# Patient Record
Sex: FEMALE | Race: BLACK OR AFRICAN AMERICAN | NOT HISPANIC OR LATINO | Employment: OTHER | URBAN - METROPOLITAN AREA
[De-identification: names, ages, dates, MRNs, and addresses within clinical notes are randomized per-mention and may not be internally consistent; named-entity substitution may affect disease eponyms.]

---

## 2017-05-10 ENCOUNTER — OFFICE VISIT (OUTPATIENT)
Dept: FAMILY MEDICINE | Facility: CLINIC | Age: 82
End: 2017-05-10
Payer: MEDICARE

## 2017-05-10 ENCOUNTER — HOSPITAL ENCOUNTER (OUTPATIENT)
Dept: RADIOLOGY | Facility: HOSPITAL | Age: 82
Discharge: HOME | End: 2017-05-10
Attending: FAMILY MEDICINE
Payer: MEDICARE

## 2017-05-10 VITALS
WEIGHT: 154.13 LBS | HEIGHT: 59 IN | HEART RATE: 69 BPM | OXYGEN SATURATION: 98 % | TEMPERATURE: 98 F | SYSTOLIC BLOOD PRESSURE: 154 MMHG | DIASTOLIC BLOOD PRESSURE: 106 MMHG | BODY MASS INDEX: 31.07 KG/M2

## 2017-05-10 DIAGNOSIS — M25.552 LEFT HIP PAIN: ICD-10-CM

## 2017-05-10 DIAGNOSIS — M25.562 ACUTE PAIN OF LEFT KNEE: ICD-10-CM

## 2017-05-10 DIAGNOSIS — R53.82 CHRONIC FATIGUE: ICD-10-CM

## 2017-05-10 DIAGNOSIS — R79.9 ABNORMAL FINDING OF BLOOD CHEMISTRY: ICD-10-CM

## 2017-05-10 DIAGNOSIS — Z01.818 PRE-OP EVALUATION: Primary | ICD-10-CM

## 2017-05-10 DIAGNOSIS — Z01.818 PRE-OP EVALUATION: ICD-10-CM

## 2017-05-10 DIAGNOSIS — R03.0 ELEVATED BLOOD PRESSURE READING: ICD-10-CM

## 2017-05-10 PROCEDURE — 73521 X-RAY EXAM HIPS BI 2 VIEWS: CPT | Mod: TC,PO

## 2017-05-10 PROCEDURE — 99205 OFFICE O/P NEW HI 60 MIN: CPT | Mod: S$PBB,,, | Performed by: FAMILY MEDICINE

## 2017-05-10 PROCEDURE — 73521 X-RAY EXAM HIPS BI 2 VIEWS: CPT | Mod: 26,,, | Performed by: RADIOLOGY

## 2017-05-10 PROCEDURE — 71020 XR CHEST PA AND LATERAL: CPT | Mod: TC,PO

## 2017-05-10 PROCEDURE — 99999 PR PBB SHADOW E&M-NEW PATIENT-LVL III: CPT | Mod: PBBFAC,,, | Performed by: FAMILY MEDICINE

## 2017-05-10 PROCEDURE — 73560 X-RAY EXAM OF KNEE 1 OR 2: CPT | Mod: TC,50,PO

## 2017-05-10 PROCEDURE — 71020 XR CHEST PA AND LATERAL: CPT | Mod: 26,,, | Performed by: RADIOLOGY

## 2017-05-10 PROCEDURE — 73560 X-RAY EXAM OF KNEE 1 OR 2: CPT | Mod: 26,50,, | Performed by: RADIOLOGY

## 2017-05-10 NOTE — PROGRESS NOTES
[facility]  Progress Note    SUBJECTIVE:     Chief Complaint   Patient presents with    Pre-op Exam    Knee Pain    Hip Pain       HPI   Mikaela Ghosh  is a 84 y.o. female here for pre-op evaluation for cataract surgery. She is having them done next month, 1 eye first, and another 2 weeks after. She also has c/o left knee/hip pain for the past couple months. It is on/off. Certain positions make it worst. Still able to ambulate without problems. Rates it as mild/moderate. No trauma/falls.    Review of patient's allergies indicates:  No Known Allergies    History reviewed. No pertinent past medical history.  Past Surgical History:   Procedure Laterality Date    HYSTERECTOMY       History reviewed. No pertinent family history.  Social History   Substance Use Topics    Smoking status: Never Smoker    Smokeless tobacco: None    Alcohol use No        Review of Systems   Constitutional: Negative for chills, diaphoresis, fever and malaise/fatigue.   HENT: Negative.  Negative for congestion, ear pain and sore throat.    Eyes: Negative for photophobia and discharge.   Respiratory: Negative.  Negative for cough, shortness of breath and wheezing.    Cardiovascular: Negative.  Negative for chest pain and palpitations.   Gastrointestinal: Negative.  Negative for abdominal pain, constipation, diarrhea, nausea and vomiting.   Genitourinary: Negative.  Negative for dysuria and hematuria.   Musculoskeletal: Positive for joint pain (left hip, knee). Negative for back pain and myalgias.   Skin: Negative for itching and rash.   Neurological: Negative for dizziness, sensory change, focal weakness, weakness and headaches.   All other systems reviewed and are negative.    OBJECTIVE:     Vitals:    05/10/17 0841   BP: (!) 154/106   Pulse: 69   Temp: 97.6 °F (36.4 °C)     Body mass index is 31.12 kg/(m^2).    Physical Exam   Constitutional: She is oriented to person, place, and time and well-developed, well-nourished, and  in no distress. No distress.   HENT:   Head: Normocephalic and atraumatic.   Right Ear: Tympanic membrane is not perforated, not erythematous and not bulging. No hemotympanum.   Left Ear: Tympanic membrane is not perforated, not erythematous and not bulging. No hemotympanum.   Mouth/Throat: Oropharynx is clear and moist. No oropharyngeal exudate.   Eyes: Conjunctivae and EOM are normal. Pupils are equal, round, and reactive to light.   Neck: No thyromegaly present.   Cardiovascular: Normal rate, regular rhythm and normal heart sounds.  Exam reveals no gallop and no friction rub.    No murmur heard.  Pulmonary/Chest: Effort normal and breath sounds normal. No respiratory distress. She has no wheezes. She has no rales.   Abdominal: Soft. Bowel sounds are normal. She exhibits no distension. There is no tenderness. There is no rebound and no guarding.   Musculoskeletal: Normal range of motion. She exhibits no edema or tenderness (no point tenderness of left hip).   + mild crepitus of left knee, good ROM. No obvious deformities.    Lymphadenopathy:     She has no cervical adenopathy.   Neurological: She is alert and oriented to person, place, and time.   Skin: Skin is warm. No rash noted. She is not diaphoretic. No erythema.       Old records were reviewed. Labs and/or images were independently reviewed.    ASSESSMENT     1. Pre-op evaluation    2. Abnormal finding of blood chemistry     3. Chronic fatigue     4. Left hip pain    5. Acute pain of left knee    6. Elevated blood pressure reading        PLAN:     Pre-op evaluation  -     CBC auto differential; Future  -     Comprehensive metabolic panel; Future  -     Hemoglobin A1c; Future  -     Lipid panel; Future  -     TSH; Future  -     Urinalysis; Future  -     Cancel: X-Ray Chest 1 View; Future; Expected date: 5/10/17  -     X-Ray Chest PA And Lateral; Future; Expected date: 5/10/17  -     Patient low risk for low risk cataract surgery. Please proceed with caution.  Advised to not take advil/aleve or NSAIDs 10 days prior to surgery.    Left hip pain  -     X-Ray Hips Bilateral 2 View Incl AP Pelvis; Future; Expected date: 5/10/17    Acute pain of left knee  -     X-ray AP Standing Knees with Left Lateral; Future; Expected date: 5/10/17  -     Consider knee injection. Will check labs first to see what next best step in management is.    Elevated blood pressure   -     Educated patient to take medications as prescribed, and to record bp logs daily to bring along to next visit. Instructed patient to decrease salt intake. Also told patient to call if any new signs or symptoms develop.    RTC PRN 2 weeks for lab/bp follow-up.    Patient low risk for low risk cataract surgery. Please proceed with caution.    Felipe Mustafa MD  05/10/2017 10:35 AM

## 2017-05-24 ENCOUNTER — OFFICE VISIT (OUTPATIENT)
Dept: FAMILY MEDICINE | Facility: CLINIC | Age: 82
End: 2017-05-24
Payer: MEDICARE

## 2017-05-24 VITALS
HEART RATE: 80 BPM | DIASTOLIC BLOOD PRESSURE: 102 MMHG | SYSTOLIC BLOOD PRESSURE: 146 MMHG | OXYGEN SATURATION: 98 % | WEIGHT: 148.94 LBS | TEMPERATURE: 98 F | BODY MASS INDEX: 30.03 KG/M2 | HEIGHT: 59 IN

## 2017-05-24 DIAGNOSIS — I10 ESSENTIAL HYPERTENSION, BENIGN: Primary | ICD-10-CM

## 2017-05-24 PROCEDURE — 99214 OFFICE O/P EST MOD 30 MIN: CPT | Mod: S$PBB,,, | Performed by: FAMILY MEDICINE

## 2017-05-24 PROCEDURE — 99999 PR PBB SHADOW E&M-EST. PATIENT-LVL III: CPT | Mod: PBBFAC,,, | Performed by: FAMILY MEDICINE

## 2017-05-24 PROCEDURE — 99213 OFFICE O/P EST LOW 20 MIN: CPT | Mod: PBBFAC,PO | Performed by: FAMILY MEDICINE

## 2017-05-24 RX ORDER — AMLODIPINE BESYLATE 10 MG/1
10 TABLET ORAL DAILY
Qty: 30 TABLET | Refills: 3 | Status: SHIPPED | OUTPATIENT
Start: 2017-05-24 | End: 2018-04-18 | Stop reason: SDUPTHER

## 2017-05-24 NOTE — PROGRESS NOTES
[facility]  Progress Note    SUBJECTIVE:     Chief Complaint   Patient presents with    Hypertension       HPI   Mikaela Ghosh  is a 84 y.o. female here for follow-up of her hypertension. Her blood pressure numbers have been elevated. She has scheduled cataract surgery next month. No other problems/complaints.     Review of patient's allergies indicates:  No Known Allergies    History reviewed. No pertinent past medical history.  Past Surgical History:   Procedure Laterality Date    HYSTERECTOMY       History reviewed. No pertinent family history.  Social History   Substance Use Topics    Smoking status: Never Smoker    Smokeless tobacco: Not on file    Alcohol use No        Review of Systems   Constitutional: Negative for chills, fever and malaise/fatigue.   HENT: Negative.    Respiratory: Negative.  Negative for cough and shortness of breath.    Cardiovascular: Negative.  Negative for chest pain.   Gastrointestinal: Negative.  Negative for abdominal pain, nausea and vomiting.   Genitourinary: Negative.    Neurological: Negative for weakness and headaches.   All other systems reviewed and are negative.    OBJECTIVE:     Vitals:    05/24/17 1524   BP: (!) 146/102   Pulse: 80   Temp: 98.3 °F (36.8 °C)     Body mass index is 30.08 kg/m².    Physical Exam   Constitutional: She is oriented to person, place, and time and well-developed, well-nourished, and in no distress. No distress.   HENT:   Head: Normocephalic and atraumatic.   Eyes: Conjunctivae and EOM are normal.   Cardiovascular: Normal rate, regular rhythm and normal heart sounds.  Exam reveals no gallop and no friction rub.    No murmur heard.  Pulmonary/Chest: Effort normal and breath sounds normal. No respiratory distress. She has no wheezes. She has no rales.   Abdominal: Soft. Bowel sounds are normal. She exhibits no distension. There is no tenderness.   Neurological: She is alert and oriented to person, place, and time.   Skin: Skin is  warm. She is not diaphoretic.       Old records were reviewed. Labs and/or images were independently reviewed.    ASSESSMENT     1. Essential hypertension, benign        PLAN:     Essential hypertension, benign  -     Start amlodipine (NORVASC) 10 MG tablet; Take 1 tablet (10 mg total) by mouth once daily.  Dispense: 30 tablet; Refill: 3  -     Educated patient to take medications as prescribed, and to record bp logs daily to bring along to next visit. Instructed patient to decrease salt intake. Also told patient to call if any new signs or symptoms develop.    RTC PRN. After cataract surgery.    More than 50% of the encounter was spent counseling patient about HTN, in an outpatient setting. Total time spent counseling patient: 25.    Felipe Mustafa MD  05/24/2017 3:52 PM

## 2018-04-18 ENCOUNTER — HOSPITAL ENCOUNTER (OUTPATIENT)
Dept: RADIOLOGY | Facility: HOSPITAL | Age: 83
Discharge: HOME | DRG: 025 | End: 2018-04-18
Attending: FAMILY MEDICINE
Payer: MEDICARE

## 2018-04-18 ENCOUNTER — OFFICE VISIT (OUTPATIENT)
Dept: FAMILY MEDICINE | Facility: CLINIC | Age: 83
DRG: 025 | End: 2018-04-18
Payer: MEDICARE

## 2018-04-18 ENCOUNTER — HOSPITAL ENCOUNTER (INPATIENT)
Facility: HOSPITAL | Age: 83
LOS: 8 days | Discharge: NURSING, REHAB OR LONG-TERM CARE | DRG: 025 | End: 2018-04-26
Attending: EMERGENCY MEDICINE | Admitting: EMERGENCY MEDICINE
Payer: MEDICARE

## 2018-04-18 VITALS
HEIGHT: 59 IN | WEIGHT: 145.75 LBS | HEART RATE: 72 BPM | BODY MASS INDEX: 29.38 KG/M2 | SYSTOLIC BLOOD PRESSURE: 154 MMHG | OXYGEN SATURATION: 99 % | TEMPERATURE: 98 F | DIASTOLIC BLOOD PRESSURE: 102 MMHG

## 2018-04-18 DIAGNOSIS — I62.9 INTRACRANIAL BLEED: ICD-10-CM

## 2018-04-18 DIAGNOSIS — I51.7 CARDIOMEGALY: ICD-10-CM

## 2018-04-18 DIAGNOSIS — R29.818 FOCAL NEUROLOGICAL DEFICIT: Primary | ICD-10-CM

## 2018-04-18 DIAGNOSIS — R29.818 FOCAL NEUROLOGICAL DEFICIT: ICD-10-CM

## 2018-04-18 DIAGNOSIS — I62.03 ACUTE ON CHRONIC INTRACRANIAL SUBDURAL HEMATOMA: ICD-10-CM

## 2018-04-18 DIAGNOSIS — I63.9 STROKE: ICD-10-CM

## 2018-04-18 DIAGNOSIS — S06.5XAA SDH (SUBDURAL HEMATOMA): ICD-10-CM

## 2018-04-18 DIAGNOSIS — Z29.89 SEIZURE PROPHYLAXIS: ICD-10-CM

## 2018-04-18 DIAGNOSIS — I62.01 ACUTE ON CHRONIC INTRACRANIAL SUBDURAL HEMATOMA: ICD-10-CM

## 2018-04-18 DIAGNOSIS — S06.5XAA SUBDURAL HEMATOMA: Primary | ICD-10-CM

## 2018-04-18 DIAGNOSIS — I10 ESSENTIAL HYPERTENSION, BENIGN: ICD-10-CM

## 2018-04-18 LAB
ALBUMIN SERPL BCP-MCNC: 3.3 G/DL
ALBUMIN SERPL BCP-MCNC: 3.7 G/DL
ALP SERPL-CCNC: 65 U/L
ALP SERPL-CCNC: 68 U/L
ALT SERPL W/O P-5'-P-CCNC: 13 U/L
ALT SERPL W/O P-5'-P-CCNC: 15 U/L
ANION GAP SERPL CALC-SCNC: 10 MMOL/L
ANION GAP SERPL CALC-SCNC: 7 MMOL/L
APTT BLDCRRT: 28.1 SEC
AST SERPL-CCNC: 17 U/L
AST SERPL-CCNC: 18 U/L
BASOPHILS # BLD AUTO: 0.03 K/UL
BASOPHILS # BLD AUTO: 0.04 K/UL
BASOPHILS NFR BLD: 0.5 %
BASOPHILS NFR BLD: 0.8 %
BILIRUB SERPL-MCNC: 0.7 MG/DL
BILIRUB SERPL-MCNC: 0.7 MG/DL
BUN SERPL-MCNC: 16 MG/DL
BUN SERPL-MCNC: 17 MG/DL
CALCIUM SERPL-MCNC: 11.4 MG/DL
CALCIUM SERPL-MCNC: 12 MG/DL
CHLORIDE SERPL-SCNC: 109 MMOL/L
CHLORIDE SERPL-SCNC: 113 MMOL/L
CHOLEST SERPL-MCNC: 168 MG/DL
CHOLEST/HDLC SERPL: 3.1 {RATIO}
CK MB SERPL-MCNC: 2.2 NG/ML
CK MB SERPL-RTO: 2.8 %
CK SERPL-CCNC: 78 U/L
CO2 SERPL-SCNC: 19 MMOL/L
CO2 SERPL-SCNC: 24 MMOL/L
CREAT SERPL-MCNC: 0.7 MG/DL
CREAT SERPL-MCNC: 0.9 MG/DL
DIFFERENTIAL METHOD: ABNORMAL
DIFFERENTIAL METHOD: ABNORMAL
EOSINOPHIL # BLD AUTO: 0.1 K/UL
EOSINOPHIL # BLD AUTO: 0.1 K/UL
EOSINOPHIL NFR BLD: 1.6 %
EOSINOPHIL NFR BLD: 1.8 %
ERYTHROCYTE [DISTWIDTH] IN BLOOD BY AUTOMATED COUNT: 15 %
ERYTHROCYTE [DISTWIDTH] IN BLOOD BY AUTOMATED COUNT: 15.4 %
EST. GFR  (AFRICAN AMERICAN): >60 ML/MIN/1.73 M^2
EST. GFR  (AFRICAN AMERICAN): >60 ML/MIN/1.73 M^2
EST. GFR  (NON AFRICAN AMERICAN): 59 ML/MIN/1.73 M^2
EST. GFR  (NON AFRICAN AMERICAN): >60 ML/MIN/1.73 M^2
ESTIMATED AVG GLUCOSE: 100 MG/DL
GLUCOSE SERPL-MCNC: 74 MG/DL
GLUCOSE SERPL-MCNC: 75 MG/DL
HBA1C MFR BLD HPLC: 5.1 %
HCT VFR BLD AUTO: 40.4 %
HCT VFR BLD AUTO: 40.9 %
HDLC SERPL-MCNC: 55 MG/DL
HDLC SERPL: 32.7 %
HGB BLD-MCNC: 13.1 G/DL
HGB BLD-MCNC: 13.2 G/DL
IMM GRANULOCYTES # BLD AUTO: 0.02 K/UL
IMM GRANULOCYTES NFR BLD AUTO: 0.4 %
INR PPP: 1
INR PPP: 1.1
INR PPP: 1.1
LDLC SERPL CALC-MCNC: 100 MG/DL
LYMPHOCYTES # BLD AUTO: 1.4 K/UL
LYMPHOCYTES # BLD AUTO: 1.7 K/UL
LYMPHOCYTES NFR BLD: 28 %
LYMPHOCYTES NFR BLD: 31.1 %
MAGNESIUM SERPL-MCNC: 2.1 MG/DL
MCH RBC QN AUTO: 31 PG
MCH RBC QN AUTO: 31.1 PG
MCHC RBC AUTO-ENTMCNC: 32 G/DL
MCHC RBC AUTO-ENTMCNC: 32.7 G/DL
MCV RBC AUTO: 95 FL
MCV RBC AUTO: 97 FL
MONOCYTES # BLD AUTO: 0.5 K/UL
MONOCYTES # BLD AUTO: 0.6 K/UL
MONOCYTES NFR BLD: 11.3 %
MONOCYTES NFR BLD: 9.9 %
NEUTROPHILS # BLD AUTO: 3 K/UL
NEUTROPHILS # BLD AUTO: 3.1 K/UL
NEUTROPHILS NFR BLD: 56.5 %
NEUTROPHILS NFR BLD: 57.9 %
NONHDLC SERPL-MCNC: 113 MG/DL
NRBC BLD-RTO: 0 /100 WBC
PHOSPHATE SERPL-MCNC: 1.6 MG/DL
PLATELET # BLD AUTO: 162 K/UL
PLATELET # BLD AUTO: 164 K/UL
PMV BLD AUTO: 11.2 FL
PMV BLD AUTO: 11.5 FL
POCT GLUCOSE: 80 MG/DL (ref 70–110)
POTASSIUM SERPL-SCNC: 3.6 MMOL/L
POTASSIUM SERPL-SCNC: 3.8 MMOL/L
PROT SERPL-MCNC: 6.5 G/DL
PROT SERPL-MCNC: 7.1 G/DL
PROTHROMBIN TIME: 10.4 SEC
PROTHROMBIN TIME: 11 SEC
PROTHROMBIN TIME: 11.2 SEC
RBC # BLD AUTO: 4.22 M/UL
RBC # BLD AUTO: 4.25 M/UL
SODIUM SERPL-SCNC: 140 MMOL/L
SODIUM SERPL-SCNC: 142 MMOL/L
TRIGL SERPL-MCNC: 65 MG/DL
TROPONIN I SERPL DL<=0.01 NG/ML-MCNC: 0.01 NG/ML
TSH SERPL DL<=0.005 MIU/L-ACNC: 1.23 UIU/ML
WBC # BLD AUTO: 5.14 K/UL
WBC # BLD AUTO: 5.46 K/UL

## 2018-04-18 PROCEDURE — 96375 TX/PRO/DX INJ NEW DRUG ADDON: CPT

## 2018-04-18 PROCEDURE — 85025 COMPLETE CBC W/AUTO DIFF WBC: CPT

## 2018-04-18 PROCEDURE — 82553 CREATINE MB FRACTION: CPT

## 2018-04-18 PROCEDURE — 83735 ASSAY OF MAGNESIUM: CPT

## 2018-04-18 PROCEDURE — 70450 CT HEAD/BRAIN W/O DYE: CPT | Mod: TC

## 2018-04-18 PROCEDURE — 70450 CT HEAD/BRAIN W/O DYE: CPT | Mod: 26,,, | Performed by: RADIOLOGY

## 2018-04-18 PROCEDURE — 93005 ELECTROCARDIOGRAM TRACING: CPT

## 2018-04-18 PROCEDURE — 25000003 PHARM REV CODE 250: Performed by: NURSE PRACTITIONER

## 2018-04-18 PROCEDURE — 93010 ELECTROCARDIOGRAM REPORT: CPT | Mod: ,,, | Performed by: INTERNAL MEDICINE

## 2018-04-18 PROCEDURE — 85610 PROTHROMBIN TIME: CPT | Mod: 91

## 2018-04-18 PROCEDURE — 99999 PR PBB SHADOW E&M-EST. PATIENT-LVL III: CPT | Mod: PBBFAC,,, | Performed by: FAMILY MEDICINE

## 2018-04-18 PROCEDURE — 80053 COMPREHEN METABOLIC PANEL: CPT | Mod: 91

## 2018-04-18 PROCEDURE — 84100 ASSAY OF PHOSPHORUS: CPT

## 2018-04-18 PROCEDURE — 99285 EMERGENCY DEPT VISIT HI MDM: CPT | Mod: 25,27

## 2018-04-18 PROCEDURE — 82550 ASSAY OF CK (CPK): CPT

## 2018-04-18 PROCEDURE — 80061 LIPID PANEL: CPT

## 2018-04-18 PROCEDURE — 99222 1ST HOSP IP/OBS MODERATE 55: CPT | Mod: GC,,, | Performed by: NEUROLOGICAL SURGERY

## 2018-04-18 PROCEDURE — 80053 COMPREHEN METABOLIC PANEL: CPT

## 2018-04-18 PROCEDURE — 83036 HEMOGLOBIN GLYCOSYLATED A1C: CPT

## 2018-04-18 PROCEDURE — 84443 ASSAY THYROID STIM HORMONE: CPT

## 2018-04-18 PROCEDURE — 99291 CRITICAL CARE FIRST HOUR: CPT | Mod: ,,, | Performed by: NURSE PRACTITIONER

## 2018-04-18 PROCEDURE — 85610 PROTHROMBIN TIME: CPT

## 2018-04-18 PROCEDURE — 84484 ASSAY OF TROPONIN QUANT: CPT

## 2018-04-18 PROCEDURE — 85025 COMPLETE CBC W/AUTO DIFF WBC: CPT | Mod: 91

## 2018-04-18 PROCEDURE — 12000002 HC ACUTE/MED SURGE SEMI-PRIVATE ROOM

## 2018-04-18 PROCEDURE — 63600175 PHARM REV CODE 636 W HCPCS: Performed by: EMERGENCY MEDICINE

## 2018-04-18 PROCEDURE — 96374 THER/PROPH/DIAG INJ IV PUSH: CPT

## 2018-04-18 PROCEDURE — 82962 GLUCOSE BLOOD TEST: CPT

## 2018-04-18 PROCEDURE — 99213 OFFICE O/P EST LOW 20 MIN: CPT | Mod: PBBFAC,PO | Performed by: FAMILY MEDICINE

## 2018-04-18 PROCEDURE — 99215 OFFICE O/P EST HI 40 MIN: CPT | Mod: S$PBB,,, | Performed by: FAMILY MEDICINE

## 2018-04-18 PROCEDURE — 85730 THROMBOPLASTIN TIME PARTIAL: CPT

## 2018-04-18 RX ORDER — ACETAMINOPHEN 325 MG/1
650 TABLET ORAL EVERY 6 HOURS PRN
Status: DISCONTINUED | OUTPATIENT
Start: 2018-04-18 | End: 2018-04-26 | Stop reason: HOSPADM

## 2018-04-18 RX ORDER — SODIUM CHLORIDE 9 MG/ML
INJECTION, SOLUTION INTRAVENOUS CONTINUOUS
Status: DISCONTINUED | OUTPATIENT
Start: 2018-04-18 | End: 2018-04-23

## 2018-04-18 RX ORDER — LABETALOL HYDROCHLORIDE 5 MG/ML
10 INJECTION, SOLUTION INTRAVENOUS EVERY 4 HOURS PRN
Status: DISCONTINUED | OUTPATIENT
Start: 2018-04-18 | End: 2018-04-19

## 2018-04-18 RX ORDER — AMLODIPINE BESYLATE 10 MG/1
10 TABLET ORAL DAILY
Qty: 90 TABLET | Refills: 3 | Status: SHIPPED | OUTPATIENT
Start: 2018-04-18 | End: 2018-05-21 | Stop reason: SDUPTHER

## 2018-04-18 RX ORDER — HYDRALAZINE HYDROCHLORIDE 20 MG/ML
10 INJECTION INTRAMUSCULAR; INTRAVENOUS
Status: COMPLETED | OUTPATIENT
Start: 2018-04-18 | End: 2018-04-18

## 2018-04-18 RX ADMIN — HYDRALAZINE HYDROCHLORIDE 10 MG: 20 INJECTION INTRAMUSCULAR; INTRAVENOUS at 05:04

## 2018-04-18 RX ADMIN — POTASSIUM PHOSPHATE, MONOBASIC AND POTASSIUM PHOSPHATE, DIBASIC 30 MMOL: 224; 236 INJECTION, SOLUTION, CONCENTRATE INTRAVENOUS at 09:04

## 2018-04-18 RX ADMIN — SODIUM CHLORIDE: 0.9 INJECTION, SOLUTION INTRAVENOUS at 08:04

## 2018-04-18 NOTE — ASSESSMENT & PLAN NOTE
84 F with subacute left convexity SDH  -Admit to neuro ICU  -SBP less than 160  -Keppra  -NPO for now  -q 1 hr neuro checks.  -Repeat CT head tomorrow am.

## 2018-04-18 NOTE — SUBJECTIVE & OBJECTIVE
(Not in a hospital admission)    Review of patient's allergies indicates:  No Known Allergies    Past Medical History:   Diagnosis Date    Hypertension      Past Surgical History:   Procedure Laterality Date    EYE SURGERY      cataract    HYSTERECTOMY       Family History     None        Social History Main Topics    Smoking status: Never Smoker    Smokeless tobacco: Never Used    Alcohol use No    Drug use: No    Sexual activity: No     Review of Systems  Objective:     Weight: 65.8 kg (145 lb)  Body mass index is 26.52 kg/m².  Vital Signs (Most Recent):  Temp: 98.7 °F (37.1 °C) (04/18/18 1810)  Pulse: 100 (04/18/18 1810)  Resp: (!) 22 (04/18/18 1810)  BP: (!) 131/92 (04/18/18 1810)  SpO2: 99 % (04/18/18 1810) Vital Signs (24h Range):  Temp:  [97.7 °F (36.5 °C)-98.7 °F (37.1 °C)] 98.7 °F (37.1 °C)  Pulse:  [] 100  Resp:  [18-22] 22  SpO2:  [97 %-99 %] 99 %  BP: (131-202)/() 131/92                           Neurosurgery Physical Exam   AOX3, speech fluent  PERRL, EOMI, face symm, tongue midline  5/5 in BUE  4/5 in right HF otherwise 5/5 in BLE  SILT  No drift    Significant Labs:    Recent Labs  Lab 04/18/18  1600   GLU 75      K 3.8      CO2 24   BUN 17   CREATININE 0.9   CALCIUM 12.0*       Recent Labs  Lab 04/18/18  1600   WBC 5.14   HGB 13.2   HCT 40.4          Recent Labs  Lab 04/18/18  1600   INR 1.0   APTT 28.1     Microbiology Results (last 7 days)     ** No results found for the last 168 hours. **            Significant Diagnostics:  CT head: reviewed

## 2018-04-18 NOTE — HOSPITAL COURSE
4/18: Admit to [facility]. HCT shows a large left SDH.   4/19: NAEON. Repeat HCT shows stable large left subdural hematoma. Stable mass effect and midline shift.   4/20: OR for crani for SDH evacuation. SD drain left intra op.   4/21: No acute events overnight. Exam stable. Post op HCT shows decrease in size, shift, and mass effect of SDH.   4/22: No acute events overnight. Exam stable. SD drain removed without complication. Patient tolerated removal well. HCT s/p removal of drain is stable.   4/24: No acute events overnight. Exam stable.  Stable for TTF per NGSY. HCT stable - slight decrease in size of collection.   4/25: No acute events overnight. Exam stable. PT/OT recommending SNF. CM to give choices today.  4/26: No acute events overnight. Exam stable. DC to [facility]. Discharge instructions given verbally to patient and family. All of their questions were answered. They were encouraged to call the clinic with any questions or concerns prior to follow up appt.

## 2018-04-18 NOTE — ED TRIAGE NOTES
Patient transferred from [facility] today for he presented to their ED from dizziness, leg weakness, and leg weakness.  Patient was transferred to us for Subdural hematoma with midline shift.

## 2018-04-18 NOTE — ED TRIAGE NOTES
Pt to the ED via personal vehicle with c/o Subdural hematoma. Pt reports symptoms began Sunday (+dizziness, R arm and leg weakness, HTN). Pt was seen by MD and they ordered a CT. MD then called pt and told to come to ED due to brain bleed. No acute distress noted. R sided weakness noted.

## 2018-04-18 NOTE — PROGRESS NOTES
"[facility]  Progress Note    SUBJECTIVE:     Chief Complaint   Patient presents with    Leg Pain    Dizziness       HPI   Mikaela Ghosh  is a 84 y.o. female brought in by daughter who is primary caretaker and historian. Patient has been feeling dizziness for the past week. Patient had called her daughter and said that she was having some right leg pain and weakness. When she walks, she "feels her right leg catch" and feels off balanced, dragging her right foot most of the time. She feels like her right hand  has significantly weakened, dropping a lot of things when she tries to hold it, like cups. Patient thinks this all may have started about 3 weeks ago.     Review of patient's allergies indicates:  No Known Allergies    History reviewed. No pertinent past medical history.  Past Surgical History:   Procedure Laterality Date    HYSTERECTOMY       History reviewed. No pertinent family history.  Social History   Substance Use Topics    Smoking status: Never Smoker    Smokeless tobacco: Never Used    Alcohol use No        Review of Systems   Constitutional: Negative for chills, fever and malaise/fatigue.   HENT: Negative.    Respiratory: Negative.  Negative for cough and shortness of breath.    Cardiovascular: Negative.  Negative for chest pain.   Gastrointestinal: Negative.  Negative for abdominal pain, nausea and vomiting.   Genitourinary: Negative.    Neurological: Positive for dizziness, focal weakness and weakness (of right upper/lower extremity). Negative for tingling, sensory change, speech change, loss of consciousness and headaches.   All other systems reviewed and are negative.    OBJECTIVE:     Vitals:    04/18/18 1307   BP: (!) 154/102   Pulse: 72   Temp: 97.7 °F (36.5 °C)     Body mass index is 29.43 kg/m².    Physical Exam   Constitutional: She is oriented to person, place, and time and well-developed, well-nourished, and in no distress. No distress.   HENT:   Head: Normocephalic and " atraumatic.   Nose: Nose normal.   Eyes: Conjunctivae and EOM are normal.   Cardiovascular: Normal rate, regular rhythm and normal heart sounds.  Exam reveals no gallop and no friction rub.    No murmur heard.  Pulmonary/Chest: Effort normal and breath sounds normal. No respiratory distress. She has no wheezes. She has no rales. She exhibits no tenderness.   Abdominal: Soft. Bowel sounds are normal. She exhibits no distension. There is no tenderness. There is no rebound.   Neurological: She is alert and oriented to person, place, and time. No cranial nerve deficit (CN 2-12 grossly intact. ).   + noticeably weaker RUE/RLE when compared to LUE/LLE. Weaker RUE . Abnormal gait, with RLE dragging. Finger to nose ok but slow.    Skin: Skin is warm. She is not diaphoretic.       Old records were reviewed. Labs and/or images were independently reviewed.    ASSESSMENT     1. Focal neurological deficit    2. Essential hypertension, benign        PLAN:     Focal neurological deficit  -     CT Head Without Contrast; Future; Expected date: 04/18/2018  -     With new focal neurological deficit, and not compliant with blood pressure medications, will obtain CT head STAT to rule out stroke.     Essential hypertension, benign  -     amLODIPine (NORVASC) 10 MG tablet; Take 1 tablet (10 mg total) by mouth once daily.  Dispense: 90 tablet; Refill: 3  -     Educated patient to take medications as prescribed, and to record bp logs daily to bring along to next visit. Instructed patient to decrease salt intake. Also told patient to call if any new signs or symptoms develop. Will need RTC within 2-3 weeks for bp management.       Dispo: radiology     Felipe Mustafa MD  04/18/2018 1:28 PM

## 2018-04-18 NOTE — CONSULTS
[facility]  Neurosurgery  Consult Note    Consults  Subjective:     Chief Complaint/Reason for Admission: SDH    History of Present Illness: 84 F presents for eval of SDH.  Per pt she had dizziness 2 days ago and was instructed to get CT head which showed subacute left convexity SDH.  Pt denies any recent falls, traumas, or HA.  She took baby asa 2 days ago but doesn't routinely take asa.      (Not in a hospital admission)    Review of patient's allergies indicates:  No Known Allergies    Past Medical History:   Diagnosis Date    Hypertension      Past Surgical History:   Procedure Laterality Date    EYE SURGERY      cataract    HYSTERECTOMY       Family History     None        Social History Main Topics    Smoking status: Never Smoker    Smokeless tobacco: Never Used    Alcohol use No    Drug use: No    Sexual activity: No     Review of Systems  Objective:     Weight: 65.8 kg (145 lb)  Body mass index is 26.52 kg/m².  Vital Signs (Most Recent):  Temp: 98.7 °F (37.1 °C) (04/18/18 1810)  Pulse: 100 (04/18/18 1810)  Resp: (!) 22 (04/18/18 1810)  BP: (!) 131/92 (04/18/18 1810)  SpO2: 99 % (04/18/18 1810) Vital Signs (24h Range):  Temp:  [97.7 °F (36.5 °C)-98.7 °F (37.1 °C)] 98.7 °F (37.1 °C)  Pulse:  [] 100  Resp:  [18-22] 22  SpO2:  [97 %-99 %] 99 %  BP: (131-202)/() 131/92                           Neurosurgery Physical Exam   AOX3, speech fluent  PERRL, EOMI, face symm, tongue midline  5/5 in BUE  4/5 in right HF otherwise 5/5 in BLE  SILT  No drift    Significant Labs:    Recent Labs  Lab 04/18/18  1600   GLU 75      K 3.8      CO2 24   BUN 17   CREATININE 0.9   CALCIUM 12.0*       Recent Labs  Lab 04/18/18  1600   WBC 5.14   HGB 13.2   HCT 40.4          Recent Labs  Lab 04/18/18  1600   INR 1.0   APTT 28.1     Microbiology Results (last 7 days)     ** No results found for the last 168 hours. **            Significant Diagnostics:  CT head:  reviewed    Assessment/Plan:     * SDH (subdural hematoma)    84 F with subacute left convexity SDH  -Admit to neuro ICU  -SBP less than 160  -Keppra  -NPO for now  -q 1 hr neuro checks.  -Repeat CT head stealth tomorrow am  -Please provide medical clearance for possible intervention                Ramakrishna Garcia,   Neurosurgery  [facility]

## 2018-04-18 NOTE — HPI
84 F presents for eval of SDH.  Per pt she had dizziness 2 days ago and was instructed to get CT head which showed subacute left convexity SDH.  Pt denies any recent falls, traumas, or HA.  She took baby asa 2 days ago but doesn't routinely take asa.

## 2018-04-18 NOTE — ED PROVIDER NOTES
Encounter Date: 4/18/2018    SCRIBE #1 NOTE: I, Krishan Lopez, am scribing for, and in the presence of,  Giorgio Perkins MD. I have scribed the following portions of the note - Other sections scribed: HPI, ROS and PE.       History     Chief Complaint   Patient presents with    Subdural Hematoma     Dr. Mustafa called stating pt had out-pt CT this am.  He recv'd results, called pt, told her to come here.  Pt arrived A&O x 3 in no acute distress.     CC: Subdural Hematoma    HPI: This 84 y.o F with no pertinent PMHx presents to the ED for emergent evaluation of a subdural hematoma. The pt reports an out-pt CT performed this AM revealed a subdural hematoma. The pt was told to come to the ED by her PCP. The pt c/o dizziness, trouble walking, right upper extremity weakness and right lower extremity weakness since 4/15/18 while at work. The pt's daughter reports the pt was unable to  her pencil this AM. The pt is not Rx blood thinners, but did take an Aspirin 4/16/18. The pt denies trauma or fall. The pt denies fever, chills, diaphoresis, visual disturbances, speech difficulty, chest pain, SOB and N/V/D. The pt does not smoke and denies ETOH consumption. No prior tx.      The history is provided by the patient. No  was used.     Review of patient's allergies indicates:  No Known Allergies  Past Medical History:   Diagnosis Date    Hypertension      Past Surgical History:   Procedure Laterality Date    EYE SURGERY      cataract    HYSTERECTOMY       History reviewed. No pertinent family history.  Social History   Substance Use Topics    Smoking status: Never Smoker    Smokeless tobacco: Never Used    Alcohol use No     Review of Systems   Constitutional: Negative for chills, diaphoresis and fever.   HENT: Negative for rhinorrhea and sore throat.    Eyes: Negative for redness and visual disturbance.   Respiratory: Negative for cough and shortness of breath.    Cardiovascular: Negative for  chest pain.   Gastrointestinal: Negative for abdominal pain, diarrhea, nausea and vomiting.   Genitourinary: Negative for dysuria, frequency and urgency.   Musculoskeletal: Positive for gait problem. Negative for back pain and neck pain.   Skin: Negative for rash.   Neurological: Positive for dizziness and weakness.        (+) subdural hematoma   Psychiatric/Behavioral: The patient is not nervous/anxious.        Physical Exam     Initial Vitals   BP Pulse Resp Temp SpO2   -- -- -- -- --      MAP       --         Physical Exam    Nursing note and vitals reviewed.  Constitutional: She appears well-developed and well-nourished.   HENT:   Right Ear: External ear normal.   Left Ear: External ear normal.   Nose: Nose normal.   Mouth/Throat: Oropharynx is clear and moist.   Eyes: Conjunctivae and EOM are normal. Pupils are equal, round, and reactive to light.   Bilateral post surgical changes IOLI.   Neck: Normal range of motion. Neck supple.   Cardiovascular: Normal rate, regular rhythm and normal heart sounds.   Pulmonary/Chest: Breath sounds normal.   Abdominal: Soft. Bowel sounds are normal.   Musculoskeletal: Normal range of motion.   Neurological: She is alert and oriented to person, place, and time. She has normal strength.   Weakness of dorsal flexion of left foot. Otherwise non-focal.   Skin: Skin is warm and dry.   Psychiatric: She has a normal mood and affect. Her behavior is normal. Judgment and thought content normal.         ED Course   Procedures  Labs Reviewed   CBC W/ AUTO DIFFERENTIAL   COMPREHENSIVE METABOLIC PANEL   APTT   PROTIME-INR   POCT GLUCOSE             Medical Decision Making:   Clinical Tests:   Radiological Study: Reviewed  ED Management:  EXAMINATION:  CT HEAD WITHOUT CONTRAST    CLINICAL HISTORY:  Focal neuro deficit, new, fixed or worsening, >6 hours;rule out stroke; Other symptoms and signs involving the nervous system    TECHNIQUE:  Low dose axial images were obtained through the head.   Coronal and sagittal reformations were also performed. Contrast was not administered.    COMPARISON:  None.    FINDINGS:  There is a mixed density lentiform shaped extra-axial subdural hematoma overlying the left frontal and the parietal convexities with maximum thickness of the hematoma approximately 2.3 cm.  There is compression of the high convexities of the left frontal and the parietal lobes with compression of the left lateral ventricle.  There is a approximately 6.4 mm shift of the midline septum pellucidum to the contralateral side.  No significant transtentorial herniation noted.  There is a questionable finding of uncal herniation.    No acute intraventricular hemorrhage noted.  In the right cerebral hemisphere there are scattered hypoattenuation changes from chronic microvascular ischemia.  Within the parenchyma no hemorrhage is noted.  Focal hyperattenuation in the external capsules from chronic microvascular ischemia noted.  Basal ganglia otherwise are unremarkable.    In the posterior fossa there is no cerebellar hemorrhages or Chiari malformations or midline shift or infarctions.    There is a normal cranial vault.  Paranasal air sinuses are clear.    In the left posterior parietal occipital convexity there is a subcutaneus fatty appearing lesion that measures approximately 3.3 by 1 cm.  The lesion is well capsulated and represents most likely a lipoma.  Impression       1. Mixed attenuation subdural hematoma (subacute to chronic) overlying the left frontal and parietal convexities with compression of the left cerebral hemisphere and midline shift of approximately 6.5 mm as above.  2. White matter changes in the centrum from chronic microvascular ischemia.  3. Lipoma over the left parieto-occipital scalp.  This report was flagged in Epic as abnormal.    Results of this test were discussed with Felipe Gaspar at 3.10 p.m. on 04/18/2018 at the time of interpretation of the test.      Electronically  signed by: Rigoberto Gordon MD  Date: 04/18/2018  Time: 15:14    1616h patient is a gCS=15 and other than left foot weakness on dorsiflexion she is nonfocal.   1616h Dr. Sandoval ( neurosurgeon) accepted the patient in transfer ER to ER.            Scribe Attestation:   Scribe #1: I performed the above scribed service and the documentation accurately describes the services I performed. I attest to the accuracy of the note.    Attending Attestation:           Physician Attestation for Scribe:  Physician Attestation Statement for Scribe #1: I, Giorgio Perkins MD, reviewed documentation, as scribed by Krishan Lopez in my presence, and it is both accurate and complete.                    Clinical Impression:   The primary encounter diagnosis was Subdural hematoma. A diagnosis of Intracranial bleed was also pertinent to this visit.

## 2018-04-18 NOTE — ED PROVIDER NOTES
"Encounter Date: 4/18/2018       History     Chief Complaint   Patient presents with    Transfer     SDH 6 mm shift AAO4 GCS 15 R leg weakness      Patient is an 84-year-old female past medical history significant for hypertension, transferred from [facility] for a subdural hematoma with 6 mm midline shift found on outpatient CT imaging done today 4/18/18.  Reportedly patient had a head CT done after complaining of "wobbly walking and right leg weakness." Patient reports that she was experiencing right lower extremity weakness," like my leg was going to give out."  At this time patient does not complain of any pain, weakness, headache, visual disturbances.  Patient denies any falls, loss of consciousness, denies taking blood thinning medications.  Patient states that she did take 2 ibuprofen yesterday.    Patient denies any chest pain, nausea, vomiting or discomfort at this time.  Patient denies any pain in any other region of her body.  Patient denies any sick contacts or recently being ill.          Review of patient's allergies indicates:  No Known Allergies  Past Medical History:   Diagnosis Date    Hypertension      Past Surgical History:   Procedure Laterality Date    EYE SURGERY      cataract    HYSTERECTOMY       History reviewed. No pertinent family history.  Social History   Substance Use Topics    Smoking status: Never Smoker    Smokeless tobacco: Never Used    Alcohol use No     Review of Systems   Constitutional: Negative for fever.   HENT: Negative for sore throat.    Respiratory: Negative for shortness of breath.    Cardiovascular: Negative for chest pain.   Gastrointestinal: Negative for nausea.   Genitourinary: Negative for dysuria.   Musculoskeletal: Negative for back pain, joint swelling and neck stiffness.   Skin: Negative for rash.   Neurological: Positive for weakness (right lower extremity weakness "giving away", right hand grasp decreased ). Negative for dizziness, seizures, " syncope, facial asymmetry, speech difficulty, light-headedness, numbness and headaches.   Hematological: Does not bruise/bleed easily.   Psychiatric/Behavioral: Positive for decreased concentration. Negative for hallucinations, self-injury and suicidal ideas. The patient is not nervous/anxious.        Physical Exam     Initial Vitals   BP Pulse Resp Temp SpO2   04/18/18 1716 04/18/18 1646 04/18/18 1716 04/18/18 1810 04/18/18 1646   (!) 186/89 72 18 98.7 °F (37.1 °C) 98 %      MAP       04/18/18 1716       121.33         Physical Exam    Vitals reviewed.  Constitutional: She appears well-developed and well-nourished. She is not diaphoretic. No distress.   HENT:   Head: Normocephalic and atraumatic.   Mouth/Throat: No oropharyngeal exudate.   Eyes: EOM are normal. Pupils are equal, round, and reactive to light. No scleral icterus.   Neck: Normal range of motion. Neck supple. No JVD present.   Cardiovascular: Normal rate, regular rhythm, normal heart sounds and intact distal pulses.   No murmur heard.  Pulmonary/Chest: Breath sounds normal. No respiratory distress. She has no wheezes.   Abdominal: Soft. Bowel sounds are normal. She exhibits no distension. There is no tenderness.   Musculoskeletal: Normal range of motion. She exhibits no edema.   Neurological: She is alert and oriented to person, place, and time.   Decreased  strength on Right greater than left.  Decreased sensation to touch on right cheek versus left.  Patient unable to do heel-to-shin on right side.    Patient presents with mild confusion   Skin: Skin is warm and dry. Capillary refill takes less than 2 seconds. No erythema.   Psychiatric: She has a normal mood and affect.         ED Course   Procedures  Labs Reviewed   CBC W/ AUTO DIFFERENTIAL - Abnormal; Notable for the following:        Result Value    MCH 31.1 (*)     RDW 15.4 (*)     All other components within normal limits   COMPREHENSIVE METABOLIC PANEL - Abnormal; Notable for the  following:     Calcium 12.0 (*)     Anion Gap 7 (*)     eGFR if non  59 (*)     All other components within normal limits   APTT   PROTIME-INR   POCT GLUCOSE             Medical Decision Making:   Initial Assessment:   Upon initial presentation patient does not appear to be in any acute distress with no gross focal neurological deficits.  Patient with known subdural hematoma found an outside facility with 6 mm midline shift.  EMS reports patient had systolic of over 200 and was given 10 of hydralazine prior to transfer.  Since patient has been nausea main campus, her systolic has been below 160.  Neuro checks every hour with no new neurologic deficits or changes from baseline.  Differential Diagnosis:   Subdural hematoma  Independently Interpreted Test(s):   I have ordered and independently interpreted X-rays - see prior notes.  I have ordered and independently interpreted EKG Reading(s) - see prior notes  Clinical Tests:   Lab Tests: Ordered and Reviewed  Radiological Study: Ordered and Reviewed  Medical Tests: Ordered and Reviewed  ED Management:  After consulting with neurosurgery, cold systolic blood pressure of less than 160.  Patient has not required an intervention at this time.  Will continue to monitor blood pressure q 10 minutes and neuro checks every hour.  Neuro critical care, neurosurgery and stroke all notified the patient is within the facility.  Neuro critical care accepted patient for admission.  Outpatient CT result:    FINDINGS:  There is a mixed density lentiform shaped extra-axial subdural hematoma overlying the left frontal and the parietal convexities with maximum thickness of the hematoma approximately 2.3 cm.  There is compression of the high convexities of the left frontal and the parietal lobes with compression of the left lateral ventricle.  There is a approximately 6.4 mm shift of the midline septum pellucidum to the contralateral side.  No significant transtentorial  herniation noted.  There is a questionable finding of uncal herniation.     No acute intraventricular hemorrhage noted.  In the right cerebral hemisphere there are scattered hypoattenuation changes from chronic microvascular ischemia.  Within the parenchyma no hemorrhage is noted.  Focal hyperattenuation in the external capsules from chronic microvascular ischemia noted.  Basal ganglia otherwise are unremarkable.     In the posterior fossa there is no cerebellar hemorrhages or Chiari malformations or midline shift or infarctions.     There is a normal cranial vault.  Paranasal air sinuses are clear.     In the left posterior parietal occipital convexity there is a subcutaneus fatty appearing lesion that measures approximately 3.3 by 1 cm.  The lesion is well capsulated and represents most likely a lipoma.  Impression         1. Mixed attenuation subdural hematoma (subacute to chronic) overlying the left frontal and parietal convexities with compression of the left cerebral hemisphere and midline shift of approximately 6.5 mm as above.  2. White matter changes in the centrum from chronic microvascular ischemia.  3. Lipoma over the left parieto-occipital scalp.  This report was flagged in Epic as abnormal.                      Clinical Impression:   The primary encounter diagnosis was Subdural hematoma. Diagnoses of Intracranial bleed and SDH (subdural hematoma) were also pertinent to this visit.            patient admitted to neuro critical care                   Chaparrita De Jesus MD  04/19/18 0148

## 2018-04-18 NOTE — PROGRESS NOTES
Received phone call from radiologist, who states that Ms devlin's CT head shows a subdural hematoma with midline shift. I attempted to call patient's phone and emergency contact which was unable to reach. I left a voicemail stating please take Ms. devlin to the nearest emergency room for further evaluation and treatment.

## 2018-04-19 ENCOUNTER — ANESTHESIA EVENT (OUTPATIENT)
Dept: SURGERY | Facility: HOSPITAL | Age: 83
DRG: 025 | End: 2018-04-19
Payer: MEDICARE

## 2018-04-19 PROBLEM — I51.7 LVH (LEFT VENTRICULAR HYPERTROPHY): Status: ACTIVE | Noted: 2018-04-19

## 2018-04-19 PROBLEM — I62.03 ACUTE ON CHRONIC INTRACRANIAL SUBDURAL HEMATOMA: Status: ACTIVE | Noted: 2018-04-19

## 2018-04-19 PROBLEM — I62.01 ACUTE ON CHRONIC INTRACRANIAL SUBDURAL HEMATOMA: Status: ACTIVE | Noted: 2018-04-19

## 2018-04-19 LAB
ALBUMIN SERPL BCP-MCNC: 3.3 G/DL
ALP SERPL-CCNC: 63 U/L
ALT SERPL W/O P-5'-P-CCNC: 14 U/L
ANION GAP SERPL CALC-SCNC: 8 MMOL/L
AST SERPL-CCNC: 26 U/L
BASOPHILS # BLD AUTO: 0.02 K/UL
BASOPHILS NFR BLD: 0.4 %
BILIRUB SERPL-MCNC: 0.8 MG/DL
BUN SERPL-MCNC: 14 MG/DL
CALCIUM SERPL-MCNC: 11 MG/DL
CHLORIDE SERPL-SCNC: 110 MMOL/L
CO2 SERPL-SCNC: 19 MMOL/L
CREAT SERPL-MCNC: 0.7 MG/DL
DIFFERENTIAL METHOD: ABNORMAL
EOSINOPHIL # BLD AUTO: 0 K/UL
EOSINOPHIL NFR BLD: 0.4 %
ERYTHROCYTE [DISTWIDTH] IN BLOOD BY AUTOMATED COUNT: 15.3 %
EST. GFR  (AFRICAN AMERICAN): >60 ML/MIN/1.73 M^2
EST. GFR  (NON AFRICAN AMERICAN): >60 ML/MIN/1.73 M^2
ESTIMATED PA SYSTOLIC PRESSURE: 63.53
GLUCOSE SERPL-MCNC: 96 MG/DL
HCT VFR BLD AUTO: 39.6 %
HGB BLD-MCNC: 12.7 G/DL
IMM GRANULOCYTES # BLD AUTO: 0.01 K/UL
IMM GRANULOCYTES NFR BLD AUTO: 0.2 %
INR PPP: 1.1
LYMPHOCYTES # BLD AUTO: 0.9 K/UL
LYMPHOCYTES NFR BLD: 16.6 %
MAGNESIUM SERPL-MCNC: 2.7 MG/DL
MCH RBC QN AUTO: 31.1 PG
MCHC RBC AUTO-ENTMCNC: 32.1 G/DL
MCV RBC AUTO: 97 FL
MITRAL VALVE MOBILITY: ABNORMAL
MITRAL VALVE REGURGITATION: ABNORMAL
MONOCYTES # BLD AUTO: 0.5 K/UL
MONOCYTES NFR BLD: 9.6 %
NEUTROPHILS # BLD AUTO: 3.8 K/UL
NEUTROPHILS NFR BLD: 72.8 %
NRBC BLD-RTO: 0 /100 WBC
PHOSPHATE SERPL-MCNC: 2.5 MG/DL
PLATELET # BLD AUTO: 160 K/UL
PMV BLD AUTO: 11.5 FL
POCT GLUCOSE: 84 MG/DL (ref 70–110)
POTASSIUM SERPL-SCNC: 4.8 MMOL/L
PROT SERPL-MCNC: 6.8 G/DL
PROTHROMBIN TIME: 11 SEC
RBC # BLD AUTO: 4.08 M/UL
RETIRED EF AND QEF - SEE NOTES: 68 (ref 55–65)
SODIUM SERPL-SCNC: 137 MMOL/L
TRICUSPID VALVE REGURGITATION: ABNORMAL
WBC # BLD AUTO: 5.19 K/UL

## 2018-04-19 PROCEDURE — 85025 COMPLETE CBC W/AUTO DIFF WBC: CPT

## 2018-04-19 PROCEDURE — 94761 N-INVAS EAR/PLS OXIMETRY MLT: CPT

## 2018-04-19 PROCEDURE — 25000003 PHARM REV CODE 250: Performed by: NURSE PRACTITIONER

## 2018-04-19 PROCEDURE — 63600175 PHARM REV CODE 636 W HCPCS: Performed by: NURSE PRACTITIONER

## 2018-04-19 PROCEDURE — 97162 PT EVAL MOD COMPLEX 30 MIN: CPT

## 2018-04-19 PROCEDURE — 97165 OT EVAL LOW COMPLEX 30 MIN: CPT

## 2018-04-19 PROCEDURE — 99233 SBSQ HOSP IP/OBS HIGH 50: CPT | Mod: 57,GC,, | Performed by: NEUROLOGICAL SURGERY

## 2018-04-19 PROCEDURE — 85610 PROTHROMBIN TIME: CPT

## 2018-04-19 PROCEDURE — 80053 COMPREHEN METABOLIC PANEL: CPT

## 2018-04-19 PROCEDURE — 20000000 HC ICU ROOM

## 2018-04-19 PROCEDURE — 83735 ASSAY OF MAGNESIUM: CPT

## 2018-04-19 PROCEDURE — 63600175 PHARM REV CODE 636 W HCPCS: Performed by: STUDENT IN AN ORGANIZED HEALTH CARE EDUCATION/TRAINING PROGRAM

## 2018-04-19 PROCEDURE — 93306 TTE W/DOPPLER COMPLETE: CPT | Mod: 26,,, | Performed by: INTERNAL MEDICINE

## 2018-04-19 PROCEDURE — 97116 GAIT TRAINING THERAPY: CPT

## 2018-04-19 PROCEDURE — 97535 SELF CARE MNGMENT TRAINING: CPT

## 2018-04-19 PROCEDURE — 93306 TTE W/DOPPLER COMPLETE: CPT

## 2018-04-19 PROCEDURE — 97530 THERAPEUTIC ACTIVITIES: CPT

## 2018-04-19 PROCEDURE — 84100 ASSAY OF PHOSPHORUS: CPT

## 2018-04-19 PROCEDURE — 99233 SBSQ HOSP IP/OBS HIGH 50: CPT | Mod: ,,, | Performed by: PSYCHIATRY & NEUROLOGY

## 2018-04-19 RX ORDER — POTASSIUM CHLORIDE 20 MEQ/15ML
40 SOLUTION ORAL
Status: DISCONTINUED | OUTPATIENT
Start: 2018-04-19 | End: 2018-04-26 | Stop reason: HOSPADM

## 2018-04-19 RX ORDER — SODIUM,POTASSIUM PHOSPHATES 280-250MG
2 POWDER IN PACKET (EA) ORAL
Status: DISCONTINUED | OUTPATIENT
Start: 2018-04-19 | End: 2018-04-26 | Stop reason: HOSPADM

## 2018-04-19 RX ORDER — AMLODIPINE BESYLATE 10 MG/1
10 TABLET ORAL DAILY
Status: DISCONTINUED | OUTPATIENT
Start: 2018-04-19 | End: 2018-04-26 | Stop reason: HOSPADM

## 2018-04-19 RX ORDER — HYDRALAZINE HYDROCHLORIDE 20 MG/ML
INJECTION INTRAMUSCULAR; INTRAVENOUS
Status: DISPENSED
Start: 2018-04-19 | End: 2018-04-19

## 2018-04-19 RX ORDER — LABETALOL HYDROCHLORIDE 5 MG/ML
10 INJECTION, SOLUTION INTRAVENOUS EVERY 4 HOURS PRN
Status: DISCONTINUED | OUTPATIENT
Start: 2018-04-19 | End: 2018-04-26 | Stop reason: HOSPADM

## 2018-04-19 RX ORDER — POTASSIUM CHLORIDE 20 MEQ/15ML
60 SOLUTION ORAL
Status: DISCONTINUED | OUTPATIENT
Start: 2018-04-19 | End: 2018-04-26 | Stop reason: HOSPADM

## 2018-04-19 RX ORDER — LEVETIRACETAM 5 MG/ML
500 INJECTION INTRAVASCULAR EVERY 12 HOURS
Status: DISCONTINUED | OUTPATIENT
Start: 2018-04-19 | End: 2018-04-23

## 2018-04-19 RX ORDER — HYDRALAZINE HYDROCHLORIDE 20 MG/ML
10 INJECTION INTRAMUSCULAR; INTRAVENOUS EVERY 4 HOURS PRN
Status: DISCONTINUED | OUTPATIENT
Start: 2018-04-19 | End: 2018-04-26 | Stop reason: HOSPADM

## 2018-04-19 RX ORDER — LANOLIN ALCOHOL/MO/W.PET/CERES
800 CREAM (GRAM) TOPICAL
Status: DISCONTINUED | OUTPATIENT
Start: 2018-04-19 | End: 2018-04-26 | Stop reason: HOSPADM

## 2018-04-19 RX ORDER — AMOXICILLIN 250 MG
1 CAPSULE ORAL DAILY
Status: DISCONTINUED | OUTPATIENT
Start: 2018-04-19 | End: 2018-04-23

## 2018-04-19 RX ADMIN — HYDRALAZINE HYDROCHLORIDE 10 MG: 20 INJECTION INTRAMUSCULAR; INTRAVENOUS at 01:04

## 2018-04-19 RX ADMIN — LABETALOL HYDROCHLORIDE 10 MG: 5 INJECTION, SOLUTION INTRAVENOUS at 05:04

## 2018-04-19 RX ADMIN — AMLODIPINE BESYLATE 10 MG: 10 TABLET ORAL at 10:04

## 2018-04-19 RX ADMIN — HYDRALAZINE HYDROCHLORIDE 10 MG: 20 INJECTION INTRAMUSCULAR; INTRAVENOUS at 06:04

## 2018-04-19 RX ADMIN — LEVETIRACETAM 500 MG: 5 INJECTION INTRAVENOUS at 08:04

## 2018-04-19 RX ADMIN — LEVETIRACETAM 500 MG: 5 INJECTION INTRAVENOUS at 10:04

## 2018-04-19 NOTE — HOSPITAL COURSE
04/18: Admit to NCC.   04/19: NAEON. F/U CTH Stable large subdural hematoma over the left cerebral convexity with local mass effect approximately 6-7 mm of rightward midline shift.  4/20: s/p SDH evacuation. SD drain in place.   4/21: no acute events overnight  4/22: post op day 2. No acute events overnight  4/24 POD #4, EVD drain removed yesterday. Stable for TTF per NGSY

## 2018-04-19 NOTE — ANESTHESIA PREPROCEDURE EVALUATION
04/19/2018  [facility]  Anesthesia Pre-Operative Evaluation         Patient Name: Mikaela Ghosh  YOB: 1933  MRN: [id number]    SUBJECTIVE:     Pre-operative evaluation for Procedure(s) (LRB):  CRANIOTOMY OPEN FOR SUB-DURAL HEMATOMA possible rosanna hole left, with stealth. (Left)     04/19/2018    Mikaela Ghosh is a 84 y.o. female w/ a significant PMHx of HTN, acute on chronic subdural of unknown etiology who presents for the above procedure.  Difficulty using her R arm, consent signed by her daughter in law.    LDA:   Right forearm 20g PIV  Right forearm 20g PIV  Left hand 20g PIV    Prev airway: None documented.    Drips:   NS 50 ml/hr    Patient Active Problem List   Diagnosis    Essential hypertension, benign    Acute on chronic intracranial subdural hematoma    Subdural hematoma       Review of patient's allergies indicates:  No Known Allergies    Current Inpatient Medications:   amLODIPine  10 mg Oral Daily    hydrALAZINE        levetiracetam IVPB  500 mg Intravenous Q12H    senna-docusate 8.6-50 mg  1 tablet Oral Daily       No current facility-administered medications on file prior to encounter.      Current Outpatient Prescriptions on File Prior to Encounter   Medication Sig Dispense Refill    amLODIPine (NORVASC) 10 MG tablet Take 1 tablet (10 mg total) by mouth once daily. 90 tablet 3       Past Surgical History:   Procedure Laterality Date    EYE SURGERY      cataract    HYSTERECTOMY         Social History     Social History    Marital status: Single     Spouse name: N/A    Number of children: N/A    Years of education: N/A     Occupational History    Not on file.     Social History Main Topics    Smoking status: Never Smoker    Smokeless tobacco: Never Used    Alcohol use No    Drug use: No    Sexual activity: No     Other Topics Concern    Not on  file     Social History Narrative    No narrative on file       OBJECTIVE:     Vital Signs Range (Last 24H):  Temp:  [36.5 °C (97.7 °F)-37.1 °C (98.7 °F)]   Pulse:  []   Resp:  [16-37]   BP: (105-202)/()   SpO2:  [96 %-100 %]       CBC:   Recent Labs      04/18/18 1857 04/19/18   0246   WBC  5.46  5.19   RBC  4.22  4.08   HGB  13.1  12.7   HCT  40.9  39.6   PLT  162  160   MCV  97  97   MCH  31.0  31.1*   MCHC  32.0  32.1       CMP:   Recent Labs      04/18/18 1857 04/19/18 0246 04/19/18   1048   NA  142  137   --    K  3.6  4.8   --    CL  113*  110   --    CO2  19*  19*   --    BUN  16  14   --    CREATININE  0.7  0.7   --    GLU  74  96   --    MG  2.1  2.7*   --    PHOS  1.6*   --   2.5*   CALCIUM  11.4*  11.0*   --    ALBUMIN  3.3*  3.3*   --    PROT  6.5  6.8   --    ALKPHOS  65  63   --    ALT  13  14   --    AST  17  26   --    BILITOT  0.7  0.8   --        INR:  Recent Labs      04/18/18   1600  04/18/18 1857 04/18/18 2050 04/19/18   0246   INR  1.0  1.1  1.1  1.1   APTT  28.1   --    --    --        Diagnostic Studies: No relevant studies.    EKG:   Normal sinus rhythm  Left bundle branch block  Abnormal ECG  When compared with ECG of 18-APR-2018 16:00,  No significant change was found  Confirmed by Hilary BARILLAS, Veronica ([id number]) on 4/19/2018 9:17:09 AM    2D ECHO:  Results for orders placed or performed during the hospital encounter of 04/18/18   Echo doppler color flow   Result Value Ref Range    EF 68 55 - 65    Mitral Valve Regurgitation MILD     Est. PA Systolic Pressure 63.53 (A)     Mitral Valve Mobility MASHA     Tricuspid Valve Regurgitation MILD          ASSESSMENT/PLAN:       Anesthesia Evaluation    I have reviewed the Patient Summary Reports.     I have reviewed the Medications.     Review of Systems  Anesthesia Hx:  Denies Family Hx of Anesthesia complications.   Denies Personal Hx of Anesthesia complications.   Social:  Non-Smoker    Hematology/Oncology:  Hematology Normal    Oncology Normal    -- Denies Anemia:    EENT/Dental:EENT/Dental Normal   Cardiovascular:   Hypertension ECG has been reviewed.    Pulmonary:  Pulmonary Normal    Renal/:  Renal/ Normal     Hepatic/GI:  Hepatic/GI Normal    Neurological:   Subdural hematoma   Endocrine:  Endocrine Normal    Psych:  Psychiatric Normal           Physical Exam  General:  Malnutrition    Airway/Jaw/Neck:  Airway Findings: Mouth Opening: < 3 cm Tongue: Normal  General Airway Assessment: Adult, Possible difficult intubation  Mallampati: IV  TM Distance: Normal, at least 6 cm  Jaw/Neck Findings:  Neck ROM: Normal ROM  Neck Findings: Normal    Eyes/Ears/Nose:  EYES/EARS/NOSE FINDINGS: Normal   Dental:  Dental Findings: Periodontal disease, Severe    Chest/Lungs:  Chest/Lungs Findings: Clear to auscultation, Normal Respiratory Rate     Heart/Vascular:  Heart Findings: Rate: Normal  Rhythm: Regular Rhythm  Sounds: Normal  Heart murmur: negative       Mental Status:  Mental Status Findings:  Cooperative, Alert and Oriented         Anesthesia Plan  Type of Anesthesia, risks & benefits discussed:  Anesthesia Type:  general  Patient's Preference:   Intra-op Monitoring Plan: standard ASA monitors and arterial line  Intra-op Monitoring Plan Comments:   Post Op Pain Control Plan: multimodal analgesia  Post Op Pain Control Plan Comments:   Induction:   IV  Beta Blocker:  Patient is not currently on a Beta-Blocker (No further documentation required).       Informed Consent: Patient representative understands risks and agrees with Anesthesia plan.  Questions answered. Anesthesia consent signed with patient representative.  ASA Score: 3     Day of Surgery Review of History & Physical: I have interviewed and examined the patient. I have reviewed the patient's H&P dated:  There are no significant changes.  H&P update referred to the surgeon.         Ready For Surgery From Anesthesia Perspective.

## 2018-04-19 NOTE — ASSESSMENT & PLAN NOTE
ECHO: The left ventricle is normal in size, with an end-diastolic diameter of 3.5 cm, and an end-systolic diameter of 2.4 cm. Wall thickness is increased, with the septum measuring 1.6 cm and the posterior wall measuring 1.4 cm across. Relative wall thickness was increased at 0.80, and the LV mass index was increased at 134.6 g/m2 consistent with mild concentric left ventricular hypertrophy      1 - Normal left ventricular systolic function (EF 65-70%).     2 - No wall motion abnormalities.     3 - Concentric hypertrophy.     4 - Mild left atrial enlargement.     5 - Normal right ventricular systolic function .     6 - MASHA and LVOT gradient as per text.     7 - Mild mitral regurgitation.     8 - Mild tricuspid regurgitation.     9 - Pulmonary hypertension. The estimated PA systolic pressure is 64 mmHg.

## 2018-04-19 NOTE — ASSESSMENT & PLAN NOTE
-- Continue Neuro checks q 1hr  -- Neurosurgery consulted  -- 04/18 OSH CT Head revealed mixed attenuation subdural hematoma (subacute to chronic) overlying the left frontal and parietal convexities with compression of the left cerebral hemisphere and midline shift of approximately 6.5 mm as above.  -- 04/19: CTH ordered for 1 am  -- SBP goal < 160  -- PT/OT/Speech

## 2018-04-19 NOTE — PLAN OF CARE
Problem: Occupational Therapy Goal  Goal: Occupational Therapy Goal  Goals to be met by: 7 days (4/25/18)    Patient will increase functional independence with ADLs by performing:    Feeding with Set-up Assistance (when cleared for diet).  UE Dressing with Set-up Assistance.  LE Dressing with Minimal Assistance.  Grooming while standing at sink with Stand-by Assistance.  Toileting from toilet with Stand-by Assistance for hygiene and clothing management.   Toilet transfer to toilet with Stand-by Assistance.    Outcome: Ongoing (interventions implemented as appropriate)  OT goals set.  ALVA Moise  4/19/2018

## 2018-04-19 NOTE — H&P
[facility]  Neurocritical Care  History & Physical    Admit Date: 4/18/2018  Service Date: 04/18/2018  Length of Stay: 0    Subjective:     Chief Complaint: Acute on chronic intracranial subdural hematoma    History of Present Illness: Ms. Ghosh is 84 y.o. Female with pmhx of HTN who presents to [facility] for a higher level of care for an Acute on Chronic SDH. The patient initially presented to her PCP appt with dizziness and RUE and RLE weakness, which began on 04/15/2018. An outpt CT was performed and revealed mixed attenuation subdural hematoma (subacute to chronic) overlying the left frontal and parietal convexities with compression of the left cerebral hemisphere and midline shift of approximately 6.5 mm as above. The patient was notified and family drove patient to [facility] and was then transported to [facility] ED. Family reported SBP in the 200's before transport to [facility]. The patient did take an Aspirin 4/16/18, but does not take this regularly. The pt denies trauma or fall and does not smoke or consume ETOH.     Past Medical History:   Diagnosis Date    Hypertension      Past Surgical History:   Procedure Laterality Date    EYE SURGERY      cataract    HYSTERECTOMY        No current facility-administered medications on file prior to encounter.      Current Outpatient Prescriptions on File Prior to Encounter   Medication Sig Dispense Refill    amLODIPine (NORVASC) 10 MG tablet Take 1 tablet (10 mg total) by mouth once daily. 90 tablet 3    [DISCONTINUED] ACETAMINOPHEN (TYLENOL EXTRA STRENGTH ORAL) Take by mouth.      [DISCONTINUED] amlodipine (NORVASC) 10 MG tablet Take 1 tablet (10 mg total) by mouth once daily. 30 tablet 3      Allergies: Patient has no known allergies.    History reviewed. No pertinent family history.  Social History   Substance Use Topics    Smoking status: Never Smoker    Smokeless tobacco: Never Used    Alcohol use No     Review of Systems   Constitutional: Denies  fevers or chills.  Pulmonary: Denies shortness of breath or cough.  Cardiology: Denies chest pain or palpitations.  GI: Denies abdominal pain or constipation.  Neurologic: Denies new weakness, headaches, or paresthesias.   Objective:     Vitals:    Temp: 98.7 °F (37.1 °C)  Pulse: 102  BP: 130/82  MAP (mmHg): 99  Resp: (!) 33  SpO2: 99 %  O2 Device (Oxygen Therapy): room air    Temp  Min: 97.7 °F (36.5 °C)  Max: 98.7 °F (37.1 °C)  Pulse  Min: 72  Max: 104  BP  Min: 124/85  Max: 202/100  MAP (mmHg)  Min: 99  Max: 128  Resp  Min: 18  Max: 36  SpO2  Min: 97 %  Max: 100 %    No intake/output data recorded.           Physical Exam  GA: Alert, comfortable, no acute distress.   HEENT: No scleral icterus or JVD.   Pulmonary: Clear to auscultation A//L. No wheezing, crackles, or rhonchi.  Cardiac: Blowing murmur noted. RRR w/o rubs/gallops.   Abdominal: Bowel sounds present x 4. No appreciable hepatosplenomegaly.  Skin: No jaundice, rashes, or visible lesions.  Neuro:  --GCS: E4 V5 M6  --Mental Status:  AAO x 4, following commands in all ext, very slight drift in RUE and slight weakness in RLE  --CN II-XII grossly intact.   --Pupils 2mm, PERRL.   --Corneal reflex, gag, cough intact.  --LUE strength: 5/5  --LLE strength: 5/5  --RUE strength: 4.5/5  --RLE strength: 4.5/5    Today I personally reviewed pertinent medications, lines/drains/airways, imaging, lab results, notably:        Assessment/Plan:     Neuro   * Acute on chronic intracranial subdural hematoma    -- Continue Neuro checks q 1hr  -- Neurosurgery consulted  -- 04/18 OSH CT Head revealed mixed attenuation subdural hematoma (subacute to chronic) overlying the left frontal and parietal convexities with compression of the left cerebral hemisphere and midline shift of approximately 6.5 mm as above.  -- 04/19: CTH ordered for 1 am  -- SBP goal < 160  -- PT/OT/Speech  -- Prepping pt for possible surgical intervention  -- NPO          Cardiac/Vascular   Essential  hypertension, benign    -- Continue to monitor HR and BP   -- SBP goal < 160  -- 2D echo pending  -- Ekg pending   -- Labetalol Prn  -- Cardene Prn            Prophylaxis:  Venous Thromboembolism: mechanical  Stress Ulcer: None  Ventilator Pneumonia: no     Activity Orders          None        No Order  Uninterrupted Critical Care/Counseling Time (not including procedures): 35 min  Kushal Duarte NP  Neurocritical Care  [facility]

## 2018-04-19 NOTE — PLAN OF CARE
Problem: Physical Therapy Goal  Goal: Physical Therapy Goal  Goals to be met by: 2018    Patient will increase functional independence with mobility by performin. Supine to sit with Set-up [place]  2. Sit to supine with Set-up [place]  3. Sit to stand transfer with Stand-by Assistance using RW.   4. Gait  x 50 feet with Contact Guard Assistance using Rolling Walker.   5. Lower extremity exercise program x20 reps per handout, with supervision  *additional goals pending neurosurgery intervention for SDH     Outcome: Ongoing (interventions implemented as appropriate)  Pt chart reviewed and PT evaluation completed- see note for details. POC initiated.     Janessa Abebe, PT, DPT   2018  Pager: [phone]

## 2018-04-19 NOTE — SUBJECTIVE & OBJECTIVE
Interval History:  NAEON     Review of Systems   Constitutional: Negative for chills and fever.   HENT: Negative for trouble swallowing.    Respiratory: Negative for shortness of breath.    Genitourinary: Negative for dysuria.   Neurological: Positive for weakness. Negative for seizures, facial asymmetry, speech difficulty and headaches.   Psychiatric/Behavioral: Negative for agitation and behavioral problems.     Objective:     Vitals:  Temp: 97.9 °F (36.6 °C)  Pulse: 90  Rhythm: normal sinus rhythm  BP: (!) 147/72  MAP (mmHg): 100  Resp: 16  SpO2: 98 %  O2 Device (Oxygen Therapy): room air    Temp  Min: 97.7 °F (36.5 °C)  Max: 98.7 °F (37.1 °C)  Pulse  Min: 67  Max: 104  BP  Min: 105/70  Max: 202/100  MAP (mmHg)  Min: 83  Max: 128  Resp  Min: 16  Max: 37  SpO2  Min: 96 %  Max: 100 %    04/18 0701 - 04/19 0700  In: 1190 [I.V.:690]  Out: 400 [Urine:400]   Unmeasured Output  Stool Occurrence: 0       Physical Exam   Constitutional: No distress.   HENT:   Head: Normocephalic and atraumatic.   Eyes: EOM are normal.   Neck: Neck supple.   Cardiovascular:   No murmur heard.  Pulmonary/Chest: No respiratory distress.   Abdominal: Soft.   Musculoskeletal: She exhibits no deformity.   Neurological: She is alert. No cranial nerve deficit or sensory deficit. GCS eye subscore is 4. GCS verbal subscore is 5. GCS motor subscore is 6.   Alert / oriented / no cranial deficits   RUE/RLE weakness 4+/5 asymmetry to LUE/LLE    Psychiatric: She has a normal mood and affect.     Unable to test gait due to level of consciousness.    Medications:  Continuous  sodium chloride 0.9% Last Rate: 50 mL/hr at 04/19/18 1500   Scheduled  amLODIPine 10 mg Daily   hydrALAZINE     levetiracetam IVPB 500 mg Q12H   senna-docusate 8.6-50 mg 1 tablet Daily   PRN  acetaminophen 650 mg Q6H PRN   hydrALAZINE 10 mg Q4H PRN   labetalol 10 mg Q4H PRN   magnesium oxide 800 mg PRN   magnesium oxide 800 mg PRN   potassium chloride 10% 40 mEq PRN   potassium  chloride 10% 40 mEq PRN   potassium chloride 10% 60 mEq PRN   potassium, sodium phosphates 2 packet PRN   potassium, sodium phosphates 2 packet PRN   potassium, sodium phosphates 2 packet PRN     Today I personally reviewed pertinent medications, lines/drains/airways, imaging, cardiology, lab results, microbiology results, notably:    Diet  Diet Adult Regular (IDDSI Level 7) [facility]; Dysphagia (Mechanical Soft)  Diet Adult Regular (IDDSI Level 7) [facility]; Dysphagia (Mechanical Soft)

## 2018-04-19 NOTE — ASSESSMENT & PLAN NOTE
83 y/o F L SDH    Neuro stable  Cont neuro checks  CV- goal SBP < 160  Pulm- toelrating RA  FENGI- goal eunatremia. ADAT. NPO at midnight.   Heme/ID- hgb/hct stable. Afebrile.   ppx- ELLY/SCD's. Gi ppx    dispo- to OR tomorrow for evacuation SDH.

## 2018-04-19 NOTE — PT/OT/SLP PROGRESS
Speech Language Pathology  Pt not seen    Mikaela Ghosh  MRN: [id number]    Patient not seen today secondary to pt NPO for possible surgery. SLP will follow up.     Gale Chan CCC-SLP   4/19/2018

## 2018-04-19 NOTE — ASSESSMENT & PLAN NOTE
Acute on chronic SDH, with no clear trauma history, presenting with right sided weakness     CTH 4/18: subdural hematoma over the left cerebral convexity with local mass effect approximately 6-7 mm of rightward midline shift. Additional smaller mixed attenuation subdural collection along the right posterior cerebral convexity, best characterized on the coronal reconstructions    Plan:  - NSGY following, plans for intervention tomorrow   - Continue Neuro checks q 1hr  - keppra prophylaxis   - Holding anti-thrombotics / DVT prophylaxis  - CT head with acute neurological change   - SBP goal < 160  - PT/OT/Speech

## 2018-04-19 NOTE — SUBJECTIVE & OBJECTIVE
Past Medical History:   Diagnosis Date    Hypertension      Past Surgical History:   Procedure Laterality Date    EYE SURGERY      cataract    HYSTERECTOMY        No current facility-administered medications on file prior to encounter.      Current Outpatient Prescriptions on File Prior to Encounter   Medication Sig Dispense Refill    amLODIPine (NORVASC) 10 MG tablet Take 1 tablet (10 mg total) by mouth once daily. 90 tablet 3    [DISCONTINUED] ACETAMINOPHEN (TYLENOL EXTRA STRENGTH ORAL) Take by mouth.      [DISCONTINUED] amlodipine (NORVASC) 10 MG tablet Take 1 tablet (10 mg total) by mouth once daily. 30 tablet 3      Allergies: Patient has no known allergies.    History reviewed. No pertinent family history.  Social History   Substance Use Topics    Smoking status: Never Smoker    Smokeless tobacco: Never Used    Alcohol use No     Review of Systems   Constitutional: Denies fevers or chills.  Pulmonary: Denies shortness of breath or cough.  Cardiology: Denies chest pain or palpitations.  GI: Denies abdominal pain or constipation.  Neurologic: Denies new weakness, headaches, or paresthesias.   Objective:     Vitals:    Temp: 98.7 °F (37.1 °C)  Pulse: 102  BP: 130/82  MAP (mmHg): 99  Resp: (!) 33  SpO2: 99 %  O2 Device (Oxygen Therapy): room air    Temp  Min: 97.7 °F (36.5 °C)  Max: 98.7 °F (37.1 °C)  Pulse  Min: 72  Max: 104  BP  Min: 124/85  Max: 202/100  MAP (mmHg)  Min: 99  Max: 128  Resp  Min: 18  Max: 36  SpO2  Min: 97 %  Max: 100 %    No intake/output data recorded.           Physical Exam  GA: Alert, comfortable, no acute distress.   HEENT: No scleral icterus or JVD.   Pulmonary: Clear to auscultation A//L. No wheezing, crackles, or rhonchi.  Cardiac: Blowing murmur noted. RRR w/o rubs/gallops.   Abdominal: Bowel sounds present x 4. No appreciable hepatosplenomegaly.  Skin: No jaundice, rashes, or visible lesions.  Neuro:  --GCS: E4 V5 M6  --Mental Status:  AAO x 4, following commands in all ext,  very slight drift in RUE and slight weakness in RLE  --CN II-XII grossly intact.   --Pupils 2mm, PERRL.   --Corneal reflex, gag, cough intact.  --LUE strength: 5/5  --LLE strength: 5/5  --RUE strength: 4.5/5  --RLE strength: 4.5/5    Today I personally reviewed pertinent medications, lines/drains/airways, imaging, lab results, notably:

## 2018-04-19 NOTE — PROGRESS NOTES
[facility]  Neurosurgery  Progress Note    Subjective:     History of Present Illness: 84 F presents for eval of SDH.  Per pt she had dizziness 2 days ago and was instructed to get CT head which showed subacute left convexity SDH.  Pt denies any recent falls, traumas, or HA.  She took baby asa 2 days ago but doesn't routinely take asa.    Post-Op Info:  Procedure(s) (LRB):  CRANIOTOMY OPEN FOR SUB-DURAL HEMATOMA possible rosanna hole left, with stealth. (Left)         Prescriptions Prior to Admission   Medication Sig Dispense Refill Last Dose    amLODIPine (NORVASC) 10 MG tablet Take 1 tablet (10 mg total) by mouth once daily. 90 tablet 3 4/18/2018       Review of patient's allergies indicates:  No Known Allergies    Past Medical History:   Diagnosis Date    Hypertension      Past Surgical History:   Procedure Laterality Date    EYE SURGERY      cataract    HYSTERECTOMY       Family History     None        Social History Main Topics    Smoking status: Never Smoker    Smokeless tobacco: Never Used    Alcohol use No    Drug use: No    Sexual activity: No     Review of Systems    Objective:     Weight: 65.8 kg (145 lb 1 oz)  Body mass index is 26.53 kg/m².  Vital Signs (Most Recent):  Temp: 97.9 °F (36.6 °C) (04/19/18 1500)  Pulse: 97 (04/19/18 1600)  Resp: (!) 25 (04/19/18 1600)  BP: 122/76 (04/19/18 1600)  SpO2: 99 % (04/19/18 1600) Vital Signs (24h Range):  Temp:  [97.7 °F (36.5 °C)-98.7 °F (37.1 °C)] 97.9 °F (36.6 °C)  Pulse:  [] 97  Resp:  [16-37] 25  SpO2:  [96 %-100 %] 99 %  BP: (105-202)/() 122/76       Date 04/19/18 0700 - 04/20/18 0659   Shift 0288-4482 7025-9063 1617-9977 24 Hour Total   I  N  T  A  K  E   I.V.  (mL/kg) 495  (7.5) 100  (1.5)  595  (9)    IV Piggyback 100   100    Shift Total  (mL/kg) 595  (9) 100  (1.5)  695  (10.6)   O  U  T  P  U  T   Shift Total  (mL/kg)       Weight (kg) 65.8 65.8 65.8 65.8                        Neurosurgery Physical Exam     AOX3,  speech fluent  PERRL, EOMI, face symm, tongue midline  5/5 in BUE  4/5 in right HF otherwise 5/5 in BLE  SILT  No drift    Significant Labs:    Recent Labs  Lab 04/18/18  1600 04/18/18 1857 04/19/18  0246   GLU 75 74 96    142 137   K 3.8 3.6 4.8    113* 110   CO2 24 19* 19*   BUN 17 16 14   CREATININE 0.9 0.7 0.7   CALCIUM 12.0* 11.4* 11.0*   MG  --  2.1 2.7*       Recent Labs  Lab 04/18/18 1600 04/18/18 1857 04/19/18  0246   WBC 5.14 5.46 5.19   HGB 13.2 13.1 12.7   HCT 40.4 40.9 39.6    162 160       Recent Labs  Lab 04/18/18  1600 04/18/18 1857 04/18/18 2050 04/19/18  0246   INR 1.0 1.1 1.1 1.1   APTT 28.1  --   --   --      Microbiology Results (last 7 days)     ** No results found for the last 168 hours. **            Significant Diagnostics:  CT head: reviewed    Assessment/Plan:     Subdural hematoma    85 y/o F L SDH    Neuro stable  Cont neuro checks  CV- goal SBP < 160  Pulm- toelrating RA  FENGI- goal eunatremia. ADAT. NPO at midnight.   Heme/ID- hgb/hct stable. Afebrile.   ppx- ELLY/SCD's. Gi ppx    dispo- to OR tomorrow for evacuation SDH.             Brad Pena MD  Neurosurgery  [facility]

## 2018-04-19 NOTE — SUBJECTIVE & OBJECTIVE
Prescriptions Prior to Admission   Medication Sig Dispense Refill Last Dose    amLODIPine (NORVASC) 10 MG tablet Take 1 tablet (10 mg total) by mouth once daily. 90 tablet 3 4/18/2018       Review of patient's allergies indicates:  No Known Allergies    Past Medical History:   Diagnosis Date    Hypertension      Past Surgical History:   Procedure Laterality Date    EYE SURGERY      cataract    HYSTERECTOMY       Family History     None        Social History Main Topics    Smoking status: Never Smoker    Smokeless tobacco: Never Used    Alcohol use No    Drug use: No    Sexual activity: No     Review of Systems    Objective:     Weight: 65.8 kg (145 lb 1 oz)  Body mass index is 26.53 kg/m².  Vital Signs (Most Recent):  Temp: 97.9 °F (36.6 °C) (04/19/18 1500)  Pulse: 97 (04/19/18 1600)  Resp: (!) 25 (04/19/18 1600)  BP: 122/76 (04/19/18 1600)  SpO2: 99 % (04/19/18 1600) Vital Signs (24h Range):  Temp:  [97.7 °F (36.5 °C)-98.7 °F (37.1 °C)] 97.9 °F (36.6 °C)  Pulse:  [] 97  Resp:  [16-37] 25  SpO2:  [96 %-100 %] 99 %  BP: (105-202)/() 122/76       Date 04/19/18 0700 - 04/20/18 0659   Shift 9793-6238 3096-2965 8405-7107 24 Hour Total   I  N  T  A  K  E   I.V.  (mL/kg) 495  (7.5) 100  (1.5)  595  (9)    IV Piggyback 100   100    Shift Total  (mL/kg) 595  (9) 100  (1.5)  695  (10.6)   O  U  T  P  U  T   Shift Total  (mL/kg)       Weight (kg) 65.8 65.8 65.8 65.8                        Neurosurgery Physical Exam     AOX3, speech fluent  PERRL, EOMI, face symm, tongue midline  5/5 in BUE  4/5 in right HF otherwise 5/5 in BLE  SILT  No drift    Significant Labs:    Recent Labs  Lab 04/18/18  1600 04/18/18  1857 04/19/18  0246   GLU 75 74 96    142 137   K 3.8 3.6 4.8    113* 110   CO2 24 19* 19*   BUN 17 16 14   CREATININE 0.9 0.7 0.7   CALCIUM 12.0* 11.4* 11.0*   MG  --  2.1 2.7*       Recent Labs  Lab 04/18/18  1600 04/18/18  1857 04/19/18  0246   WBC 5.14 5.46 5.19   HGB 13.2 13.1 12.7    HCT 40.4 40.9 39.6    162 160       Recent Labs  Lab 04/18/18  1600 04/18/18  1857 04/18/18 2050 04/19/18  0246   INR 1.0 1.1 1.1 1.1   APTT 28.1  --   --   --      Microbiology Results (last 7 days)     ** No results found for the last 168 hours. **            Significant Diagnostics:  CT head: reviewed

## 2018-04-19 NOTE — HPI
Ms. Ghosh is 84 y.o. Female with pmhx of HTN who presents to [facility] for a higher level of care for an Acute on Chronic SDH. The patient initially presented to her PCP appt with dizziness and RUE and RLE weakness, which began on 04/15/2018. An outpt CT was performed and revealed mixed attenuation subdural hematoma (subacute to chronic) overlying the left frontal and parietal convexities with compression of the left cerebral hemisphere and midline shift of approximately 6.5 mm as above. The patient was notified and family drove patient to [facility] and was then transported to [facility] ED. Family reported SBP in the 200's before transport to [facility]. The patient did take an Aspirin 4/16/18, but does not take this regularly. The pt denies trauma or fall and does not smoke or consume ETOH.

## 2018-04-19 NOTE — PLAN OF CARE
Problem: Patient Care Overview  Goal: Plan of Care Review  Outcome: Ongoing (interventions implemented as appropriate)  POC reviewed with pt and family at 1400. Pt and family verbalized understanding. Questions and concerns addressed. No acute events today. Pt progressing toward goals. Will continue to monitor. See flowsheets for full assessment and VS info.     NS decreased to 50 ml/hr.  Pt bathed today.  Started mechanical soft, regular diet.  Will be NPO after midnight for surgery tomorrow.   Pt given PRN hydralazine and labetalol each x 1.   NSX and anesthesia consents in chart.   Pt does not want to be administered blood products.

## 2018-04-19 NOTE — PLAN OF CARE
SDH with brain compression  Plan for possible surgery  Resume home antihypertensives  EKG - bundle branch block  Hold all antithrombotics for now for possible surgery  keppra 500 mg q12  Echo pending

## 2018-04-19 NOTE — PT/OT/SLP EVAL
Occupational Therapy   Evaluation/Treatment    Name: Mikaela Ghosh  MRN: [id number]  Admitting Diagnosis:  Acute on chronic intracranial subdural hematoma      Recommendations:     Discharge Recommendations: nursing facility, skilled  Discharge Equipment Recommendations:   (TBD)  Barriers to discharge:  Inaccessible home environment, Decreased caregiver support    History:     Occupational Profile:  Living Environment: Pt lives alone in a one story home with 6-7 MARY JO with BHR; tub/shower combo  Previous level of function: Independent, driving, working as a  at [employer]  Roles and Routines: Works as  at [employer]  Equipment Owned:  none  Assistance upon Discharge: pt has daughters and other family members who live in town who can provide occassional assist    Past Medical History:   Diagnosis Date    Hypertension        Past Surgical History:   Procedure Laterality Date    EYE SURGERY      cataract    HYSTERECTOMY         Subjective     Chief Complaint: none stated  Patient/Family stated goals: return to PLOF  Communicated with: RN prior to session.  Pain/Comfort:  · Pain Rating 1:  (pt with c/o L leg soreness; unable to verbalize exact location; did not rate pain on scale)    Objective:     Patient found with: blood pressure cuff, PureWick, peripheral IV, pulse ox (continuous), telemetry, SCD    General Precautions: Standard, NPO, aspiration, fall   Orthopedic Precautions:N/A   Braces: N/A     Occupational Performance:    Bed Mobility:    · Patient completed Supine to Sit with contact guard assistance   · Pt scooted to EOB with Min A    Functional Mobility/Transfers:  · Patient completed Sit <> Stand Transfer with minimum assistance  with  no assistive device from EOB and toilet  · Patient completed Toilet Transfer Stand Pivot technique with moderate assistance with  rolling walker  · Pt completed functional mobility     Activities of Daily Living:  Toileting: moderate assistance from toilet; assist  required for stability and clothing management in standing prior to and after toileting   LB dressing: moderate assistance to don clean socks  UB dressing: minimum assistance to don gown   Grooming: CGA for stability while washing hands at sink     Cognitive/Visual Perceptual:  Cognitive/Psychosocial Skills:     -       Oriented to: Person and Place   -       Follows Commands/attention: able to follow simple one-step commands with increased verbal/visual cues and provided increased time   -       Communication: clear/fluent  -       Memory: Impaired STM and Poor immediate recall  -       Safety awareness/insight to disability: impaired-limited insight into disability   -       Mood/Affect/Coping skills/emotional control: Appropriate to situation    Visual/Perceptual:      -pt reports no deficits, but further assessment during function is warranted    Physical Exam:  Postural examination/scapula alignment:    -       Rounded shoulders  Skin integrity: Visible skin intact  Edema:  None noted  Sensation:    -       Intact  light/touch BUE  Dominant hand:    -       Right  Upper Extremity Strength:     -       Right Upper Extremity: 3/5 grossly  -       Left Upper Extremity: 4/5 grossly  Upper Extremity ROM: WFL   Strength: Good L; Fair R  Fine Motor Coordination: Impaired R  Gross motor coordination:  Ataxia RUE    Patient left up in chair with all lines intact, call button in reach, RN notified and family present    AMPA 6 Click:  AMPA Total Score: 16    Treatment & Education:  Pt/family educated on OT role/POC  Education:    Assessment:     Mikaela Ghosh is a 84 y.o. female with a medical diagnosis of Acute on chronic intracranial subdural hematoma.  She presents with cognitive deficits requiring increased time/cues to follow simple commands as well as limited insight into deficits.  Pt demonstrates RUE/RLE coordination deficits and weakness.  Performance deficits affecting function are weakness, impaired  "endurance, impaired self care skills, impaired functional mobilty, gait instability, impaired balance, impaired cognition, decreased coordination, decreased upper extremity function, decreased lower extremity function, impaired fine motor, impaired coordination, decreased safety awareness.  SNF with potential to progress to inpatient rehab recommended at d/c.      Rehab Prognosis:  good; patient would benefit from acute skilled OT services to address these deficits and reach maximum level of function.         Clinical Decision Makin.  OT Low:  "Pt evaluation falls under low complexity for evaluation coding due to performance deficits noted in 1-3 areas as stated above and 0 co-morbities affecting current functional status. Data obtained from problem focused assessments. No modifications or assistance was required for completion of evaluation. Only brief occupational profile and history review completed."     Plan:     Patient to be seen 4 x/week to address the above listed problems via self-care/home management, therapeutic activities, therapeutic exercises, neuromuscular re-education, cognitive retraining  · Plan of Care Expires: 18  · Plan of Care Reviewed with: patient, daughter, family    This Plan of care has been discussed with the patient who was involved in its development and understands and is in agreement with the identified goals and treatment plan    GOALS:    Occupational Therapy Goals        Problem: Occupational Therapy Goal    Goal Priority Disciplines Outcome Interventions   Occupational Therapy Goal     OT, PT/OT Ongoing (interventions implemented as appropriate)    Description:  Goals to be met by: 7 days (18)    Patient will increase functional independence with ADLs by performing:    Feeding with Set-up Assistance (when cleared for diet).  UE Dressing with Set-up Assistance.  LE Dressing with Minimal Assistance.  Grooming while standing at sink with Stand-by " Assistance.  Toileting from toilet with Stand-by Assistance for hygiene and clothing management.   Toilet transfer to toilet with Stand-by Assistance.                      Time Tracking:     OT Date of Treatment: 04/19/18  OT Start Time: 0856  OT Stop Time: 0938  OT Total Time (min): 42 min    Billable Minutes:Evaluation 18  Self Care/Home Management 24    ALVA Moise  4/19/2018

## 2018-04-19 NOTE — PLAN OF CARE
[facility] [place] - [address]  [address] [place] [address]  Phone: [phone] Fax: [phone]    This CM spoke with patient daughter and son at bedside.       04/19/18 1133   Discharge Assessment   Assessment Type Discharge Planning Assessment   Confirmed/corrected address and phone number on facesheet? Yes   Assessment information obtained from? Caregiver   Expected Length of Stay (days) 3   Communicated expected length of stay with patient/caregiver no   Prior to hospitilization cognitive status: Alert/Oriented   Prior to hospitalization functional status: Independent   Current cognitive status: Unable to Assess   Current Functional Status: Needs Assistance   Facility Arrived From: (transfer from [facility])   Lives With alone   Able to Return to Prior Arrangements no   Is patient able to care for self after discharge? No   Who are your caregiver(s) and their phone number(s)? (christian Donald [phone] or Jay reyes [phone])   Patient's perception of discharge disposition other (comments)  (aracely)   Readmission Within The Last 30 Days no previous admission in last 30 days   Patient currently being followed by outpatient case management? No   Patient currently receives any other outside agency services? No   Equipment Currently Used at Home none   Do you have any problems affording any of your prescribed medications? No   Is the patient taking medications as prescribed? yes   Does the patient have transportation home? Yes   Transportation Available family or friend will provide   Does the patient receive services at the [facility]? No   Discharge Plan A Rehab   Discharge Plan B Home;Home Health   Patient/Family In Agreement With Plan yes     Nimo Ricardo RN/BSN/KAILEY  [phone]  [facility]

## 2018-04-19 NOTE — ASSESSMENT & PLAN NOTE
-- Continue to monitor HR and BP   -- SBP goal < 160  -- 2D echo pending  -- Ekg pending   -- Labetalol Prn

## 2018-04-19 NOTE — PROGRESS NOTES
[facility]  Neurocritical Care  Progress Note    Admit Date: 4/18/2018  Service Date: 04/19/2018  Length of Stay: 1    Subjective:     Chief Complaint: Acute on chronic intracranial subdural hematoma    History of Present Illness: Ms. Ghosh is 84 y.o. Female with pmhx of HTN who presents to [facility] for a higher level of care for an Acute on Chronic SDH. The patient initially presented to her PCP appt with dizziness and RUE and RLE weakness, which began on 04/15/2018. An outpt CT was performed and revealed mixed attenuation subdural hematoma (subacute to chronic) overlying the left frontal and parietal convexities with compression of the left cerebral hemisphere and midline shift of approximately 6.5 mm as above. The patient was notified and family drove patient to [facility] and was then transported to [facility] ED. Family reported SBP in the 200's before transport to [facility]. The patient did take an Aspirin 4/16/18, but does not take this regularly. The pt denies trauma or fall and does not smoke or consume ETOH.     Hospital Course: 04/18: Admit to [facility].   04/19: NAEON. F/U CTH Stable large subdural hematoma over the left cerebral convexity with local mass effect approximately 6-7 mm of rightward midline shift.    Interval History:  NAEON     Review of Systems   Constitutional: Negative for chills and fever.   HENT: Negative for trouble swallowing.    Respiratory: Negative for shortness of breath.    Genitourinary: Negative for dysuria.   Neurological: Positive for weakness. Negative for seizures, facial asymmetry, speech difficulty and headaches.   Psychiatric/Behavioral: Negative for agitation and behavioral problems.     Objective:     Vitals:  Temp: 97.9 °F (36.6 °C)  Pulse: 90  Rhythm: normal sinus rhythm  BP: (!) 147/72  MAP (mmHg): 100  Resp: 16  SpO2: 98 %  O2 Device (Oxygen Therapy): room air    Temp  Min: 97.7 °F (36.5 °C)  Max: 98.7 °F (37.1 °C)  Pulse  Min: 67  Max: 104  BP  Min: 105/70  Max:  202/100  MAP (mmHg)  Min: 83  Max: 128  Resp  Min: 16  Max: 37  SpO2  Min: 96 %  Max: 100 %    04/18 0701 - 04/19 0700  In: 1190 [I.V.:690]  Out: 400 [Urine:400]   Unmeasured Output  Stool Occurrence: 0       Physical Exam   Constitutional: No distress.   HENT:   Head: Normocephalic and atraumatic.   Eyes: EOM are normal.   Neck: Neck supple.   Cardiovascular:   No murmur heard.  Pulmonary/Chest: No respiratory distress.   Abdominal: Soft.   Musculoskeletal: She exhibits no deformity.   Neurological: She is alert. No cranial nerve deficit or sensory deficit. GCS eye subscore is 4. GCS verbal subscore is 5. GCS motor subscore is 6.   Alert / oriented / no cranial deficits   RUE/RLE weakness 4+/5 asymmetry to LUE/LLE    Psychiatric: She has a normal mood and affect.     Unable to test gait due to level of consciousness.    Medications:  Continuous  sodium chloride 0.9% Last Rate: 50 mL/hr at 04/19/18 1500   Scheduled  amLODIPine 10 mg Daily   hydrALAZINE     levetiracetam IVPB 500 mg Q12H   senna-docusate 8.6-50 mg 1 tablet Daily   PRN  acetaminophen 650 mg Q6H PRN   hydrALAZINE 10 mg Q4H PRN   labetalol 10 mg Q4H PRN   magnesium oxide 800 mg PRN   magnesium oxide 800 mg PRN   potassium chloride 10% 40 mEq PRN   potassium chloride 10% 40 mEq PRN   potassium chloride 10% 60 mEq PRN   potassium, sodium phosphates 2 packet PRN   potassium, sodium phosphates 2 packet PRN   potassium, sodium phosphates 2 packet PRN     Today I personally reviewed pertinent medications, lines/drains/airways, imaging, cardiology, lab results, microbiology results, notably:    Diet  Diet Adult Regular (IDDSI Level 7) [facility]; Dysphagia (Mechanical Soft)  Diet Adult Regular (IDDSI Level 7) [facility]; Dysphagia (Mechanical Soft)    Assessment/Plan:     Neuro   * Acute on chronic intracranial subdural hematoma    Acute on chronic SDH, with no clear trauma history, presenting with right sided weakness     [facility] 4/18: subdural  hematoma over the left cerebral convexity with local mass effect approximately 6-7 mm of rightward midline shift. Additional smaller mixed attenuation subdural collection along the right posterior cerebral convexity, best characterized on the coronal reconstructions    Plan:  - NSGY following, plans for intervention tomorrow   - Continue Neuro checks q 1hr  - keppra prophylaxis   - Holding anti-thrombotics / DVT prophylaxis  - CT head with acute neurological change   - SBP goal < 160  - PT/OT/Speech          Cardiac/Vascular   LVH (left ventricular hypertrophy)    ECHO: The left ventricle is normal in size, with an end-diastolic diameter of 3.5 cm, and an end-systolic diameter of 2.4 cm. Wall thickness is increased, with the septum measuring 1.6 cm and the posterior wall measuring 1.4 cm across. Relative wall thickness was increased at 0.80, and the LV mass index was increased at 134.6 g/m2 consistent with mild concentric left ventricular hypertrophy      1 - Normal left ventricular systolic function (EF 65-70%).     2 - No wall motion abnormalities.     3 - Concentric hypertrophy.     4 - Mild left atrial enlargement.     5 - Normal right ventricular systolic function .     6 - MASHA and LVOT gradient as per text.     7 - Mild mitral regurgitation.     8 - Mild tricuspid regurgitation.     9 - Pulmonary hypertension. The estimated PA systolic pressure is 64 mmHg.         Essential hypertension, benign    -- Continue to monitor HR and BP   -- SBP goal < 160  -- Initiated home CCB  -- Labetalol Prn            Prophylaxis:  Venous Thromboembolism: mechanical  Stress Ulcer: None  Ventilator Pneumonia: not applicable     Activity Orders          None        Full Code    Jose Franklin MD  Neurocritical Care  [facility]

## 2018-04-19 NOTE — PT/OT/SLP EVAL
Physical Therapy Evaluation    Patient Name:  Mikaela Ghosh   MRN:  [id number]    Recommendations:     Discharge Recommendations:  nursing facility, skilled   Discharge Equipment Recommendations:  (TBD)   Barriers to discharge: Inaccessible home and Decreased caregiver support (pt lives alone, stairs to enter home)     Assessment:     Mikaela Ghosh is a 84 y.o. female admitted with a medical diagnosis of Acute on chronic intracranial subdural hematoma.  She presents with the following impairments/functional limitations:  weakness, impaired endurance, impaired functional mobilty, gait instability, impaired self care skills, impaired balance, decreased safety awareness, decreased lower extremity function, decreased upper extremity function, pain, impaired fine motor, impaired cardiopulmonary response to activity. Pt alert and cooperative, able to follow simple commands with increased cueing and time to process information. Pt able to ambulate short distances within room this visit with min-mod A for balance and safety. Altered gait mechanics, RLE weakness, and impaired dynamic standing balance noted. Therapy goals and discharge recommendations pending tentative surgery for SDH evacuation. Pt would benefit from skilled PT intervention to address below listed deficits, reduce fall risk, and maximize (I) and safety with functional mobility.     Rehab Prognosis:  Good; patient would benefit from acute skilled PT services to address these deficits and reach maximum level of function.      Recent Surgery: Procedure(s) (LRB):  CRANIOTOMY OPEN FOR SUB-DURAL HEMATOMA possible rosanna hole left, with stealth. (Left)      Plan:     During this hospitalization, patient to be seen 4 x/week to address the above listed problems via gait training, therapeutic activities, therapeutic exercises, neuromuscular re-education  · Plan of Care Expires:  05/19/18   Plan of Care Reviewed with: patient, daughter, family    Subjective      Communicated with RN prior to session.  Patient found supine upon PT entry to room. Pt alert and agreeable to evaluation.  Pt's family at bedside. Pt may be having surgery for SDH evacuation pending NS team.     Chief Complaint: impaired gait   Patient comments/goals: return to PLOF  Pain/Comfort:  · Pain Rating 1:  (Pt with reports of LLE soreness, but unable to further describe or localize pain; unable to rate pain)  · Location - Side 1: Left  · Location - Orientation 1: lower  · Location 1: leg  · Pain Addressed 1: Reposition, Distraction, Nurse notified    Patients cultural, spiritual, Christianity conflicts given the current situation: no known conflicts    Patient History:     Living Environment: Pt lives alone in [facility] with 6-7 MARY JO, B HR available.   Prior Level of Function: independent with mobility and ADLs, no AD use.   DME owned: none  Caregiver Assistance: family (daughter and son) is in the process of setting up additional assistance for pt upon discharge if needed; pending pt progress     Additional roles/responsibilities:   · Driving: yes  · Working: [employer]      Objective:     Patient found with: blood pressure cuff, PureWick, peripheral IV, pulse ox (continuous), telemetry, SCD     General Precautions: Standard, NPO, aspiration, fall   Orthopedic Precautions:N/A   Braces: N/A     Exams:  · Cognitive Exam:  Patient is oriented to Person and Place; able to follow simple commands with increased cueing and time to process   · Postural Exam:  Patient presented with the following abnormalities:    · -       Rounded shoulders  · -       Forward head  · Sensation:    · -       Intact  light/touch BLEs  · RLE ROM: WFL  · RLE Strength: 4/5   · LLE ROM: WFL  · LLE Strength: 4+/5     Functional Mobility:       Bed Mobility    · Supine to sit: CGA with HOB elevated, use of handrail          Transfers · Sit <> Stand: minimum assistance from EOB x 2 trials with no AD; minimum assistance from toilet x 1  trial with no AD        Gait  · Distance: ~15 ft.   · Assistance level: min-mod A for balance and bilateral HHA    · Gait Deviations: decreased step length, decreased joce, decreased toe-to-floor clearance on R, multi-directional instability         Balance  - Static Sitting: fair; able to maintain with SBA   - Dynamic Sitting: fair; able to maintain with CGA  - Static Standing: fair; able to maintain with CGA  - Dynamic Standing: poor; requires min-mod A to maintain     Therapeutic Activities and Exercises:  Therapeutic activities aimed to:  - increase pt's independence, safety, and efficiency with bed mobility and functional transfers. See above for assistance levels.   - educate pt and family on PT POC, tentative discharge recommendations, goals for therapy     Therapist facilitated trial of gait training to improve gait stability, and independence with functional ambulation. Gait performed within room from EOB >bathroom>bedside chair. Pt requiring min-mod A for balance and safety. Cueing required to increase step length bilaterally and increase toe-to-floor clearance on R.     Patient left up in chair with all lines intact, call button in reach, RN notified and family present.    AM-PAC 6 CLICK MOBILITY  Total Score:15       GOALS:    Physical Therapy Goals        Problem: Physical Therapy Goal    Goal Priority Disciplines Outcome Goal Variances Interventions   Physical Therapy Goal     PT/OT, PT Ongoing (interventions implemented as appropriate)     Description:  Goals to be met by: 2018    Patient will increase functional independence with mobility by performin. Supine to sit with Set-up [place]  2. Sit to supine with Set-up [place]  3. Sit to stand transfer with Stand-by Assistance using RW.   4. Gait  x 50 feet with Contact Guard Assistance using Rolling Walker.   5. Lower extremity exercise program x20 reps per handout, with supervision  *additional goals pending neurosurgery  intervention for SDH                       History:     Past Medical History:   Diagnosis Date    Hypertension        Past Surgical History:   Procedure Laterality Date    EYE SURGERY      cataract    HYSTERECTOMY         Clinical Decision Making:     Moderate complexity evaluation:   · Evolving clinical presentation   · 1-2 personal factors/comorbidities affecting treatment   · Examining and addressing 3+ functional deficits, activity limitations, and participation restrictions    Time Tracking:     PT Received On: 04/19/18  PT Start Time: 0855     PT Stop Time: 0937  PT Total Time (min): 42 min (Co-tx with OT)    Billable Minutes: Evaluation 15 min, Gait Training 10 min and Therapeutic Activity 17 min    Janessa Abebe PT, DPT   4/19/2018  Pager: [phone]

## 2018-04-20 ENCOUNTER — ANESTHESIA (OUTPATIENT)
Dept: SURGERY | Facility: HOSPITAL | Age: 83
DRG: 025 | End: 2018-04-20
Payer: MEDICARE

## 2018-04-20 LAB
ABO + RH BLD: NORMAL
ALBUMIN SERPL BCP-MCNC: 2.8 G/DL
ALP SERPL-CCNC: 58 U/L
ALT SERPL W/O P-5'-P-CCNC: 13 U/L
ANION GAP SERPL CALC-SCNC: 9 MMOL/L
AST SERPL-CCNC: 16 U/L
BASOPHILS # BLD AUTO: 0.02 K/UL
BASOPHILS NFR BLD: 0.3 %
BILIRUB SERPL-MCNC: 0.8 MG/DL
BLD GP AB SCN CELLS X3 SERPL QL: NORMAL
BUN SERPL-MCNC: 12 MG/DL
CALCIUM SERPL-MCNC: 10.6 MG/DL
CHLORIDE SERPL-SCNC: 115 MMOL/L
CO2 SERPL-SCNC: 20 MMOL/L
CREAT SERPL-MCNC: 0.7 MG/DL
DIFFERENTIAL METHOD: ABNORMAL
EOSINOPHIL # BLD AUTO: 0.1 K/UL
EOSINOPHIL NFR BLD: 1.2 %
ERYTHROCYTE [DISTWIDTH] IN BLOOD BY AUTOMATED COUNT: 15.4 %
EST. GFR  (AFRICAN AMERICAN): >60 ML/MIN/1.73 M^2
EST. GFR  (NON AFRICAN AMERICAN): >60 ML/MIN/1.73 M^2
GLUCOSE SERPL-MCNC: 85 MG/DL
HCT VFR BLD AUTO: 37.5 %
HGB BLD-MCNC: 12.1 G/DL
IMM GRANULOCYTES # BLD AUTO: 0.02 K/UL
IMM GRANULOCYTES NFR BLD AUTO: 0.3 %
INR PPP: 1.1
LYMPHOCYTES # BLD AUTO: 1.1 K/UL
LYMPHOCYTES NFR BLD: 18.9 %
MAGNESIUM SERPL-MCNC: 2.1 MG/DL
MCH RBC QN AUTO: 31.4 PG
MCHC RBC AUTO-ENTMCNC: 32.3 G/DL
MCV RBC AUTO: 97 FL
MONOCYTES # BLD AUTO: 0.7 K/UL
MONOCYTES NFR BLD: 12.6 %
NEUTROPHILS # BLD AUTO: 3.9 K/UL
NEUTROPHILS NFR BLD: 66.7 %
NRBC BLD-RTO: 0 /100 WBC
PHOSPHATE SERPL-MCNC: 2.3 MG/DL
PHOSPHATE SERPL-MCNC: 2.3 MG/DL
PLATELET # BLD AUTO: 144 K/UL
PMV BLD AUTO: 11.3 FL
POCT GLUCOSE: 114 MG/DL (ref 70–110)
POCT GLUCOSE: 83 MG/DL (ref 70–110)
POTASSIUM SERPL-SCNC: 3.6 MMOL/L
PROT SERPL-MCNC: 5.6 G/DL
PROTHROMBIN TIME: 11.4 SEC
RBC # BLD AUTO: 3.85 M/UL
SODIUM SERPL-SCNC: 144 MMOL/L
WBC # BLD AUTO: 5.86 K/UL

## 2018-04-20 PROCEDURE — 63600175 PHARM REV CODE 636 W HCPCS: Performed by: STUDENT IN AN ORGANIZED HEALTH CARE EDUCATION/TRAINING PROGRAM

## 2018-04-20 PROCEDURE — 25000003 PHARM REV CODE 250: Performed by: STUDENT IN AN ORGANIZED HEALTH CARE EDUCATION/TRAINING PROGRAM

## 2018-04-20 PROCEDURE — 009430Z DRAINAGE OF INTRACRANIAL SUBDURAL SPACE WITH DRAINAGE DEVICE, PERCUTANEOUS APPROACH: ICD-10-PCS | Performed by: NEUROLOGICAL SURGERY

## 2018-04-20 PROCEDURE — 37000008 HC ANESTHESIA 1ST 15 MINUTES: Performed by: NEUROLOGICAL SURGERY

## 2018-04-20 PROCEDURE — 25000003 PHARM REV CODE 250: Performed by: PHYSICIAN ASSISTANT

## 2018-04-20 PROCEDURE — 63600175 PHARM REV CODE 636 W HCPCS: Performed by: PHYSICIAN ASSISTANT

## 2018-04-20 PROCEDURE — C1729 CATH, DRAINAGE: HCPCS | Performed by: NEUROLOGICAL SURGERY

## 2018-04-20 PROCEDURE — 84100 ASSAY OF PHOSPHORUS: CPT

## 2018-04-20 PROCEDURE — 27100021 HC MULTIPORT INFUSION MANIFOLD: Performed by: STUDENT IN AN ORGANIZED HEALTH CARE EDUCATION/TRAINING PROGRAM

## 2018-04-20 PROCEDURE — 25000003 PHARM REV CODE 250: Performed by: NURSE PRACTITIONER

## 2018-04-20 PROCEDURE — 61312 CRNEC/CRNOT STTL XDRL/SDRL: CPT | Mod: 82,AS,, | Performed by: PHYSICIAN ASSISTANT

## 2018-04-20 PROCEDURE — 25000003 PHARM REV CODE 250

## 2018-04-20 PROCEDURE — 61312 CRNEC/CRNOT STTL XDRL/SDRL: CPT | Mod: ,,, | Performed by: NEUROLOGICAL SURGERY

## 2018-04-20 PROCEDURE — 86920 COMPATIBILITY TEST SPIN: CPT

## 2018-04-20 PROCEDURE — 25000003 PHARM REV CODE 250: Performed by: NEUROLOGICAL SURGERY

## 2018-04-20 PROCEDURE — 85610 PROTHROMBIN TIME: CPT

## 2018-04-20 PROCEDURE — 99291 CRITICAL CARE FIRST HOUR: CPT | Mod: ,,, | Performed by: PSYCHIATRY & NEUROLOGY

## 2018-04-20 PROCEDURE — 20000000 HC ICU ROOM

## 2018-04-20 PROCEDURE — D9220A PRA ANESTHESIA: Mod: ,,, | Performed by: ANESTHESIOLOGY

## 2018-04-20 PROCEDURE — 94761 N-INVAS EAR/PLS OXIMETRY MLT: CPT

## 2018-04-20 PROCEDURE — 83735 ASSAY OF MAGNESIUM: CPT

## 2018-04-20 PROCEDURE — 86901 BLOOD TYPING SEROLOGIC RH(D): CPT

## 2018-04-20 PROCEDURE — 85025 COMPLETE CBC W/AUTO DIFF WBC: CPT

## 2018-04-20 PROCEDURE — 36000710: Performed by: NEUROLOGICAL SURGERY

## 2018-04-20 PROCEDURE — C1713 ANCHOR/SCREW BN/BN,TIS/BN: HCPCS | Performed by: NEUROLOGICAL SURGERY

## 2018-04-20 PROCEDURE — 37000009 HC ANESTHESIA EA ADD 15 MINS: Performed by: NEUROLOGICAL SURGERY

## 2018-04-20 PROCEDURE — 27201037 HC PRESSURE MONITORING SET UP

## 2018-04-20 PROCEDURE — 80053 COMPREHEN METABOLIC PANEL: CPT

## 2018-04-20 PROCEDURE — 63600175 PHARM REV CODE 636 W HCPCS: Performed by: NEUROLOGICAL SURGERY

## 2018-04-20 PROCEDURE — 27201423 OPTIME MED/SURG SUP & DEVICES STERILE SUPPLY: Performed by: NEUROLOGICAL SURGERY

## 2018-04-20 PROCEDURE — 63600175 PHARM REV CODE 636 W HCPCS: Performed by: NURSE PRACTITIONER

## 2018-04-20 PROCEDURE — C1751 CATH, INF, PER/CENT/MIDLINE: HCPCS | Performed by: STUDENT IN AN ORGANIZED HEALTH CARE EDUCATION/TRAINING PROGRAM

## 2018-04-20 PROCEDURE — 36000711: Performed by: NEUROLOGICAL SURGERY

## 2018-04-20 PROCEDURE — 27100025 HC TUBING, SET FLUID WARMER: Performed by: STUDENT IN AN ORGANIZED HEALTH CARE EDUCATION/TRAINING PROGRAM

## 2018-04-20 DEVICE — SCREW UN3 1.5X4 NEW HEAD SD: Type: IMPLANTABLE DEVICE | Site: CRANIAL | Status: FUNCTIONAL

## 2018-04-20 DEVICE — PLATE CRANIAL UN3 BOX LG 2X2: Type: IMPLANTABLE DEVICE | Site: CRANIAL | Status: FUNCTIONAL

## 2018-04-20 RX ORDER — NICARDIPINE HYDROCHLORIDE 0.2 MG/ML
2.5 INJECTION INTRAVENOUS CONTINUOUS
Status: DISCONTINUED | OUTPATIENT
Start: 2018-04-20 | End: 2018-04-20

## 2018-04-20 RX ORDER — PROPOFOL 10 MG/ML
VIAL (ML) INTRAVENOUS
Status: DISCONTINUED | OUTPATIENT
Start: 2018-04-20 | End: 2018-04-20

## 2018-04-20 RX ORDER — NICARDIPINE HYDROCHLORIDE 0.2 MG/ML
2.5 INJECTION INTRAVENOUS CONTINUOUS
Status: DISCONTINUED | OUTPATIENT
Start: 2018-04-20 | End: 2018-04-21

## 2018-04-20 RX ORDER — NEOSTIGMINE METHYLSULFATE 1 MG/ML
INJECTION, SOLUTION INTRAVENOUS
Status: DISCONTINUED | OUTPATIENT
Start: 2018-04-20 | End: 2018-04-20

## 2018-04-20 RX ORDER — PHENYLEPHRINE HYDROCHLORIDE 10 MG/ML
INJECTION INTRAVENOUS
Status: DISCONTINUED | OUTPATIENT
Start: 2018-04-20 | End: 2018-04-20

## 2018-04-20 RX ORDER — BACITRACIN 50000 [IU]/1
INJECTION, POWDER, FOR SOLUTION INTRAMUSCULAR
Status: DISCONTINUED | OUTPATIENT
Start: 2018-04-20 | End: 2018-04-20 | Stop reason: HOSPADM

## 2018-04-20 RX ORDER — MUPIROCIN 20 MG/G
1 OINTMENT TOPICAL 2 TIMES DAILY
Status: COMPLETED | OUTPATIENT
Start: 2018-04-20 | End: 2018-04-24

## 2018-04-20 RX ORDER — NICARDIPINE HYDROCHLORIDE 0.2 MG/ML
INJECTION INTRAVENOUS
Status: COMPLETED
Start: 2018-04-20 | End: 2018-04-20

## 2018-04-20 RX ORDER — FENTANYL CITRATE 50 UG/ML
25 INJECTION, SOLUTION INTRAMUSCULAR; INTRAVENOUS EVERY 4 HOURS PRN
Status: DISCONTINUED | OUTPATIENT
Start: 2018-04-20 | End: 2018-04-26 | Stop reason: HOSPADM

## 2018-04-20 RX ORDER — GLYCOPYRROLATE 0.2 MG/ML
INJECTION INTRAMUSCULAR; INTRAVENOUS
Status: DISCONTINUED | OUTPATIENT
Start: 2018-04-20 | End: 2018-04-20

## 2018-04-20 RX ORDER — ROCURONIUM BROMIDE 10 MG/ML
INJECTION, SOLUTION INTRAVENOUS
Status: DISCONTINUED | OUTPATIENT
Start: 2018-04-20 | End: 2018-04-20

## 2018-04-20 RX ORDER — ACETAMINOPHEN 10 MG/ML
INJECTION, SOLUTION INTRAVENOUS
Status: DISCONTINUED | OUTPATIENT
Start: 2018-04-20 | End: 2018-04-20

## 2018-04-20 RX ORDER — LIDOCAINE HCL/PF 100 MG/5ML
SYRINGE (ML) INTRAVENOUS
Status: DISCONTINUED | OUTPATIENT
Start: 2018-04-20 | End: 2018-04-20

## 2018-04-20 RX ORDER — BACITRACIN ZINC 500 UNIT/G
OINTMENT (GRAM) TOPICAL
Status: DISCONTINUED | OUTPATIENT
Start: 2018-04-20 | End: 2018-04-20 | Stop reason: HOSPADM

## 2018-04-20 RX ORDER — ONDANSETRON 2 MG/ML
INJECTION INTRAMUSCULAR; INTRAVENOUS
Status: DISCONTINUED | OUTPATIENT
Start: 2018-04-20 | End: 2018-04-20

## 2018-04-20 RX ORDER — FENTANYL CITRATE 50 UG/ML
INJECTION, SOLUTION INTRAMUSCULAR; INTRAVENOUS
Status: DISCONTINUED | OUTPATIENT
Start: 2018-04-20 | End: 2018-04-20

## 2018-04-20 RX ORDER — LIDOCAINE HYDROCHLORIDE AND EPINEPHRINE 10; 10 MG/ML; UG/ML
INJECTION, SOLUTION INFILTRATION; PERINEURAL
Status: DISCONTINUED | OUTPATIENT
Start: 2018-04-20 | End: 2018-04-20 | Stop reason: HOSPADM

## 2018-04-20 RX ADMIN — HYDRALAZINE HYDROCHLORIDE 10 MG: 20 INJECTION INTRAMUSCULAR; INTRAVENOUS at 10:04

## 2018-04-20 RX ADMIN — FENTANYL CITRATE 25 MCG: 50 INJECTION, SOLUTION INTRAMUSCULAR; INTRAVENOUS at 08:04

## 2018-04-20 RX ADMIN — FENTANYL CITRATE 25 MCG: 50 INJECTION, SOLUTION INTRAMUSCULAR; INTRAVENOUS at 02:04

## 2018-04-20 RX ADMIN — PHENYLEPHRINE HYDROCHLORIDE 100 MCG: 10 INJECTION INTRAVENOUS at 08:04

## 2018-04-20 RX ADMIN — ONDANSETRON 4 MG: 2 INJECTION INTRAMUSCULAR; INTRAVENOUS at 10:04

## 2018-04-20 RX ADMIN — NEOSTIGMINE METHYLSULFATE 3 MG: 1 INJECTION INTRAVENOUS at 10:04

## 2018-04-20 RX ADMIN — LABETALOL HYDROCHLORIDE 10 MG: 5 INJECTION, SOLUTION INTRAVENOUS at 07:04

## 2018-04-20 RX ADMIN — GLYCOPYRROLATE 0.4 MG: 0.2 INJECTION, SOLUTION INTRAMUSCULAR; INTRAVENOUS at 10:04

## 2018-04-20 RX ADMIN — FENTANYL CITRATE 25 MCG: 50 INJECTION, SOLUTION INTRAMUSCULAR; INTRAVENOUS at 11:04

## 2018-04-20 RX ADMIN — ROCURONIUM BROMIDE 35 MG: 10 INJECTION, SOLUTION INTRAVENOUS at 07:04

## 2018-04-20 RX ADMIN — SODIUM CHLORIDE, SODIUM GLUCONATE, SODIUM ACETATE, POTASSIUM CHLORIDE, MAGNESIUM CHLORIDE, SODIUM PHOSPHATE, DIBASIC, AND POTASSIUM PHOSPHATE: .53; .5; .37; .037; .03; .012; .00082 INJECTION, SOLUTION INTRAVENOUS at 08:04

## 2018-04-20 RX ADMIN — LEVETIRACETAM 500 MG: 5 INJECTION INTRAVENOUS at 08:04

## 2018-04-20 RX ADMIN — PROPOFOL 100 MG: 10 INJECTION, EMULSION INTRAVENOUS at 07:04

## 2018-04-20 RX ADMIN — PHENYLEPHRINE HYDROCHLORIDE 50 MCG: 10 INJECTION INTRAVENOUS at 08:04

## 2018-04-20 RX ADMIN — LABETALOL HYDROCHLORIDE 10 MG: 5 INJECTION, SOLUTION INTRAVENOUS at 01:04

## 2018-04-20 RX ADMIN — FENTANYL CITRATE 100 MCG: 50 INJECTION, SOLUTION INTRAMUSCULAR; INTRAVENOUS at 07:04

## 2018-04-20 RX ADMIN — PHENYLEPHRINE HYDROCHLORIDE 50 MCG: 10 INJECTION INTRAVENOUS at 09:04

## 2018-04-20 RX ADMIN — NICARDIPINE HYDROCHLORIDE 2.5 MG/HR: 0.2 INJECTION, SOLUTION INTRAVENOUS at 03:04

## 2018-04-20 RX ADMIN — MUPIROCIN 1 G: 20 OINTMENT TOPICAL at 08:04

## 2018-04-20 RX ADMIN — LABETALOL HYDROCHLORIDE 10 MG: 5 INJECTION, SOLUTION INTRAVENOUS at 10:04

## 2018-04-20 RX ADMIN — NICARDIPINE HYDROCHLORIDE 2.5 MG/HR: 0.2 INJECTION INTRAVENOUS at 03:04

## 2018-04-20 RX ADMIN — SODIUM CHLORIDE, SODIUM GLUCONATE, SODIUM ACETATE, POTASSIUM CHLORIDE, MAGNESIUM CHLORIDE, SODIUM PHOSPHATE, DIBASIC, AND POTASSIUM PHOSPHATE: .53; .5; .37; .037; .03; .012; .00082 INJECTION, SOLUTION INTRAVENOUS at 07:04

## 2018-04-20 RX ADMIN — HYDRALAZINE HYDROCHLORIDE 10 MG: 20 INJECTION INTRAMUSCULAR; INTRAVENOUS at 03:04

## 2018-04-20 RX ADMIN — CEFTRIAXONE 2 G: 2 INJECTION, SOLUTION INTRAVENOUS at 08:04

## 2018-04-20 RX ADMIN — LIDOCAINE HYDROCHLORIDE 60 MG: 20 INJECTION, SOLUTION INTRAVENOUS at 07:04

## 2018-04-20 RX ADMIN — PHENYLEPHRINE HYDROCHLORIDE 100 MCG: 10 INJECTION INTRAVENOUS at 09:04

## 2018-04-20 RX ADMIN — SODIUM CHLORIDE 0.2 MCG/KG/MIN: 9 INJECTION, SOLUTION INTRAVENOUS at 08:04

## 2018-04-20 RX ADMIN — MUPIROCIN 1 G: 20 OINTMENT TOPICAL at 12:04

## 2018-04-20 RX ADMIN — ROCURONIUM BROMIDE 15 MG: 10 INJECTION, SOLUTION INTRAVENOUS at 08:04

## 2018-04-20 RX ADMIN — ACETAMINOPHEN 1000 MG: 10 INJECTION, SOLUTION INTRAVENOUS at 08:04

## 2018-04-20 RX ADMIN — FENTANYL CITRATE 50 MCG: 50 INJECTION, SOLUTION INTRAMUSCULAR; INTRAVENOUS at 09:04

## 2018-04-20 RX ADMIN — FENTANYL CITRATE 50 MCG: 50 INJECTION, SOLUTION INTRAMUSCULAR; INTRAVENOUS at 08:04

## 2018-04-20 NOTE — NURSING
Pt taken to OR by anesthesia staff on portable cardiac monitor.  Consents in chart and sent with patient.

## 2018-04-20 NOTE — PLAN OF CARE
Problem: Patient Care Overview  Goal: Plan of Care Review  Outcome: Ongoing (interventions implemented as appropriate)  POC reviewed with pt at 0300. Pt verbalized understanding. Questions and concerns addressed. No acute events overnight. Pt NPO at midnight for surgery today. Pt consents in chart, and preop checklist completed. Pt progressing toward goals. Will continue to monitor. See flowsheets for full assessment and VS info

## 2018-04-20 NOTE — TRANSFER OF CARE
"Anesthesia Transfer of Care Note    Patient: Mikaela Ghosh    Procedure(s) Performed: Procedure(s) (LRB):  CRANIOTOMY OPEN FOR SUB-DURAL HEMATOMA left, with stealth. (Left)    Patient location: ICU    Anesthesia Type: general    Transport from OR: Transported from OR on 6-10 L/min O2 by face mask with adequate spontaneous ventilation    Post pain: adequate analgesia    Post assessment: no apparent anesthetic complications    Post vital signs: stable    Level of consciousness: awake    Nausea/Vomiting: no nausea/vomiting    Complications: none    Transfer of care protocol was followed      Last vitals:   Visit Vitals  BP (!) 198/88   Pulse 66   Temp 36.6 °C (97.8 °F) (Oral)   Resp 20   Ht 5' 2" (1.575 m)   Wt 65.6 kg (144 lb 10 oz)   SpO2 99%   Breastfeeding? No   BMI 26.45 kg/m²     "

## 2018-04-20 NOTE — ANESTHESIA POSTPROCEDURE EVALUATION
"Anesthesia Post Evaluation    Patient: Mikaela Ghosh    Procedure(s) Performed: Procedure(s) (LRB):  CRANIOTOMY OPEN FOR SUB-DURAL HEMATOMA left, with stealth. (Left)    Final Anesthesia Type: general  Patient location during evaluation: ICU  Patient participation: Yes- Able to Participate  Level of consciousness: awake and lethargic  Post-procedure vital signs: reviewed and stable  Pain management: adequate  Airway patency: patent  PONV status at discharge: No PONV  Anesthetic complications: no      Cardiovascular status: blood pressure returned to baseline and hypertensive  Respiratory status: unassisted, spontaneous ventilation and face mask  Hydration status: euvolemic  Follow-up not needed.        Visit Vitals  BP (!) 198/88   Pulse 66   Temp 36.6 °C (97.8 °F) (Oral)   Resp 20   Ht 5' 2" (1.575 m)   Wt 65.6 kg (144 lb 10 oz)   SpO2 99%   Breastfeeding? No   BMI 26.45 kg/m²       Pain/Verna Score: Pain Assessment Performed: Yes (4/20/2018  7:01 AM)  Presence of Pain: denies (4/20/2018  7:01 AM)      "

## 2018-04-20 NOTE — PT/OT/SLP PROGRESS
Speech Language Pathology  Discharge    Mikaela Ghosh  MRN: [id number]    Patient not seen today secondary to pt in OR for craniotomy upon attempt. New SLP are needed post op when pt medically appropriate.    Gale Chan CCC-SLP   4/20/2018

## 2018-04-20 NOTE — PLAN OF CARE
Problem: Patient Care Overview  Goal: Plan of Care Review  Outcome: Ongoing (interventions implemented as appropriate)  POC reviewed with pt and family at 1400. Pt and family verbalized understanding. Questions and concerns addressed. No acute events today. Pt progressing toward goals. Pt HOB at 15 degrees, due to subdural drain, bed controls locked.  Will continue to monitor. See flowsheets for full assessment and VS info.

## 2018-04-20 NOTE — PT/OT/SLP DISCHARGE
Physical Therapy Discharge Summary    Name: Mikaela Ghosh  MRN: [id number]   Principal Problem: Acute on chronic intracranial subdural hematoma     Patient Discharged from acute Physical Therapy on 18.  Please refer to prior PT noted date on 18 for functional status.     Assessment:     Patient has not met goals.    Objective:     GOALS:    Physical Therapy Goals        Problem: Physical Therapy Goal    Goal Priority Disciplines Outcome Goal Variances Interventions   Physical Therapy Goal     PT/OT, PT Ongoing (interventions implemented as appropriate)     Description:  Goals to be met by: 2018    Patient will increase functional independence with mobility by performin. Supine to sit with Set-up [place]  2. Sit to supine with Set-up [place]  3. Sit to stand transfer with Stand-by Assistance using RW.   4. Gait  x 50 feet with Contact Guard Assistance using Rolling Walker.   5. Lower extremity exercise program x20 reps per handout, with supervision  *additional goals pending neurosurgery intervention for SDH                       Reasons for Discontinuation of Therapy Services  Pt to OR for scheduled craniotomy.     Plan:     Patient to OR 18 for craniotomy; PT orders discontinued. Please re-consult therapy post-op when pt is medically appropriate to participate in functional mobility.     Janessa Abebe, PT  2018

## 2018-04-20 NOTE — PROGRESS NOTES
[facility]  Neurocritical Care  Progress Note    Admit Date: 4/18/2018  Service Date: 04/20/2018  Length of Stay: 2    Subjective:     Chief Complaint: Acute on chronic intracranial subdural hematoma    History of Present Illness: Ms. Ghosh is 84 y.o. Female with pmhx of HTN who presents to [facility] for a higher level of care for an Acute on Chronic SDH. The patient initially presented to her PCP appt with dizziness and RUE and RLE weakness, which began on 04/15/2018. An outpt CT was performed and revealed mixed attenuation subdural hematoma (subacute to chronic) overlying the left frontal and parietal convexities with compression of the left cerebral hemisphere and midline shift of approximately 6.5 mm as above. The patient was notified and family drove patient to [facility] and was then [place] [facility] [place] Family reported SBP in the 200's before transport [place] [facility] The patient did take an Aspirin 4/16/18, but does not take this regularly. The pt denies trauma or fall and does not smoke or consume ETOH.     Hospital Course: 04/18: Admit to [facility].   04/19: NAEON. F/U CTH Stable large subdural hematoma over the left cerebral convexity with local mass effect approximately 6-7 mm of rightward midline shift.  4/0: s/p SDH evacuation. SD drain in place.         SUBJECTIVE:     Interval History/Significant Events: no acute events overnight    Review of Symptoms:   Constitutional: Denies fevers or chills.  Pulmonary: Denies shortness of breath or cough.  Cardiology: Denies chest pain or palpitations.  GI: Denies abdominal pain or constipation.  Neurologic: Denies new weakness, headaches, or paresthesias.     Medications:  Continuous Infusions:   sodium chloride 0.9% 50 mL/hr at 04/20/18 1500    niCARdipine       Scheduled Meds:   amLODIPine  10 mg Oral Daily    levetiracetam IVPB  500 mg Intravenous Q12H    mupirocin  1 g Nasal BID    senna-docusate 8.6-50 mg  1 tablet Oral Daily     PRN  Meds:.acetaminophen, fentaNYL, hydrALAZINE, labetalol, magnesium oxide, magnesium oxide, potassium chloride 10%, potassium chloride 10%, potassium chloride 10%, potassium, sodium phosphates, potassium, sodium phosphates, potassium, sodium phosphates    OBJECTIVE:   Vital Signs (Most Recent):   Temp: 97.6 °F (36.4 °C) (04/20/18 1500)  Pulse: 91 (04/20/18 1700)  Resp: 17 (04/20/18 1700)  BP: 134/74 (04/20/18 1700)  SpO2: 98 % (04/20/18 1700)    Vital Signs (24h Range):   Temp:  [97.6 °F (36.4 °C)-98.1 °F (36.7 °C)] 97.6 °F (36.4 °C)  Pulse:  [58-91] 91  Resp:  [13-43] 17  SpO2:  [95 %-100 %] 98 %  BP: (117-198)/() 134/74  Arterial Line BP: (127-154)/(47-57) 127/54    ICP/CPP (Last 24h):        I & O (Last 24h):   Intake/Output Summary (Last 24 hours) at 04/20/18 1743  Last data filed at 04/20/18 1700   Gross per 24 hour   Intake           2752.5 ml   Output             1350 ml   Net           1402.5 ml     Physical Exam:  GA: somnolent, comfortable, no acute distress.   HEENT: No scleral icterus or JVD.   Pulmonary: Clear to auscultation A/P/L. No wheezing, crackles, or rhonchi.  Cardiac: RRR S1 & S2   Abdominal: Bowel sounds present x 4. No appreciable hepatosplenomegaly.  Skin: No jaundice, rashes, or visible lesions.  Neuro:  --GCS:14  --Mental Status:  Somnolent, easily arousable, follows commands. aao x2.   --CN II-XII grossly intact.   --Pupils 2.5mm, PERRL.   Facial symmetry  Mild right hemiparesis    Vent Data:        Lines/Drains/Airway:            Arterial Line 04/20/18 Right Radial (Active)   Site Assessment Clean;Dry;Intact;No redness;No swelling 4/20/2018  3:00 PM   Line Status Pulsatile blood flow 4/20/2018  3:00 PM   Art Line Waveform Appropriate;Square wave test performed 4/20/2018  3:00 PM   Arterial Line Interventions Zeroed and calibrated;Leveled;Connections checked and tightened 4/20/2018  3:00 PM   Color/Movement/Sensation Capillary refill less than 3 sec 4/20/2018  3:00 PM   Dressing Type  "Transparent 4/20/2018  3:00 PM   Dressing Status Biopatch in place;Clean;Dry;Intact 4/20/2018  3:00 PM   Dressing Intervention Dressing reinforced 4/20/2018  3:00 PM   Dressing Change Due 04/27/18 4/20/2018  3:00 PM           Closed/Suction Drain 04/20/18 0924 Left Other (Comment) Bulb 7 Fr. (Active)   Site Description Unable to view 4/20/2018  3:00 PM   Dressing Type [place] 4/20/2018  3:00 PM   Dressing Status Clean;Dry;Intact 4/20/2018  3:00 PM   Dressing Intervention Dressing reinforced 4/20/2018  3:00 PM   Drainage None 4/20/2018  3:00 PM   Status To bulb suction 4/20/2018  3:00 PM   Output (mL) 55 mL 4/20/2018  5:00 PM            Urethral Catheter 04/20/18 0806 Double-lumen;Non-latex 16 Fr. (Active)   Site Assessment Clean;Intact 4/20/2018  3:00 PM   Collection Container Urimeter 4/20/2018  3:00 PM   Securement Method secured to top of thigh w/ adhesive device 4/20/2018  3:00 PM   Catheter Care Performed yes 4/20/2018  3:00 PM   Reason for Continuing Urinary Catheterization Post operative 4/20/2018  3:00 PM   CAUTI Prevention Bundle Drainage bag off the floor;StatLock in place w 1" slack;Intact seal between catheter & drainage tubing;Green sheeting clip in use;No dependent loops or kinks;Drainage bag not overfilled (<2/3 full);Drainage bag below bladder 4/20/2018 11:00 AM   Output (mL) 250 mL 4/20/2018  4:00 PM     Nutrition/Tube Feeds (if NPO state why): npo post op     Labs:  ABG: No results for input(s): PH, PO2, PCO2, HCO3, POCSATURATED, BE in the last 24 hours.  BMP:  Recent Labs  Lab 04/20/18  0210      K 3.6   *   CO2 20*   BUN 12   CREATININE 0.7   GLU 85   MG 2.1   PHOS 2.3*  2.3*     LFT: Lab Results   Component Value Date    AST 16 04/20/2018    ALT 13 04/20/2018    ALKPHOS 58 04/20/2018    BILITOT 0.8 04/20/2018    ALBUMIN 2.8 (L) 04/20/2018    PROT 5.6 (L) 04/20/2018     CBC:   Lab Results   Component Value Date    WBC 5.86 04/20/2018    HGB 12.1 04/20/2018    HCT 37.5 04/20/2018 "    MCV 97 04/20/2018     (L) 04/20/2018     Microbiology x 7d:   Microbiology Results (last 7 days)     ** No results found for the last 168 hours. **        Imaging:    I personally reviewed the above image.    ASSESSMENT/PLAN:     Active Hospital Problems    Diagnosis    *Acute on chronic intracranial subdural hematoma    LVH (left ventricular hypertrophy)    Essential hypertension, benign    Subdural hematoma      Neuro:   SDH with brain compression s/p evacuation today  Hob flat  SD drain in place  keppra for seizure prophylaxis  Close neuromonitoring  Repeat ct head if neuro worsening.    Pulmonary:   Stable saturations on nasal cannula    Cardiac:   SBP goal <160 with PRN or nicardipine gtt as needed    Renal:    Making urine    ID:   Afebrile, normal wbc    Hem/Onc:   Stable hh and plt count    Endocrine:    BS stable  HbA1c 5.1    Fluids/Electrolytes/Nutrition/GI:   Npo post op; encephalopathy (mild)  Lines:  Art +  CVC NA  ETT NA  [place] NA  NG +  PEG NA    Proph:  DVT:SCD/ no heparin intracranial hemorrhage  Constipation:  Last output:bowel regimen  PUP: NA  VAP: NA    Full Code    Uninterrupted Critical Care/Counseling Time (not including procedures):: 30 mins      René Shea MD  Neurocritical Care  [facility]

## 2018-04-20 NOTE — TRANSFER OF CARE
"Anesthesia Transfer of Care Note    Patient: Mikaela Ghosh    Procedure(s) Performed: Procedure(s) (LRB):  CRANIOTOMY OPEN FOR SUB-DURAL HEMATOMA left, with stealth. (Left)    Patient location: ICU    Anesthesia Type: general    Transport from OR: Transported from OR on 6-10 L/min O2 by face mask with adequate spontaneous ventilation. Continuous ECG monitoring in transport. Continuous SpO2 monitoring in transport    Post pain: adequate analgesia    Post assessment: no apparent anesthetic complications    Post vital signs: stable    Level of consciousness: awake and lethargic    Nausea/Vomiting: no nausea/vomiting    Complications: none    Transfer of care protocol was followed      Last vitals:   Visit Vitals  BP (!) 198/88   Pulse 66   Temp 36.6 °C (97.8 °F) (Oral)   Resp 20   Ht 5' 2" (1.575 m)   Wt 65.6 kg (144 lb 10 oz)   SpO2 99%   Breastfeeding? No   BMI 26.45 kg/m²     "

## 2018-04-20 NOTE — NURSING
Pt back in room from OR. Connected to wall monitor, vss at this time. [facility] notified of pt's arrival.

## 2018-04-20 NOTE — NURSING
Pt complaining of tooth pain, family states this is normal for pt, but pt states it's worse than usual.  Notified ASHELY Garzon with [facility], orders to give fentanyl 1 hour early.

## 2018-04-21 LAB
ALBUMIN SERPL BCP-MCNC: 2.9 G/DL
ALP SERPL-CCNC: 61 U/L
ALT SERPL W/O P-5'-P-CCNC: 12 U/L
ANION GAP SERPL CALC-SCNC: 8 MMOL/L
AST SERPL-CCNC: 16 U/L
BASOPHILS # BLD AUTO: 0.01 K/UL
BASOPHILS NFR BLD: 0.1 %
BILIRUB SERPL-MCNC: 0.7 MG/DL
BUN SERPL-MCNC: 13 MG/DL
CALCIUM SERPL-MCNC: 10.9 MG/DL
CHLORIDE SERPL-SCNC: 113 MMOL/L
CO2 SERPL-SCNC: 21 MMOL/L
CREAT SERPL-MCNC: 0.8 MG/DL
DIFFERENTIAL METHOD: ABNORMAL
EOSINOPHIL # BLD AUTO: 0 K/UL
EOSINOPHIL NFR BLD: 0.1 %
ERYTHROCYTE [DISTWIDTH] IN BLOOD BY AUTOMATED COUNT: 15.6 %
EST. GFR  (AFRICAN AMERICAN): >60 ML/MIN/1.73 M^2
EST. GFR  (NON AFRICAN AMERICAN): >60 ML/MIN/1.73 M^2
GLUCOSE SERPL-MCNC: 105 MG/DL
HCT VFR BLD AUTO: 36.6 %
HGB BLD-MCNC: 12 G/DL
IMM GRANULOCYTES # BLD AUTO: 0.02 K/UL
IMM GRANULOCYTES NFR BLD AUTO: 0.2 %
INR PPP: 1.1
LYMPHOCYTES # BLD AUTO: 0.7 K/UL
LYMPHOCYTES NFR BLD: 8 %
MAGNESIUM SERPL-MCNC: 2.1 MG/DL
MCH RBC QN AUTO: 31.2 PG
MCHC RBC AUTO-ENTMCNC: 32.8 G/DL
MCV RBC AUTO: 95 FL
MONOCYTES # BLD AUTO: 0.9 K/UL
MONOCYTES NFR BLD: 10.5 %
NEUTROPHILS # BLD AUTO: 7.1 K/UL
NEUTROPHILS NFR BLD: 81.1 %
NRBC BLD-RTO: 0 /100 WBC
PHOSPHATE SERPL-MCNC: 2.3 MG/DL
PHOSPHATE SERPL-MCNC: 2.4 MG/DL
PLATELET # BLD AUTO: 154 K/UL
PMV BLD AUTO: 11.3 FL
POCT GLUCOSE: 101 MG/DL (ref 70–110)
POCT GLUCOSE: 120 MG/DL (ref 70–110)
POCT GLUCOSE: 98 MG/DL (ref 70–110)
POTASSIUM SERPL-SCNC: 3.6 MMOL/L
POTASSIUM SERPL-SCNC: 4.5 MMOL/L
PROT SERPL-MCNC: 5.9 G/DL
PROTHROMBIN TIME: 11.2 SEC
RBC # BLD AUTO: 3.85 M/UL
SODIUM SERPL-SCNC: 142 MMOL/L
WBC # BLD AUTO: 8.76 K/UL

## 2018-04-21 PROCEDURE — 85610 PROTHROMBIN TIME: CPT

## 2018-04-21 PROCEDURE — 25000003 PHARM REV CODE 250: Performed by: NURSE PRACTITIONER

## 2018-04-21 PROCEDURE — 84100 ASSAY OF PHOSPHORUS: CPT | Mod: 91

## 2018-04-21 PROCEDURE — 94761 N-INVAS EAR/PLS OXIMETRY MLT: CPT

## 2018-04-21 PROCEDURE — 63600175 PHARM REV CODE 636 W HCPCS: Performed by: STUDENT IN AN ORGANIZED HEALTH CARE EDUCATION/TRAINING PROGRAM

## 2018-04-21 PROCEDURE — 84132 ASSAY OF SERUM POTASSIUM: CPT

## 2018-04-21 PROCEDURE — 20000000 HC ICU ROOM

## 2018-04-21 PROCEDURE — 80053 COMPREHEN METABOLIC PANEL: CPT

## 2018-04-21 PROCEDURE — 99233 SBSQ HOSP IP/OBS HIGH 50: CPT | Mod: ,,, | Performed by: PSYCHIATRY & NEUROLOGY

## 2018-04-21 PROCEDURE — 84100 ASSAY OF PHOSPHORUS: CPT

## 2018-04-21 PROCEDURE — 85025 COMPLETE CBC W/AUTO DIFF WBC: CPT

## 2018-04-21 PROCEDURE — 83735 ASSAY OF MAGNESIUM: CPT

## 2018-04-21 RX ADMIN — ACETAMINOPHEN 650 MG: 325 TABLET ORAL at 03:04

## 2018-04-21 RX ADMIN — POTASSIUM & SODIUM PHOSPHATES POWDER PACK 280-160-250 MG 2 PACKET: 280-160-250 PACK at 09:04

## 2018-04-21 RX ADMIN — POTASSIUM & SODIUM PHOSPHATES POWDER PACK 280-160-250 MG 2 PACKET: 280-160-250 PACK at 05:04

## 2018-04-21 RX ADMIN — STANDARDIZED SENNA CONCENTRATE AND DOCUSATE SODIUM 1 TABLET: 8.6; 5 TABLET, FILM COATED ORAL at 08:04

## 2018-04-21 RX ADMIN — ACETAMINOPHEN 650 MG: 325 TABLET ORAL at 09:04

## 2018-04-21 RX ADMIN — LEVETIRACETAM 500 MG: 5 INJECTION INTRAVENOUS at 09:04

## 2018-04-21 RX ADMIN — POTASSIUM CHLORIDE 40 MEQ: 20 SOLUTION ORAL at 05:04

## 2018-04-21 RX ADMIN — POTASSIUM & SODIUM PHOSPHATES POWDER PACK 280-160-250 MG 2 PACKET: 280-160-250 PACK at 03:04

## 2018-04-21 RX ADMIN — ACETAMINOPHEN 650 MG: 325 TABLET ORAL at 11:04

## 2018-04-21 RX ADMIN — MUPIROCIN 1 G: 20 OINTMENT TOPICAL at 08:04

## 2018-04-21 RX ADMIN — LEVETIRACETAM 500 MG: 5 INJECTION INTRAVENOUS at 08:04

## 2018-04-21 RX ADMIN — MUPIROCIN 1 G: 20 OINTMENT TOPICAL at 09:04

## 2018-04-21 RX ADMIN — AMLODIPINE BESYLATE 10 MG: 10 TABLET ORAL at 08:04

## 2018-04-21 NOTE — PROGRESS NOTES
[facility]  Neurocritical Care  Progress Note    Admit Date: 4/18/2018  Service Date: 04/21/2018  Length of Stay: 3    Subjective:     Chief Complaint: Acute on chronic intracranial subdural hematoma    History of Present Illness: Ms. Ghosh is 84 y.o. Female with pmhx of HTN who presents to [facility] for a higher level of care for an Acute on Chronic SDH. The patient initially presented to her PCP appt with dizziness and RUE and RLE weakness, which began on 04/15/2018. An outpt CT was performed and revealed mixed attenuation subdural hematoma (subacute to chronic) overlying the left frontal and parietal convexities with compression of the left cerebral hemisphere and midline shift of approximately 6.5 mm as above. The patient was notified and family drove patient to [facility] and [place] then [place] [facility] [place] Family reported SBP in the 200's before [place] [facility] The patient did take an Aspirin 4/16/18, but does not take this regularly. The pt denies trauma or fall and does not smoke or consume ETOH.     Hospital Course: 04/18: Admit to [facility].   04/19: NAEON. F/U CTH Stable large subdural hematoma over the left cerebral convexity with local mass effect approximately 6-7 mm of rightward midline shift.  4/20: s/p SDH evacuation. SD drain in place.   4/21: no acute events overnight  [facility]  Neurocritical Care  Progress Note    Admit Date: 4/18/2018  Service Date: 04/21/2018  Length of Stay: 3    Subjective:     Chief Complaint: Acute on chronic intracranial subdural hematoma    History of Present Illness: Ms. Ghosh is 84 y.o. Female with pmhx of HTN who presents to [facility] for a higher level of care for an Acute on Chronic SDH. The patient initially presented to her PCP appt with dizziness and RUE and RLE weakness, which began on 04/15/2018. An outpt CT was performed and revealed mixed attenuation subdural hematoma (subacute to chronic) overlying the left frontal and  parietal convexities with compression of the left cerebral hemisphere and midline shift of approximately 6.5 mm as above. The patient was notified and family drove patient to [facility] and was then [place] [facility] [place]. Family reported SBP in the 200's before transport to [facility]. The patient did take an Aspirin 4/16/18, but does not take this regularly. The pt denies trauma or fall and does not smoke or consume ETOH.     Hospital Course: 04/18: Admit to [facility].   04/19: NAEON. F/U CTH Stable large subdural hematoma over the left cerebral convexity with local mass effect approximately 6-7 mm of rightward midline shift.  4/20: s/p SDH evacuation. SD drain in place.   4/21: no acute events overnight        SUBJECTIVE:     Interval History/Significant Events: no acute events overnight    Review of Symptoms:   Constitutional: Denies fevers or chills.  Pulmonary: Denies shortness of breath or cough.  Cardiology: Denies chest pain or palpitations.  GI: Denies abdominal pain or constipation.  Neurologic: Denies new weakness, headaches, or paresthesias.     Medications:  Continuous Infusions:   sodium chloride 0.9% 50 mL/hr at 04/21/18 1000    niCARdipine       Scheduled Meds:   amLODIPine  10 mg Oral Daily    levetiracetam IVPB  500 mg Intravenous Q12H    mupirocin  1 g Nasal BID    senna-docusate 8.6-50 mg  1 tablet Oral Daily     PRN Meds:.acetaminophen, fentaNYL, hydrALAZINE, labetalol, magnesium oxide, magnesium oxide, potassium chloride 10%, potassium chloride 10%, potassium chloride 10%, potassium, sodium phosphates, potassium, sodium phosphates, potassium, sodium phosphates    OBJECTIVE:   Vital Signs (Most Recent):   Temp: 98.7 °F (37.1 °C) (04/21/18 0701)  Pulse: 79 (04/21/18 1000)  Resp: (!) 21 (04/21/18 1000)  BP: (!) 160/76 (04/21/18 1000)  SpO2: 96 % (04/21/18 1000)    Vital Signs (24h Range):   Temp:  [97.6 °F (36.4 °C)-98.7 °F (37.1 °C)] 98.7 °F (37.1 °C)  Pulse:  [] 79  Resp:  [13-27] 21  SpO2:   [95 %-99 %] 96 %  BP: (110-160)/(54-82) 160/76  Arterial Line BP: (109-154)/(47-69) 150/61    ICP/CPP (Last 24h):        I & O (Last 24h):     Intake/Output Summary (Last 24 hours) at 04/21/18 1104  Last data filed at 04/21/18 1000   Gross per 24 hour   Intake          1351.67 ml   Output             1490 ml   Net          -138.33 ml     Physical Exam:  GA: somnolent, comfortable, no acute distress.   HEENT: No scleral icterus or JVD.   Pulmonary: Clear to auscultation A/P/L. No wheezing, crackles, or rhonchi.  Cardiac: RRR S1 & S2   Abdominal: Bowel sounds present x 4. No appreciable hepatosplenomegaly.  Skin: No jaundice, rashes, or visible lesions.  Neuro:  --GCS:14  --Mental Status:  awake, easily arousable, follows commands. aao x2.   --CN II-XII grossly intact.   --Pupils 2.5mm, PERRL.   Facial symmetry  Mild right hemiparesis    Vent Data:        Lines/Drains/Airway:            Arterial Line 04/20/18 Right Radial (Active)   Site Assessment Clean;Dry;Intact;No redness;No swelling 4/20/2018  3:00 PM   Line Status Pulsatile blood flow 4/20/2018  3:00 PM   Art Line Waveform Appropriate;Square wave test performed 4/20/2018  3:00 PM   Arterial Line Interventions Zeroed and calibrated;Leveled;Connections checked and tightened 4/20/2018  3:00 PM   Color/Movement/Sensation Capillary refill less than 3 sec 4/20/2018  3:00 PM   Dressing Type Transparent 4/20/2018  3:00 PM   Dressing Status Biopatch in place;Clean;Dry;Intact 4/20/2018  3:00 PM   Dressing Intervention Dressing reinforced 4/20/2018  3:00 PM   Dressing Change Due 04/27/18 4/20/2018  3:00 PM           Closed/Suction Drain 04/20/18 0924 Left Other (Comment) Bulb 7 Fr. (Active)   Site Description Unable to view 4/20/2018  3:00 PM   Dressing Type [place] 4/20/2018  3:00 PM   Dressing Status Clean;Dry;Intact 4/20/2018  3:00 PM   Dressing Intervention Dressing reinforced 4/20/2018  3:00 PM   Drainage None 4/20/2018  3:00 PM   Status To bulb suction 4/20/2018  " 3:00 PM   Output (mL) 55 mL 4/20/2018  5:00 PM            Urethral Catheter 04/20/18 0806 Double-lumen;Non-latex 16 Fr. (Active)   Site Assessment Clean;Intact 4/20/2018  3:00 PM   Collection Container Urimeter 4/20/2018  3:00 PM   Securement Method secured to top of thigh w/ adhesive device 4/20/2018  3:00 PM   Catheter Care Performed yes 4/20/2018  3:00 PM   Reason for Continuing Urinary Catheterization Post operative 4/20/2018  3:00 PM   CAUTI Prevention Bundle Drainage bag off the floor;StatLock in place w 1" slack;Intact seal between catheter & drainage tubing;Green sheeting clip in use;No dependent loops or kinks;Drainage bag not overfilled (<2/3 full);Drainage bag below bladder 4/20/2018 11:00 AM   Output (mL) 250 mL 4/20/2018  4:00 PM     Nutrition/Tube Feeds (if NPO state why): npo post op     Labs:  ABG: No results for input(s): PH, PO2, PCO2, HCO3, POCSATURATED, BE in the last 24 hours.  BMP:    Recent Labs  Lab 04/21/18  0259 04/21/18  0806     --    K 3.6 4.5   *  --    CO2 21*  --    BUN 13  --    CREATININE 0.8  --      --    MG 2.1  --    PHOS 2.4*  --      LFT:   Lab Results   Component Value Date    AST 16 04/21/2018    ALT 12 04/21/2018    ALKPHOS 61 04/21/2018    BILITOT 0.7 04/21/2018    ALBUMIN 2.9 (L) 04/21/2018    PROT 5.9 (L) 04/21/2018     CBC:   Lab Results   Component Value Date    WBC 8.76 04/21/2018    HGB 12.0 04/21/2018    HCT 36.6 (L) 04/21/2018    MCV 95 04/21/2018     04/21/2018     Microbiology x 7d:   Microbiology Results (last 7 days)     ** No results found for the last 168 hours. **        Imaging:    I personally reviewed the above image.    ASSESSMENT/PLAN:     Active Hospital Problems    Diagnosis    *Acute on chronic intracranial subdural hematoma    LVH (left ventricular hypertrophy)    Essential hypertension, benign    Subdural hematoma      Neuro:   SDH with brain compression s/p evacuation post op day 1  Hob flat  SD drain in " place  keppra for seizure prophylaxis  Close neuromonitoring  Repeat ct head if neuro worsening.    Pulmonary:   Stable saturations on nasal cannula    Cardiac:   SBP goal <160 with PRN or nicardipine gtt as needed    Renal:    Making urine    ID:   Afebrile, normal wbc    Hem/Onc:   Stable hh and plt count    Endocrine:    BS stable  HbA1c 5.1    Fluids/Electrolytes/Nutrition/GI:   Resume oral intake  Lines:  Art +  CVC NA  ETT NA  [place] NA  NG +  PEG NA    Proph:  DVT:SCD/ no heparin intracranial hemorrhage  Constipation:  Last output:bowel regimen  PUP: NA  VAP: NA    Full Code    Uninterrupted Critical Care/Counseling Time (not including procedures):: 30 mins      René Shea MD  Neurocritical Care  [facility]

## 2018-04-21 NOTE — SUBJECTIVE & OBJECTIVE
Prescriptions Prior to Admission   Medication Sig Dispense Refill Last Dose    amLODIPine (NORVASC) 10 MG tablet Take 1 tablet (10 mg total) by mouth once daily. 90 tablet 3 4/18/2018       Review of patient's allergies indicates:  No Known Allergies    Past Medical History:   Diagnosis Date    Hypertension      Past Surgical History:   Procedure Laterality Date    EYE SURGERY      cataract    HYSTERECTOMY       Family History     None        Social History Main Topics    Smoking status: Never Smoker    Smokeless tobacco: Never Used    Alcohol use No    Drug use: No    Sexual activity: No     Review of Systems    Objective:     Weight: 65.6 kg (144 lb 10 oz)  Body mass index is 26.45 kg/m².  Vital Signs (Most Recent):  Temp: 98.5 °F (36.9 °C) (04/21/18 1101)  Pulse: 80 (04/21/18 1400)  Resp: 19 (04/21/18 1400)  BP: 128/68 (04/21/18 1400)  SpO2: 96 % (04/21/18 1400) Vital Signs (24h Range):  Temp:  [97.6 °F (36.4 °C)-98.7 °F (37.1 °C)] 98.5 °F (36.9 °C)  Pulse:  [] 80  Resp:  [16-27] 19  SpO2:  [95 %-99 %] 96 %  BP: (110-160)/(54-85) 128/68  Arterial Line BP: (109-153)/(51-69) 134/64       Date 04/21/18 0700 - 04/22/18 0659   Shift 7399-9365 1422-2856 1349-9233 24 Hour Total   I  N  T  A  K  E   P.O. 240   240    I.V.  (mL/kg) 400  (6.1)   400  (6.1)    IV Piggyback 100   100    Shift Total  (mL/kg) 740  (11.3)   740  (11.3)   O  U  T  P  U  T   Urine  (mL/kg/hr) 225   225    Drains 50   50    Shift Total  (mL/kg) 275  (4.2)   275  (4.2)   Weight (kg) 65.6 65.6 65.6 65.6                        Physical Exam:  Nursing note and vitals reviewed.    Constitutional: She appears well-developed and well-nourished.     Eyes: Pupils are equal, round, and reactive to light.     Abdominal: Soft.     Psych/Behavior: She is alert. She is oriented to person, place, and time. She has a normal mood and affect.      AOX3, speech fluent  PERRL, EOMI, face symm, tongue midline  5/5 in BUE  4/5 in right HF otherwise 5/5  in BLE  SILT  No drift    Significant Labs:    Recent Labs  Lab 04/20/18 0210 04/21/18  0259 04/21/18  0806   GLU 85 105  --     142  --    K 3.6 3.6 4.5   * 113*  --    CO2 20* 21*  --    BUN 12 13  --    CREATININE 0.7 0.8  --    CALCIUM 10.6* 10.9*  --    MG 2.1 2.1  --        Recent Labs  Lab 04/20/18 0210 04/21/18 0259   WBC 5.86 8.76   HGB 12.1 12.0   HCT 37.5 36.6*   * 154       Recent Labs  Lab 04/20/18 0210 04/21/18 0259   INR 1.1 1.1     Microbiology Results (last 7 days)     ** No results found for the last 168 hours. **            Significant Diagnostics:  CT head: reviewed

## 2018-04-21 NOTE — PLAN OF CARE
Problem: Patient Care Overview  Goal: Plan of Care Review  Outcome: Ongoing (interventions implemented as appropriate)  No acute events throughout the day, VS and assessment per flow sheet, patient progressing towards goal as tolerated.  In and out catheterized at 1730 due to retention. Regular diet started. Plan of care reviewed with Mikaela Ghosh and family, all concerns addressed. Will continue to monitor.

## 2018-04-21 NOTE — PROGRESS NOTES
[facility]  Neurosurgery  Progress Note    Subjective:     History of Present Illness: 84 F presents for eval of SDH.  Per pt she had dizziness 2 days ago and was instructed to get CT head which showed subacute left convexity SDH.  Pt denies any recent falls, traumas, or HA.  She took baby asa 2 days ago but doesn't routinely take asa.    Post-Op Info:  Procedure(s) (LRB):  CRANIOTOMY OPEN FOR SUB-DURAL HEMATOMA left, with stealth. (Left)   1 Day Post-Op     Prescriptions Prior to Admission   Medication Sig Dispense Refill Last Dose    amLODIPine (NORVASC) 10 MG tablet Take 1 tablet (10 mg total) by mouth once daily. 90 tablet 3 4/18/2018       Review of patient's allergies indicates:  No Known Allergies    Past Medical History:   Diagnosis Date    Hypertension      Past Surgical History:   Procedure Laterality Date    EYE SURGERY      cataract    HYSTERECTOMY       Family History     None        Social History Main Topics    Smoking status: Never Smoker    Smokeless tobacco: Never Used    Alcohol use No    Drug use: No    Sexual activity: No     Review of Systems    Objective:     Weight: 65.6 kg (144 lb 10 oz)  Body mass index is 26.45 kg/m².  Vital Signs (Most Recent):  Temp: 98.5 °F (36.9 °C) (04/21/18 1101)  Pulse: 80 (04/21/18 1400)  Resp: 19 (04/21/18 1400)  BP: 128/68 (04/21/18 1400)  SpO2: 96 % (04/21/18 1400) Vital Signs (24h Range):  Temp:  [97.6 °F (36.4 °C)-98.7 °F (37.1 °C)] 98.5 °F (36.9 °C)  Pulse:  [] 80  Resp:  [16-27] 19  SpO2:  [95 %-99 %] 96 %  BP: (110-160)/(54-85) 128/68  Arterial Line BP: (109-153)/(51-69) 134/64       Date 04/21/18 0700 - 04/22/18 0659   Shift 3216-9782 0128-4542 1081-5623 24 Hour Total   I  N  T  A  K  E   P.O. 240   240    I.V.  (mL/kg) 400  (6.1)   400  (6.1)    IV Piggyback 100   100    Shift Total  (mL/kg) 740  (11.3)   740  (11.3)   O  U  T  P  U  T   Urine  (mL/kg/hr) 225   225    Drains 50   50    Shift Total  (mL/kg) 275  (4.2)    275  (4.2)   Weight (kg) 65.6 65.6 65.6 65.6                        Physical Exam:  Nursing note and vitals reviewed.    Constitutional: She appears well-developed and well-nourished.     Eyes: Pupils are equal, round, and reactive to light.     Abdominal: Soft.     Psych/Behavior: She is alert. She is oriented to person, place, and time. She has a normal mood and affect.      AOX3, speech fluent  PERRL, EOMI, face symm, tongue midline  5/5 in BUE  4/5 in right HF otherwise 5/5 in BLE  SILT  No drift    Significant Labs:    Recent Labs  Lab 04/20/18 0210 04/21/18  0259 04/21/18  0806   GLU 85 105  --     142  --    K 3.6 3.6 4.5   * 113*  --    CO2 20* 21*  --    BUN 12 13  --    CREATININE 0.7 0.8  --    CALCIUM 10.6* 10.9*  --    MG 2.1 2.1  --        Recent Labs  Lab 04/20/18 0210 04/21/18  0259   WBC 5.86 8.76   HGB 12.1 12.0   HCT 37.5 36.6*   * 154       Recent Labs  Lab 04/20/18 0210 04/21/18  0259   INR 1.1 1.1     Microbiology Results (last 7 days)     ** No results found for the last 168 hours. **            Significant Diagnostics:  CT head: reviewed    Assessment/Plan:     Subdural hematoma    85 y/o F L SDH s/p crani POD#1    Neuro stable  Cont neuro checks  CV- goal SBP < 160  Pulm- toelrating RA  FENGI- goal eunatremia. ADAT.   Heme/ID- hgb/hct stable. Afebrile.   ppx- ELLY/SCD's. Gi ppx    dispo- PTOT. HOB < 30 degrees. Cont SD drain. Cont ICU care            Brad Pena MD  Neurosurgery  [facility]

## 2018-04-21 NOTE — NURSING
NAVEEN Salazar with NCC notified of bladder scan of 323 cc after removal of [place] at 0900 without voiding. Ordered to in and out catheterize patient.

## 2018-04-21 NOTE — ASSESSMENT & PLAN NOTE
83 y/o F L SDH s/p crani POD#1    Neuro stable  Cont neuro checks  CV- goal SBP < 160  Pulm- toelrating RA  FENGI- goal eunatremia. ADAT.   Heme/ID- hgb/hct stable. Afebrile.   ppx- ELLY/SCD's. Gi ppx    dispo- PTOT. HOB < 30 degrees. Cont SD drain. Cont ICU care

## 2018-04-21 NOTE — PLAN OF CARE
Problem: Patient Care Overview  Goal: Plan of Care Review  Outcome: Ongoing (interventions implemented as appropriate)  POC reviewed with pt and pts  Family at 0500. Pt verbalized understanding. Questions and concerns addressed. No acute events overnight. Pt taken to CT.  Pt progressing toward goals. Will continue to monitor. See flowsheets for full assessment and VS info

## 2018-04-22 LAB
ALBUMIN SERPL BCP-MCNC: 2.5 G/DL
ALP SERPL-CCNC: 58 U/L
ALT SERPL W/O P-5'-P-CCNC: 10 U/L
ANION GAP SERPL CALC-SCNC: 7 MMOL/L
AST SERPL-CCNC: 14 U/L
BASOPHILS # BLD AUTO: 0.03 K/UL
BASOPHILS NFR BLD: 0.4 %
BILIRUB SERPL-MCNC: 0.8 MG/DL
BUN SERPL-MCNC: 12 MG/DL
CALCIUM SERPL-MCNC: 10.5 MG/DL
CHLORIDE SERPL-SCNC: 114 MMOL/L
CO2 SERPL-SCNC: 22 MMOL/L
CREAT SERPL-MCNC: 0.7 MG/DL
DIFFERENTIAL METHOD: ABNORMAL
EOSINOPHIL # BLD AUTO: 0.1 K/UL
EOSINOPHIL NFR BLD: 0.7 %
ERYTHROCYTE [DISTWIDTH] IN BLOOD BY AUTOMATED COUNT: 15.7 %
EST. GFR  (AFRICAN AMERICAN): >60 ML/MIN/1.73 M^2
EST. GFR  (NON AFRICAN AMERICAN): >60 ML/MIN/1.73 M^2
GLUCOSE SERPL-MCNC: 87 MG/DL
HCT VFR BLD AUTO: 36.8 %
HGB BLD-MCNC: 11.5 G/DL
IMM GRANULOCYTES # BLD AUTO: 0.03 K/UL
IMM GRANULOCYTES NFR BLD AUTO: 0.4 %
INR PPP: 1.1
LYMPHOCYTES # BLD AUTO: 1.2 K/UL
LYMPHOCYTES NFR BLD: 14.4 %
MAGNESIUM SERPL-MCNC: 2 MG/DL
MCH RBC QN AUTO: 30.7 PG
MCHC RBC AUTO-ENTMCNC: 31.3 G/DL
MCV RBC AUTO: 98 FL
MONOCYTES # BLD AUTO: 1.1 K/UL
MONOCYTES NFR BLD: 12.7 %
NEUTROPHILS # BLD AUTO: 6 K/UL
NEUTROPHILS NFR BLD: 71.4 %
NRBC BLD-RTO: 0 /100 WBC
PHOSPHATE SERPL-MCNC: 2.1 MG/DL
PLATELET # BLD AUTO: 147 K/UL
PMV BLD AUTO: 11 FL
POCT GLUCOSE: 107 MG/DL (ref 70–110)
POCT GLUCOSE: 111 MG/DL (ref 70–110)
POCT GLUCOSE: 119 MG/DL (ref 70–110)
POCT GLUCOSE: 89 MG/DL (ref 70–110)
POCT GLUCOSE: 93 MG/DL (ref 70–110)
POTASSIUM SERPL-SCNC: 3.6 MMOL/L
PROT SERPL-MCNC: 5.5 G/DL
PROTHROMBIN TIME: 11.4 SEC
RBC # BLD AUTO: 3.74 M/UL
SODIUM SERPL-SCNC: 143 MMOL/L
WBC # BLD AUTO: 8.34 K/UL

## 2018-04-22 PROCEDURE — 85025 COMPLETE CBC W/AUTO DIFF WBC: CPT

## 2018-04-22 PROCEDURE — 94761 N-INVAS EAR/PLS OXIMETRY MLT: CPT

## 2018-04-22 PROCEDURE — 99233 SBSQ HOSP IP/OBS HIGH 50: CPT | Mod: ,,, | Performed by: PSYCHIATRY & NEUROLOGY

## 2018-04-22 PROCEDURE — 84100 ASSAY OF PHOSPHORUS: CPT

## 2018-04-22 PROCEDURE — 25000003 PHARM REV CODE 250: Performed by: NURSE PRACTITIONER

## 2018-04-22 PROCEDURE — 80053 COMPREHEN METABOLIC PANEL: CPT

## 2018-04-22 PROCEDURE — 63600175 PHARM REV CODE 636 W HCPCS: Performed by: NURSE PRACTITIONER

## 2018-04-22 PROCEDURE — 63600175 PHARM REV CODE 636 W HCPCS: Performed by: STUDENT IN AN ORGANIZED HEALTH CARE EDUCATION/TRAINING PROGRAM

## 2018-04-22 PROCEDURE — 20000000 HC ICU ROOM

## 2018-04-22 PROCEDURE — 85610 PROTHROMBIN TIME: CPT

## 2018-04-22 PROCEDURE — 83735 ASSAY OF MAGNESIUM: CPT

## 2018-04-22 RX ADMIN — MUPIROCIN 1 G: 20 OINTMENT TOPICAL at 09:04

## 2018-04-22 RX ADMIN — LEVETIRACETAM 500 MG: 5 INJECTION INTRAVENOUS at 08:04

## 2018-04-22 RX ADMIN — ACETAMINOPHEN 650 MG: 325 TABLET ORAL at 06:04

## 2018-04-22 RX ADMIN — POTASSIUM CHLORIDE 40 MEQ: 20 SOLUTION ORAL at 05:04

## 2018-04-22 RX ADMIN — POTASSIUM & SODIUM PHOSPHATES POWDER PACK 280-160-250 MG 2 PACKET: 280-160-250 PACK at 08:04

## 2018-04-22 RX ADMIN — AMLODIPINE BESYLATE 10 MG: 10 TABLET ORAL at 08:04

## 2018-04-22 RX ADMIN — LEVETIRACETAM 500 MG: 5 INJECTION INTRAVENOUS at 09:04

## 2018-04-22 RX ADMIN — MUPIROCIN 1 G: 20 OINTMENT TOPICAL at 08:04

## 2018-04-22 RX ADMIN — POTASSIUM & SODIUM PHOSPHATES POWDER PACK 280-160-250 MG 2 PACKET: 280-160-250 PACK at 01:04

## 2018-04-22 RX ADMIN — STANDARDIZED SENNA CONCENTRATE AND DOCUSATE SODIUM 1 TABLET: 8.6; 5 TABLET, FILM COATED ORAL at 08:04

## 2018-04-22 RX ADMIN — ACETAMINOPHEN 650 MG: 325 TABLET ORAL at 09:04

## 2018-04-22 RX ADMIN — HYDRALAZINE HYDROCHLORIDE 10 MG: 20 INJECTION INTRAMUSCULAR; INTRAVENOUS at 12:04

## 2018-04-22 RX ADMIN — POTASSIUM & SODIUM PHOSPHATES POWDER PACK 280-160-250 MG 2 PACKET: 280-160-250 PACK at 05:04

## 2018-04-22 NOTE — PLAN OF CARE
Problem: Patient Care Overview  Goal: Plan of Care Review  Outcome: Ongoing (interventions implemented as appropriate)  POC reviewed with pt at 0600. Pt and daughter verbalized understanding. Questions and concerns addressed. No acute events overnight. Transported pt to scheduled CT. Subdural drain in place to thumbprint suction. Pt progressing toward goals. Will continue to monitor. See flowsheets for full assessment and VS info

## 2018-04-22 NOTE — NURSING
Transported pt to CT at 0315 via bed per RN and PCT with continuous cardiac and 02 monitoring. Pt tolerated travel well with no complications. Returned to unit at 0335.

## 2018-04-22 NOTE — PLAN OF CARE
Problem: Patient Care Overview  Goal: Plan of Care Review  Outcome: Ongoing (interventions implemented as appropriate)  No acute events throughout the day, VS and assessment per flow sheet, patient progressing towards goal as tolerated.  Neurosx liberated HOB restrictions to less than 45 degrees. Pt in and out catheterization x2 on shift due to urinary retention. Complete bath with linen change completed on shift by RN. Plan of care reviewed with Mikaela Ghosh and family, all concerns addressed. Will continue to monitor.

## 2018-04-22 NOTE — PROGRESS NOTES
[facility]  Neurocritical Care  Progress Note    Admit Date: 4/18/2018  Service Date: 04/22/2018  Length of Stay: 4    Subjective:     Chief Complaint: Acute on chronic intracranial subdural hematoma    History of Present Illness: Ms. Ghosh is 84 y.o. Female with pmhx of HTN who presents to [facility] for a higher level of care for an Acute on Chronic SDH. The patient initially presented to her PCP appt with dizziness and RUE and RLE weakness, which began on 04/15/2018. An outpt CT was performed and revealed mixed attenuation subdural hematoma (subacute to chronic) overlying the left frontal and parietal convexities with compression of the left cerebral hemisphere and midline shift of approximately 6.5 mm as above. The patient was notified and family drove patient to [facility] and was then transported to [facility] ED. Family reported SBP in the 200's before transport to [facility]. The patient did take an Aspirin 4/16/18, but does not take this regularly. The pt denies trauma or fall and does not smoke or consume ETOH.     Hospital Course: 04/18: Admit to [facility].   04/19: NAEON. F/U CTH Stable large subdural hematoma over the left cerebral convexity with local mass effect approximately 6-7 mm of rightward midline shift.  4/20: s/p SDH evacuation. SD drain in place.   4/21: no acute events overnight  4/22: post op day 2. No acute events overnight    [facility]  Neurocritical Care  Progress Note    Admit Date: 4/18/2018  Service Date: 04/22/2018  Length of Stay: 4    Subjective:     Chief Complaint: Acute on chronic intracranial subdural hematoma    History of Present Illness: Ms. Ghosh is 84 y.o. Female with pmhx of HTN who presents to [facility] for a higher level of care for an Acute on Chronic SDH. The patient initially presented to her PCP appt with dizziness and RUE and RLE weakness, which began on 04/15/2018. An outpt CT was performed and revealed mixed attenuation subdural hematoma (subacute  to chronic) overlying the left frontal and parietal convexities with compression of the left cerebral hemisphere and midline shift of approximately 6.5 mm as above. The patient was notified and family drove patient to [facility] and was then transported to [facility] ED. Family reported SBP in the 200's before transport to [facility]. The patient did take an Aspirin 4/16/18, but does not take this regularly. The pt denies trauma or fall and does not smoke or consume ETOH.     Hospital Course: 04/18: Admit to [facility].   04/19: NAEON. F/U CTH Stable large subdural hematoma over the left cerebral convexity with local mass effect approximately 6-7 mm of rightward midline shift.  4/20: s/p SDH evacuation. SD drain in place.   4/21: no acute events overnight  4/22: post op day 2. No acute events overnight        SUBJECTIVE:     Interval History/Significant Events: no acute events overnight    Review of Symptoms:   Constitutional: Denies fevers or chills.  Pulmonary: Denies shortness of breath or cough.  Cardiology: Denies chest pain or palpitations.  GI: Denies abdominal pain or constipation.  Neurologic: Denies new weakness, headaches, or paresthesias.     Medications:  Continuous Infusions:   sodium chloride 0.9% 50 mL/hr at 04/22/18 0701     Scheduled Meds:   amLODIPine  10 mg Oral Daily    levetiracetam IVPB  500 mg Intravenous Q12H    mupirocin  1 g Nasal BID    senna-docusate 8.6-50 mg  1 tablet Oral Daily     PRN Meds:.acetaminophen, fentaNYL, hydrALAZINE, labetalol, magnesium oxide, magnesium oxide, potassium chloride 10%, potassium chloride 10%, potassium chloride 10%, potassium, sodium phosphates, potassium, sodium phosphates, potassium, sodium phosphates    OBJECTIVE:   Vital Signs (Most Recent):   Temp: 99 °F (37.2 °C) (04/22/18 0701)  Pulse: 85 (04/22/18 0842)  Resp: (!) 41 (04/22/18 0842)  BP: 129/65 (04/22/18 0701)  SpO2: 97 % (04/22/18 0842)    Vital Signs (24h Range):   Temp:  [98 °F (36.7 °C)-99 °F (37.2 °C)] 99 °F  (37.2 °C)  Pulse:  [74-92] 85  Resp:  [19-41] 41  SpO2:  [96 %-98 %] 97 %  BP: (105-173)/(55-89) 129/65  Arterial Line BP: (134-145)/(56-64) 134/64    ICP/CPP (Last 24h):        I & O (Last 24h):     Intake/Output Summary (Last 24 hours) at 04/22/18 1003  Last data filed at 04/22/18 0701   Gross per 24 hour   Intake             1290 ml   Output             1200 ml   Net               90 ml     Physical Exam:  GA: somnolent, comfortable, no acute distress.   HEENT: No scleral icterus or JVD.   Pulmonary: Clear to auscultation A/P/L. No wheezing, crackles, or rhonchi.  Cardiac: RRR S1 & S2   Abdominal: Bowel sounds present x 4. No appreciable hepatosplenomegaly.  Skin: No jaundice, rashes, or visible lesions.  Neuro:  --GCS:14  --Mental Status:  awake, easily arousable, follows commands. aao x2.   --CN II-XII grossly intact.   --Pupils 2.5mm, PERRL.   Facial symmetry  Mild right hemiparesis (improved)    Vent Data:        Lines/Drains/Airway:            Arterial Line 04/20/18 Right Radial (Active)   Site Assessment Clean;Dry;Intact;No redness;No swelling 4/20/2018  3:00 PM   Line Status Pulsatile blood flow 4/20/2018  3:00 PM   Art Line Waveform Appropriate;Square wave test performed 4/20/2018  3:00 PM   Arterial Line Interventions Zeroed and calibrated;Leveled;Connections checked and tightened 4/20/2018  3:00 PM   Color/Movement/Sensation Capillary refill less than 3 sec 4/20/2018  3:00 PM   Dressing Type Transparent 4/20/2018  3:00 PM   Dressing Status Biopatch in place;Clean;Dry;Intact 4/20/2018  3:00 PM   Dressing Intervention Dressing reinforced 4/20/2018  3:00 PM   Dressing Change Due 04/27/18 4/20/2018  3:00 PM           Closed/Suction Drain 04/20/18 0924 Left Other (Comment) Bulb 7 Fr. (Active)   Site Description Unable to view 4/20/2018  3:00 PM   Dressing Type [place] 4/20/2018  3:00 PM   Dressing Status Clean;Dry;Intact 4/20/2018  3:00 PM   Dressing Intervention Dressing reinforced 4/20/2018  3:00 PM  "  Drainage None 4/20/2018  3:00 PM   Status To bulb suction 4/20/2018  3:00 PM   Output (mL) 55 mL 4/20/2018  5:00 PM            Urethral Catheter 04/20/18 0806 Double-lumen;Non-latex 16 Fr. (Active)   Site Assessment Clean;Intact 4/20/2018  3:00 PM   Collection Container Urimeter 4/20/2018  3:00 PM   Securement Method secured to top of thigh w/ adhesive device 4/20/2018  3:00 PM   Catheter Care Performed yes 4/20/2018  3:00 PM   Reason for Continuing Urinary Catheterization Post operative 4/20/2018  3:00 PM   CAUTI Prevention Bundle Drainage bag off the floor;StatLock in place w 1" slack;Intact seal between catheter & drainage tubing;Green sheeting clip in use;No dependent loops or kinks;Drainage bag not overfilled (<2/3 full);Drainage bag below bladder 4/20/2018 11:00 AM   Output (mL) 250 mL 4/20/2018  4:00 PM     Nutrition/Tube Feeds (if NPO state why): po  Labs:  ABG: No results for input(s): PH, PO2, PCO2, HCO3, POCSATURATED, BE in the last 24 hours.  BMP:    Recent Labs  Lab 04/22/18  0150      K 3.6   *   CO2 22*   BUN 12   CREATININE 0.7   GLU 87   MG 2.0   PHOS 2.1*     LFT:   Lab Results   Component Value Date    AST 14 04/22/2018    ALT 10 04/22/2018    ALKPHOS 58 04/22/2018    BILITOT 0.8 04/22/2018    ALBUMIN 2.5 (L) 04/22/2018    PROT 5.5 (L) 04/22/2018     CBC:   Lab Results   Component Value Date    WBC 8.34 04/22/2018    HGB 11.5 (L) 04/22/2018    HCT 36.8 (L) 04/22/2018    MCV 98 04/22/2018     (L) 04/22/2018     Microbiology x 7d:   Microbiology Results (last 7 days)     ** No results found for the last 168 hours. **        Imaging:    I personally reviewed the above image.    ASSESSMENT/PLAN:     Active Hospital Problems    Diagnosis    *Acute on chronic intracranial subdural hematoma    LVH (left ventricular hypertrophy)    Essential hypertension, benign    Subdural hematoma      Neuro:   SDH with brain compression s/p evacuation post op day 2  Hob <30 degrees  SD drain in " place  keppra for seizure prophylaxis  Close neuromonitoring  Repeat ct head if neuro worsening.    Pulmonary:   Stable saturations on nasal cannula    Cardiac:   SBP goal <160 with PRN or nicardipine gtt as needed    Renal:    Making urine    ID:   Afebrile, normal wbc    Hem/Onc:   Stable hh and plt count    Endocrine:    BS stable  HbA1c 5.1    Fluids/Electrolytes/Nutrition/GI:   Resume oral intake  Lines:  Art +  CVC NA  ETT NA  [place] NA  NG +  PEG NA    Proph:  DVT:SCD/ no heparin intracranial hemorrhage  Constipation:  Last output:bowel regimen  PUP: NA  VAP: NA    Full Code    Uninterrupted Critical Care/Counseling Time (not including procedures):: III      René Shea MD  Neurocritical Care  [facility]

## 2018-04-23 PROBLEM — I27.20 PULMONARY HYPERTENSION: Status: ACTIVE | Noted: 2018-04-23

## 2018-04-23 PROBLEM — G93.5 BRAIN COMPRESSION: Status: ACTIVE | Noted: 2018-04-23

## 2018-04-23 LAB
ALBUMIN SERPL BCP-MCNC: 2.4 G/DL
ALP SERPL-CCNC: 56 U/L
ALT SERPL W/O P-5'-P-CCNC: 10 U/L
ANION GAP SERPL CALC-SCNC: 6 MMOL/L
AST SERPL-CCNC: 11 U/L
BASOPHILS # BLD AUTO: 0.03 K/UL
BASOPHILS NFR BLD: 0.4 %
BILIRUB SERPL-MCNC: 0.4 MG/DL
BUN SERPL-MCNC: 15 MG/DL
CALCIUM SERPL-MCNC: 10.7 MG/DL
CHLORIDE SERPL-SCNC: 114 MMOL/L
CO2 SERPL-SCNC: 22 MMOL/L
CREAT SERPL-MCNC: 0.9 MG/DL
DIFFERENTIAL METHOD: ABNORMAL
EOSINOPHIL # BLD AUTO: 0.2 K/UL
EOSINOPHIL NFR BLD: 2.1 %
ERYTHROCYTE [DISTWIDTH] IN BLOOD BY AUTOMATED COUNT: 15.8 %
EST. GFR  (AFRICAN AMERICAN): >60 ML/MIN/1.73 M^2
EST. GFR  (NON AFRICAN AMERICAN): 58.9 ML/MIN/1.73 M^2
GLUCOSE SERPL-MCNC: 108 MG/DL
HCT VFR BLD AUTO: 34.1 %
HGB BLD-MCNC: 10.6 G/DL
IMM GRANULOCYTES # BLD AUTO: 0.03 K/UL
IMM GRANULOCYTES NFR BLD AUTO: 0.4 %
INR PPP: 1.1
LYMPHOCYTES # BLD AUTO: 1.2 K/UL
LYMPHOCYTES NFR BLD: 16.8 %
MAGNESIUM SERPL-MCNC: 2.1 MG/DL
MCH RBC QN AUTO: 30.8 PG
MCHC RBC AUTO-ENTMCNC: 31.1 G/DL
MCV RBC AUTO: 99 FL
MONOCYTES # BLD AUTO: 1 K/UL
MONOCYTES NFR BLD: 13.8 %
NEUTROPHILS # BLD AUTO: 4.8 K/UL
NEUTROPHILS NFR BLD: 66.5 %
NRBC BLD-RTO: 0 /100 WBC
PHOSPHATE SERPL-MCNC: 2.1 MG/DL
PLATELET # BLD AUTO: 145 K/UL
PMV BLD AUTO: 10.7 FL
POCT GLUCOSE: 104 MG/DL (ref 70–110)
POCT GLUCOSE: 111 MG/DL (ref 70–110)
POCT GLUCOSE: 132 MG/DL (ref 70–110)
POTASSIUM SERPL-SCNC: 4.1 MMOL/L
PROT SERPL-MCNC: 5.2 G/DL
PROTHROMBIN TIME: 10.9 SEC
RBC # BLD AUTO: 3.44 M/UL
SODIUM SERPL-SCNC: 142 MMOL/L
WBC # BLD AUTO: 7.25 K/UL

## 2018-04-23 PROCEDURE — 92523 SPEECH SOUND LANG COMPREHEN: CPT

## 2018-04-23 PROCEDURE — 51798 US URINE CAPACITY MEASURE: CPT

## 2018-04-23 PROCEDURE — 84100 ASSAY OF PHOSPHORUS: CPT

## 2018-04-23 PROCEDURE — 92610 EVALUATE SWALLOWING FUNCTION: CPT

## 2018-04-23 PROCEDURE — 85025 COMPLETE CBC W/AUTO DIFF WBC: CPT

## 2018-04-23 PROCEDURE — 94761 N-INVAS EAR/PLS OXIMETRY MLT: CPT

## 2018-04-23 PROCEDURE — 25000003 PHARM REV CODE 250: Performed by: STUDENT IN AN ORGANIZED HEALTH CARE EDUCATION/TRAINING PROGRAM

## 2018-04-23 PROCEDURE — 25000003 PHARM REV CODE 250: Performed by: NURSE PRACTITIONER

## 2018-04-23 PROCEDURE — 20000000 HC ICU ROOM

## 2018-04-23 PROCEDURE — 86901 BLOOD TYPING SEROLOGIC RH(D): CPT

## 2018-04-23 PROCEDURE — 99233 SBSQ HOSP IP/OBS HIGH 50: CPT | Mod: ,,, | Performed by: ANESTHESIOLOGY

## 2018-04-23 PROCEDURE — 85610 PROTHROMBIN TIME: CPT

## 2018-04-23 PROCEDURE — 80053 COMPREHEN METABOLIC PANEL: CPT

## 2018-04-23 PROCEDURE — 83735 ASSAY OF MAGNESIUM: CPT

## 2018-04-23 PROCEDURE — 63600175 PHARM REV CODE 636 W HCPCS: Performed by: STUDENT IN AN ORGANIZED HEALTH CARE EDUCATION/TRAINING PROGRAM

## 2018-04-23 RX ORDER — LEVETIRACETAM 500 MG/1
500 TABLET ORAL 2 TIMES DAILY
Status: DISCONTINUED | OUTPATIENT
Start: 2018-04-23 | End: 2018-04-26 | Stop reason: HOSPADM

## 2018-04-23 RX ADMIN — ACETAMINOPHEN 650 MG: 325 TABLET ORAL at 10:04

## 2018-04-23 RX ADMIN — MUPIROCIN 1 G: 20 OINTMENT TOPICAL at 09:04

## 2018-04-23 RX ADMIN — AMLODIPINE BESYLATE 10 MG: 10 TABLET ORAL at 09:04

## 2018-04-23 RX ADMIN — POTASSIUM & SODIUM PHOSPHATES POWDER PACK 280-160-250 MG 2 PACKET: 280-160-250 PACK at 04:04

## 2018-04-23 RX ADMIN — POTASSIUM & SODIUM PHOSPHATES POWDER PACK 280-160-250 MG 2 PACKET: 280-160-250 PACK at 09:04

## 2018-04-23 RX ADMIN — LABETALOL HYDROCHLORIDE 10 MG: 5 INJECTION, SOLUTION INTRAVENOUS at 06:04

## 2018-04-23 RX ADMIN — LEVETIRACETAM 500 MG: 500 TABLET ORAL at 09:04

## 2018-04-23 RX ADMIN — LEVETIRACETAM 500 MG: 5 INJECTION INTRAVENOUS at 09:04

## 2018-04-23 NOTE — OP NOTE
DATE OF PROCEDURE:  04/20/2018    PREOPERATIVE DIAGNOSIS:  Left hemispheric subdural hematoma.    POSTOPERATIVE DIAGNOSIS:  Left hemispheric subdural hematoma.    OPERATIVE PROCEDURE UNDERGONE:  Left frontoparietal craniotomy for evacuation of   subdural hematoma.    SURGEON:  Wojciech Sandoval M.D.    ASSISTANT:  ASHELY Mcleod.  No resident physician was available to assist in   the operation.    ANESTHESIA:  General.    INDICATIONS FOR PROCEDURE:  This is an 84-year-old female who presented with   right-sided hemiparesis and speech difficulties, worked up and found to have a   significant left-sided subacute on chronic subdural hematoma with mass effect.    We felt the patient would benefit from surgical intervention.    OPERATIVE NOTE:  Preoperative antibiotics were administered.  The patient was   placed on a horseshoe, head turned to the right.  A linear area was shaved just   above the superior temporal line.  Local anesthetic was injected.  The head was   prepped and draped in sterile fashion.  We made an incision, placed a   self-retaining retractor, placed two bur holes, one at each end of the incision,   turned ovoid craniotomy flap, tacked the dura, opened the dura in a cruciate   fashion, and got into the membrane of hematoma.  The subacute component came out   under high pressure.  There was a fair amount of clot, which we evacuated from   the frontotemporal and parietal area with copious irrigation.  We did make a   slit incision in the inner membrane and coagulated out very thoroughly.  After   obtaining hemostasis, we placed a subdural drain, brought it out through the   posterior bur hole and through a stab skin incision.  The dura was   reapproximated.  The bone flap was fixated using titanium plates and screws.    The patient was extubated and brought up to the ICU without any problems or   complication.  EBL was around 100 mL.  No specimens were sent.      VAHE/SID  dd: 04/22/2018 23:17:57 (CDT)  td:  04/22/2018 23:52:30 (CDT)  Doc ID   #[id number]  Job ID #[id number]    CC:

## 2018-04-23 NOTE — PLAN OF CARE
Problem: SLP Goal  Goal: SLP Goal  Pt seen for assessment this date. Pt appears near baseline with subtle high level cognitive deficits. Pt safe to continue diet of regular solids and thin liquid.     Tracie Paulino MS, CCC-SLP  Speech Language Pathologist  Pager: [phone]  Date 4/23/2018

## 2018-04-23 NOTE — PROGRESS NOTES
Notified ASHELY Sanchez that patient has not voided since midnight. Bladder scanner showed 451cc of urine. Orders were to in and out patient once.

## 2018-04-23 NOTE — ASSESSMENT & PLAN NOTE
85 y/o F L SDH s/p crani POD#3    Neuro stable on exam  SD drain out today  Stop abx after drain out  Liberalize HOB after drain out.  CTH in AM  Cont neuro checks  CV- goal SBP < 160  Pulm- toelrating RA  KALYNI- goal eunatremia. ADAT.   Heme/ID- hgb/hct stable. Afebrile.   ppx- ELLY/SCD's. Gi ppx    dispo- likely TTF tomorrow.

## 2018-04-23 NOTE — PLAN OF CARE
Problem: Patient Care Overview  Goal: Plan of Care Review  Outcome: Ongoing (interventions implemented as appropriate)  FELICIA drain to thumb print suction. Patient having difficulty urinating. Bladder scanning done and had to in and out patient. POC reviewed with patient and daughter in law at 1300. Both verbalized understanding. Questions and concerns addressed. No acute events today. Progressing toward goals. Will continue to monitor. See flowsheets for full assessment and VS info.

## 2018-04-23 NOTE — PROGRESS NOTES
ICU Progress Note  Neurocritical Care    Admit Date: 4/18/2018  LOS: 5    CC: Acute on chronic intracranial subdural hematoma    Code Status: Full Code     SUBJECTIVE:     Interval History/Significant Events:   Awake  Alert  ox2  Drain still in place  afebrile  Wbc wnl  Essential hypertension  received hydralazine last night  Drain still in place  Brain compression ;poa- now resolved  Pulmonary hypertension ;poa    Medications:  Continuous Infusions:   sodium chloride 0.9% 50 mL/hr at 04/23/18 0900     Scheduled Meds:   amLODIPine  10 mg Oral Daily    levetiracetam IVPB  500 mg Intravenous Q12H    mupirocin  1 g Nasal BID    senna-docusate 8.6-50 mg  1 tablet Oral Daily     PRN Meds:.acetaminophen, fentaNYL, hydrALAZINE, labetalol, magnesium oxide, magnesium oxide, potassium chloride 10%, potassium chloride 10%, potassium chloride 10%, potassium, sodium phosphates, potassium, sodium phosphates, potassium, sodium phosphates    OBJECTIVE:   Vital Signs (Most Recent):   Temp: 98 °F (36.7 °C) (04/23/18 0701)  Pulse: 75 (04/23/18 0904)  Resp: (!) 26 (04/23/18 0904)  BP: (!) 158/79 (04/23/18 0904)  SpO2: 99 % (04/23/18 0904)    Vital Signs (24h Range):   Temp:  [97.7 °F (36.5 °C)-98.4 °F (36.9 °C)] 98 °F (36.7 °C)  Pulse:  [] 75  Resp:  [18-35] 26  SpO2:  [95 %-99 %] 99 %  BP: (104-173)/(58-98) 158/79    ICP/CPP (Last 24h):        I & O (Last 24h):   Intake/Output Summary (Last 24 hours) at 04/23/18 1006  Last data filed at 04/23/18 0929   Gross per 24 hour   Intake             1610 ml   Output             1055 ml   Net              555 ml     Physical Exam:  GA: Alert, comfortable, no acute distress.   HEENT: No scleral icterus or JVD.   Pulmonary: Clear to auscultation A/P/L. No wheezing, crackles, or rhonchi.  Cardiac: RRR S1 & S2 w/o rubs/murmurs/gallops.   Abdominal: Bowel sounds present x 4. No appreciable hepatosplenomegaly.  Skin: No jaundice, rashes, or visible  lesions.  Neuro:  Awake  Alert  Non-focal  ox3         Lines/Drains/Airway:              Closed/Suction Drain 04/20/18 0924 Left Other (Comment) Bulb 7 Fr. (Active)   Site Description Unable to view 4/23/2018  3:00 AM   Dressing Type [place] 4/23/2018  3:00 AM   Dressing Status Clean;Dry;Intact 4/23/2018  3:00 AM   Dressing Intervention Dressing reinforced 4/23/2018  3:00 AM   Drainage None 4/23/2018  3:00 AM   Status To bulb suction 4/23/2018  3:00 AM   Output (mL) 70 mL 4/23/2018  9:00 AM     Nutrition/Tube Feeds (if NPO state why):po    Labs:  ABG: No results for input(s): PH, PO2, PCO2, HCO3, POCSATURATED, BE in the last 24 hours.  BMP:  Recent Labs  Lab 04/23/18  0114      K 4.1   *   CO2 22*   BUN 15   CREATININE 0.9      MG 2.1   PHOS 2.1*     LFT: Lab Results   Component Value Date    AST 11 04/23/2018    ALT 10 04/23/2018    ALKPHOS 56 04/23/2018    BILITOT 0.4 04/23/2018    ALBUMIN 2.4 (L) 04/23/2018    PROT 5.2 (L) 04/23/2018     CBC:   Lab Results   Component Value Date    WBC 7.25 04/23/2018    HGB 10.6 (L) 04/23/2018    HCT 34.1 (L) 04/23/2018    MCV 99 (H) 04/23/2018     (L) 04/23/2018     Microbiology x 7d:   Microbiology Results (last 7 days)     ** No results found for the last 168 hours. **        Imaging:  Cxr; clear  I personally reviewed the above image.    ASSESSMENT/PLAN:     Active Hospital Problems    Diagnosis    *Acute on chronic intracranial subdural hematoma    LVH (left ventricular hypertrophy)    Essential hypertension, benign    Subdural hematoma            Plan:  Follow BP  Follow exam  Add lopressor if BP still high  Follow exam      Level;3

## 2018-04-23 NOTE — PT/OT/SLP EVAL
"Speech Language Pathology Evaluation  Cognitive/Bedside Swallow    Patient Name:  Mikaela Ghosh   MRN:  [id number]  Admitting Diagnosis: Acute on chronic intracranial subdural hematoma    Recommendations:                  General Recommendations:  Cognitive-linguistic therapy  Diet recommendations:  Regular, Thin   Aspiration Precautions: Standard aspiration precautions   General Precautions: Standard,    Communication strategies:  none    History:     Past Medical History:   Diagnosis Date    Hypertension        Past Surgical History:   Procedure Laterality Date    EYE SURGERY      cataract    HYSTERECTOMY         Social History: Patient lives alone.    Prior Intubation HX:  During surgical intervention    Modified Barium Swallow: N/A    Chest X-Rays: N/A    Prior diet: regular solids and thin liquids     Occupation/hobbies/homemaking: Pt is fully independent at baseline she drives, cooks cleans and works as a  at [employer] 5x/weekly     Subjective     "I just want to get back home"     Pain/Comfort:  · Pain Rating 1: 0/10  · Pain Rating Post-Intervention 1: 0/10    Objective:     Cognitive Status:    Arousal/Alertness Appropriate response to stimuli  Attention No obvious deficits observed    Orientation Oriented x4  Memory Immediate Recall 100%  and Delayed1/3 w/ min cues Pt reports she typically writes down important dates and appointments to avoid forgetting   Problem Solving baisc WFL , Categories not yet assessed  and Sequencing unable to complete despite mod-max cues      Receptive Language:   Comprehension:      WFL    Pragmatics:    WFL    Expressive Language:  Verbal:    WFL      Motor Speech:  WFL    Voice:   strong and clear    Visual-Spatial:  difficulyt completing a conitinous [place]; intiatlly felt to be related to absent glasses showever once in place Pt continued to draw [place] in 4 separate steps    Ongoing assessment warranted 2/2 overall disorganization and difficulty completing clock " drawing tasks which may be mostly related to visual motor planning deficits     Reading:   WFL for functional material ongoing assessment warranted for lengthier information      Written Expression:   Able to functionally complete name writing Pt reports at baseline hand writing is a challenge; family at the bedside showed SLP samples of writing prior to surgery appearing near baseline     Oral Musculature Evaluation  · Oral Musculature: WFL  · Dentition: scattered dentition  · Mucosal Quality: good  · Oral Labial Strength and Mobility: WFL  · Lingual Strength and Mobility: WFL  · Volitional Cough: strong   · Volitional Swallow: prompts  · Voice Prior to PO Intake: strong and clear     Bedside Swallow Eval:   Consistencies Assessed:  · Thin liquids 6oz via consecutive sps from a straw   · Solids x5     Oral Phase:   · WFL    Pharyngeal Phase:   · no overt clinical signs/symptoms of aspiration  · no overt clinical signs/symptoms of pharyngeal dysphagia    Compensatory Strategies  · None    Treatment: education provided to Pt and family member re: Pt at/near cognitive baseline however warrants ongoing follow up by speech discipline for higher level cognitive linguistic skills given level of independence prior to surgery     Assessment:     Mikaela Ghosh is a 84 y.o. female with an SLP diagnosis of Cognitive-Linguistic Impairment.      Goals:    SLP Goals        Problem: SLP Goal    Goal Priority Disciplines Outcome   SLP Goal     SLP    Description:  Speech Language Pathology Goals  Goals expected to be met by 4/30    1. Pt will complete mental flexibility tasks with 80% acc with min cues   2. Pt will participate in ongoing assessment of reading and writing skills    3. Pt will participate in ongoing assessment of money, math and time management skills                     Plan:     · Patient to be seen:  3 x/week   · Plan of Care expires:  05/22/18  · Plan of Care reviewed with:  patient   · SLP Follow-Up:  Yes        Discharge recommendations:  Discharge Facility/Level Of Care Needs: nursing facility, skilled   Barriers to Discharge:  cognitive deficits and independent living pre-op     Time Tracking:     SLP Treatment Date:   04/23/18  Speech Start Time:  1107  Speech Stop Time:  1124     Speech Total Time (min):  17 min    Billable Minutes: Eval 8  and Eval Swallow and Oral Function 9    Tracie Paulino CCC-SLP  04/23/2018

## 2018-04-23 NOTE — PLAN OF CARE
Problem: Patient Care Overview  Goal: Plan of Care Review  Outcome: Ongoing (interventions implemented as appropriate)  POC reviewed with pt and daughter at 0500. Pt and daughter verbalized understanding. Questions and concerns addressed. No acute events overnight. Pt required in and out cath x 1. Sudbdural drain in place to thumbprint suction. NS at 50ml/hr.  Pt progressing toward goals. Will continue to monitor. See flowsheets for full assessment and VS info

## 2018-04-23 NOTE — SUBJECTIVE & OBJECTIVE
Interval History: NAEON.    Medications:  Continuous Infusions:  Scheduled Meds:   amLODIPine  10 mg Oral Daily    levETIRAcetam  500 mg Oral BID    mupirocin  1 g Nasal BID     PRN Meds:acetaminophen, fentaNYL, hydrALAZINE, labetalol, magnesium oxide, magnesium oxide, potassium chloride 10%, potassium chloride 10%, potassium chloride 10%, potassium, sodium phosphates, potassium, sodium phosphates, potassium, sodium phosphates     Review of Systems  Objective:     Weight: 65.6 kg (144 lb 10 oz)  Body mass index is 26.45 kg/m².  Vital Signs (Most Recent):  Temp: 98 °F (36.7 °C) (04/23/18 0701)  Pulse: 78 (04/23/18 1000)  Resp: 19 (04/23/18 1000)  BP: (!) 185/88 (Morning meds given) (04/23/18 1000)  SpO2: 99 % (04/23/18 1000) Vital Signs (24h Range):  Temp:  [97.7 °F (36.5 °C)-98.4 °F (36.9 °C)] 98 °F (36.7 °C)  Pulse:  [] 78  Resp:  [18-35] 19  SpO2:  [95 %-99 %] 99 %  BP: (104-185)/(58-98) 185/88       Date 04/23/18 0700 - 04/24/18 0659   Shift 3916-6256 9885-7656 8719-4580 24 Hour Total   I  N  T  A  K  E   P.O. 300   300    I.V.  (mL/kg) 216.7  (3.3)   216.7  (3.3)    IV Piggyback 100   100    Shift Total  (mL/kg) 616.7  (9.4)   616.7  (9.4)   O  U  T  P  U  T   Drains 70   70    Shift Total  (mL/kg) 70  (1.1)   70  (1.1)   Weight (kg) 65.6 65.6 65.6 65.6            Closed/Suction Drain 04/20/18 0924 Left Other (Comment) Bulb 7 Fr. (Active)   Site Description Unable to view 4/23/2018  3:00 AM   Dressing Type [place] 4/23/2018  3:00 AM   Dressing Status Clean;Dry;Intact 4/23/2018  3:00 AM   Dressing Intervention Dressing reinforced 4/23/2018  3:00 AM   Drainage None 4/23/2018  3:00 AM   Status To bulb suction 4/23/2018  3:00 AM   Output (mL) 70 mL 4/23/2018  9:00 AM       Neurosurgery Physical Exam    AAOx3, PERRL, EOMI, FS, TM, 5/5 throughout, SILT.  SD drain in place, draining.  Incision c/d/i    Significant Labs:    Recent Labs  Lab 04/22/18  0150 04/23/18  0114   GLU 87 108    142   K 3.6  4.1   * 114*   CO2 22* 22*   BUN 12 15   CREATININE 0.7 0.9   CALCIUM 10.5 10.7*   MG 2.0 2.1       Recent Labs  Lab 04/22/18  0150 04/23/18  0114   WBC 8.34 7.25   HGB 11.5* 10.6*   HCT 36.8* 34.1*   * 145*       Recent Labs  Lab 04/22/18  0150 04/23/18  0114   INR 1.1 1.1     Microbiology Results (last 7 days)     ** No results found for the last 168 hours. **        All pertinent labs from the last 24 hours have been reviewed.    Significant Diagnostics:  I have reviewed all pertinent imaging results/findings within the past 24 hours.

## 2018-04-23 NOTE — PROGRESS NOTES
[facility]  Neurosurgery  Progress Note    Subjective:     History of Present Illness: 84 F presents for eval of SDH.  Per pt she had dizziness 2 days ago and was instructed to get CT head which showed subacute left convexity SDH.  Pt denies any recent falls, traumas, or HA.  She took baby asa 2 days ago but doesn't routinely take asa.    Post-Op Info:  Procedure(s) (LRB):  CRANIOTOMY OPEN FOR SUB-DURAL HEMATOMA left, with stealth. (Left)   3 Days Post-Op     Interval History: NAEON.    Medications:  Continuous Infusions:  Scheduled Meds:   amLODIPine  10 mg Oral Daily    levETIRAcetam  500 mg Oral BID    mupirocin  1 g Nasal BID     PRN Meds:acetaminophen, fentaNYL, hydrALAZINE, labetalol, magnesium oxide, magnesium oxide, potassium chloride 10%, potassium chloride 10%, potassium chloride 10%, potassium, sodium phosphates, potassium, sodium phosphates, potassium, sodium phosphates     Review of Systems  Objective:     Weight: 65.6 kg (144 lb 10 oz)  Body mass index is 26.45 kg/m².  Vital Signs (Most Recent):  Temp: 98 °F (36.7 °C) (04/23/18 0701)  Pulse: 78 (04/23/18 1000)  Resp: 19 (04/23/18 1000)  BP: (!) 185/88 (Morning meds given) (04/23/18 1000)  SpO2: 99 % (04/23/18 1000) Vital Signs (24h Range):  Temp:  [97.7 °F (36.5 °C)-98.4 °F (36.9 °C)] 98 °F (36.7 °C)  Pulse:  [] 78  Resp:  [18-35] 19  SpO2:  [95 %-99 %] 99 %  BP: (104-185)/(58-98) 185/88       Date 04/23/18 0700 - 04/24/18 0659   Shift 8391-7485 0583-9599 0150-9087 24 Hour Total   I  N  T  A  K  E   P.O. 300   300    I.V.  (mL/kg) 216.7  (3.3)   216.7  (3.3)    IV Piggyback 100   100    Shift Total  (mL/kg) 616.7  (9.4)   616.7  (9.4)   O  U  T  P  U  T   Drains 70   70    Shift Total  (mL/kg) 70  (1.1)   70  (1.1)   Weight (kg) 65.6 65.6 65.6 65.6            Closed/Suction Drain 04/20/18 0924 Left Other (Comment) Bulb 7 Fr. (Active)   Site Description Unable to view 4/23/2018  3:00 AM   Dressing Type [place] 4/23/2018   3:00 AM   Dressing Status Clean;Dry;Intact 4/23/2018  3:00 AM   Dressing Intervention Dressing reinforced 4/23/2018  3:00 AM   Drainage None 4/23/2018  3:00 AM   Status To bulb suction 4/23/2018  3:00 AM   Output (mL) 70 mL 4/23/2018  9:00 AM       Neurosurgery Physical Exam    AAOx3, PERRL, EOMI, FS, TM, 5/5 throughout, SILT.  SD drain in place, draining.  Incision c/d/i    Significant Labs:    Recent Labs  Lab 04/22/18  0150 04/23/18  0114   GLU 87 108    142   K 3.6 4.1   * 114*   CO2 22* 22*   BUN 12 15   CREATININE 0.7 0.9   CALCIUM 10.5 10.7*   MG 2.0 2.1       Recent Labs  Lab 04/22/18  0150 04/23/18  0114   WBC 8.34 7.25   HGB 11.5* 10.6*   HCT 36.8* 34.1*   * 145*       Recent Labs  Lab 04/22/18  0150 04/23/18  0114   INR 1.1 1.1     Microbiology Results (last 7 days)     ** No results found for the last 168 hours. **        All pertinent labs from the last 24 hours have been reviewed.    Significant Diagnostics:  I have reviewed all pertinent imaging results/findings within the past 24 hours.    Assessment/Plan:     Subdural hematoma    85 y/o F L SDH s/p crani POD#3    Neuro stable on exam  SD drain out today  Stop abx after drain out  Liberalize HOB after drain out.  CTH in AM  Cont neuro checks  CV- goal SBP < 160  Pulm- toelrating RA  FENGI- goal eunatremia. ADAT.   Heme/ID- hgb/hct stable. Afebrile.   ppx- ELLY/SCD's. Gi ppx    dispo- likely TTF tomorrow.            Sascha Proyr MD  Neurosurgery  [facility]

## 2018-04-24 PROBLEM — Z29.89 SEIZURE PROPHYLAXIS: Status: ACTIVE | Noted: 2018-04-24

## 2018-04-24 LAB
ABO + RH BLD: NORMAL
ALBUMIN SERPL BCP-MCNC: 2.7 G/DL
ALP SERPL-CCNC: 55 U/L
ALT SERPL W/O P-5'-P-CCNC: 9 U/L
ANION GAP SERPL CALC-SCNC: 7 MMOL/L
AST SERPL-CCNC: 14 U/L
BASOPHILS # BLD AUTO: 0.03 K/UL
BASOPHILS NFR BLD: 0.4 %
BILIRUB SERPL-MCNC: 0.5 MG/DL
BLD GP AB SCN CELLS X3 SERPL QL: NORMAL
BLD PROD TYP BPU: NORMAL
BLD PROD TYP BPU: NORMAL
BLOOD UNIT EXPIRATION DATE: NORMAL
BLOOD UNIT EXPIRATION DATE: NORMAL
BLOOD UNIT TYPE CODE: 6200
BLOOD UNIT TYPE CODE: 6200
BLOOD UNIT TYPE: NORMAL
BLOOD UNIT TYPE: NORMAL
BUN SERPL-MCNC: 10 MG/DL
CALCIUM SERPL-MCNC: 10.8 MG/DL
CHLORIDE SERPL-SCNC: 109 MMOL/L
CO2 SERPL-SCNC: 22 MMOL/L
CODING SYSTEM: NORMAL
CODING SYSTEM: NORMAL
CREAT SERPL-MCNC: 0.7 MG/DL
DIFFERENTIAL METHOD: ABNORMAL
DISPENSE STATUS: NORMAL
DISPENSE STATUS: NORMAL
EOSINOPHIL # BLD AUTO: 0.2 K/UL
EOSINOPHIL NFR BLD: 2.2 %
ERYTHROCYTE [DISTWIDTH] IN BLOOD BY AUTOMATED COUNT: 15.7 %
EST. GFR  (AFRICAN AMERICAN): >60 ML/MIN/1.73 M^2
EST. GFR  (NON AFRICAN AMERICAN): >60 ML/MIN/1.73 M^2
GLUCOSE SERPL-MCNC: 102 MG/DL
GLUCOSE SERPL-MCNC: 91 MG/DL (ref 70–110)
HCO3 UR-SCNC: 21.6 MMOL/L (ref 24–28)
HCT VFR BLD AUTO: 35.5 %
HCT VFR BLD CALC: 31 %PCV (ref 36–54)
HGB BLD-MCNC: 11.6 G/DL
IMM GRANULOCYTES # BLD AUTO: 0.02 K/UL
IMM GRANULOCYTES NFR BLD AUTO: 0.3 %
INR PPP: 1
LYMPHOCYTES # BLD AUTO: 1.2 K/UL
LYMPHOCYTES NFR BLD: 16.4 %
MAGNESIUM SERPL-MCNC: 2.2 MG/DL
MCH RBC QN AUTO: 32 PG
MCHC RBC AUTO-ENTMCNC: 32.7 G/DL
MCV RBC AUTO: 98 FL
MONOCYTES # BLD AUTO: 0.8 K/UL
MONOCYTES NFR BLD: 11.6 %
NEUTROPHILS # BLD AUTO: 4.9 K/UL
NEUTROPHILS NFR BLD: 69.1 %
NRBC BLD-RTO: 0 /100 WBC
PCO2 BLDA: 31.7 MMHG (ref 35–45)
PH SMN: 7.44 [PH] (ref 7.35–7.45)
PHOSPHATE SERPL-MCNC: 2.7 MG/DL
PLATELET # BLD AUTO: 162 K/UL
PMV BLD AUTO: 11.6 FL
PO2 BLDA: 143 MMHG (ref 80–100)
POC BE: -3 MMOL/L
POC IONIZED CALCIUM: 1.44 MMOL/L (ref 1.06–1.42)
POC SATURATED O2: 99 % (ref 95–100)
POC TCO2: 23 MMOL/L (ref 23–27)
POCT GLUCOSE: 102 MG/DL (ref 70–110)
POCT GLUCOSE: 117 MG/DL (ref 70–110)
POCT GLUCOSE: 128 MG/DL (ref 70–110)
POCT GLUCOSE: 138 MG/DL (ref 70–110)
POTASSIUM BLD-SCNC: 3.2 MMOL/L (ref 3.5–5.1)
POTASSIUM SERPL-SCNC: 4 MMOL/L
PROT SERPL-MCNC: 6 G/DL
PROTHROMBIN TIME: 10.4 SEC
RBC # BLD AUTO: 3.63 M/UL
SAMPLE: ABNORMAL
SODIUM BLD-SCNC: 143 MMOL/L (ref 136–145)
SODIUM SERPL-SCNC: 138 MMOL/L
TRANS ERYTHROCYTES VOL PATIENT: NORMAL ML
TRANS ERYTHROCYTES VOL PATIENT: NORMAL ML
WBC # BLD AUTO: 7.13 K/UL

## 2018-04-24 PROCEDURE — 83735 ASSAY OF MAGNESIUM: CPT

## 2018-04-24 PROCEDURE — 25000003 PHARM REV CODE 250: Performed by: NURSE PRACTITIONER

## 2018-04-24 PROCEDURE — 84100 ASSAY OF PHOSPHORUS: CPT

## 2018-04-24 PROCEDURE — 85610 PROTHROMBIN TIME: CPT

## 2018-04-24 PROCEDURE — 20600001 HC STEP DOWN PRIVATE ROOM

## 2018-04-24 PROCEDURE — 80053 COMPREHEN METABOLIC PANEL: CPT

## 2018-04-24 PROCEDURE — 94761 N-INVAS EAR/PLS OXIMETRY MLT: CPT

## 2018-04-24 PROCEDURE — 99233 SBSQ HOSP IP/OBS HIGH 50: CPT | Mod: ,,, | Performed by: PHYSICIAN ASSISTANT

## 2018-04-24 PROCEDURE — 25000003 PHARM REV CODE 250: Performed by: STUDENT IN AN ORGANIZED HEALTH CARE EDUCATION/TRAINING PROGRAM

## 2018-04-24 PROCEDURE — 85025 COMPLETE CBC W/AUTO DIFF WBC: CPT

## 2018-04-24 RX ADMIN — MUPIROCIN 1 G: 20 OINTMENT TOPICAL at 08:04

## 2018-04-24 RX ADMIN — MUPIROCIN 1 G: 20 OINTMENT TOPICAL at 09:04

## 2018-04-24 RX ADMIN — ACETAMINOPHEN 650 MG: 325 TABLET ORAL at 03:04

## 2018-04-24 RX ADMIN — AMLODIPINE BESYLATE 10 MG: 10 TABLET ORAL at 08:04

## 2018-04-24 RX ADMIN — LEVETIRACETAM 500 MG: 500 TABLET ORAL at 08:04

## 2018-04-24 RX ADMIN — LEVETIRACETAM 500 MG: 500 TABLET ORAL at 09:04

## 2018-04-24 RX ADMIN — ACETAMINOPHEN 650 MG: 325 TABLET ORAL at 09:04

## 2018-04-24 NOTE — NURSING TRANSFER
Nursing Transfer Note      4/24/2018     Transfer TRANSFER  FROM 7090    Transfer via bed    Transfer with cardiac monitoring    Transported by Shiv RN and Franklyn RN    Medicines sent: YES    Chart send with patient: YES     Notified: Pt son and family member accompanied pt during travel    Patient reassessed at: 4/24/2018 @1723    Upon arrival to floor: cardiac monitor applied, patient oriented to room, call bell in reach and bed in lowest position    Upon arrival to room the pt assigned nurse for that unit met the pt in the room along with her family members. All the pt belongings were with the pt. Pt vital stable and AOx4. Will continue to monitor

## 2018-04-24 NOTE — PROGRESS NOTES
[facility]  Neurosurgery  Progress Note    Subjective:     History of Present Illness: 84 F presents for eval of SDH.  Per pt she had dizziness 2 days ago and was instructed to get CT head which showed subacute left convexity SDH.  Pt denies any recent falls, traumas, or HA.  She took baby asa 2 days ago but doesn't routinely take asa.    Post-Op Info:  Procedure(s) (LRB):  CRANIOTOMY OPEN FOR SUB-DURAL HEMATOMA left, with stealth. (Left)   3 Days Post-Op     Prescriptions Prior to Admission   Medication Sig Dispense Refill Last Dose    amLODIPine (NORVASC) 10 MG tablet Take 1 tablet (10 mg total) by mouth once daily. 90 tablet 3 4/18/2018       Review of patient's allergies indicates:  No Known Allergies    Past Medical History:   Diagnosis Date    Hypertension      Past Surgical History:   Procedure Laterality Date    EYE SURGERY      cataract    HYSTERECTOMY       Family History     None        Social History Main Topics    Smoking status: Never Smoker    Smokeless tobacco: Never Used    Alcohol use No    Drug use: No    Sexual activity: No     Review of Systems    Objective:     Weight: 65.6 kg (144 lb 10 oz)  Body mass index is 26.45 kg/m².  Vital Signs (Most Recent):  Temp: 99.1 °F (37.3 °C) (04/23/18 1905)  Pulse: 77 (04/23/18 2105)  Resp: (!) 27 (04/23/18 2105)  BP: 115/73 (04/23/18 2105)  SpO2: 96 % (04/23/18 2105) Vital Signs (24h Range):  Temp:  [97.8 °F (36.6 °C)-99.1 °F (37.3 °C)] 99.1 °F (37.3 °C)  Pulse:  [69-89] 77  Resp:  [12-32] 27  SpO2:  [95 %-100 %] 96 %  BP: (111-185)/(61-98) 115/73       Date 04/23/18 0700 - 04/24/18 0659   Shift 8280-5224 6046-2309 9692-3778 24 Hour Total   I  N  T  A  K  E   P.O. 300 325  625    I.V.  (mL/kg) 216.7  (3.3)   216.7  (3.3)    IV Piggyback 100   100    Shift Total  (mL/kg) 616.7  (9.4) 325  (5)  941.7  (14.4)   O  U  T  P  U  T   Urine  (mL/kg/hr) 800  (1.5) 900  1700    Drains 130   130    Shift Total  (mL/kg) 930  (14.2)  900  (13.7)  1830  (27.9)   Weight (kg) 65.6 65.6 65.6 65.6                        Physical Exam:  Nursing note and vitals reviewed.    Constitutional: She appears well-developed and well-nourished.     Eyes: Pupils are equal, round, and reactive to light.     Abdominal: Soft.     Psych/Behavior: She is alert. She is oriented to person, place, and time. She has a normal mood and affect.      AOX3, speech fluent  PERRL, EOMI, face symm, tongue midline  5/5 in BUE  4/5 in right HF otherwise 5/5 in BLE  SILT  No drift    Significant Labs:    Recent Labs  Lab 04/22/18  0150 04/23/18  0114   GLU 87 108    142   K 3.6 4.1   * 114*   CO2 22* 22*   BUN 12 15   CREATININE 0.7 0.9   CALCIUM 10.5 10.7*   MG 2.0 2.1       Recent Labs  Lab 04/22/18  0150 04/23/18  0114   WBC 8.34 7.25   HGB 11.5* 10.6*   HCT 36.8* 34.1*   * 145*       Recent Labs  Lab 04/22/18  0150 04/23/18  0114   INR 1.1 1.1     Microbiology Results (last 7 days)     ** No results found for the last 168 hours. **            Significant Diagnostics:  CT head: reviewed    Assessment/Plan:     Subdural hematoma    85 y/o F L SDH s/p crani POD2    Neuro stable on exam  SD drain out tomorrow  Stop abx after drain out  Liberalize HOB after drain out.  CTH in AM  Cont neuro checks  CV- goal SBP < 160  Pulm- toelrating RA  FENGI- goal eunatremia. ADAT.   Heme/ID- hgb/hct stable. Afebrile.   ppx- ELLY/SCD's. Gi ppx    dispo- likely TTF tomorrow            Brad Pena MD  Neurosurgery  [facility]

## 2018-04-24 NOTE — ASSESSMENT & PLAN NOTE
83 y/o F L SDH s/p crani POD3    Neuro stable on exam  SD drain out   Stop abx now  No HOB restrictions.  CTH stable.   Cont neuro checks q4h  CV- goal SBP < 160  Pulm- toelrating RA  FENGI- goal eunatremia.   Heme/ID- hgb/hct stable. Afebrile.   ppx- ELLY/SCD's. Gi ppx    dispo- TTF neurosurgery service today.

## 2018-04-24 NOTE — SUBJECTIVE & OBJECTIVE
Prescriptions Prior to Admission   Medication Sig Dispense Refill Last Dose    amLODIPine (NORVASC) 10 MG tablet Take 1 tablet (10 mg total) by mouth once daily. 90 tablet 3 4/18/2018       Review of patient's allergies indicates:  No Known Allergies    Past Medical History:   Diagnosis Date    Hypertension      Past Surgical History:   Procedure Laterality Date    EYE SURGERY      cataract    HYSTERECTOMY       Family History     None        Social History Main Topics    Smoking status: Never Smoker    Smokeless tobacco: Never Used    Alcohol use No    Drug use: No    Sexual activity: No     Review of Systems    Objective:     Weight: 65.6 kg (144 lb 10 oz)  Body mass index is 26.45 kg/m².  Vital Signs (Most Recent):  Temp: 99.1 °F (37.3 °C) (04/23/18 1905)  Pulse: 77 (04/23/18 2105)  Resp: (!) 27 (04/23/18 2105)  BP: 115/73 (04/23/18 2105)  SpO2: 96 % (04/23/18 2105) Vital Signs (24h Range):  Temp:  [97.8 °F (36.6 °C)-99.1 °F (37.3 °C)] 99.1 °F (37.3 °C)  Pulse:  [69-89] 77  Resp:  [12-32] 27  SpO2:  [95 %-100 %] 96 %  BP: (111-185)/(61-98) 115/73       Date 04/23/18 0700 - 04/24/18 0659   Shift 6532-8239 1978-1885 8348-3621 24 Hour Total   I  N  T  A  K  E   P.O. 300 325  625    I.V.  (mL/kg) 216.7  (3.3)   216.7  (3.3)    IV Piggyback 100   100    Shift Total  (mL/kg) 616.7  (9.4) 325  (5)  941.7  (14.4)   O  U  T  P  U  T   Urine  (mL/kg/hr) 800  (1.5) 900  1700    Drains 130   130    Shift Total  (mL/kg) 930  (14.2) 900  (13.7)  1830  (27.9)   Weight (kg) 65.6 65.6 65.6 65.6                        Physical Exam:  Nursing note and vitals reviewed.    Constitutional: She appears well-developed and well-nourished.     Eyes: Pupils are equal, round, and reactive to light.     Abdominal: Soft.     Psych/Behavior: She is alert. She is oriented to person, place, and time. She has a normal mood and affect.      AOX3, speech fluent  PERRL, EOMI, face symm, tongue midline  5/5 in BUE  4/5 in right HF otherwise  5/5 in BLE  SILT  No drift    Significant Labs:    Recent Labs  Lab 04/22/18  0150 04/23/18  0114   GLU 87 108    142   K 3.6 4.1   * 114*   CO2 22* 22*   BUN 12 15   CREATININE 0.7 0.9   CALCIUM 10.5 10.7*   MG 2.0 2.1       Recent Labs  Lab 04/22/18  0150 04/23/18  0114   WBC 8.34 7.25   HGB 11.5* 10.6*   HCT 36.8* 34.1*   * 145*       Recent Labs  Lab 04/22/18  0150 04/23/18  0114   INR 1.1 1.1     Microbiology Results (last 7 days)     ** No results found for the last 168 hours. **            Significant Diagnostics:  CT head: reviewed

## 2018-04-24 NOTE — ASSESSMENT & PLAN NOTE
85 y/o F L SDH s/p crani POD2    Neuro stable on exam  SD drain out tomorrow  Stop abx after drain out  Liberalize HOB after drain out.  CTH in AM  Cont neuro checks  CV- goal SBP < 160  Pulm- toelrating RA  KALYNI- goal eunatremia. ADAT.   Heme/ID- hgb/hct stable. Afebrile.   ppx- ELLY/SCD's. Gi ppx    dispo- likely TTF tomorrow

## 2018-04-24 NOTE — PROGRESS NOTES
[facility]  Neurosurgery  Progress Note    Subjective:     History of Present Illness: 84 F presents for eval of SDH.  Per pt she had dizziness 2 days ago and was instructed to get CT head which showed subacute left convexity SDH.  Pt denies any recent falls, traumas, or HA.  She took baby asa 2 days ago but doesn't routinely take asa.    Post-Op Info:  Procedure(s) (LRB):  CRANIOTOMY OPEN FOR SUB-DURAL HEMATOMA left, with stealth. (Left)   4 Days Post-Op     Interval History: NAEON. SD drain out yesterday. CTH satisfactory.    Medications:  Continuous Infusions:  Scheduled Meds:   amLODIPine  10 mg Oral Daily    levETIRAcetam  500 mg Oral BID    mupirocin  1 g Nasal BID     PRN Meds:acetaminophen, fentaNYL, hydrALAZINE, labetalol, magnesium oxide, magnesium oxide, potassium chloride 10%, potassium chloride 10%, potassium chloride 10%, potassium, sodium phosphates, potassium, sodium phosphates, potassium, sodium phosphates     Review of Systems    Objective:     Weight: 65.6 kg (144 lb 10 oz)  Body mass index is 26.45 kg/m².  Vital Signs (Most Recent):  Temp: 98.1 °F (36.7 °C) (04/24/18 1102)  Pulse: 82 (04/24/18 1102)  Resp: (!) 25 (04/24/18 1102)  BP: (!) 143/86 (04/24/18 1102)  SpO2: 97 % (04/24/18 1102) Vital Signs (24h Range):  Temp:  [98.1 °F (36.7 °C)-99.1 °F (37.3 °C)] 98.1 °F (36.7 °C)  Pulse:  [66-82] 82  Resp:  [12-34] 25  SpO2:  [95 %-100 %] 97 %  BP: (115-170)/(62-95) 143/86       Date 04/24/18 0700 - 04/25/18 0659   Shift 7815-6362 2121-5064 4892-5954 24 Hour Total   I  N  T  A  K  E   P.O. 250   250    Shift Total  (mL/kg) 250  (3.8)   250  (3.8)   O  U  T  P  U  T   Shift Total  (mL/kg)       Weight (kg) 65.6 65.6 65.6 65.6            Closed/Suction Drain 04/20/18 0924 Left Other (Comment) Bulb 7 Fr. (Active)   Site Description Unable to view 4/23/2018  3:00 AM   Dressing Type [place] 4/23/2018  3:00 AM   Dressing Status Clean;Dry;Intact 4/23/2018  3:00 AM   Dressing  Intervention Dressing reinforced 4/23/2018  3:00 AM   Drainage None 4/23/2018  3:00 AM   Status To bulb suction 4/23/2018  3:00 AM   Output (mL) 70 mL 4/23/2018  9:00 AM       Neurosurgery Physical Exam    AAOx3, PERRL, EOMI, FS, TM, 5/5 throughout, SILT.  Incision c/d/i    Significant Labs:    Recent Labs  Lab 04/23/18  0114 04/24/18  0159    102    138   K 4.1 4.0   * 109   CO2 22* 22*   BUN 15 10   CREATININE 0.9 0.7   CALCIUM 10.7* 10.8*   MG 2.1 2.2       Recent Labs  Lab 04/23/18  0114 04/24/18  0159   WBC 7.25 7.13   HGB 10.6* 11.6*   HCT 34.1* 35.5*   * 162       Recent Labs  Lab 04/23/18  0114 04/24/18  0159   INR 1.1 1.0     Microbiology Results (last 7 days)     ** No results found for the last 168 hours. **        All pertinent labs from the last 24 hours have been reviewed.    Significant Diagnostics:  I have reviewed all pertinent imaging results/findings within the past 24 hours.    Assessment/Plan:     Subdural hematoma    85 y/o F L SDH s/p crani POD3    Neuro stable on exam  SD drain out   Stop abx now  No HOB restrictions.  CTH stable.   Cont neuro checks q4h  CV- goal SBP < 160  Pulm- toelrating RA  FENGI- goal eunatremia.   Heme/ID- hgb/hct stable. Afebrile.   ppx- ELLY/SCD's. Gi ppx    dispo- TTF neurosurgery service today.            Sascha Pryor MD  Neurosurgery  [facility]

## 2018-04-24 NOTE — ASSESSMENT & PLAN NOTE
Acute on chronic SDH, with no clear trauma history, presenting with right sided weakness     CTH 4/18: subdural hematoma over the left cerebral convexity with local mass effect approximately 6-7 mm of rightward midline shift. Additional smaller mixed attenuation subdural collection along the right posterior cerebral convexity, best characterized on the coronal reconstructions    Plan:  - NSGY following, plans for intervention tomorrow   - Continue Neuro checks q 1hr  - keppra prophylaxis   - Holding anti-thrombotics / DVT prophylaxis  - CT head with acute neurological change   - SBP goal < 160  - PT/OT/Speech  - 4/23 drain removed  -4/24 Repeat CT Head reveals Postsurgical changes of left craniotomy for subdural hematoma evacuation with interval removal of the subdural drain.  -- Pt stable for TTF per NGSY

## 2018-04-24 NOTE — SUBJECTIVE & OBJECTIVE
Interval History: NAEON. SD drain out yesterday. CTH satisfactory.    Medications:  Continuous Infusions:  Scheduled Meds:   amLODIPine  10 mg Oral Daily    levETIRAcetam  500 mg Oral BID    mupirocin  1 g Nasal BID     PRN Meds:acetaminophen, fentaNYL, hydrALAZINE, labetalol, magnesium oxide, magnesium oxide, potassium chloride 10%, potassium chloride 10%, potassium chloride 10%, potassium, sodium phosphates, potassium, sodium phosphates, potassium, sodium phosphates     Review of Systems    Objective:     Weight: 65.6 kg (144 lb 10 oz)  Body mass index is 26.45 kg/m².  Vital Signs (Most Recent):  Temp: 98.1 °F (36.7 °C) (04/24/18 1102)  Pulse: 82 (04/24/18 1102)  Resp: (!) 25 (04/24/18 1102)  BP: (!) 143/86 (04/24/18 1102)  SpO2: 97 % (04/24/18 1102) Vital Signs (24h Range):  Temp:  [98.1 °F (36.7 °C)-99.1 °F (37.3 °C)] 98.1 °F (36.7 °C)  Pulse:  [66-82] 82  Resp:  [12-34] 25  SpO2:  [95 %-100 %] 97 %  BP: (115-170)/(62-95) 143/86       Date 04/24/18 0700 - 04/25/18 0659   Shift 0853-1764 5794-5485 4355-5112 24 Hour Total   I  N  T  A  K  E   P.O. 250   250    Shift Total  (mL/kg) 250  (3.8)   250  (3.8)   O  U  T  P  U  T   Shift Total  (mL/kg)       Weight (kg) 65.6 65.6 65.6 65.6            Closed/Suction Drain 04/20/18 0924 Left Other (Comment) Bulb 7 Fr. (Active)   Site Description Unable to view 4/23/2018  3:00 AM   Dressing Type [place] 4/23/2018  3:00 AM   Dressing Status Clean;Dry;Intact 4/23/2018  3:00 AM   Dressing Intervention Dressing reinforced 4/23/2018  3:00 AM   Drainage None 4/23/2018  3:00 AM   Status To bulb suction 4/23/2018  3:00 AM   Output (mL) 70 mL 4/23/2018  9:00 AM       Neurosurgery Physical Exam    AAOx3, PERRL, EOMI, FS, TM, 5/5 throughout, SILT.  Incision c/d/i    Significant Labs:    Recent Labs  Lab 04/23/18  0114 04/24/18  0159    102    138   K 4.1 4.0   * 109   CO2 22* 22*   BUN 15 10   CREATININE 0.9 0.7   CALCIUM 10.7* 10.8*   MG 2.1 2.2        Recent Labs  Lab 04/23/18  0114 04/24/18  0159   WBC 7.25 7.13   HGB 10.6* 11.6*   HCT 34.1* 35.5*   * 162       Recent Labs  Lab 04/23/18  0114 04/24/18  0159   INR 1.1 1.0     Microbiology Results (last 7 days)     ** No results found for the last 168 hours. **        All pertinent labs from the last 24 hours have been reviewed.    Significant Diagnostics:  I have reviewed all pertinent imaging results/findings within the past 24 hours.

## 2018-04-24 NOTE — SUBJECTIVE & OBJECTIVE
Interval History:  /24 POD #4, EVD drain removed yesterday. Stable for TTF per NGSY    Review of Systems   Review of symptoms : denies all   Constitutional: Denies fevers or chills.  Pulmonary: Denies shortness of breath or cough.  Cardiology: Denies chest pain or palpitations.  GI: Denies abdominal pain or constipation.  Neurologic: Denies new weakness,  headache, or paresthesias.  Objective:     Vitals:  Temp: 98.3 °F (36.8 °C)  Pulse: 85  Rhythm: normal sinus rhythm  BP: (!) 151/71  MAP (mmHg): 100  Resp: (!) 33  SpO2: 97 %  O2 Device (Oxygen Therapy): room air    Temp  Min: 98.1 °F (36.7 °C)  Max: 99.1 °F (37.3 °C)  Pulse  Min: 66  Max: 85  BP  Min: 105/73  Max: 170/83  MAP (mmHg)  Min: 83  Max: 118  Resp  Min: 12  Max: 34  SpO2  Min: 95 %  Max: 100 %    04/23 0701 - 04/24 0700  In: 941.7 [P.O.:625; I.V.:216.7]  Out: 2630 [Urine:2500; Drains:130]   Unmeasured Output  Urine Occurrence: 1  Stool Occurrence: 0       Physical Exam   Physical Exam:  GA:Awake,  Alert, Oriented X 3, Follows commands, moves all extremities.  comfortable, no acute distress.   HEENT: No scleral icterus or JVD.   Pulmonary: Clear to auscultation Anterior. No wheezing, crackles, or rhonchi.  Cardiac: RRR S1 & S2 w/o rubs/murmurs/gallops.   Abdominal: Bowel sounds present x 4. No appreciable hepatosplenomegaly.  Skin: No jaundice, rashes, or visible lesions.  Neuro:  --GCS: E4 V5 M6  --Mental Status: Awake,  Alert, Oriented X 3, Follows commands, moves all extremities  --CN II-XII grossly intact.   --Pupils 4mm, PERRL.   --Corneal reflex, gag, cough intact.  --LUE strength: 5/5  --RUE strength: 5/5  --LLE strength: 5/5  --RLE strength: 5/5  Unable to test gait due to level of consciousness.    Medications:  Continuous Scheduled  amLODIPine 10 mg Daily   levETIRAcetam 500 mg BID   mupirocin 1 g BID   PRN  acetaminophen 650 mg Q6H PRN   fentaNYL 25 mcg Q4H PRN   hydrALAZINE 10 mg Q4H PRN   labetalol 10 mg Q4H PRN   magnesium oxide 800 mg PRN    magnesium oxide 800 mg PRN   potassium chloride 10% 40 mEq PRN   potassium chloride 10% 40 mEq PRN   potassium chloride 10% 60 mEq PRN   potassium, sodium phosphates 2 packet PRN   potassium, sodium phosphates 2 packet PRN   potassium, sodium phosphates 2 packet PRN     Today I personally reviewed pertinent medications, lines/drains/airways, lab results, notably:    Diet  Diet Adult Regular (IDDSI Level 7)  Diet Adult Regular (IDDSI Level 7)

## 2018-04-24 NOTE — PLAN OF CARE
Problem: Patient Care Overview  Goal: Plan of Care Review  Outcome: Ongoing (interventions implemented as appropriate)  POC reviewed with pt at 0200. Pt verbalized understanding. Questions and concerns addressed. No acute events overnight. Pt progressing toward goals. Will continue to monitor. See flowsheets for full assessment and VS info.  Pt transported to CT overnight.

## 2018-04-24 NOTE — PROGRESS NOTES
[facility]  Neurocritical Care  Progress Note    Admit Date: 4/18/2018  Service Date: 04/24/2018  Length of Stay: 6    Subjective:     Chief Complaint: Acute on chronic intracranial subdural hematoma    History of Present Illness: Ms. Ghosh is 84 y.o. Female with pmhx of HTN who presents to [facility] for a higher level of care for an Acute on Chronic SDH. The patient initially presented to her PCP appt with dizziness and RUE and RLE weakness, which began on 04/15/2018. An outpt CT was performed and revealed mixed attenuation subdural hematoma (subacute to chronic) overlying the left frontal and parietal convexities with compression of the left cerebral hemisphere and midline shift of approximately 6.5 mm as above. The patient was notified and family drove patient to [facility] and was then transported to [facility]. Family reported SBP in the 200's before transport to [facility]. The patient did take an Aspirin 4/16/18, but does not take this regularly. The pt denies trauma or fall and does not smoke or consume ETOH.     Hospital Course: 04/18: Admit to [facility].   04/19: NAEON. F/U CTH Stable large subdural hematoma over the left cerebral convexity with local mass effect approximately 6-7 mm of rightward midline shift.  4/20: s/p SDH evacuation. SD drain in place.   4/21: no acute events overnight  4/22: post op day 2. No acute events overnight  4/24 POD #4, EVD drain removed yesterday. Stable for TTF per NGSY    Interval History:  /24 POD #4, EVD drain removed yesterday. Stable for TTF per NGSY    Review of Systems   Review of symptoms : denies all   Constitutional: Denies fevers or chills.  Pulmonary: Denies shortness of breath or cough.  Cardiology: Denies chest pain or palpitations.  GI: Denies abdominal pain or constipation.  Neurologic: Denies new weakness,  headache, or paresthesias.  Objective:     Vitals:  Temp: 98.3 °F (36.8 °C)  Pulse: 85  Rhythm: normal sinus rhythm  BP: (!) 151/71  MAP (mmHg):  100  Resp: (!) 33  SpO2: 97 %  O2 Device (Oxygen Therapy): room air    Temp  Min: 98.1 °F (36.7 °C)  Max: 99.1 °F (37.3 °C)  Pulse  Min: 66  Max: 85  BP  Min: 105/73  Max: 170/83  MAP (mmHg)  Min: 83  Max: 118  Resp  Min: 12  Max: 34  SpO2  Min: 95 %  Max: 100 %    04/23 0701 - 04/24 0700  In: 941.7 [P.O.:625; I.V.:216.7]  Out: 2630 [Urine:2500; Drains:130]   Unmeasured Output  Urine Occurrence: 1  Stool Occurrence: 0       Physical Exam   Physical Exam:  GA:Awake,  Alert, Oriented X 3, Follows commands, moves all extremities.  comfortable, no acute distress.   HEENT: No scleral icterus or JVD.   Pulmonary: Clear to auscultation Anterior. No wheezing, crackles, or rhonchi.  Cardiac: RRR S1 & S2 w/o rubs/murmurs/gallops.   Abdominal: Bowel sounds present x 4. No appreciable hepatosplenomegaly.  Skin: No jaundice, rashes, or visible lesions.  Neuro:  --GCS: E4 V5 M6  --Mental Status: Awake,  Alert, Oriented X 3, Follows commands, moves all extremities  --CN II-XII grossly intact.   --Pupils 4mm, PERRL.   --Corneal reflex, gag, cough intact.  --LUE strength: 5/5  --RUE strength: 5/5  --LLE strength: 5/5  --RLE strength: 5/5  Unable to test gait due to level of consciousness.    Medications:  Continuous Scheduled  amLODIPine 10 mg Daily   levETIRAcetam 500 mg BID   mupirocin 1 g BID   PRN  acetaminophen 650 mg Q6H PRN   fentaNYL 25 mcg Q4H PRN   hydrALAZINE 10 mg Q4H PRN   labetalol 10 mg Q4H PRN   magnesium oxide 800 mg PRN   magnesium oxide 800 mg PRN   potassium chloride 10% 40 mEq PRN   potassium chloride 10% 40 mEq PRN   potassium chloride 10% 60 mEq PRN   potassium, sodium phosphates 2 packet PRN   potassium, sodium phosphates 2 packet PRN   potassium, sodium phosphates 2 packet PRN     Today I personally reviewed pertinent medications, lines/drains/airways, lab results, notably:    Diet  Diet Adult Regular (IDDSI Level 7)  Diet Adult Regular (IDDSI Level 7)        Assessment/Plan:     Neuro   * Acute on chronic  intracranial subdural hematoma    Acute on chronic SDH, with no clear trauma history, presenting with right sided weakness     CTH 4/18: subdural hematoma over the left cerebral convexity with local mass effect approximately 6-7 mm of rightward midline shift. Additional smaller mixed attenuation subdural collection along the right posterior cerebral convexity, best characterized on the coronal reconstructions    Plan:  - NSGY following, plans for intervention tomorrow   - Continue Neuro checks q 1hr  - keppra prophylaxis   - Holding anti-thrombotics / DVT prophylaxis  - CT head with acute neurological change   - SBP goal < 160  - PT/OT/Speech  - 4/23 drain removed  -4/24 Repeat CT Head reveals Postsurgical changes of left craniotomy for subdural hematoma evacuation with interval removal of the subdural drain.  -- Pt stable for TTF per NGSY          Seizure prophylaxis    -- Keppra 500 mg BID daily X 7 days.         Brain compression    Present on admission        Cardiac/Vascular   Essential hypertension, benign    -- Continue to monitor HR and BP   -- SBP goal < 160  -- Amlodipine 10 mg daily  -- Labetalol Prn            Prophylaxis:  Venous Thromboembolism: mechanical  Stress Ulcer: None  Ventilator Pneumonia: not applicable     Activity Orders          None        Full Code    Level III  I have spent > 37 min with this patient, with over 50% of this time spent coordinating care and speaking with the family        Ryann Kennedy PA-C  Neurocritical Care  [facility]

## 2018-04-24 NOTE — PLAN OF CARE
SHAMIKA advised by CM that the Pt family is asking for help with SS disability. SHAMIKA met with Pt and Pt son. Pt son reported his sister was the one heading that request. SW discussed involvement of [facility] for this and will ask them to call daughter to scheduling coming in for the screening. Also reported previous rec was short SNF. Discussed OSNF and gave NH list as all options. Reported therapy will determine what the new rec is and the new SW on floor will discuss with them further.    SHAMIKA sent email to Elisa with [employer].    Ansley Manning LMSW  Neurocritical Care   [facility]  [id number]

## 2018-04-24 NOTE — PT/OT/SLP PROGRESS
Speech Language Pathology      Mikaela Ghosh  MRN: [id number]    Patient not seen today secondary to Other (Comment) (awaiting second sign order from MD for treatment). Will follow-up 4/25/18 for continued assessment of higher cognition and continued treatment.    Angelica So, SHARAN-SLP

## 2018-04-25 LAB
ALBUMIN SERPL BCP-MCNC: 2.7 G/DL
ALP SERPL-CCNC: 62 U/L
ALT SERPL W/O P-5'-P-CCNC: 11 U/L
ANION GAP SERPL CALC-SCNC: 7 MMOL/L
AST SERPL-CCNC: 12 U/L
BASOPHILS # BLD AUTO: 0.04 K/UL
BASOPHILS NFR BLD: 0.6 %
BILIRUB SERPL-MCNC: 0.6 MG/DL
BUN SERPL-MCNC: 12 MG/DL
CALCIUM SERPL-MCNC: 11.1 MG/DL
CHLORIDE SERPL-SCNC: 108 MMOL/L
CO2 SERPL-SCNC: 23 MMOL/L
CREAT SERPL-MCNC: 0.7 MG/DL
DIFFERENTIAL METHOD: ABNORMAL
EOSINOPHIL # BLD AUTO: 0.2 K/UL
EOSINOPHIL NFR BLD: 3 %
ERYTHROCYTE [DISTWIDTH] IN BLOOD BY AUTOMATED COUNT: 15.5 %
EST. GFR  (AFRICAN AMERICAN): >60 ML/MIN/1.73 M^2
EST. GFR  (NON AFRICAN AMERICAN): >60 ML/MIN/1.73 M^2
GLUCOSE SERPL-MCNC: 93 MG/DL
HCT VFR BLD AUTO: 37.9 %
HGB BLD-MCNC: 11.9 G/DL
IMM GRANULOCYTES # BLD AUTO: 0.02 K/UL
IMM GRANULOCYTES NFR BLD AUTO: 0.3 %
INR PPP: 1
LYMPHOCYTES # BLD AUTO: 1.4 K/UL
LYMPHOCYTES NFR BLD: 20.8 %
MAGNESIUM SERPL-MCNC: 2.2 MG/DL
MCH RBC QN AUTO: 31 PG
MCHC RBC AUTO-ENTMCNC: 31.4 G/DL
MCV RBC AUTO: 99 FL
MONOCYTES # BLD AUTO: 0.8 K/UL
MONOCYTES NFR BLD: 12.1 %
NEUTROPHILS # BLD AUTO: 4.2 K/UL
NEUTROPHILS NFR BLD: 63.2 %
NRBC BLD-RTO: 0 /100 WBC
PHOSPHATE SERPL-MCNC: 2.1 MG/DL
PLATELET # BLD AUTO: 197 K/UL
PMV BLD AUTO: 11 FL
POCT GLUCOSE: 99 MG/DL (ref 70–110)
POCT GLUCOSE: 99 MG/DL (ref 70–110)
POTASSIUM SERPL-SCNC: 3.9 MMOL/L
PROT SERPL-MCNC: 6.1 G/DL
PROTHROMBIN TIME: 10.3 SEC
RBC # BLD AUTO: 3.84 M/UL
SODIUM SERPL-SCNC: 138 MMOL/L
WBC # BLD AUTO: 6.69 K/UL

## 2018-04-25 PROCEDURE — 83735 ASSAY OF MAGNESIUM: CPT

## 2018-04-25 PROCEDURE — 85610 PROTHROMBIN TIME: CPT

## 2018-04-25 PROCEDURE — 25000003 PHARM REV CODE 250: Performed by: NURSE PRACTITIONER

## 2018-04-25 PROCEDURE — 36415 COLL VENOUS BLD VENIPUNCTURE: CPT

## 2018-04-25 PROCEDURE — 92507 TX SP LANG VOICE COMM INDIV: CPT

## 2018-04-25 PROCEDURE — 97535 SELF CARE MNGMENT TRAINING: CPT

## 2018-04-25 PROCEDURE — 80053 COMPREHEN METABOLIC PANEL: CPT

## 2018-04-25 PROCEDURE — 25000003 PHARM REV CODE 250: Performed by: PHYSICIAN ASSISTANT

## 2018-04-25 PROCEDURE — 97164 PT RE-EVAL EST PLAN CARE: CPT

## 2018-04-25 PROCEDURE — 99024 POSTOP FOLLOW-UP VISIT: CPT | Mod: POP,,, | Performed by: PHYSICIAN ASSISTANT

## 2018-04-25 PROCEDURE — 85025 COMPLETE CBC W/AUTO DIFF WBC: CPT

## 2018-04-25 PROCEDURE — 63600175 PHARM REV CODE 636 W HCPCS: Performed by: PHYSICIAN ASSISTANT

## 2018-04-25 PROCEDURE — 25000003 PHARM REV CODE 250: Performed by: STUDENT IN AN ORGANIZED HEALTH CARE EDUCATION/TRAINING PROGRAM

## 2018-04-25 PROCEDURE — 20600001 HC STEP DOWN PRIVATE ROOM

## 2018-04-25 PROCEDURE — 97530 THERAPEUTIC ACTIVITIES: CPT

## 2018-04-25 PROCEDURE — 97168 OT RE-EVAL EST PLAN CARE: CPT

## 2018-04-25 PROCEDURE — 84100 ASSAY OF PHOSPHORUS: CPT

## 2018-04-25 RX ORDER — HEPARIN SODIUM 5000 [USP'U]/ML
5000 INJECTION, SOLUTION INTRAVENOUS; SUBCUTANEOUS EVERY 8 HOURS
Status: DISCONTINUED | OUTPATIENT
Start: 2018-04-25 | End: 2018-04-26 | Stop reason: HOSPADM

## 2018-04-25 RX ORDER — BACITRACIN 500 [USP'U]/G
OINTMENT TOPICAL 2 TIMES DAILY
Status: DISCONTINUED | OUTPATIENT
Start: 2018-04-25 | End: 2018-04-26 | Stop reason: HOSPADM

## 2018-04-25 RX ADMIN — HEPARIN SODIUM 5000 UNITS: 5000 INJECTION, SOLUTION INTRAVENOUS; SUBCUTANEOUS at 04:04

## 2018-04-25 RX ADMIN — LEVETIRACETAM 500 MG: 500 TABLET ORAL at 09:04

## 2018-04-25 RX ADMIN — BACITRACIN: 500 OINTMENT TOPICAL at 09:04

## 2018-04-25 RX ADMIN — HEPARIN SODIUM 5000 UNITS: 5000 INJECTION, SOLUTION INTRAVENOUS; SUBCUTANEOUS at 10:04

## 2018-04-25 RX ADMIN — AMLODIPINE BESYLATE 10 MG: 10 TABLET ORAL at 09:04

## 2018-04-25 RX ADMIN — ACETAMINOPHEN 650 MG: 325 TABLET ORAL at 05:04

## 2018-04-25 NOTE — NURSING
Patient awake, alert, responsive, oriented x 4. Vital signs stable. Clear and fluent speech.  Family at the bedside. Call light in reach, reminded patient to call nurse for assistance. Telemetry monitoring in place.

## 2018-04-25 NOTE — PLAN OF CARE
Problem: Occupational Therapy Goal  Goal: Occupational Therapy Goal  Goals to be met by: 5/2/2108     Patient will increase functional independence with ADLs by performing:    UE Dressing with Set-up Assistance.  LE Dressing with Supervision.  Grooming while standing at sink with Supervision.  Toileting from toilet with [place] for hygiene and clothing management.   Toilet transfer to toilet with Stand-by Assistance.    Initiate OT POC.    Comments: Daniel Ca OTR/L  4/25/2018

## 2018-04-25 NOTE — PLAN OF CARE
Problem: SLP Goal  Goal: SLP Goal  Speech Language Pathology Goals  Goals expected to be met by 4/30    1. Pt will complete mental flexibility tasks with 80% acc with min cues   2. Pt will participate in ongoing assessment of reading and writing skills    3. Pt will participate in ongoing assessment of money, math and time management skills      Pt with good progress towards goals     Tracie Paulino MS, CCC-SLP  Speech Language Pathologist  Pager: [phone]  Date  4/25/2018

## 2018-04-25 NOTE — PT/OT/SLP RE-EVAL
"Physical Therapy Re-evaluation    Patient Name:  Mikaela Ghosh   MRN:  [id number]    Recommendations:     Discharge Recommendations:  nursing facility, skilled (pt lives alone and has 7 MARY JO; worked as a [employer]  prior to admit)   Discharge Equipment Recommendations: walker, rolling, bedside commode   Barriers to discharge: Inaccessible home and Decreased caregiver support (lives alone with 7 MARY JO)    Assessment:     Mikaela Ghosh is a 84 y.o. female admitted with a medical diagnosis of Acute on chronic intracranial subdural hematoma.  She presents with the following impairments/functional limitations:  weakness, impaired endurance, impaired functional mobilty, gait instability . Pt is motivated to progress with mobility    Rehab Prognosis:  good; patient would benefit from acute skilled PT services to address these deficits and reach maximum level of function.      Recent Surgery: Procedure(s) (LRB):  CRANIOTOMY OPEN FOR SUB-DURAL HEMATOMA left, with stealth. (Left) 5 Days Post-Op    Plan:     During this hospitalization, patient to be seen 5 x/week to address the above listed problems via gait training, therapeutic activities, therapeutic exercises, neuromuscular re-education  · Plan of Care Expires:  05/25/18   Plan of Care Reviewed with: patient, son    Subjective     Communicated with nurse prior to session.  Patient found supine upon PT entry to room, agreeable to evaluation.    "I need to use the bathroom"  Pain/Comfort:  · Pain Rating 1: 5/10 (pt c/o "moderate pain" in her L shoulder and head)  · Location - Side 1: Left  · Location 1: shoulder (L shoulder and head)  · Pain Addressed 1: Reposition, Nurse notified, Distraction  · Pain Rating Post-Intervention 1:  ("I just feel like I need a tylenol"; pt did not grade)    Patients cultural, spiritual, Yazdanism conflicts given the current situation: none noted      Objective:     Patient found with: telemetry, SCD     General Precautions: Standard, fall "   Orthopedic Precautions:N/A   Braces: N/A     Exams:  · Cognitive Exam:  Patient is oriented to Person, Place and Time and follows 100% of simple commands   · Postural Exam:  Patient presented with the following abnormalities:    · -       Rounded shoulders  · -       Forward head  · -       Abnormal trunk flexion  · Sensation:    · -       Intact  light/touch B LE  · RLE ROM: WFL  · RLE Strength: Deficits: hip flex 3+/5; knee flex/ext 4/5; ankle DF 4/5  · LLE ROM: WFL  · LLE Strength: Deficits: hip flex 3+/5; knee flex/ext 4/5; ankle DF 4/5    Functional Mobility:  · Bed Mobility:     · Supine to Sit: minimum assistance  · Transfers:     · Sit to Stand:  minimum assistance from bed and moderate assistance from low toilet with rolling walker  · Gait: 6ft then 40ft with RW with flexed trunk (improved with verbal cues for upright posture), decreased step length, narrow MARIELLA, and pt tends to drift to the R.     AM-PAC 6 CLICK MOBILITY  Total Score:17     Therapeutic Activities and Exercises:   pt ambulated to the bathroom and urinated, pt able to clean herself with set up. Pt's nurse notified.    Patient left up in chair with all lines intact, call button in reach, nurse notified and son present.    GOALS:    Physical Therapy Goals        Problem: Physical Therapy Goal    Goal Priority Disciplines Outcome Goal Variances Interventions   Physical Therapy Goal     PT/OT, PT Ongoing (interventions implemented as appropriate)     Description:  Goals to be met by: 2018    Patient will increase functional independence with mobility by performin. Supine to sit with Set-up [place]-not met  2. Sit to supine with Set-up [place]-not met  3. Sit to stand transfer with Supervision using RW. -not met  4. Gait  x 150 feet with stand by Assistance using Rolling Walker. -not met  5. Lower extremity exercise program x20 reps per handout, with supervision  6. Pt up/down 7 steps with B UE rail support with CGA-not met                      History:     Past Medical History:   Diagnosis Date    Hypertension        Past Surgical History:   Procedure Laterality Date    EYE SURGERY      cataract    HYSTERECTOMY         Clinical Decision Making:     History  Co-morbidities and personal factors that may impact the plan of care Examination  Body Structures and Functions, activity limitations and participation restrictions that may impact the plan of care Clinical Presentation   Decision Making/ Complexity Score   Co-morbidities:   [] Time since onset of injury / illness / exacerbation  [] Status of current condition  []Patient's cognitive status and safety concerns    [] Multiple Medical Problems (see med hx)  Personal Factors:   [] Patient's age  [] Prior Level of function   [] Patient's home situation (environment and family support)  [] Patient's level of motivation  [] Expected progression of patient      HISTORY:(criteria)    [] 49497 - no personal factors/history    [] 16017 - has 1-2 personal factor/comorbidity     [] 55140 - has >3 personal factor/comorbidity     Body Regions:  [] Objective examination findings  [] Head     []  Neck  [] Trunk   [] Upper Extremity  [] Lower Extremity    Body Systems:  [] For communication ability, affect, cognition, language, and learning style: the assessment of the ability to make needs known, consciousness, orientation (person, place, and time), expected emotional /behavioral responses, and learning preferences (eg, learning barriers, education  needs)  [] For the neuromuscular system: a general assessment of gross coordinated movement (eg, balance, gait, locomotion, transfers, and transitions) and motor function  (motor control and motor learning)  [] For the musculoskeletal system: the assessment of gross symmetry, gross range of motion, gross strength, height, and weight  [] For the integumentary system: the assessment of pliability(texture), presence of scar formation, skin color, and  skin integrity  [] For cardiovascular/pulmonary system: the assessment of heart rate, respiratory rate, blood pressure, and edema     Activity limitations:    [] Patient's cognitive status and saf ety concerns          [] Status of current condition      [] Weight bearing restriction  [] Cardiopulmunary Restriction    Participation Restrictions:   [] Goals and goal agreement with the patient     [] Rehab potential (prognosis) and probable outcome      Examination of Body System: (criteria)    [] 65360 - addressing 1-2 elements    [] 43445 - addressing a total of 3 or more elements     [] 32106 -  Addressing a total of 4 or more elements         Clinical Presentation: (criteria)  Choose one     On examination of body system using standardized tests and measures patient presents with (CHOOSE ONE) elements from any of the following: body structures and functions, activity limitations, and/or participation restrictions.  Leading to a clinical presentation that is considered (CHOOSE ONE)                              Clinical Decision Making  (Eval Complexity):  Low- 83866     Time Tracking:     PT Received On: 04/25/18  PT Start Time: 0828     PT Stop Time: 0857  PT Total Time (min): 29 min     Billable Minutes: Re-eval 15 and Therapeutic Activity 14      Gabby Hoff, PT  04/25/2018

## 2018-04-25 NOTE — PT/OT/SLP PROGRESS
Speech Language Pathology Treatment    Patient Name:  Mikaela Ghosh   MRN:  [id number]  Admitting Diagnosis: Acute on chronic intracranial subdural hematoma    Recommendations:                 General Recommendations:  Cognitive-linguistic therapy  Diet recommendations:  Regular, Liquid Diet Level: Thin   General Precautions: Standard,      Subjective     Pt awake and alert     Pain/Comfort:  · Pain Rating 1: 0/10  · Pain Rating Post-Intervention 1: 0/10    Objective:     Has the patient been evaluated by SLP for swallowing?   Yes  Keep patient NPO? No   Current Respiratory Status:        Pt participated in 4 item auditory sequencing task. Pt unable to complete without SLP mod assistance , Pt benefited from visual cueing with items written down to help organize. Pt attempted clock drawing task and was also disorganized in attempting unable to complete warranting SLP mod-max visual cueing to address priority quadrants initially first and then filling in remaining numbers. Pt motivated to participate in tasks and eager to improve. SLP offered directions re: tasks she and family members can continue to complete independently.  Pt reporting she is pleased she will be going to a SNF to continue to work on her skills so she can return home.  Speech to continue to follow.     Assessment:     Mikaela Ghosh is a 84 y.o. female with an SLP diagnosis of Cognitive-Linguistic Impairment.    Goals:    SLP Goals        Problem: SLP Goal    Goal Priority Disciplines Outcome   SLP Goal     SLP    Description:  Speech Language Pathology Goals  Goals expected to be met by 4/30    1. Pt will complete mental flexibility tasks with 80% acc with min cues   2. Pt will participate in ongoing assessment of reading and writing skills    3. Pt will participate in ongoing assessment of money, math and time management skills                     Plan:     · Patient to be seen:  3 x/week   · Plan of Care expires:  05/22/18  · Plan of Care reviewed  with:  patient   · SLP Follow-Up:  Yes       Discharge recommendations:  nursing facility, skilled   Barriers to Discharge:  None    Time Tracking:     SLP Treatment Date:   04/25/18  Speech Start Time:  1535  Speech Stop Time:  1548     Speech Total Time (min):  13 min    Billable Minutes: Speech Therapy Individual 13    Tracie Paulino CCC-SLP  04/25/2018

## 2018-04-25 NOTE — ASSESSMENT & PLAN NOTE
85 y/o F with left SDH s/p crani POD5    -Neuro stable on exam  -Post-op CTH stable  -Bacitracin to incision BID  -Keppra 500mg BID x 7 days for seizure prophylaxis (day 2)  -Cont neuro checks q4h  -PT/OT/OOB  -SQH for DVT prophylaxis  -Continue home amlodipine for HTN   -PT recommending SNF. CM to obtain choices today

## 2018-04-25 NOTE — SUBJECTIVE & OBJECTIVE
Interval History: NAEON. Patient reports mild HA relieved with tylenol. She denies new neuro changes. Tolerating diet, voiding appropriately. +BM    Medications:  Continuous Infusions:  Scheduled Meds:   amLODIPine  10 mg Oral Daily    levETIRAcetam  500 mg Oral BID     PRN Meds:acetaminophen, fentaNYL, hydrALAZINE, labetalol, magnesium oxide, magnesium oxide, potassium chloride 10%, potassium chloride 10%, potassium chloride 10%, potassium, sodium phosphates, potassium, sodium phosphates, potassium, sodium phosphates     Review of Systems  Objective:     Weight: 65.6 kg (144 lb 10 oz)  Body mass index is 26.45 kg/m².  Vital Signs (Most Recent):  Temp: 97.4 °F (36.3 °C) (04/25/18 1128)  Pulse: 81 (04/25/18 1128)  Resp: 18 (04/25/18 1128)  BP: 135/79 (04/25/18 1128)  SpO2: 95 % (04/25/18 1128) Vital Signs (24h Range):  Temp:  [96.7 °F (35.9 °C)-99.6 °F (37.6 °C)] 97.4 °F (36.3 °C)  Pulse:  [66-87] 81  Resp:  [18-33] 18  SpO2:  [90 %-98 %] 95 %  BP: (119-164)/(63-86) 135/79        Neurosurgery Physical Exam  General: well developed, well nourished, no distress.   Head: normocephalic, atraumatic  Neurologic: Alert and oriented. Thought content appropriate.  GCS: Motor: 6/Verbal: 5/Eyes: 4 GCS Total: 15  Mental Status: Awake, Alert, Oriented x3  Cranial nerves: face symmetric, tongue midline, CN II-XII grossly intact.   Eyes: pupils equal, round, reactive to light with accomodation, EOMI.    Sensory: intact to light touch throughout  Motor Strength:Moves all extremities spontaneously with good tone.  Generalized deconditioning. No focal weakness. No abnormal movements seen.   DTR's - 2 + and symmetric in UE and LE  Pronator Drift: no drift noted  Finger-to-nose: left dysmetria  Babinski: absent  Pulses: 2+ and symmetric radial and dorsalis pedis. No lower extremity edema  Incision clean, dry and intact     Significant Labs:    Recent Labs  Lab 04/24/18  0159 04/25/18  0452    93    138   K 4.0 3.9   CL  109 108   CO2 22* 23   BUN 10 12   CREATININE 0.7 0.7   CALCIUM 10.8* 11.1*   MG 2.2 2.2       Recent Labs  Lab 04/24/18  0159 04/25/18  0452   WBC 7.13 6.69   HGB 11.6* 11.9*   HCT 35.5* 37.9    197       Recent Labs  Lab 04/24/18  0159 04/25/18  0452   INR 1.0 1.0     Microbiology Results (last 7 days)     ** No results found for the last 168 hours. **        All pertinent labs from the last 24 hours have been reviewed.    Significant Diagnostics:  None new

## 2018-04-25 NOTE — PLAN OF CARE
Rec is Skilled Nursing. Family chose [facility] in this order. Referal send throught [facility].

## 2018-04-25 NOTE — PT/OT/SLP RE-EVAL
Occupational Therapy   Re-evaluation    Name: Mikaela Ghosh  MRN: [id number]  Admitting Diagnosis:  Acute on chronic intracranial subdural hematoma 5 Days Post-Op    Recommendations:     Discharge Recommendations: nursing facility, skilled ( SNF)  Discharge Equipment Recommendations:  walker, rolling, bedside commode  Barriers to discharge:  Inaccessible home environment, Decreased caregiver support    History:     Past Medical History:   Diagnosis Date    Hypertension          Past Surgical History:   Procedure Laterality Date    EYE SURGERY      cataract    HYSTERECTOMY         Subjective     Chief Complaint: Fatigue   Patient/Family stated goals: Return to PLOF  Communicated with: RN prior to session.  Pain/Comfort:  Pain Rating 1: 0/10  Pain Rating Post-Intervention 1: 0/10    Objective:     Patient found with: telemetry, SCD    General Precautions: Standard, fall   Orthopedic Precautions:N/A   Braces: N/A     Occupational Performance:    Bed Mobility:    · Patient completed Rolling/Turning to Right with stand by assistance  · Patient completed Scooting/Bridging with contact guard assistance  · Patient completed Supine to Sit with contact guard assistance  · Patient completed Sit to Supine with contact guard assistance    Functional Mobility/Transfers:  · Patient completed Sit <> Stand Transfer with minimum assistance  with  rolling walker   · Patient completed Toilet Transfer Stand Pivot technique with minimum assistance with  rolling walker    Activities of Daily Living:  · Grooming: contact guard assistance oral and hand hygiene   · UB Dressing: minimum assistance donned gown as robe  · LB Dressing: contact guard assistance donned/doffed socks seated EOB  · Toileting: supervision BM sitting on toilet     Cognitive/Visual Perceptual:  Cognitive/Psychosocial Skills:     -       Oriented to: Person, Place, Time and Situation   -       Follows Commands/attention:Follows multistep  commands  -        Communication: clear/fluent  -       Memory: No Deficits noted  -       Safety awareness/insight to disability: impaired   -       Mood/Affect/Coping skills/emotional control: Appropriate to situation  Visual/Perceptual:      -Intact    Physical Exam:  Balance:    -       Pt displayed fair to good overall balance   Postural examination/scapula alignment:    -       Rounded shoulders  -       Forward head  Skin integrity: Visible skin intact  Upper Extremity Range of Motion:     -       Right Upper Extremity: WFL  -       Left Upper Extremity: WFL  Upper Extremity Strength:    -       Right Upper Extremity: WFL  -       Left Upper Extremity: WFL   Strength:    -       Right Upper Extremity: WFL  -       Left Upper Extremity: WFL  Fine Motor Coordination:    -       Intact  Gross motor coordination:   WFL    Patient left HOB elevated with all lines intact, call button in reach and family present    AMPA 6 Click:  AMPA Total Score: 20    Treatment & Education:  Pt's famiy was educated on pt's CLOF and need for SS SNF placement. Pt's family agreeable to recommendations.   Education:    Assessment:     Mikaela Ghosh is a 84 y.o. female with a medical diagnosis of Acute on chronic intracranial subdural hematoma.  She presents with impairments listed below. Pt did well to participate and tolerate session. Pt did well to complete ADLs and oob activities, but stil displays decreased ability to perform task indep and safely. Pt boris benefit from SS SNF to increase overall independence and safety. Pt would benefit from skilled OT services to improve independence and overall occupational functioning.      Performance deficits affecting function are impaired endurance, impaired self care skills, impaired functional mobilty, gait instability.      Rehab Prognosis:  good; patient would benefit from acute skilled OT services to address these deficits and reach maximum level of function.         Clinical Decision Making:  "    1.  OT Low:  "Pt evaluation falls under low complexity for evaluation coding due to performance deficits noted in 1-3 areas as stated above and 0 co-morbities affecting current functional status. Data obtained from problem focused assessments. No modifications or assistance was required for completion of evaluation. Only brief occupational profile and history review completed."     Plan:     Patient to be seen 3 x/week to address the above listed problems via self-care/home management, therapeutic activities, therapeutic exercises, neuromuscular re-education, cognitive retraining  · Plan of Care Expires: 05/19/18  · Plan of Care Reviewed with: patient, family    This Plan of care has been discussed with the patient who was involved in its development and understands and is in agreement with the identified goals and treatment plan    GOALS:    Occupational Therapy Goals        Problem: Occupational Therapy Goal    Goal Priority Disciplines Outcome Interventions   Occupational Therapy Goal     OT, PT/OT Ongoing (interventions implemented as appropriate)    Description:  Goals to be met by: 5/2/2108     Patient will increase functional independence with ADLs by performing:    UE Dressing with Set-up Assistance.  LE Dressing with Supervision.  Grooming while standing at sink with Supervision.  Toileting from toilet with Delta for hygiene and clothing management.   Toilet transfer to toilet with Stand-by Assistance.                      Time Tracking:     OT Date of Treatment: 04/25/18  OT Start Time: 1222  OT Stop Time: 1240  OT Total Time (min): 18 min    Billable Minutes:Re-eval 10 minutes  Self Care/Home Management 8 minutes    Daniel Ca, OT  4/25/2018      "

## 2018-04-25 NOTE — PROGRESS NOTES
[facility]  Neurosurgery  Progress Note    Subjective:     History of Present Illness: 84 F presents for eval of SDH.  Per pt she had dizziness 2 days ago and was instructed to get CT head which showed subacute left convexity SDH.  Pt denies any recent falls, traumas, or HA.  She took baby asa 2 days ago but doesn't routinely take asa.    Post-Op Info:  Procedure(s) (LRB):  CRANIOTOMY OPEN FOR SUB-DURAL HEMATOMA left, with stealth. (Left)   5 Days Post-Op     Interval History: NAEON. Patient reports mild HA relieved with tylenol. She denies new neuro changes. Tolerating diet, voiding appropriately. +BM    Medications:  Continuous Infusions:  Scheduled Meds:   amLODIPine  10 mg Oral Daily    levETIRAcetam  500 mg Oral BID     PRN Meds:acetaminophen, fentaNYL, hydrALAZINE, labetalol, magnesium oxide, magnesium oxide, potassium chloride 10%, potassium chloride 10%, potassium chloride 10%, potassium, sodium phosphates, potassium, sodium phosphates, potassium, sodium phosphates     Review of Systems  Objective:     Weight: 65.6 kg (144 lb 10 oz)  Body mass index is 26.45 kg/m².  Vital Signs (Most Recent):  Temp: 97.4 °F (36.3 °C) (04/25/18 1128)  Pulse: 81 (04/25/18 1128)  Resp: 18 (04/25/18 1128)  BP: 135/79 (04/25/18 1128)  SpO2: 95 % (04/25/18 1128) Vital Signs (24h Range):  Temp:  [96.7 °F (35.9 °C)-99.6 °F (37.6 °C)] 97.4 °F (36.3 °C)  Pulse:  [66-87] 81  Resp:  [18-33] 18  SpO2:  [90 %-98 %] 95 %  BP: (119-164)/(63-86) 135/79        Neurosurgery Physical Exam  General: well developed, well nourished, no distress.   Head: normocephalic, atraumatic  Neurologic: Alert and oriented. Thought content appropriate.  GCS: Motor: 6/Verbal: 5/Eyes: 4 GCS Total: 15  Mental Status: Awake, Alert, Oriented x3  Cranial nerves: face symmetric, tongue midline, CN II-XII grossly intact.   Eyes: pupils equal, round, reactive to light with accomodation, EOMI.    Sensory: intact to light touch throughout  Motor  Strength:Moves all extremities spontaneously with good tone.  Generalized deconditioning. No focal weakness. No abnormal movements seen.   DTR's - 2 + and symmetric in UE and LE  Pronator Drift: no drift noted  Finger-to-nose: left dysmetria  Babinski: absent  Pulses: 2+ and symmetric radial and dorsalis pedis. No lower extremity edema  Incision clean, dry and intact     Significant Labs:    Recent Labs  Lab 04/24/18 0159 04/25/18  0452    93    138   K 4.0 3.9    108   CO2 22* 23   BUN 10 12   CREATININE 0.7 0.7   CALCIUM 10.8* 11.1*   MG 2.2 2.2       Recent Labs  Lab 04/24/18 0159 04/25/18  0452   WBC 7.13 6.69   HGB 11.6* 11.9*   HCT 35.5* 37.9    197       Recent Labs  Lab 04/24/18 0159 04/25/18  0452   INR 1.0 1.0     Microbiology Results (last 7 days)     ** No results found for the last 168 hours. **        All pertinent labs from the last 24 hours have been reviewed.    Significant Diagnostics:  None new     Assessment/Plan:     Subdural hematoma    85 y/o F with left SDH s/p crani POD5    -Neuro stable on exam  -Post-op CTH stable  -Bacitracin to incision BID  -Keppra 500mg BID x 7 days for seizure prophylaxis (day 2)  -Cont neuro checks q4h  -PT/OT/OOB  -SQH for DVT prophylaxis  -Continue home amlodipine for HTN   -PT recommending SNF. CM to obtain choices today               Leticia Cuba PA-C  Neurosurgery  [facility]

## 2018-04-25 NOTE — PLAN OF CARE
Problem: Physical Therapy Goal  Goal: Physical Therapy Goal  Goals to be met by: 2018    Patient will increase functional independence with mobility by performin. Supine to sit with Set-up [place]-not met  2. Sit to supine with Set-up [place]-not met  3. Sit to stand transfer with Supervision using RW. -not met  4. Gait  x 150 feet with stand by Assistance using Rolling Walker. -not met  5. Lower extremity exercise program x20 reps per handout, with supervision  6. Pt up/down 7 steps with B UE rail support with CGA-not met   Outcome: Ongoing (interventions implemented as appropriate)  Re-eval completed and pt will continue to benefit from skilled PT services to work towards improved functional mobility including: bed mobility, transfers, up/down steps, and gait.   Gabby Hoff, PT  2018

## 2018-04-26 ENCOUNTER — HOSPITAL ENCOUNTER (INPATIENT)
Facility: HOSPITAL | Age: 83
LOS: 20 days | Discharge: HOME HEALTH | DRG: 950 | End: 2018-05-16
Attending: INTERNAL MEDICINE | Admitting: INTERNAL MEDICINE
Payer: MEDICARE

## 2018-04-26 VITALS
TEMPERATURE: 97 F | OXYGEN SATURATION: 99 % | HEIGHT: 62 IN | WEIGHT: 144.63 LBS | DIASTOLIC BLOOD PRESSURE: 76 MMHG | SYSTOLIC BLOOD PRESSURE: 147 MMHG | BODY MASS INDEX: 26.61 KG/M2 | HEART RATE: 88 BPM | RESPIRATION RATE: 18 BRPM

## 2018-04-26 DIAGNOSIS — E83.39 HYPOPHOSPHATEMIA: ICD-10-CM

## 2018-04-26 DIAGNOSIS — S06.5XAA SUBDURAL HEMATOMA: ICD-10-CM

## 2018-04-26 DIAGNOSIS — I51.7 LVH (LEFT VENTRICULAR HYPERTROPHY): ICD-10-CM

## 2018-04-26 DIAGNOSIS — I10 ESSENTIAL HYPERTENSION, BENIGN: Primary | ICD-10-CM

## 2018-04-26 DIAGNOSIS — S06.5XAA SUBDURAL HEMATOMA: Primary | ICD-10-CM

## 2018-04-26 DIAGNOSIS — E83.52 HYPERCALCEMIA: ICD-10-CM

## 2018-04-26 DIAGNOSIS — I27.20 PULMONARY HYPERTENSION: ICD-10-CM

## 2018-04-26 DIAGNOSIS — I62.03 ACUTE ON CHRONIC INTRACRANIAL SUBDURAL HEMATOMA: ICD-10-CM

## 2018-04-26 DIAGNOSIS — I62.01 ACUTE ON CHRONIC INTRACRANIAL SUBDURAL HEMATOMA: ICD-10-CM

## 2018-04-26 DIAGNOSIS — E21.0 PRIMARY HYPERPARATHYROIDISM: ICD-10-CM

## 2018-04-26 LAB
ALBUMIN SERPL BCP-MCNC: 2.6 G/DL
ALP SERPL-CCNC: 56 U/L
ALT SERPL W/O P-5'-P-CCNC: 11 U/L
ANION GAP SERPL CALC-SCNC: 7 MMOL/L
AST SERPL-CCNC: 13 U/L
BASOPHILS # BLD AUTO: 0.02 K/UL
BASOPHILS NFR BLD: 0.3 %
BILIRUB SERPL-MCNC: 0.3 MG/DL
BUN SERPL-MCNC: 21 MG/DL
CALCIUM SERPL-MCNC: 11.1 MG/DL
CHLORIDE SERPL-SCNC: 110 MMOL/L
CO2 SERPL-SCNC: 24 MMOL/L
CREAT SERPL-MCNC: 0.8 MG/DL
DIFFERENTIAL METHOD: ABNORMAL
EOSINOPHIL # BLD AUTO: 0.1 K/UL
EOSINOPHIL NFR BLD: 2 %
ERYTHROCYTE [DISTWIDTH] IN BLOOD BY AUTOMATED COUNT: 15.4 %
EST. GFR  (AFRICAN AMERICAN): >60 ML/MIN/1.73 M^2
EST. GFR  (NON AFRICAN AMERICAN): >60 ML/MIN/1.73 M^2
GLUCOSE SERPL-MCNC: 94 MG/DL
HCT VFR BLD AUTO: 35.9 %
HGB BLD-MCNC: 11.2 G/DL
IMM GRANULOCYTES # BLD AUTO: 0.02 K/UL
IMM GRANULOCYTES NFR BLD AUTO: 0.3 %
INR PPP: 1
LYMPHOCYTES # BLD AUTO: 1.5 K/UL
LYMPHOCYTES NFR BLD: 22.8 %
MAGNESIUM SERPL-MCNC: 2 MG/DL
MCH RBC QN AUTO: 30.9 PG
MCHC RBC AUTO-ENTMCNC: 31.2 G/DL
MCV RBC AUTO: 99 FL
MONOCYTES # BLD AUTO: 0.8 K/UL
MONOCYTES NFR BLD: 13 %
NEUTROPHILS # BLD AUTO: 4 K/UL
NEUTROPHILS NFR BLD: 61.6 %
NRBC BLD-RTO: 0 /100 WBC
PHOSPHATE SERPL-MCNC: 2.1 MG/DL
PLATELET # BLD AUTO: 185 K/UL
PMV BLD AUTO: 10.8 FL
POCT GLUCOSE: 108 MG/DL (ref 70–110)
POCT GLUCOSE: 109 MG/DL (ref 70–110)
POCT GLUCOSE: 97 MG/DL (ref 70–110)
POTASSIUM SERPL-SCNC: 3.8 MMOL/L
PROT SERPL-MCNC: 5.8 G/DL
PROTHROMBIN TIME: 10.6 SEC
RBC # BLD AUTO: 3.62 M/UL
SODIUM SERPL-SCNC: 141 MMOL/L
WBC # BLD AUTO: 6.48 K/UL

## 2018-04-26 PROCEDURE — 85610 PROTHROMBIN TIME: CPT

## 2018-04-26 PROCEDURE — 84100 ASSAY OF PHOSPHORUS: CPT

## 2018-04-26 PROCEDURE — 85025 COMPLETE CBC W/AUTO DIFF WBC: CPT

## 2018-04-26 PROCEDURE — 25000003 PHARM REV CODE 250: Performed by: PHYSICIAN ASSISTANT

## 2018-04-26 PROCEDURE — 83735 ASSAY OF MAGNESIUM: CPT

## 2018-04-26 PROCEDURE — 36415 COLL VENOUS BLD VENIPUNCTURE: CPT

## 2018-04-26 PROCEDURE — 25000003 PHARM REV CODE 250: Performed by: NURSE PRACTITIONER

## 2018-04-26 PROCEDURE — 63600175 PHARM REV CODE 636 W HCPCS: Performed by: PHYSICIAN ASSISTANT

## 2018-04-26 PROCEDURE — 94761 N-INVAS EAR/PLS OXIMETRY MLT: CPT

## 2018-04-26 PROCEDURE — 12000000 HC SNF SEMI-PRIVATE ROOM

## 2018-04-26 PROCEDURE — 80053 COMPREHEN METABOLIC PANEL: CPT

## 2018-04-26 PROCEDURE — 97116 GAIT TRAINING THERAPY: CPT

## 2018-04-26 PROCEDURE — 25000003 PHARM REV CODE 250: Performed by: STUDENT IN AN ORGANIZED HEALTH CARE EDUCATION/TRAINING PROGRAM

## 2018-04-26 PROCEDURE — 99024 POSTOP FOLLOW-UP VISIT: CPT | Mod: POP,,, | Performed by: PHYSICIAN ASSISTANT

## 2018-04-26 RX ORDER — BACITRACIN 500 [USP'U]/G
OINTMENT TOPICAL 2 TIMES DAILY
Status: CANCELLED | OUTPATIENT
Start: 2018-04-26

## 2018-04-26 RX ORDER — ACETAMINOPHEN 325 MG/1
650 TABLET ORAL EVERY 6 HOURS PRN
Status: DISCONTINUED | OUTPATIENT
Start: 2018-04-26 | End: 2018-04-30

## 2018-04-26 RX ORDER — LEVETIRACETAM 500 MG/1
500 TABLET ORAL 2 TIMES DAILY
Status: COMPLETED | OUTPATIENT
Start: 2018-04-26 | End: 2018-04-29

## 2018-04-26 RX ORDER — AMLODIPINE BESYLATE 10 MG/1
10 TABLET ORAL DAILY
Status: CANCELLED | OUTPATIENT
Start: 2018-04-27

## 2018-04-26 RX ORDER — AMOXICILLIN 250 MG
1 CAPSULE ORAL 2 TIMES DAILY
Status: DISCONTINUED | OUTPATIENT
Start: 2018-04-26 | End: 2018-05-16 | Stop reason: HOSPADM

## 2018-04-26 RX ORDER — ACETAMINOPHEN 325 MG/1
650 TABLET ORAL EVERY 6 HOURS PRN
Status: CANCELLED | OUTPATIENT
Start: 2018-04-26

## 2018-04-26 RX ORDER — HEPARIN SODIUM 5000 [USP'U]/ML
5000 INJECTION, SOLUTION INTRAVENOUS; SUBCUTANEOUS EVERY 8 HOURS
Status: DISCONTINUED | OUTPATIENT
Start: 2018-04-26 | End: 2018-05-16 | Stop reason: HOSPADM

## 2018-04-26 RX ORDER — LEVETIRACETAM 500 MG/1
500 TABLET ORAL 2 TIMES DAILY
Status: CANCELLED | OUTPATIENT
Start: 2018-04-26 | End: 2018-04-29

## 2018-04-26 RX ORDER — HEPARIN SODIUM 5000 [USP'U]/ML
5000 INJECTION, SOLUTION INTRAVENOUS; SUBCUTANEOUS EVERY 8 HOURS
Status: CANCELLED | OUTPATIENT
Start: 2018-04-26

## 2018-04-26 RX ORDER — CALCIUM CARBONATE 200(500)MG
500 TABLET,CHEWABLE ORAL DAILY PRN
Status: DISCONTINUED | OUTPATIENT
Start: 2018-04-26 | End: 2018-04-27

## 2018-04-26 RX ORDER — BACITRACIN 500 [USP'U]/G
OINTMENT TOPICAL 2 TIMES DAILY
Status: DISCONTINUED | OUTPATIENT
Start: 2018-04-26 | End: 2018-05-16 | Stop reason: HOSPADM

## 2018-04-26 RX ORDER — AMOXICILLIN 250 MG
1 CAPSULE ORAL 2 TIMES DAILY
Status: CANCELLED | OUTPATIENT
Start: 2018-04-26

## 2018-04-26 RX ORDER — AMLODIPINE BESYLATE 10 MG/1
10 TABLET ORAL DAILY
Status: DISCONTINUED | OUTPATIENT
Start: 2018-04-27 | End: 2018-05-16 | Stop reason: HOSPADM

## 2018-04-26 RX ADMIN — LEVETIRACETAM 500 MG: 500 TABLET ORAL at 08:04

## 2018-04-26 RX ADMIN — HEPARIN SODIUM 5000 UNITS: 5000 INJECTION, SOLUTION INTRAVENOUS; SUBCUTANEOUS at 09:04

## 2018-04-26 RX ADMIN — ACETAMINOPHEN 650 MG: 325 TABLET ORAL at 03:04

## 2018-04-26 RX ADMIN — BACITRACIN: 500 OINTMENT TOPICAL at 08:04

## 2018-04-26 RX ADMIN — HEPARIN SODIUM 5000 UNITS: 5000 INJECTION, SOLUTION INTRAVENOUS; SUBCUTANEOUS at 03:04

## 2018-04-26 RX ADMIN — STANDARDIZED SENNA CONCENTRATE AND DOCUSATE SODIUM 1 TABLET: 8.6; 5 TABLET, FILM COATED ORAL at 08:04

## 2018-04-26 RX ADMIN — AMLODIPINE BESYLATE 10 MG: 10 TABLET ORAL at 08:04

## 2018-04-26 RX ADMIN — BACITRACIN: 500 OINTMENT TOPICAL at 11:04

## 2018-04-26 RX ADMIN — HEPARIN SODIUM 5000 UNITS: 5000 INJECTION, SOLUTION INTRAVENOUS; SUBCUTANEOUS at 05:04

## 2018-04-26 NOTE — PROGRESS NOTES
[facility]  Neurosurgery  Progress Note    Subjective:     History of Present Illness: 84 F presents for eval of SDH.  Per pt she had dizziness 2 days ago and was instructed to get CT head which showed subacute left convexity SDH.  Pt denies any recent falls, traumas, or HA.  She took baby asa 2 days ago but doesn't routinely take asa.    Post-Op Info:  Procedure(s) (LRB):  CRANIOTOMY OPEN FOR SUB-DURAL HEMATOMA left, with stealth. (Left)   6 Days Post-Op     Interval History:   NAEON. Patient sitting on side of bed eating. Reports mild incisional pain. Denies any vision changes, weakness, seizure like activity, incontinence. Family deny any worsening confusion. Tolerating diet. Voiding appropriately.     Medications:  Continuous Infusions:  Scheduled Meds:   amLODIPine  10 mg Oral Daily    bacitracin   Topical (Top) BID    heparin (porcine)  5,000 Units Subcutaneous Q8H    levETIRAcetam  500 mg Oral BID     PRN Meds:acetaminophen, fentaNYL, hydrALAZINE, labetalol, magnesium oxide, magnesium oxide, potassium chloride 10%, potassium chloride 10%, potassium chloride 10%, potassium, sodium phosphates, potassium, sodium phosphates, potassium, sodium phosphates     Review of Systems  Objective:     Weight: 65.6 kg (144 lb 10 oz)  Body mass index is 26.45 kg/m².  Vital Signs (Most Recent):  Temp: 97.4 °F (36.3 °C) (04/26/18 1128)  Pulse: 88 (04/26/18 1506)  Resp: 18 (04/26/18 1128)  BP: (!) 147/76 (04/26/18 1128)  SpO2: 99 % (04/26/18 1128) Vital Signs (24h Range):  Temp:  [97.2 °F (36.2 °C)-98.2 °F (36.8 °C)] 97.4 °F (36.3 °C)  Pulse:  [65-92] 88  Resp:  [16-20] 18  SpO2:  [91 %-99 %] 99 %  BP: (124-161)/(66-98) 147/76           Neurosurgery Physical Exam   General: well developed, well nourished, no distress.   Head: normocephalic  Neurologic: Alert and oriented. Thought content appropriate.  GCS: Motor: 6/Verbal: 5/Eyes: 4 GCS Total: 15  Mental Status: Awake, Alert, Oriented x 4  Language: No  aphasia  Speech: No dysarthria  Cranial nerves: face symmetric, tongue midline, CN II-XII grossly intact.   Eyes: pupils equal, round, reactive to light with accomodation, EOMI.   Pulmonary: normal respirations, no signs of respiratory distress  Abdomen: soft, non-distended, not tender to palpation  Sensory: intact to light touch throughout  Motor Strength:Moves all extremities spontaneously with good tone.  Full strength upper and lower extremities. No abnormal movements seen.   Pronator Drift: no drift noted  Finger-to-nose: dysmetria on left  Clonus: absent  Babinski: absent      Incision:  Clean, dry, intact staples. No surrounding erythema or edema. No drainage.       Significant Labs:    Recent Labs  Lab 04/25/18  0452 04/26/18  0358   GLU 93 94    141   K 3.9 3.8    110   CO2 23 24   BUN 12 21   CREATININE 0.7 0.8   CALCIUM 11.1* 11.1*   MG 2.2 2.0       Recent Labs  Lab 04/25/18 0452 04/26/18  0357   WBC 6.69 6.48   HGB 11.9* 11.2*   HCT 37.9 35.9*    185       Recent Labs  Lab 04/25/18  0452 04/26/18  0357   INR 1.0 1.0         Assessment/Plan:     Subdural hematoma    85 y/o F with left a SDH s/p crani for evacuation on 4/20.    -Neuro stable on exam  -Continue Bacitracin to incision BID  -Keppra 500mg BID x 7 days for seizure prophylaxis   -OK to shower, just keep incision dry  -Continue to hold all ASA products  -FU with NSGY RN in 2 weeks for wound check and with repeat HCT. OK for SNF to remove staples if still admitted. Patient just needs to have appt with HCT rescheduled for after discharge from SNF.   -Discharge instructions given verbally to patient and family. All of their questions were answered. They were encouraged to call the clinic with any questions or concerns prior to follow up appt.                   ASHELY Méndez  Neurosurgery  [facility]

## 2018-04-26 NOTE — PT/OT/SLP PROGRESS
"Physical Therapy Treatment    Patient Name:  Mikaela Ghosh   MRN:  [id number]    Recommendations:     Discharge Recommendations:  nursing facility, skilled   Discharge Equipment Recommendations: walker, rolling, bedside commode   Barriers to discharge: Decreased caregiver support    Assessment:     Mikaela Ghosh is a 84 y.o. female admitted with a medical diagnosis of Acute on chronic intracranial subdural hematoma.  She presents with the following impairments/functional limitations:  weakness, impaired endurance, impaired self care skills, impaired functional mobilty, gait instability.  Pt tolerated PT Tx today amb 20 ft CGA using RW to bathroom & completing sit<>stand transfers CGA using RW from chair & Neelima using RW from toilet.  PT educated pt on seated TE to be performed when up in chair throughout day.  Pt is still appropriate and would benefit from skilled PT.    Rehab Prognosis:  Good; patient would benefit from acute skilled PT services to address these deficits and reach maximum level of function.      Recent Surgery: Procedure(s) (LRB):  CRANIOTOMY OPEN FOR SUB-DURAL HEMATOMA left, with stealth. (Left) 6 Days Post-Op    Plan:     During this hospitalization, patient to be seen 5 x/week to address the above listed problems via gait training, therapeutic activities, therapeutic exercises, neuromuscular re-education  · Plan of Care Expires:  05/25/18   Plan of Care Reviewed with: patient, other (see comments) (Family member)    Subjective     Communicated with RN prior to session.  Patient found sitting in bed with HOB elevated, family member present upon PT entry to room, agreeable to treatment.      Chief Complaint: Pt denies any pain.  Patient comments/goals: "I need to use the bathroom."  Pain/Comfort:  Pain Rating 1: 0/10  Pain Rating Post-Intervention 1: 0/10    Patients cultural, spiritual, Cheondoism conflicts given the current situation: none reported    Objective:     Patient found with: telemetry, " peripheral IV     General Precautions: Standard, fall   Orthopedic Precautions:N/A   Braces: N/A     Functional Mobility:  · Bed Mobility:      Supine to Sit: stand by assistance    · Transfers:     · Sit to Stand:  contact guard assistance with rolling walker  · Toilet Transfer: minimum assistance with  rolling walker  using  Sit<>stand transfer  · Deficits:  Requires extra time, limited LE push-off & trunk extension  · PT cuing safe hand placement & sequencing task.  Neelima to/from toilet at hips for controlled ascent/descent.    · Gait: 20 ft CGA using RW  · PT assist/cuing safe RW management, heel-toe gait pattern, increasing foot clearance & upright posture.    · Balance:   · > 30 sec stood CGA using RW, no LOB noted      AM-PAC 6 CLICK MOBILITY  Turning over in bed (including adjusting bedclothes, sheets and blankets)?: 3  Sitting down on and standing up from a chair with arms (e.g., wheelchair, bedside commode, etc.): 3  Moving from lying on back to sitting on the side of the bed?: 3  Moving to and from a bed to a chair (including a wheelchair)?: 3  Need to walk in hospital room?: 3  Climbing 3-5 steps with a railing?: 2  Total Score: 17       Therapeutic Activities and Exercises:     PT assisted pt with amb & transfer to toilet. VC for safe hand placement & sequencing task, PT assist for controlled ascent/descent.    Seated LE TE:  - 10 x b/l LE marching  - 10 x b/l LAQ  - 10 x b/l ankle DF/PF pumps    PT educated pt on:  - Importance of OOB activity  - Performing seated TE for LE strengthening  - Transfer safety using RW  - Gait training using RW    Pt safe to amb using RW to bathroom with RN (CGA/Neelima).    Patient left up in chair with all lines intact, call button in reach and family member present..    GOALS:    Physical Therapy Goals        Problem: Physical Therapy Goal    Goal Priority Disciplines Outcome Goal Variances Interventions   Physical Therapy Goal     PT/OT, PT Ongoing (interventions  implemented as appropriate)     Description:  Goals to be met by: 2018    Patient will increase functional independence with mobility by performin. Supine to sit with Set-up [place]-not met  2. Sit to supine with Set-up [place]-not met  3. Sit to stand transfer with Supervision using RW. -not met  4. Gait  x 150 feet with stand by Assistance using Rolling Walker. -not met  5. Lower extremity exercise program x20 reps per handout, with supervision  6. Pt up/down 7 steps with B UE rail support with CGA-not met                     Time Tracking:     PT Received On: 18  PT Start Time: 819     PT Stop Time: 839  PT Total Time (min): 20 min     Billable Minutes: Gait Training 20    Treatment Type: Treatment  PT/PTA: PT     PTA Visit Number: 0     Tylor Ames, [facility] [[email or web address]]  2018

## 2018-04-26 NOTE — SUBJECTIVE & OBJECTIVE
Interval History:   NAEON. Patient sitting on side of bed eating. Reports mild incisional pain. Denies any vision changes, weakness, seizure like activity, incontinence. Family deny any worsening confusion. Tolerating diet. Voiding appropriately.     Medications:  Continuous Infusions:  Scheduled Meds:   amLODIPine  10 mg Oral Daily    bacitracin   Topical (Top) BID    heparin (porcine)  5,000 Units Subcutaneous Q8H    levETIRAcetam  500 mg Oral BID     PRN Meds:acetaminophen, fentaNYL, hydrALAZINE, labetalol, magnesium oxide, magnesium oxide, potassium chloride 10%, potassium chloride 10%, potassium chloride 10%, potassium, sodium phosphates, potassium, sodium phosphates, potassium, sodium phosphates     Review of Systems  Objective:     Weight: 65.6 kg (144 lb 10 oz)  Body mass index is 26.45 kg/m².  Vital Signs (Most Recent):  Temp: 97.4 °F (36.3 °C) (04/26/18 1128)  Pulse: 88 (04/26/18 1506)  Resp: 18 (04/26/18 1128)  BP: (!) 147/76 (04/26/18 1128)  SpO2: 99 % (04/26/18 1128) Vital Signs (24h Range):  Temp:  [97.2 °F (36.2 °C)-98.2 °F (36.8 °C)] 97.4 °F (36.3 °C)  Pulse:  [65-92] 88  Resp:  [16-20] 18  SpO2:  [91 %-99 %] 99 %  BP: (124-161)/(66-98) 147/76           Neurosurgery Physical Exam   General: well developed, well nourished, no distress.   Head: normocephalic  Neurologic: Alert and oriented. Thought content appropriate.  GCS: Motor: 6/Verbal: 5/Eyes: 4 GCS Total: 15  Mental Status: Awake, Alert, Oriented x 4  Language: No aphasia  Speech: No dysarthria  Cranial nerves: face symmetric, tongue midline, CN II-XII grossly intact.   Eyes: pupils equal, round, reactive to light with accomodation, EOMI.   Pulmonary: normal respirations, no signs of respiratory distress  Abdomen: soft, non-distended, not tender to palpation  Sensory: intact to light touch throughout  Motor Strength:Moves all extremities spontaneously with good tone.  Full strength upper and lower extremities. No abnormal movements seen.    Pronator Drift: no drift noted  Finger-to-nose: dysmetria on left  Clonus: absent  Babinski: absent      Incision:  Clean, dry, intact staples. No surrounding erythema or edema. No drainage.       Significant Labs:    Recent Labs  Lab 04/25/18 0452 04/26/18  0358   GLU 93 94    141   K 3.9 3.8    110   CO2 23 24   BUN 12 21   CREATININE 0.7 0.8   CALCIUM 11.1* 11.1*   MG 2.2 2.0       Recent Labs  Lab 04/25/18 0452 04/26/18  0357   WBC 6.69 6.48   HGB 11.9* 11.2*   HCT 37.9 35.9*    185       Recent Labs  Lab 04/25/18 0452 04/26/18  0357   INR 1.0 1.0

## 2018-04-26 NOTE — PLAN OF CARE
Problem: Physical Therapy Goal  Goal: Physical Therapy Goal  Goals to be met by: 2018    Patient will increase functional independence with mobility by performin. Supine to sit with Set-up [place]-not met  2. Sit to supine with Set-up [place]-not met  3. Sit to stand transfer with Supervision using RW. -not met  4. Gait  x 150 feet with stand by Assistance using Rolling Walker. -not met  5. Lower extremity exercise program x20 reps per handout, with supervision  6. Pt up/down 7 steps with B UE rail support with CGA-not met    Outcome: Ongoing (interventions implemented as appropriate)  Pt progressing toward goals.    Tylor Ames, SPT  2018

## 2018-04-26 NOTE — PLAN OF CARE
SHAMIKA following for DC needs. SW in communication with CM.    SW left message for Polly with [facility] to follow up on referral.     UPDATE 2:07 PM  Patient will admit to [facility] today.     SHAMIKA arranged transport with [facility] [place] for 4PM.     Nurse call report to [phone].     SW notified nurse of the above.     SW attempted to call patient's daughter, Judie [phone]; left message. CM notified patient's son at the bedside.     Hattie Villatoro, YISEL  [id number]

## 2018-04-26 NOTE — DISCHARGE SUMMARY
[facility]  Neurosurgery  Discharge Summary      Patient Name: Mikaela Ghosh  MRN: [id number]  Admission Date: 4/18/2018  Hospital Length of Stay: 8 days  Discharge Date and Time: 4/26/2018  4:21 PM  Attending Physician: No att. providers found   Discharging Provider: ASHELY Méndez  Primary Care Provider: Felipe Mustafa MD    HPI:   84 F presents for eval of SDH.  Per pt she had dizziness 2 days ago and was instructed to get CT head which showed subacute left convexity SDH.  Pt denies any recent falls, traumas, or HA.  She took baby asa 2 days ago but doesn't routinely take asa.    DATE OF PROCEDURE:  04/20/2018     PREOPERATIVE DIAGNOSIS:  Left hemispheric subdural hematoma.     POSTOPERATIVE DIAGNOSIS:  Left hemispheric subdural hematoma.     OPERATIVE PROCEDURE UNDERGONE:  Left frontoparietal craniotomy for evacuation of subdural hematoma.     SURGEON:  Wojciech Sandoval M.D.  ASSISTANT:  ASHELY Mcleod.  No resident physician was available to assist in the operation.     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  We felt the patient would benefit from surgical intervention.      Hospital Course:  4/18: Admit to [facility]. HCT shows a large left SDH.   4/19: NAEON. Repeat HCT shows stable large left subdural hematoma. Stable mass effect and midline shift.   4/20: OR for crani for SDH evacuation. SD drain left intra op.   4/21: No acute events overnight. Exam stable. Post op HCT shows decrease in size, shift, and mass effect of SDH.   4/22: No acute events overnight. Exam stable. SD drain removed without complication. Patient tolerated removal well. HCT s/p removal of drain is stable.   4/24: No acute events overnight. Exam stable.  Stable for TTF per NGSY. HCT stable - slight decrease in size of collection.    4/25: No acute events overnight. Exam stable. PT/OT recommending SNF. CM to give choices today.  4/26: No acute events overnight. Exam stable. DC to [facility]. Discharge instructions given verbally to patient and family. All of their questions were answered. They were encouraged to call the clinic with any questions or concerns prior to follow up appt.             Consults:   Consults         Status Ordering Provider     Inpatient consult to Neuro Critical Care  Once     Provider:  (Not yet assigned)    Acknowledged TAZ WRIGHT     Inpatient consult to Neurosurgery  Once     Provider:  (Not yet assigned)    Acknowledged LARRY STRAKEY     Inpatient consult to SNF [place]  Once     Provider:  (Not yet assigned)    Acknowledged JORDY DUKES     Inpatient consult to Social Work/Case Management  Once     Provider:  (Not yet assigned)    Acknowledged LARRY STARKEY          Significant Diagnostic Studies: Labs:   BMP:   Recent Labs  Lab 04/25/18  0452 04/26/18  0358   GLU 93 94    141   K 3.9 3.8    110   CO2 23 24   BUN 12 21   CREATININE 0.7 0.8   CALCIUM 11.1* 11.1*   MG 2.2 2.0    and CBC   Recent Labs  Lab 04/25/18  0452 04/26/18  0357   WBC 6.69 6.48   HGB 11.9* 11.2*   HCT 37.9 35.9*    185     Radiology: X-Ray: CXR: X-Ray Chest 1 View (CXR):   Results for orders placed or performed during the hospital encounter of 04/18/18   X-Ray Chest AP Single View    Narrative    EXAMINATION:  XR CHEST 1 VIEW    CLINICAL HISTORY:  sdh;    TECHNIQUE:  Single frontal view of the chest was performed.    COMPARISON:  04/18/2018.    FINDINGS:  Cardiac silhouette is mildly enlarged but stable in size.  Mildly tortuous thoracic aorta.  No focal consolidation.  Possible small effusions versus scarring.  No detrimental change in lung aeration.      Impression    No detrimental change.      Electronically signed by: Casey Flores MD  Date:    04/18/2018  Time:    20:51     CT scan: head  Cardiac  Graphics: ECG: and Echocardiogram:   2D echo with color flow doppler:   Results for orders placed or performed during the hospital encounter of 04/18/18   Echo doppler color flow   Result Value Ref Range    EF 68 55 - 65    Mitral Valve Regurgitation MILD     Est. PA Systolic Pressure 63.53 (A)     Mitral Valve Mobility MASHA     Tricuspid Valve Regurgitation MILD        Pending Diagnostic Studies:     None        Final Active Diagnoses:    Diagnosis Date Noted POA    PRINCIPAL PROBLEM:  Acute on chronic intracranial subdural hematoma [I62.01, I62.03] 04/19/2018 Yes    Intracranial bleed [I62.9]  Unknown    Seizure prophylaxis [Z29.8] 04/24/2018 Not Applicable    Brain compression [G93.5] 04/23/2018 Yes    Pulmonary hypertension [I27.20] 04/23/2018 Yes    LVH (left ventricular hypertrophy) [I51.7] 04/19/2018 Yes    Essential hypertension, benign [I10] 04/18/2018 Yes    Subdural hematoma [I62.00] 04/18/2018 Yes      Problems Resolved During this Admission:    Diagnosis Date Noted Date Resolved POA    SDH (subdural hematoma) [I62.00] 04/18/2018 04/18/2018 Unknown      Discharged Condition: good    Disposition: Skilled Nursing Facility    Follow Up: 2 weeks with head CT    Patient Instructions:   See the patient instruction tab for detailed discharge instructions and follow up information.         Medications:  Reconciled Home Medications:      Medication List      ASK your doctor about these medications    amLODIPine 10 MG tablet  Commonly known as:  NORVASC  Take 1 tablet (10 mg total) by mouth once daily.            ASHELY Méndez  Neurosurgery  [facility]

## 2018-04-26 NOTE — ASSESSMENT & PLAN NOTE
83 y/o F with left a SDH s/p crani for evacuation on 4/20.    -Neuro stable on exam  -Continue Bacitracin to incision BID  -Keppra 500mg BID x 7 days for seizure prophylaxis   -OK to shower, just keep incision dry  -Continue to hold all ASA products  -FU with NSGY RN in 2 weeks for wound check and with repeat HCT. OK for SNF to remove staples if still admitted. Patient just needs to have appt with HCT rescheduled for after discharge from SNF.   -Discharge instructions given verbally to patient and family. All of their questions were answered. They were encouraged to call the clinic with any questions or concerns prior to follow up appt.

## 2018-04-26 NOTE — DISCHARGE INSTRUCTIONS
Patient Information  -No driving until released by Neurosurgery  -Do not take any OTC products containing acetaminophen at the same time as you take your narcotic pain medication. Medications that may contain acetaminophen include but are not limited to: Excedrin and other headache medications, arthritis medications, cold and sinus medications, etc. Please review the list of active ingredients in any OTC medication prior to taking it.  -Do not take any Aspirin or Aspirin containing products until released by Neurosurgery  -Do not consume any alcoholic beverages until released by your Neurosurgeon  -Do not perform any excessive bending over or leaning forward as this is a fall hazard.  -Do not perform any heavy lifting or lifting more than 10 lbs from the ground level as this is a fall hazard.    Contact the Neurosurgery Office immediately if:  -If you begin to notice any neurologic changes such as:           -Sudden onset of lethargy or sleepiness           -Sudden confusion, trouble speaking, or understanding            -Sudden trouble seeing in one or both eyes            -Sudden trouble walking, dizziness, loss of coordination            -Sudden severe headache with no known cause            -Sudden onset of numbness or weakness     Wound Care:  Keep your incision open to air. You may shower on Day 2. Have the stream of water hit you opposite from the incision. Do not scrub the incision. Pat the incision dry after your shower. You cannot take a bath/swim/submerge the incision until 8 weeks after surgery.    Apply Bacitracin ointment (over the counter) to incision twice daily.    Call your doctor or go to the Emergency Room for any signs of infection including: increased redness, drainage, pain or fever (temperature greater than or equal to 101.4).       Miscellaneous:  -Follow up with Neurosurgery in 2 weeks for wound check with head CT  -Appointments will be mailed to you      Neurosurgery Office:  [phone]

## 2018-04-26 NOTE — PLAN OF CARE
Patient to be discharged to [facility]. Care deferred to [facility]. Patient to be transported via wheelchair van. Neurosurgery clinic to schedule follow up appointment.       04/26/18 1502   Final Note   Assessment Type Final Discharge Note   Discharge Disposition SNF   Hospital Follow Up  Appt(s) scheduled? (Neurosurgery clinic to schedule follow up appointment.)   Discharge plans and expectations educations in teach back method with documentation complete? Yes

## 2018-04-26 NOTE — PROGRESS NOTES
Report rec'd earlier from [facility]. Pt arrived via w/c transport van. AAO, denies pain. Left head staples dry and intact. No skin breakdowns. Standby assist transfer w/c to bed. 2 family members at bedside. Informed of SNF routine and safety. Instructed pt to call for assist, call light in reach

## 2018-04-27 ENCOUNTER — PATIENT OUTREACH (OUTPATIENT)
Dept: ADMINISTRATIVE | Facility: CLINIC | Age: 83
End: 2018-04-27

## 2018-04-27 PROBLEM — E83.52 HYPERCALCEMIA: Status: ACTIVE | Noted: 2018-04-27

## 2018-04-27 PROBLEM — E83.39 HYPOPHOSPHATEMIA: Status: ACTIVE | Noted: 2018-04-27

## 2018-04-27 PROBLEM — H04.123 DRY EYES: Status: ACTIVE | Noted: 2018-04-27

## 2018-04-27 PROCEDURE — 97161 PT EVAL LOW COMPLEX 20 MIN: CPT

## 2018-04-27 PROCEDURE — 25000003 PHARM REV CODE 250: Performed by: PHYSICIAN ASSISTANT

## 2018-04-27 PROCEDURE — 97110 THERAPEUTIC EXERCISES: CPT

## 2018-04-27 PROCEDURE — 97116 GAIT TRAINING THERAPY: CPT

## 2018-04-27 PROCEDURE — 63600175 PHARM REV CODE 636 W HCPCS: Performed by: PHYSICIAN ASSISTANT

## 2018-04-27 PROCEDURE — 97802 MEDICAL NUTRITION INDIV IN: CPT

## 2018-04-27 PROCEDURE — 97165 OT EVAL LOW COMPLEX 30 MIN: CPT

## 2018-04-27 PROCEDURE — 97530 THERAPEUTIC ACTIVITIES: CPT

## 2018-04-27 PROCEDURE — 12000000 HC SNF SEMI-PRIVATE ROOM

## 2018-04-27 PROCEDURE — 92523 SPEECH SOUND LANG COMPREHEN: CPT

## 2018-04-27 PROCEDURE — 25000003 PHARM REV CODE 250: Performed by: INTERNAL MEDICINE

## 2018-04-27 PROCEDURE — 99305 1ST NF CARE MODERATE MDM 35: CPT | Mod: AI,,, | Performed by: INTERNAL MEDICINE

## 2018-04-27 PROCEDURE — 97535 SELF CARE MNGMENT TRAINING: CPT

## 2018-04-27 RX ORDER — ALUMINUM HYDROXIDE, MAGNESIUM HYDROXIDE, AND SIMETHICONE 2400; 240; 2400 MG/30ML; MG/30ML; MG/30ML
30 SUSPENSION ORAL EVERY 6 HOURS PRN
Status: DISCONTINUED | OUTPATIENT
Start: 2018-04-27 | End: 2018-05-16 | Stop reason: HOSPADM

## 2018-04-27 RX ORDER — SODIUM,POTASSIUM PHOSPHATES 280-250MG
1 POWDER IN PACKET (EA) ORAL
Status: COMPLETED | OUTPATIENT
Start: 2018-04-27 | End: 2018-04-28

## 2018-04-27 RX ADMIN — HEPARIN SODIUM 5000 UNITS: 5000 INJECTION, SOLUTION INTRAVENOUS; SUBCUTANEOUS at 02:04

## 2018-04-27 RX ADMIN — STANDARDIZED SENNA CONCENTRATE AND DOCUSATE SODIUM 1 TABLET: 8.6; 5 TABLET, FILM COATED ORAL at 09:04

## 2018-04-27 RX ADMIN — HYPROMELLOSE 2910 1 DROP: 5 SOLUTION OPHTHALMIC at 10:04

## 2018-04-27 RX ADMIN — STANDARDIZED SENNA CONCENTRATE AND DOCUSATE SODIUM 1 TABLET: 8.6; 5 TABLET, FILM COATED ORAL at 10:04

## 2018-04-27 RX ADMIN — BACITRACIN: 500 OINTMENT TOPICAL at 09:04

## 2018-04-27 RX ADMIN — LEVETIRACETAM 500 MG: 500 TABLET ORAL at 10:04

## 2018-04-27 RX ADMIN — HEPARIN SODIUM 5000 UNITS: 5000 INJECTION, SOLUTION INTRAVENOUS; SUBCUTANEOUS at 10:04

## 2018-04-27 RX ADMIN — POTASSIUM & SODIUM PHOSPHATES POWDER PACK 280-160-250 MG 1 PACKET: 280-160-250 PACK at 05:04

## 2018-04-27 RX ADMIN — BACITRACIN: 500 OINTMENT TOPICAL at 10:04

## 2018-04-27 RX ADMIN — HEPARIN SODIUM 5000 UNITS: 5000 INJECTION, SOLUTION INTRAVENOUS; SUBCUTANEOUS at 06:04

## 2018-04-27 RX ADMIN — AMLODIPINE BESYLATE 10 MG: 10 TABLET ORAL at 09:04

## 2018-04-27 RX ADMIN — POTASSIUM & SODIUM PHOSPHATES POWDER PACK 280-160-250 MG 1 PACKET: 280-160-250 PACK at 10:04

## 2018-04-27 RX ADMIN — LEVETIRACETAM 500 MG: 500 TABLET ORAL at 09:04

## 2018-04-27 RX ADMIN — ACETAMINOPHEN 650 MG: 325 TABLET ORAL at 10:04

## 2018-04-27 RX ADMIN — ACETAMINOPHEN 650 MG: 325 TABLET ORAL at 06:04

## 2018-04-27 RX ADMIN — HYPROMELLOSE 2910 1 DROP: 5 SOLUTION OPHTHALMIC at 02:04

## 2018-04-27 NOTE — PLAN OF CARE
Problem: Patient Care Overview  Goal: Plan of Care Review  Outcome: Ongoing (interventions implemented as appropriate)  Knowledge deficit regarding low sodium diet  Related to lack of value in diet therapy as evidenced by patient report during diet history where she tries to eliminate added salt at the table.         RD to complete low sodium education   Recommendation/Intervention: Recommend low sodium diet, RD to follow. If PO intake < 75% recommend boost plus daily vanilla  Goals: PO to meet 85% of EEN  Nutrition Goal Status: new  Communication of RD Recs: other (comment) (care plans)

## 2018-04-27 NOTE — PT/OT/SLP EVAL
Occupational Therapy  Evaluation/Treatment    Mikaela Ghosh   MRN: [id number]   Admitting Diagnosis: Subdural hematoma     OT Date of Treatment: 04/27/18   OT Start Time: 0947  OT Stop Time: 1015  OT Total Time (min): 28 min    Billable Minutes:  Evaluation 8  Self Care/Home Management 10  Therapeutic Activity 10    Diagnosis: Subdural hematoma    Past Medical History:   Diagnosis Date    Hypertension       Past Surgical History:   Procedure Laterality Date    EYE SURGERY      cataract    HYSTERECTOMY           General Precautions: Standard, fall  Orthopedic Precautions: N/A  Braces: N/A    Do you have any cultural, spiritual, Yarsanism conflicts, given your current situation?: none stated     Patient History:  Lives With: alone  Living Arrangements: house  Home Accessibility: stairs to enter home  Number of Stairs to Enter Home: 6  Stair Railings at Home: outside, present at both sides  Transportation Available: family or friend will provide  Living Environment Comment: Pt lives alone and I PTA. Pt has daughter that works and assists as able.  Pt works as [employer] . tp ahs tub/shower.  Equipment Currently Used at Home: none    Prior level of function:   Bed Mobility/Transfers: independent  Grooming: independent  Bathing: independent  Upper Body Dressing: independent  Lower Body Dressing: independent  Toileting: independent  Home Management Skills: independent  Driving License: Yes  Mode of Transportation: Family  Occupation: Part time employment  Type of Occupation: [employer]   Leisure and Hobbies: reading     Dominant hand: right    Subjective:  Communicated with pt prior to session.    Chief Complaint: immobility  Patient/Family stated goals: go back to work    Pain/Comfort  Pain Rating 1: 0/10  Pain Rating Post-Intervention 1: 0/10    Objective:   Patient found with:  (supine in bed)    Cognitive Exam:  Oriented to: Person, Place, Time and Situation  Follows Commands/attention: Follows one-step  commands  Communication: clear/fluent  Memory:  No Deficits noted  Safety awareness/insight to disability: intact  Coping skills/emotional control: Appropriate to situation    Visual/perceptual:  Intact    Physical Exam:  Postural examination/scapula alignment:    -       Rounded shoulders  Skin integrity: staples intact  Edema: None noted     Sensation:      -       Intact    Upper Extremity Range of Motion:  Right Upper Extremity: WFL  Left Upper Extremity: WFL    Upper Extremity Strength:  Right Upper Extremity: WFL  Left Upper Extremity: WFL   Strength: WFL    Fine motor coordination:      -       Intact    Gross motor coordination: WFL    Functional Status:  MDS G  Bed Mobility Functional Status: S-SBA==supine to sit and scooting  Transfer Functional Status: CGA-Min (A)-pt stands from bed, toilet, w/c with sup and is HHA to ambulate  Walk in Room Functional Status: CGA-Min (A)--HHA no AD to ambulate from bedroom to bathroom  Dressing Functional Status: 1: S-SBA--pt donned socks, shoes, pants, shirt  Eating Functional Status: - (I)  Toilet Use Functional Status: S-SBA on standard toilet  No AD  Personal Hygiene Functional Status: S-SBA seated at sink  Bathing Functional Status: S-SBA on toilet  Eval Only: Number of U/E limb <4/5 MMT: 0  Moving from seated to standing position: Not steady, but able to stabilize without staff assistance  Walking (with assistive device if used): Not steady, only able to stabilize with staff assistance  Turning around and facing the opposite direction while walking: Not steady, but able to stabilize without staff assistance  Moving on and off the toilet: Not steady, but able to stabilize without staff assistance  Surface-to-surface transfer (transfer between bed and chair or wheelchair): Not steady, but able to stabilize without staff assistance    MDS GG  Eating Performance: Independent.  Oral Hygiene Performance: Independent.  Lying to sitting on side of bed Performance:  Supervision or touching assistance.  Sit to Stand Performance: Supervision or touching assistance.  Chair/bed-to-chair transfer Performance: Supervision or touching assistance.  Chair/bed-to-chair transfer Goal: Independent.  Toilet transfer Performance: Supervision or touching assistance.    [facility] 6 Click:  [facility] Total Score: 19      Additional Treatment:  Pt educated on OT role and POC, d/c planning, safety standing, dressing technique, DME     Patient left up in chair with call button in reach    Assessment:  Mikaela Ghosh is a 84 y.o. female with a medical diagnosis of Subdural hematoma and presents with decreased indep with self care and functional mobiltiy. Pt is very motivated and cooperative.  Pt will benefit from OT to address limitations and increased functional indep and safety to return to [facility].    Rehab identified problem list/impairments: weakness, impaired endurance, impaired self care skills, impaired functional mobilty, gait instability, impaired balance, impaired skin     Pt evaluation falls under low complexity for evaluation coding due to performance deficits noted in 1-3 areas as stated above and 0 co-morbities affecting current functional status. Data obtained from problem focused assessments. No modifications or assistance was required for completion of evaluation. Only brief occupational profile and history review completed.       Rehab potential is good    Activity tolerance: Good    Discharge recommendations: home with home health     Barriers to discharge: Inaccessible home environment, Decreased caregiver support     Equipment recommendations:  (TBD)     GOALS:    Occupational Therapy Goals        Problem: Occupational Therapy Goal    Goal Priority Disciplines Outcome Interventions   Occupational Therapy Goal     OT, PT/OT Ongoing (interventions implemented as appropriate)    Description:  Goals to be met by: 10 days     Patient will increase functional independence with ADLs by  performing:    UE Dressing with Modified Herman.  LE Dressing with Modified Herman.  Grooming while standing at sink with Set-up Assistance.  Toileting from toilet with Modified Herman for hygiene and clothing management.   Bathing from  shower chair/bench with Supervision.  Supine to sit with Modified Herman.  Stand pivot transfers with Modified Herman.  Toilet transfer to toilet with Modified Herman.  Upper extremity exercise program x 25 min to increase functional endurance for ADLs  Pt will perform a functional standing task x 15 min with sup                    PLAN: Patient to be seen 5 x/week to address the above listed problems via self-care/home management, community/work re-entry, therapeutic activities, therapeutic exercises  Plan of Care expires: 05/27/18  Plan of Care reviewed with:  patient    ALVA Bacon  04/27/2018

## 2018-04-27 NOTE — SUBJECTIVE & OBJECTIVE
Past Medical History:   Diagnosis Date    Hypertension        Past Surgical History:   Procedure Laterality Date    EYE SURGERY      cataract    HYSTERECTOMY         Review of patient's allergies indicates:  No Known Allergies    Current Facility-Administered Medications on File Prior to Encounter   Medication    [DISCONTINUED] acetaminophen tablet 650 mg    [DISCONTINUED] amLODIPine tablet 10 mg    [DISCONTINUED] bacitracin ointment    [DISCONTINUED] fentaNYL injection 25 mcg    [DISCONTINUED] heparin (porcine) injection 5,000 Units    [DISCONTINUED] hydrALAZINE injection 10 mg    [DISCONTINUED] labetalol injection 10 mg    [DISCONTINUED] levETIRAcetam tablet 500 mg    [DISCONTINUED] magnesium oxide tablet 800 mg    [DISCONTINUED] magnesium oxide tablet 800 mg    [DISCONTINUED] potassium chloride 10% oral solution 40 mEq    [DISCONTINUED] potassium chloride 10% oral solution 40 mEq    [DISCONTINUED] potassium chloride 10% oral solution 60 mEq    [DISCONTINUED] potassium, sodium phosphates 280-160-250 mg packet 2 packet    [DISCONTINUED] potassium, sodium phosphates 280-160-250 mg packet 2 packet    [DISCONTINUED] potassium, sodium phosphates 280-160-250 mg packet 2 packet     Current Outpatient Prescriptions on File Prior to Encounter   Medication Sig    amLODIPine (NORVASC) 10 MG tablet Take 1 tablet (10 mg total) by mouth once daily.     Family History     No pertinent past family history.        Social History Main Topics    Smoking status: Never Smoker    Smokeless tobacco: Never Used    Alcohol use No    Drug use: No    Sexual activity: No     Review of Systems   Constitutional: no fever or chills  Eyes: no visual changes; + dry eyes  ENT: no nasal congestion or sore throat  Respiratory: no cough or shortness of breath  Cardiovascular: no chest pain or palpitations  Gastrointestinal: no nausea or vomiting, no abdominal pain; last BM: 4/26  Genitourinary: no hematuria or  dysuria  Integument/Breast: no rash or pruritis  Hematologic/Lymphatic: no easy bruising or lymphadenopathy  Allergy/Immunology: no postnasal drip  Musculoskeletal: no arthralgias or myalgias  Neurological: no seizures or tremors; + intermittent HA  Behavioral/Psych: no auditory or visual hallucinations  Endocrine: no heat or cold intolerance        Objective:     Vital Signs (Most Recent):  Temp: 97.9 °F (36.6 °C) (04/27/18 0730)  Pulse: 68 (04/27/18 0730)  Resp: 18 (04/27/18 0730)  BP: 127/68 (04/27/18 0904)  SpO2: 98 % (04/27/18 0730) Vital Signs (24h Range):  Temp:  [97.9 °F (36.6 °C)-98.3 °F (36.8 °C)] 97.9 °F (36.6 °C)  Pulse:  [68-88] 68  Resp:  [18] 18  SpO2:  [96 %-98 %] 98 %  BP: (127-135)/(58-72) 127/68     Weight: 66.7 kg (147 lb 0.8 oz)  Body mass index is 26.9 kg/m².    Physical Exam  General: well developed, well nourished, no distress  HENT: Head:normocephalic, incision over left frontoparietal scalp; C/D/I. Ears:bilateral external ear canals normal. Nose: Nares normal. Septum midline. Mucosa normal. Throat: lips, mucosa, and tongue normal and no throat erythema.  Eyes: conjunctivae/corneas clear.  EOM normal  Neck: supple, symmetrical, trachea midline, no JVD and thyroid not enlarged.   Lungs:  clear to auscultation bilaterally and normal respiratory effort  Cardiovascular: Heart: regular rate and rhythm, S1, S2 normal, no murmur, click, rub or gallop. Chest Wall: no tenderness. Extremities: no cyanosis, no edema, no clubbing. Pulses: 2+ and symmetric.  Abdomen/Rectal: Abdomen: soft, non-tender non-distended; bowel sounds normal; no masses,  no organomegaly.   Skin: Skin color, texture, turgor normal. No rashes or lesions  Musculoskeletal:no clubbing, cyanosis  Lymph Nodes: No cervical or supraclavicular adenopathy  Neurologic: Normal strength and tone. No focal numbness or weakness  Psych/Behavioral:  Alert and oriented, appropriate affect.    Significant Labs:     Recent Labs  Lab 04/24/18  8951  04/25/18  0452 04/26/18  0357   WBC 7.13 6.69 6.48   HGB 11.6* 11.9* 11.2*   HCT 35.5* 37.9 35.9*    197 185       Recent Labs  Lab 04/24/18  0159 04/25/18  0452 04/26/18  0358    138 141   K 4.0 3.9 3.8    108 110   CO2 22* 23 24   BUN 10 12 21   CREATININE 0.7 0.7 0.8   CALCIUM 10.8* 11.1* 11.1*   PROT 6.0 6.1 5.8*   BILITOT 0.5 0.6 0.3   ALKPHOS 55 62 56   ALT 9* 11 11   AST 14 12 13

## 2018-04-27 NOTE — PT/OT/SLP EVAL
PhysicalTherapy   Evaluation    Mikaela Ghosh   MRN: [id number]     PT Received On: 04/27/18  PT Start Time: 1028     PT Stop Time: 1108    PT Total Time (min): 40 min       Billable Minutes:  Evaluation 15, Gait Training 10, Therapeutic Activity 5 and Therapeutic Exercise 10= eval +25    Diagnosis: Subdural hematoma  Past Medical History:   Diagnosis Date    Hypertension       Past Surgical History:   Procedure Laterality Date    EYE SURGERY      cataract    HYSTERECTOMY           General Precautions: Standard, fall  Orthopedic Precautions: N/A   Braces: N/A         Patient History:  Lives With: alone  Living Arrangements: house  Home Accessibility: stairs to enter home  Number of Stairs to Enter Home: 6  Stair Railings at Home: outside, present at both sides  Transportation Available: family or friend will provide, car  Living Environment Comment: Lives alone and PTA I. Patient works as  at [employer] and driving prior. Dtr works, assists as able.   Equipment Currently Used at Home: none except has a grab bar for bath; dtr assists w/ bathing for safety.  DME owned (not currently used): none    Previous Level of Function:  Ambulation Skills: independent  Transfer Skills: independent  ADL Skills: independent  Work/Leisure Activity: independent    Subjective:  Communicated with patient  prior to session.  Agreeable to session   Chief Complaint: fatigue  Patient goals: return to PLOF; would like to return to job as [employer]     Pain/Comfort  Pain Rating 1: 2/10  Location - Side 1: Right  Location 1: hip ([facility] this pain preexisitng [facility]  Pain Addressed 1: Distraction  Pain Rating Post-Intervention 1: 0/10    Objective:  Patient found seated in w/c with      Cognitive Exam:  Oriented to: Person, Place, Time and Situation  Follows Commands/attention: Follows one-step commands  Communication: clear/fluent  Safety awareness/insight to disability: intact    Physical Exam:  Postural  examination/scapula alignment:    -       No postural abnormalities identified    Skin integrity: surgical incission w/staples left head  Edema: None noted     Sensation:      -       Intact    Upper Extremity Range of Motion:  Right Upper Extremity: WFL  Left Upper Extremity: WFL    Upper Extremity Strength:  Right Upper Extremity: WFL  Left Upper Extremity: WFL    Lower Extremity Range of Motion:  Right Lower Extremity: WFL  Left Lower Extremity: WFL    Lower Extremity Strength:  Right Lower Extremity: WFL  Left Lower Extremity: WFL     Fine motor coordination:      Gross motor coordination: WFL    Functional Status:  MDS G  Bed Mobility Functional Status: S-SBA  Transfer Functional Status: CGA-Min (A)  Walk in Room Functional Status: CGA-Min (A)  Walk in Corridor Functional Status: CGA-Min (A)  Locomotion on Unit Functional Status: CGA-Min (A)  Locomotion Off Unit Functional Status: CGA-Min (A)  Eval Only: Number of L/E limb <4/5 MMT: 0  Moving from seated to standing position: Not steady, but able to stabilize without staff assistance  Walking (with assistive device if used): Not steady, but able to stabilize without staff assistance  Turning around and facing the opposite direction while walking: Not steady, but able to stabilize without staff assistance  Moving on and off the toilet: Not steady, but able to stabilize without staff assistance  Surface-to-surface transfer (transfer between bed and chair or wheelchair): Not steady, but able to stabilize without staff assistance    MDS GG  Lying to sitting on side of bed Performance: Partial/moderate assistance.  Sit to Stand Performance: Partial/moderate assistance.  Chair/bed-to-chair transfer Performance: Partial/moderate assistance.  Chair/bed-to-chair transfer Goal: Supervision or touching assistance.  Does the resident walk?: Yes --> Continue to Walk 50 feet with two turns assessment  Walk 50 feet with two turns Performance: Supervision or touching  assistance.  Walk 150 feet Performance: Partial/moderate assistance.  Does the resident use a wheelchair/scooter?: Yes --> Continue to Wheel 50 feet with two turns assessment  Wheel 50 feet with two turns Performance: Partial/moderate assistance.  Indicate the type of wheelchair/scooter used: Manual    AM-PAC 6 CLICK MOBILITY  Total Score:18    Bed Mobility:  Sit>Supine:w/ CGA on mat  Supine>Sit: w/ CGA on mat    Transfers:  Sit<>Stand: w/ RW and CGA, cues for technique, multople trials,   Stand Pivot Transfer: w/ Rw and CGa w/c<>mat      Gait:  Amb w/ RW and  feet, 75 feet. Mildly forward flexed posture, can improve briefly. Occ decreased clearance RLE, more frequent as patient fatigued.        Advanced Gait:  Stairs: up/down 4 steps w/ BHR and CGa  Curb Step: not performed    Wheelchair Mobility:  Patient propels w/c 80 feet w/ BUEs and CGA    Therex:  Quad sets,   Glute sets,   Ankle  Pumps,   hip abduction/adduction,  heelslides,   SAQ,   LAQ   Hip flexion  Ankle pumps in sitting  x20 reps w/ assist as needed.    Balance:  Stands w/o Ad w/ CGA  Sits EOB w/o LOB    Additional Treatment:      Patient left up in chair with call button in reach.    Assessment:  Mikaela Ghosh is a 84 y.o. female with a medical diagnosis of Subdural hematoma. She is s/p left craniotomy. Patient participated well and will benefit from skilled PT to address deficits and improve safety and functional mobility to allow patient to transition safely home. .    Rehab identified problem list/impairments: weakness, impaired endurance, impaired functional mobilty, gait instability, impaired balance, decreased lower extremity function, pain    Rehab potential is good.    Activity tolerance: Good    Discharge recommendations: home with home health     Barriers to discharge: Inaccessible home environment, Decreased caregiver support    Equipment recommendations: walker, rolling, bedside commode     GOALS:    Physical Therapy Goals         Problem: Physical Therapy Goal    Goal Priority Disciplines Outcome Goal Variances Interventions   Physical Therapy Goal     PT/OT, PT      Description:  Goals to be met by: 14 days     Patient will increase functional independence with mobility by performin. Supine to sit with Modified Charles  2. Sit to supine with Modified Charles  3. Sit to stand transfer with Supervision with appropriate assistive device, as needed  4. Bed to chair transfer with Supervision using Rolling Walker or least restrictive assistive device, as appropriate  5. Gait  x 300 feet with Supervision using Rolling Walker. Or least restrictive assistive device  6. Gait x 50 feet w/ single point cane with minimal assistance.  7.Wheelchair propulsion x150 feet with Stand-by Assistance using bilateral upper extremities  8. Ascend/descend 8 stair with bilateral Handrails Supervision using Single-point Cane or without assistive, or appropriate AD   9. Ascend/Descend 4 inch curb step with Stand-by Assistance using Rolling Walker.or appropriate assistive device  10. Stand for 5 minutes with Stand-by Assistance using Rolling Walker or appropriate assistive device and perform an activity  11. Lower extremity exercise program x20 reps per handout, with assistance as needed and gym therex  12. Perform Thompson or appropriate balance test.                      PLAN:    Patient to be seen 5 x/week  to address the above listed problems via gait training, therapeutic activities, therapeutic exercises, wheelchair management/training  Plan of Care Expires: 18    Sierra Caldera, PT 2018

## 2018-04-27 NOTE — PLAN OF CARE
Problem: Patient Care Overview  Goal: Plan of Care Review  Outcome: Ongoing (interventions implemented as appropriate)   04/27/18 0553   Coping/Psychosocial   Plan Of Care Reviewed With patient

## 2018-04-27 NOTE — PLAN OF CARE
Problem: Fall Risk (Adult)  Goal: Absence of Falls  Patient will demonstrate the desired outcomes by discharge/transition of care.   Outcome: Ongoing (interventions implemented as appropriate)  Pt had no falls this shift;will continue to monitor.

## 2018-04-27 NOTE — HPI
Chief Complaint/Reason for Admission: Subdural hematoma    History of Present Illness:  Patient is a 84 y.o. female with HTN who presents to SNF after hospitalization for subdural hematoma requiring left frontoparietal craniotomy with evacuation on 4/20 by Dr. Sandoval.  The patient initially presented with dizziness and was found to have SDH on CT. She complains of intermittent mild headache at incision site since surgery which is relieved with tylenol. She denies any exacerbating conditions.  She does have dry eyes and was taking tears BID at home and would like to resume.  No seizures or focal weakness.   The patient has been admitted to SNF for ongoing PT/OT due to insufficient progress to go home safely from the hospital.

## 2018-04-27 NOTE — HOSPITAL COURSE
The patient was admitted at [facility] from 4/18 to 4/26/2018.  4/26: Patient admitted to SNF for ongoing PT/Ot following a hospitalization for subdural hematoma requiring left frontoparietal craniotomy with evacuation.  4/30: Patient seen at bedside, doing well, reports mild HA tolerable with tylenol. No acute events overnight. Labs reviewed.   5/3: Ca++ elevated, treated with NS, will monitor Ca++ levels  5/4: ionized Ca++ 1.72 elevated treated with NS, will monitor Ca++ levels  5/7: Patient seen at bedside doing well, denies pain, discussed elevated Ca++ level; d/w with Dr. Lemos-believed to be primary hyperthyroidism patient asymptomatic secure outpatient appointment. ionzied Ca++ 1.58  5/8: Patient seen at bedside, doing well, discussed upcoming Endocrine appointment, verbalized understanding.   5/10: seen in endocrine clinic patient trying to decided with will start medication for hypercalcemia will give answer tomorrow.   5/11: After discussing use of cinacalcet with patient and daughter at bedside, patient willing to start medication. Discussed 24hour urine with both. 24hour urine will start on Sunday discussed with nurse and charge nurse.   5/15: Patient seen at bedside doing well, no complaints, discussed upcoming discharge with patient, discussed no driving until cleared by neurosurgery and driving eval (discussed with daughter on 5/14/18).  5/16: Patient seen at bedside, doing well, discussed discharge, answered all questions, patient being discharge home with home health services.

## 2018-04-27 NOTE — PLAN OF CARE
Problem: Occupational Therapy Goal  Goal: Occupational Therapy Goal  Goals to be met by: 10 days     Patient will increase functional independence with ADLs by performing:    UE Dressing with Modified [place].  LE Dressing with Modified [place].  Grooming while standing at sink with Set-up Assistance.  Toileting from toilet with Modified [place] for hygiene and clothing management.   Bathing from  shower chair/bench with Supervision.  Supine to sit with Modified [place].  Stand pivot transfers with Modified [place].  Toilet transfer to toilet with Modified [place].  Upper extremity exercise program x 25 min to increase functional endurance for ADLs  Pt will perform a functional standing task x 15 min with sup  Outcome: Ongoing (interventions implemented as appropriate)  Goals  Set. ALVA Bacon  4/27/2018

## 2018-04-27 NOTE — CONSULTS
"  [facility]  Adult Nutrition  Consult Note    SUMMARY     Recommendations  RD to complete low sodium education   Recommendation/Intervention: Recommend low sodium diet, RD to follow. If PO intake < 75% recommend boost plus daily vanilla  Goals: PO to meet 85% of EEN  Nutrition Goal Status: new  Communication of RD Recs: other (comment) (care plans)    Reason for Assessment    Reason for Assessment: consult  Diagnosis:  (Debility)  Relevant Medical History: acute on chronic subdural hematoma  Interdisciplinary Rounds: did not attend  General Information Comments: patient has no food prefences, her appetite is back and she has never tried a nutrition oral supplement before, milk ok, daily weights, up for meals, passed swallow eval bedside per speech  Nutrition Discharge Planning: dc on low sodium diet    Nutrition Risk Screen    Nutrition Risk Screen: no indicators present    Nutrition/Diet History    Patient Reported Diet/Restrictions/Preferences: low salt  Food Preferences: none follow no added salt diet at home and may limit her carbohydrates  Do you have any cultural, spiritual, Caodaism conflicts, given your current situation?: none  Food Allergies: NKFA  Factors Affecting Nutritional Intake: decreased appetite    Anthropometrics    5% weight loss over this past year from record review, pt suspected but does not know how much    Height Method: Stated  Height: 5' 2" (157.5 cm)  Height (inches): 62 in  Weight Method: Bed Scale  Weight: 66.7 kg (147 lb 0.8 oz)  Weight (lb): 147.05 lb  Ideal Body Weight (IBW), Female: 110 lb  % Ideal Body Weight, Female (lb): 133.68 lb  BMI (Calculated): 27  BMI Grade: 25 - 29.9 - overweight       Lab/Procedures/Meds    Pertinent Labs Reviewed: reviewed    Pertinent Medications Reviewed: reviewed  Pertinent Medications Comments: senna-docusate, heparin,     Physical Findings/Assessment    Overall Physical Appearance: mild to moderate loss of muscle mass in  " thighs, hands, temples and clavicle  Tubes:  (-)  Oral/Mouth Cavity: WDL  Skin:  (-)    Estimated/Assessed Needs    Weight Used For Calorie Calculations: 66.7 kg (147 lb 0.8 oz)  Energy Calorie Requirements (kcal): 9697-0440  Energy Need Method:  (1.25 x (PAL) +/- 10%)  Protein Requirements: 66  Weight Used For Protein Calculations: 66.7 kg (147 lb 0.8 oz)  Fluid Requirements (mL): 1400 or per MD  Fluid Need Method: RDA Method  RDA Method (mL): 1338         Nutrition Prescription Ordered    Current Diet Order: Regular  Nutrition Order Comments: recommend low sodium diet  Oral Nutrition Supplement: none    Evaluation of Received Nutrient/Fluid Intake    Energy Calories Required: meeting needs  Protein Required: meeting needs  Fluid Required: meeting needs  Comments: patient was informed that RD would be recommending low sodium diet  Tolerance: tolerating  % Intake of Estimated Energy Needs: 75 - 100 %  % Meal Intake: 75 - 100 %    Nutrition Risk    Level of Risk/Frequency of Follow-up: low     Assessment and Plan  Knowledge deficit regarding low sodium diet  Related to lack of value in diet therapy as evidenced by patient report during diet history where she tries to eliminate added salt at the table.    Monitor and Evaluation    Food and Nutrient Intake: food and beverage intake  Food and Nutrient Adminstration: diet order  Physical Activity and Function: nutrition-related ADLs and IADLs  Anthropometric Measurements: weight change  Biochemical Data, Medical Tests and Procedures: electrolyte and renal panel, gastrointestinal profile  Nutrition-Focused Physical Findings: overall appearance     Nutrition Follow-Up    RD Follow-up?: Yes

## 2018-04-27 NOTE — PLAN OF CARE
Problem: Physical Therapy Goal  Goal: Physical Therapy Goal  Goals to be met by: 14 days     Patient will increase functional independence with mobility by performin. Supine to sit with Modified [place]  2. Sit to supine with Modified [place]  3. Sit to stand transfer with Supervision with appropriate assistive device, as needed  4. Bed to chair transfer with Supervision using Rolling Walker or least restrictive assistive device, as appropriate  5. Gait  x 300 feet with Supervision using Rolling Walker. Or least restrictive assistive device  6. Gait x 50 feet w/ single point cane with minimal assistance.  7.Wheelchair propulsion x150 feet with Stand-by Assistance using bilateral upper extremities  8. Ascend/descend 8 stair with bilateral Handrails Supervision using Single-point Cane or without assistive, or appropriate AD   9. Ascend/Descend 4 inch curb step with Stand-by Assistance using Rolling Walker.or appropriate assistive device  10. Stand for 5 minutes with Stand-by Assistance using Rolling Walker or appropriate assistive device and perform an activity  11. Lower extremity exercise program x20 reps per handout, with assistance as needed and gym therex  12. Perform Thompson or appropriate balance test.    PT eval completed. Sierra Caldera, PT 2018

## 2018-04-27 NOTE — H&P
[facility]  Department of Hospital Medicine  History & Physical    Patient Name: Mikaela Ghosh  MRN: [id number]  Code Status: Full Code  Admission Date: 4/26/2018  Attending Physician: Luh Garcia MD   Primary Care Provider: Felipe Mustafa MD    Subjective:     Principal Problem:Subdural hematoma       HPI: Chief Complaint/Reason for Admission: Subdural hematoma    History of Present Illness:  Patient is a 84 y.o. female with HTN who presents to SNF after hospitalization for subdural hematoma requiring left frontoparietal craniotomy with evacuation on 4/20 by Dr. Sandoval.  The patient initially presented with dizziness and was found to have SDH on CT. She complains of intermittent mild headache at incision site since surgery which is relieved with tylenol. She denies any exacerbating conditions.  She does have dry eyes and was taking tears BID at home and would like to resume.  No seizures or focal weakness.      The patient has been admitted to SNF for ongoing PT/OT due to insufficient progress to go home safely from the hospital.    Records from last hospital stay reviewed and summarized above.       Past Medical History:   Diagnosis Date    Hypertension        Past Surgical History:   Procedure Laterality Date    EYE SURGERY      cataract    HYSTERECTOMY         Review of patient's allergies indicates:  No Known Allergies    Current Facility-Administered Medications on File Prior to Encounter   Medication    [DISCONTINUED] acetaminophen tablet 650 mg    [DISCONTINUED] amLODIPine tablet 10 mg    [DISCONTINUED] bacitracin ointment    [DISCONTINUED] fentaNYL injection 25 mcg    [DISCONTINUED] heparin (porcine) injection 5,000 Units    [DISCONTINUED] hydrALAZINE injection 10 mg    [DISCONTINUED] labetalol injection 10 mg    [DISCONTINUED] levETIRAcetam tablet 500 mg    [DISCONTINUED] magnesium oxide tablet 800 mg    [DISCONTINUED] magnesium oxide tablet 800 mg    [DISCONTINUED]  potassium chloride 10% oral solution 40 mEq    [DISCONTINUED] potassium chloride 10% oral solution 40 mEq    [DISCONTINUED] potassium chloride 10% oral solution 60 mEq    [DISCONTINUED] potassium, sodium phosphates 280-160-250 mg packet 2 packet    [DISCONTINUED] potassium, sodium phosphates 280-160-250 mg packet 2 packet    [DISCONTINUED] potassium, sodium phosphates 280-160-250 mg packet 2 packet     Current Outpatient Prescriptions on File Prior to Encounter   Medication Sig    amLODIPine (NORVASC) 10 MG tablet Take 1 tablet (10 mg total) by mouth once daily.     Family History     No pertinent past family history.        Social History Main Topics    Smoking status: Never Smoker    Smokeless tobacco: Never Used    Alcohol use No    Drug use: No    Sexual activity: No     Review of Systems   Constitutional: no fever or chills  Eyes: no visual changes; + dry eyes  ENT: no nasal congestion or sore throat  Respiratory: no cough or shortness of breath  Cardiovascular: no chest pain or palpitations  Gastrointestinal: no nausea or vomiting, no abdominal pain; last BM: 4/26  Genitourinary: no hematuria or dysuria  Integument/Breast: no rash or pruritis  Hematologic/Lymphatic: no easy bruising or lymphadenopathy  Allergy/Immunology: no postnasal drip  Musculoskeletal: no arthralgias or myalgias  Neurological: no seizures or tremors; + intermittent HA  Behavioral/Psych: no auditory or visual hallucinations  Endocrine: no heat or cold intolerance        Objective:     Vital Signs (Most Recent):  Temp: 97.9 °F (36.6 °C) (04/27/18 0730)  Pulse: 68 (04/27/18 0730)  Resp: 18 (04/27/18 0730)  BP: 127/68 (04/27/18 0904)  SpO2: 98 % (04/27/18 0730) Vital Signs (24h Range):  Temp:  [97.9 °F (36.6 °C)-98.3 °F (36.8 °C)] 97.9 °F (36.6 °C)  Pulse:  [68-88] 68  Resp:  [18] 18  SpO2:  [96 %-98 %] 98 %  BP: (127-135)/(58-72) 127/68     Weight: 66.7 kg (147 lb 0.8 oz)  Body mass index is 26.9 kg/m².    Physical Exam  General:  well developed, well nourished, no distress  HENT: Head:normocephalic, incision over left frontoparietal scalp; C/D/I. Ears:bilateral external ear canals normal. Nose: Nares normal. Septum midline. Mucosa normal. Throat: lips, mucosa, and tongue normal and no throat erythema.  Eyes: conjunctivae/corneas clear.  EOM normal  Neck: supple, symmetrical, trachea midline, no JVD and thyroid not enlarged.   Lungs:  clear to auscultation bilaterally and normal respiratory effort  Cardiovascular: Heart: regular rate and rhythm, S1, S2 normal, no murmur, click, rub or gallop. Chest Wall: no tenderness. Extremities: no cyanosis, no edema, no clubbing. Pulses: 2+ and symmetric.  Abdomen/Rectal: Abdomen: soft, non-tender non-distended; bowel sounds normal; no masses,  no organomegaly.   Skin: Skin color, texture, turgor normal. No rashes or lesions  Musculoskeletal:no clubbing, cyanosis  Lymph Nodes: No cervical or supraclavicular adenopathy  Neurologic: Normal strength and tone. No focal numbness or weakness  Psych/Behavioral:  Alert and oriented, appropriate affect.    Significant Labs:     Recent Labs  Lab 04/24/18  0159 04/25/18  0452 04/26/18  0357   WBC 7.13 6.69 6.48   HGB 11.6* 11.9* 11.2*   HCT 35.5* 37.9 35.9*    197 185       Recent Labs  Lab 04/24/18  0159 04/25/18  0452 04/26/18  0358    138 141   K 4.0 3.9 3.8    108 110   CO2 22* 23 24   BUN 10 12 21   CREATININE 0.7 0.7 0.8   CALCIUM 10.8* 11.1* 11.1*   PROT 6.0 6.1 5.8*   BILITOT 0.5 0.6 0.3   ALKPHOS 55 62 56   ALT 9* 11 11   AST 14 12 13            Assessment/Plan:     * Subdural hematoma    -continue PT/OT to increase ambulation, ADL performance and endurance  Continue to monitor incision and continue bactroban BID to incision  -continue seizure prophylaxis with keppra to 4/29  -continue heparin for DVT prophylaxis  -continue fall precautions  -continue senokot-s to prevent constipation; hold for frequent or loose stooling           Essential hypertension, benign    Chronic with improved control  Continue therapy with amlodipine and low Na diet  -will continue to monitor and adjust regimen as necessary          Hypophosphatemia    Replete with phos packets  Monitor with twice weekly labs        Hypercalcemia    Mild and Elevated since 5/2017  Renal function normal but phos is low  Encourage hydration  Will check a PTH with next labwork once phos is repleted        Dry eyes    resume therapy with artificial tears as she takes at home          Future Appointments  Date Time Provider Department Center   5/10/2018 10:15 AM [facility] 500 LB LIMIT [facility] IC [facility]   5/10/2018 11:20 AM Brady Mustafa PA-C [facility] NEUROS7 [facility]     Patient's care plan and discharge planning will be discussed by the SNF team in IDT meeting weekly. Medications to be reviewed and discussed with the SNF unit clinical pharmacist.    Luh Garcia MD  Department of Hospital Medicine  [facility]

## 2018-04-27 NOTE — PT/OT/SLP EVAL
"Speech Language Pathology  Evaluation    Mikaela Ghosh   MRN: [id number]   Admitting Diagnosis: Subdural hematoma    Diet recommendations: Solid Diet Level: Regular  Liquid Diet Level: Thin Standard aspiration precautions    SLP Treatment Date: 04/27/18  Speech Start Time: 1335     Speech Stop Time: 1358     Speech Total (min): 23 min       TREATMENT BILLABLE MINUTES:  Eval 23     Diagnosis: Subdural hematoma    Past Medical History:   Diagnosis Date    Hypertension      Past Surgical History:   Procedure Laterality Date    EYE SURGERY      cataract    HYSTERECTOMY         Has the patient been evaluated by SLP for swallowing? : Yes  Keep patient NPO?: No General Precautions: Standard, fall    Current Respiratory Status: room air     Social Hx: Patient lives alone. Pt reports working as a  at [employer], drives, and manages medications.  She reports that her son assists her with managing finances.      Subjective:  "Look like my speech was a little slow."  "A little blurry in there." pt describing changes in cognition and speech since SDH.       Objective:              Cognitive Status:  Behavioral Observations: alert, appropriate and cooperative-  Memory and Orientation: O x 4. Pt immediately recalled up to 4 digits in a series. After a 5 minute delay, pt recalled 2/3 words given cues (0/3 ind'ly).  Attention: adequate  Problem Solving: Simple problem solving q's were answered with 100% acc. Pt generated multiple causes for a hypothetical problem with good ability.   Pragmatics: wfl    Language: delayed    Auditory Comprehension: Increased response time and repetition x 1 needed to 5 complex yes/no q's. Pt was able to follow a 2-step command, but not a 3-step command despite cues and repetition.    Verbal Expression: Pt able to express basic wants/needs and some complex information.  Increased time needed for thought formulation. Occasionally reduced fluency of speech.    Motor Speech: no dysarthria or apraxia " evident    Voice: wfl    Reading: tba    Writing: tba    Visual-Spatial: tba    Assessment:  Mikaela Ghosh is a 84 y.o. female with a medical diagnosis of Subdural hematoma and presents with cognitive-linguistic deficits.          Discharge recommendations: Discharge Facility/Level Of Care Needs:  (tbd)     Goals:    SLP Goals        Problem: SLP Goal    Goal Priority Disciplines Outcome   SLP Goal     SLP    Description:  Speech Language Pathology Goals  Goals expected to be met by 5/4:  1. Pt will complete immediate memory tasks with 70% accuracy min cues.  2. Following a delay, pt will recall 3/3 words given mod cues.  3. Pt will follow 3-step commands with 70% accuracy given mod cues.  4. Pt will list 8 items in one minute given min cues.  5. Pt will participate in ongoing assessment of reading, writing, visual spatial, and functional math abilities.                         Plan:   Patient to be seen Therapy Frequency: 5 x/week   Plan of Care expires:    Plan of Care reviewed with: patient  SLP Follow-up?: Yes              LIZZY Gomez, SHARAN-SLP  04/27/2018    LIZZY Gomez, CCC-SLP  Speech Language Pathologist  [phone]  4/27/2018

## 2018-04-27 NOTE — PHYSICIAN QUERY
PT Name: Mikaela Ghosh  MR #: [id number]     Physician Query Form - Documentation Clarification      CDS/: Damaris Gilbert               Contact information: [phone]    This form is a permanent document in the medical record.     Query Date: April 27, 2018    By submitting this query, we are merely seeking further clarification of documentation. Please utilize your independent clinical judgment when addressing the question(s) below.    The Medical record reflects the following:    Supporting Clinical Findings Location in Medical Record   Final Active Diagnoses:     Diagnosis Date Noted POA    PRINCIPAL PROBLEM:  Acute on chronic intracranial subdural hematoma [I62.01, I62.03] 04/19/2018 Yes    Intracranial bleed [I62.9]   Unknown    Seizure prophylaxis [Z29.8] 04/24/2018 Not Applicable    Brain compression [G93.5] 04/23/2018 Yes    Pulmonary hypertension [I27.20] 04/23/2018 Yes    LVH (left ventricular hypertrophy) [I51.7] 04/19/2018 Yes    Essential hypertension, benign [I10] 04/18/2018 Yes    Subdural hematoma [I62.00] 04/18/2018 Yes                D/c summary 4/26 (Anali)   CONCLUSIONS     1 - Normal left ventricular systolic function (EF 65-70%).     2 - No wall motion abnormalities.     3 - Concentric hypertrophy.     4 - Mild left atrial enlargement.     5 - Normal right ventricular systolic function .     6 - MASHA and LVOT gradient as per text.     7 - Mild mitral regurgitation.     8 - Mild tricuspid regurgitation.     9 - Pulmonary hypertension. The estimated PA systolic pressure is 64 mmHg       Echo 4/19                                                                            Doctor, Please specify diagnosis or diagnoses associated with above clinical findings. Please specify the type of Pulmonary Hypertension.    Provider Use Only      [     ] Primary pulmonary hypertension (Group 1)  [     ] Other Secondary pulmonary hypertension  (Group 5)  [     ] Secondary pulmonary arterial  hypertension due to: _______________  [   x  ] Pulmonary hypertension due to Left  heart disease (Group 2)  [     ] Pulmonary hypertension due to Lung disease and hypoxia (Group 3)  [     ] Chronic thromboembolic pulmonary hypertension (Group 4)  [     ] Pulmonary hypertension, unspecified   [     ] Other: ___________________                                                                                                                             [  ] Clinically undetermined      Erika Briones PA-C   Neurosurgery   Pager: [phone]

## 2018-04-28 PROCEDURE — 63600175 PHARM REV CODE 636 W HCPCS: Performed by: PHYSICIAN ASSISTANT

## 2018-04-28 PROCEDURE — 97530 THERAPEUTIC ACTIVITIES: CPT

## 2018-04-28 PROCEDURE — 12000000 HC SNF SEMI-PRIVATE ROOM

## 2018-04-28 PROCEDURE — 97535 SELF CARE MNGMENT TRAINING: CPT

## 2018-04-28 PROCEDURE — 25000003 PHARM REV CODE 250: Performed by: INTERNAL MEDICINE

## 2018-04-28 PROCEDURE — 25000003 PHARM REV CODE 250: Performed by: PHYSICIAN ASSISTANT

## 2018-04-28 RX ADMIN — BACITRACIN: 500 OINTMENT TOPICAL at 08:04

## 2018-04-28 RX ADMIN — BACITRACIN: 500 OINTMENT TOPICAL at 09:04

## 2018-04-28 RX ADMIN — HEPARIN SODIUM 5000 UNITS: 5000 INJECTION, SOLUTION INTRAVENOUS; SUBCUTANEOUS at 01:04

## 2018-04-28 RX ADMIN — HYPROMELLOSE 2910 1 DROP: 5 SOLUTION OPHTHALMIC at 09:04

## 2018-04-28 RX ADMIN — LEVETIRACETAM 500 MG: 500 TABLET ORAL at 09:04

## 2018-04-28 RX ADMIN — HYPROMELLOSE 2910 1 DROP: 5 SOLUTION OPHTHALMIC at 03:04

## 2018-04-28 RX ADMIN — LEVETIRACETAM 500 MG: 500 TABLET ORAL at 08:04

## 2018-04-28 RX ADMIN — POTASSIUM & SODIUM PHOSPHATES POWDER PACK 280-160-250 MG 1 PACKET: 280-160-250 PACK at 08:04

## 2018-04-28 RX ADMIN — HEPARIN SODIUM 5000 UNITS: 5000 INJECTION, SOLUTION INTRAVENOUS; SUBCUTANEOUS at 06:04

## 2018-04-28 RX ADMIN — ACETAMINOPHEN 650 MG: 325 TABLET ORAL at 09:04

## 2018-04-28 RX ADMIN — STANDARDIZED SENNA CONCENTRATE AND DOCUSATE SODIUM 1 TABLET: 8.6; 5 TABLET, FILM COATED ORAL at 08:04

## 2018-04-28 RX ADMIN — POTASSIUM & SODIUM PHOSPHATES POWDER PACK 280-160-250 MG 1 PACKET: 280-160-250 PACK at 01:04

## 2018-04-28 RX ADMIN — POTASSIUM & SODIUM PHOSPHATES POWDER PACK 280-160-250 MG 1 PACKET: 280-160-250 PACK at 05:04

## 2018-04-28 RX ADMIN — ACETAMINOPHEN 650 MG: 325 TABLET ORAL at 08:04

## 2018-04-28 RX ADMIN — POTASSIUM & SODIUM PHOSPHATES POWDER PACK 280-160-250 MG 1 PACKET: 280-160-250 PACK at 06:04

## 2018-04-28 RX ADMIN — HEPARIN SODIUM 5000 UNITS: 5000 INJECTION, SOLUTION INTRAVENOUS; SUBCUTANEOUS at 08:04

## 2018-04-28 NOTE — PLAN OF CARE
Problem: Occupational Therapy Goal  Goal: Occupational Therapy Goal  Goals to be met by: 10 days     Patient will increase functional independence with ADLs by performing:    UE Dressing with Modified [place].  LE Dressing with Modified [place].  Grooming while standing at sink with Set-up Assistance.  Toileting from toilet with Modified [place] for hygiene and clothing management.   Bathing from  shower chair/bench with Supervision.  Supine to sit with Modified [place].  Stand pivot transfers with Modified [place].  Toilet transfer to toilet with Modified [place].  Upper extremity exercise program x 25 min to increase functional endurance for ADLs  Pt will perform a functional standing task x 15 min with sup   Pt. Tolerated session well.

## 2018-04-28 NOTE — PROGRESS NOTES
Physical Therapy Discharge Summary    Name: Mikaela Ghosh  MRN: [id number]   Principal Problem: Acute on chronic intracranial subdural hematoma     Patient Discharged from acute Physical Therapy on 18.  Please refer to prior PT noted date on 18 for functional status.     Assessment:     Patient appropriate for care in another setting.    Objective:     GOALS:    Physical Therapy Goals        Problem: Physical Therapy Goal    Goal Priority Disciplines Outcome Goal Variances Interventions   Physical Therapy Goal     PT/OT, PT Ongoing (interventions implemented as appropriate)     Description:  Goals to be met by: 2018    Patient will increase functional independence with mobility by performin. Supine to sit with Set-up [place]-not met  2. Sit to supine with Set-up [place]-not met  3. Sit to stand transfer with Supervision using RW. -not met  4. Gait  x 150 feet with stand by Assistance using Rolling Walker. -not met  5. Lower extremity exercise program x20 reps per handout, with supervision  6. Pt up/down 7 steps with B UE rail support with CGA-not met                     Reasons for Discontinuation of Therapy Services  Transfer to alternate level of care.      Plan:     Patient Discharged to: Skilled Nursing Facility.    Gabby Hoff, PT  2018

## 2018-04-28 NOTE — PT/OT/SLP PROGRESS
Occupational Therapy  Treatment    Mikaela Ghosh   MRN: [id number]   Admitting Diagnosis: Subdural hematoma    OT Date of Treatment: 04/28/18  Total Time (min): 40 min    Billable Minutes:  Self Care/Home Management 15 and Therapeutic Activity 25    General Precautions: Standard, fall  Orthopedic Precautions: N/A  Braces: N/A    Do you have any cultural, spiritual, Druze conflicts, given your current situation?: none stated    Subjective:  Communicated with nurse prior to session.  I just can't get my mind right     Pain/Comfort  Pain Rating 1: 0/10  Pain Rating Post-Intervention 1: 0/10    Objective:  Patient found with:  (supine in bed eating with family present)    Functional Status:  MDS G  Bed Mobility Functional Status: SBA supine to sit   Transfer Functional Status: CGA-Min (A) with RW sit to stand and to ambulate to w/c x ~ 8 feet   Walk in Room Functional Status: CGA-Min (A)  Dressing Functional Status: 2:CGA-Min (A) LBD to don shoes seated EOB/UBD  SBA to don jacket   Eating Functional Status: mod(I) - (I)  Moving from seated to standing position: Not steady, but able to stabilize without staff assistance  Surface-to-surface transfer (transfer between bed and chair or wheelchair): Not steady, only able to stabilize with staff assistance     MDS GG  Eating Performance: Set-up or clean-up assistance.  Oral Hygiene Performance: Setup or clean-up assistance  Toileting Hygiene Performance: Partial/moderate assistance.  Lying to sitting on side of bed Performance: Partial/moderate assistance.  Sit to Stand Performance: Partial/moderate assistance.  Chair/bed-to-chair transfer Performance: Partial/moderate assistance.  Chair/bed-to-chair transfer Goal: Supervision or touching assistance.     [facility] 6 Click:  AMPAC Total Score: 19    Additional Treatment:  Pt. Participated in dynamic standing tasks with SBA at table when working on simple cognitive tasks  Pt. Was able to sort and sequence cards with  supervision  Pt. Required Min A to complete 8 piece and 10 piece peg design from model  Pt. Engaged in resistive activity of removing tops from 12  objects and rematching top to proper top with supervision     Patient left up in chair with all lines intact and nurse notified    ASSESSMENT:  Mikaela Ghosh is a 84 y.o. female with a medical diagnosis of Subdural hematoma and presents with deficits in cognition, self-care skills, as well as functional mobility and would benefit from continued oT services. Pt. Tolerated session well on this date..    Rehab identified problem list/impairments: weakness, impaired endurance, impaired self care skills, impaired functional mobilty, gait instability, impaired balance, impaired skin    Rehab potential is good    Activity tolerance: Good    Discharge recommendations: home with home health  Likely supervision as well     Barriers to discharge: Inaccessible home environment, Decreased caregiver support     Equipment recommendations:  (TBD)     GOALS:    Occupational Therapy Goals        Problem: Occupational Therapy Goal    Goal Priority Disciplines Outcome Interventions   Occupational Therapy Goal     OT, PT/OT Ongoing (interventions implemented as appropriate)    Description:  Goals to be met by: 10 days     Patient will increase functional independence with ADLs by performing:    UE Dressing with Modified [place].  LE Dressing with Modified [place].  Grooming while standing at sink with Set-up Assistance.  Toileting from toilet with Modified [place] for hygiene and clothing management.   Bathing from  shower chair/bench with Supervision.  Supine to sit with Modified [place].  Stand pivot transfers with Modified [place].  Toilet transfer to toilet with Modified [place].  Upper extremity exercise program x 25 min to increase functional endurance for ADLs  Pt will perform a functional standing task x 15 min with sup                    Plan:  Patient  to be seen 5 x/week to address the above listed problems via self-care/home management, community/work re-entry, therapeutic activities, therapeutic exercises  Plan of Care expires: 05/27/18  Plan of Care reviewed with:      ALVA Alarcon  04/28/2018

## 2018-04-29 PROCEDURE — 25000003 PHARM REV CODE 250: Performed by: PHYSICIAN ASSISTANT

## 2018-04-29 PROCEDURE — 25000003 PHARM REV CODE 250: Performed by: INTERNAL MEDICINE

## 2018-04-29 PROCEDURE — 97116 GAIT TRAINING THERAPY: CPT

## 2018-04-29 PROCEDURE — 12000000 HC SNF SEMI-PRIVATE ROOM

## 2018-04-29 PROCEDURE — 63600175 PHARM REV CODE 636 W HCPCS: Performed by: PHYSICIAN ASSISTANT

## 2018-04-29 PROCEDURE — 97110 THERAPEUTIC EXERCISES: CPT

## 2018-04-29 PROCEDURE — 97530 THERAPEUTIC ACTIVITIES: CPT

## 2018-04-29 RX ADMIN — HEPARIN SODIUM 5000 UNITS: 5000 INJECTION, SOLUTION INTRAVENOUS; SUBCUTANEOUS at 09:04

## 2018-04-29 RX ADMIN — ACETAMINOPHEN 650 MG: 325 TABLET ORAL at 09:04

## 2018-04-29 RX ADMIN — HYPROMELLOSE 2910 1 DROP: 5 SOLUTION OPHTHALMIC at 02:04

## 2018-04-29 RX ADMIN — STANDARDIZED SENNA CONCENTRATE AND DOCUSATE SODIUM 1 TABLET: 8.6; 5 TABLET, FILM COATED ORAL at 09:04

## 2018-04-29 RX ADMIN — LEVETIRACETAM 500 MG: 500 TABLET ORAL at 09:04

## 2018-04-29 RX ADMIN — HYPROMELLOSE 2910 1 DROP: 5 SOLUTION OPHTHALMIC at 09:04

## 2018-04-29 RX ADMIN — HEPARIN SODIUM 5000 UNITS: 5000 INJECTION, SOLUTION INTRAVENOUS; SUBCUTANEOUS at 06:04

## 2018-04-29 RX ADMIN — AMLODIPINE BESYLATE 10 MG: 10 TABLET ORAL at 09:04

## 2018-04-29 RX ADMIN — HEPARIN SODIUM 5000 UNITS: 5000 INJECTION, SOLUTION INTRAVENOUS; SUBCUTANEOUS at 02:04

## 2018-04-29 NOTE — PLAN OF CARE
Problem: Fall Risk (Adult)  Goal: Absence of Falls  Patient will demonstrate the desired outcomes by discharge/transition of care.   Patient free of falls per shift. Will continue to monitor patient.

## 2018-04-29 NOTE — PT/OT/SLP PROGRESS
Physical Therapy  Treatment    Mikaela Ghosh   MRN: [id number]   Admitting Diagnosis: Subdural hematoma    PT Received On: 04/29/18  Total Time (min): 53       Billable Minutes:  Gait Training 25, Therapeutic Activity 10 and Therapeutic Exercise 18    Treatment Type: Treatment  PT/PTA: PT     PTA Visit Number: 0       General Precautions: Standard, fall  Orthopedic Precautions: N/A   Braces: N/A    Subjective:  Communicated with nurse prior to session.  Pt states she needs to go to the bathroom  Pain/Comfort  Pain Rating 1: 0/10  Pain Addressed 1:  (did not grade L hip pain during bike)  Pain Rating Post-Intervention 1:  (pt c/o L hip pain when on bike, no pain after stopped;)    Objective:   Patient found with:  (supine in bed)   Functional Status:  MDS G  Bed Mobility Functional Status: S-SBA  Transfer Functional Status: CGA-Min (A)  Walk in Room Functional Status: CGA-Min (A)  Walk in Corridor Functional Status: CGA-Min (A)  Locomotion on Unit Functional Status: CGA-Min (A)  Locomotion Off Unit Functional Status: CGA-Min (A)  Eval Only: Number of L/E limb <4/5 MMT: 0  Moving from seated to standing position: Not steady, but able to stabilize without staff assistance  Walking (with assistive device if used): Not steady, but able to stabilize without staff assistance  Turning around and facing the opposite direction while walking: Not steady, only able to stabilize with staff assistance  Moving on and off the toilet: Not steady, but able to stabilize without staff assistance  Surface-to-surface transfer (transfer between bed and chair or wheelchair): Not steady, only able to stabilize with staff assistance    MDS GG  Sit to lying Performance: Supervision or touching assistance.  Lying to sitting on side of bed Performance: Partial/moderate assistance.  Sit to Stand Performance: Partial/moderate assistance.  Chair/bed-to-chair transfer Performance: Partial/moderate assistance.  Toilet transfer Performance: Supervision  or touching assistance.  Walk 50 feet with two turns Performance: Supervision or touching assistance.     AM-PAC 6 CLICK MOBILITY  Total Score:18    Bed Mobility:  Sit>Supine: pt remained up in w/c after treatment   Supine>Sit: SBA with bed rail support    Transfers:  Sit<>Stand: CGA x 4 trials from w/; 1 trial from [facility];  When pt was going to sit, pt's R hand slipped off the arm rest of the w/c and pt had LOB to the R requiring mod assist to remain upright  Stand Pivot Transfer: bed to w/c with no AD with minimal assist    Gait:  Amb 118ft x 3 trials then 80ft with RW with minimal assist due to R sided instability especially with change of direction. Pt with crossover gait at times causing instability. Pt requires verbal cues to stay close to the walker and for upright posture due to flexed and R sided lean at times. Pt required verbal cues to slow pace for safety. Pt tends to drift to the R at times during gait.    Advanced Gait:  Stairs: up/down 8 steps then 4 steps with B UE rail support with minimal assist with verbal cues for safety.    Wheelchair Mobility:  Patient propels w/c 40ft with B UE on level surface for UE strengthening with CGA to avoid obstacles on the R at times.     Therex:  Pt performed B LE ergometer x 15 min .    Balance:  Static standing balance fair (-); dynamic standing balance poor(+)    Additional Treatment:  Pt transferred from w/c to/from [facility] with minimal assist; Pt urinated and had a BM, nurse notified. PT spoke with pt and her daughter about safety concerns with mobility.    Patient left up in chair with call button in reach, nurse notified and daughter present.    Assessment:  Mikaela Ghosh is a 84 y.o. female with a medical diagnosis of Subdural hematoma.  Pt is limited with functional mobility due to instability, impaired safety awareness, weakness, and fatigue.pt is motivated to return to work at [employer].    Rehab identified problem list/impairments: weakness, impaired  endurance, impaired functional mobilty, gait instability, impaired balance, decreased lower extremity function, pain    Rehab potential is good.    Activity tolerance: Good    Discharge recommendations: home with home health     Barriers to discharge: Inaccessible home environment, Decreased caregiver support    Equipment recommendations: walker, rolling, bedside commode     GOALS:    Physical Therapy Goals        Problem: Physical Therapy Goal    Goal Priority Disciplines Outcome Goal Variances Interventions   Physical Therapy Goal     PT/OT, PT Ongoing (interventions implemented as appropriate)     Description:  Goals to be met by: 14 days     Patient will increase functional independence with mobility by performin. Supine to sit with Modified [place]  2. Sit to supine with Modified [place]  3. Sit to stand transfer with Supervision with appropriate assistive device, as needed  4. Bed to chair transfer with Supervision using Rolling Walker or least restrictive assistive device, as appropriate  5. Gait  x 300 feet with Supervision using Rolling Walker. Or least restrictive assistive device  6. Gait x 50 feet w/ single point cane with minimal assistance.  7.Wheelchair propulsion x150 feet with Stand-by Assistance using bilateral upper extremities  8. Ascend/descend 8 stair with bilateral Handrails Supervision using Single-point Cane or without assistive, or appropriate AD   9. Ascend/Descend 4 inch curb step with Stand-by Assistance using Rolling Walker.or appropriate assistive device  10. Stand for 5 minutes with Stand-by Assistance using Rolling Walker or appropriate assistive device and perform an activity  11. Lower extremity exercise program x20 reps per handout, with assistance as needed and gym therex  12. Perform Thompson or appropriate balance test.                    PLAN:    Patient to be seen 5 x/week  to address the above listed problems via gait training, therapeutic activities,  therapeutic exercises, wheelchair management/training  Plan of Care expires: 05/27/18  Plan of Care reviewed with: patient    Gabby ESTELLE Hoff, PT  04/29/2018

## 2018-04-29 NOTE — PLAN OF CARE
Problem: Physical Therapy Goal  Goal: Physical Therapy Goal  Goals to be met by: 14 days     Patient will increase functional independence with mobility by performin. Supine to sit with Modified [place]  2. Sit to supine with Modified [place]  3. Sit to stand transfer with Supervision with appropriate assistive device, as needed  4. Bed to chair transfer with Supervision using Rolling Walker or least restrictive assistive device, as appropriate  5. Gait  x 300 feet with Supervision using Rolling Walker. Or least restrictive assistive device  6. Gait x 50 feet w/ single point cane with minimal assistance.  7.Wheelchair propulsion x150 feet with Stand-by Assistance using bilateral upper extremities  8. Ascend/descend 8 stair with bilateral Handrails Supervision using Single-point Cane or without assistive, or appropriate AD   9. Ascend/Descend 4 inch curb step with Stand-by Assistance using Rolling Walker.or appropriate assistive device  10. Stand for 5 minutes with Stand-by Assistance using Rolling Walker or appropriate assistive device and perform an activity  11. Lower extremity exercise program x20 reps per handout, with assistance as needed and gym therex  12. Perform Thompson or appropriate balance test.     Outcome: Ongoing (interventions implemented as appropriate)  Pt's goals remain appropriate and pt will continue to benefit from skilled PT services to work towards improved functional mobility including: bed mobility, transfers, up/down steps, and gait.   Gabby Hoff, PT  2018

## 2018-04-30 PROBLEM — I62.9 INTRACRANIAL BLEED: Status: ACTIVE | Noted: 2018-04-30

## 2018-04-30 LAB
ANION GAP SERPL CALC-SCNC: 7 MMOL/L
BASOPHILS # BLD AUTO: 0.04 K/UL
BASOPHILS NFR BLD: 0.6 %
BUN SERPL-MCNC: 16 MG/DL
CALCIUM SERPL-MCNC: 11.8 MG/DL
CHLORIDE SERPL-SCNC: 107 MMOL/L
CO2 SERPL-SCNC: 25 MMOL/L
CREAT SERPL-MCNC: 0.7 MG/DL
DIFFERENTIAL METHOD: ABNORMAL
EOSINOPHIL # BLD AUTO: 0.1 K/UL
EOSINOPHIL NFR BLD: 1.4 %
ERYTHROCYTE [DISTWIDTH] IN BLOOD BY AUTOMATED COUNT: 15.2 %
EST. GFR  (AFRICAN AMERICAN): >60 ML/MIN/1.73 M^2
EST. GFR  (NON AFRICAN AMERICAN): >60 ML/MIN/1.73 M^2
GLUCOSE SERPL-MCNC: 77 MG/DL
HCT VFR BLD AUTO: 36.3 %
HGB BLD-MCNC: 11.4 G/DL
IMM GRANULOCYTES # BLD AUTO: 0.02 K/UL
IMM GRANULOCYTES NFR BLD AUTO: 0.3 %
LYMPHOCYTES # BLD AUTO: 1.5 K/UL
LYMPHOCYTES NFR BLD: 23.3 %
MAGNESIUM SERPL-MCNC: 1.9 MG/DL
MCH RBC QN AUTO: 31.1 PG
MCHC RBC AUTO-ENTMCNC: 31.4 G/DL
MCV RBC AUTO: 99 FL
MONOCYTES # BLD AUTO: 0.7 K/UL
MONOCYTES NFR BLD: 11.5 %
NEUTROPHILS # BLD AUTO: 3.9 K/UL
NEUTROPHILS NFR BLD: 62.9 %
NRBC BLD-RTO: 0 /100 WBC
PHOSPHATE SERPL-MCNC: 1.9 MG/DL
PLATELET # BLD AUTO: 270 K/UL
PMV BLD AUTO: 10.8 FL
POTASSIUM SERPL-SCNC: 3.9 MMOL/L
PTH-INTACT SERPL-MCNC: 197 PG/ML
RBC # BLD AUTO: 3.67 M/UL
SODIUM SERPL-SCNC: 139 MMOL/L
WBC # BLD AUTO: 6.26 K/UL

## 2018-04-30 PROCEDURE — 97530 THERAPEUTIC ACTIVITIES: CPT

## 2018-04-30 PROCEDURE — 97116 GAIT TRAINING THERAPY: CPT

## 2018-04-30 PROCEDURE — 97110 THERAPEUTIC EXERCISES: CPT

## 2018-04-30 PROCEDURE — 97535 SELF CARE MNGMENT TRAINING: CPT

## 2018-04-30 PROCEDURE — 92507 TX SP LANG VOICE COMM INDIV: CPT

## 2018-04-30 PROCEDURE — 99309 SBSQ NF CARE MODERATE MDM 30: CPT | Mod: ,,, | Performed by: NURSE PRACTITIONER

## 2018-04-30 PROCEDURE — 25000003 PHARM REV CODE 250: Performed by: PHYSICIAN ASSISTANT

## 2018-04-30 PROCEDURE — 83735 ASSAY OF MAGNESIUM: CPT

## 2018-04-30 PROCEDURE — 85025 COMPLETE CBC W/AUTO DIFF WBC: CPT

## 2018-04-30 PROCEDURE — 25000003 PHARM REV CODE 250: Performed by: NURSE PRACTITIONER

## 2018-04-30 PROCEDURE — 25000003 PHARM REV CODE 250: Performed by: INTERNAL MEDICINE

## 2018-04-30 PROCEDURE — 83970 ASSAY OF PARATHORMONE: CPT

## 2018-04-30 PROCEDURE — 12000000 HC SNF SEMI-PRIVATE ROOM

## 2018-04-30 PROCEDURE — 84100 ASSAY OF PHOSPHORUS: CPT

## 2018-04-30 PROCEDURE — 36415 COLL VENOUS BLD VENIPUNCTURE: CPT

## 2018-04-30 PROCEDURE — 80048 BASIC METABOLIC PNL TOTAL CA: CPT

## 2018-04-30 PROCEDURE — 63600175 PHARM REV CODE 636 W HCPCS: Performed by: PHYSICIAN ASSISTANT

## 2018-04-30 RX ORDER — ACETAMINOPHEN 325 MG/1
650 TABLET ORAL EVERY 6 HOURS PRN
Status: DISCONTINUED | OUTPATIENT
Start: 2018-04-30 | End: 2018-05-16 | Stop reason: HOSPADM

## 2018-04-30 RX ADMIN — HYPROMELLOSE 2910 1 DROP: 5 SOLUTION OPHTHALMIC at 10:04

## 2018-04-30 RX ADMIN — BACITRACIN: 500 OINTMENT TOPICAL at 08:04

## 2018-04-30 RX ADMIN — HYPROMELLOSE 2910 1 DROP: 5 SOLUTION OPHTHALMIC at 08:04

## 2018-04-30 RX ADMIN — HYPROMELLOSE 2910 1 DROP: 5 SOLUTION OPHTHALMIC at 02:04

## 2018-04-30 RX ADMIN — HEPARIN SODIUM 5000 UNITS: 5000 INJECTION, SOLUTION INTRAVENOUS; SUBCUTANEOUS at 05:04

## 2018-04-30 RX ADMIN — ACETAMINOPHEN 650 MG: 325 TABLET ORAL at 10:04

## 2018-04-30 RX ADMIN — AMLODIPINE BESYLATE 10 MG: 10 TABLET ORAL at 08:04

## 2018-04-30 RX ADMIN — HEPARIN SODIUM 5000 UNITS: 5000 INJECTION, SOLUTION INTRAVENOUS; SUBCUTANEOUS at 10:04

## 2018-04-30 RX ADMIN — HEPARIN SODIUM 5000 UNITS: 5000 INJECTION, SOLUTION INTRAVENOUS; SUBCUTANEOUS at 02:04

## 2018-04-30 RX ADMIN — ACETAMINOPHEN 650 MG: 325 TABLET ORAL at 12:04

## 2018-04-30 RX ADMIN — STANDARDIZED SENNA CONCENTRATE AND DOCUSATE SODIUM 1 TABLET: 8.6; 5 TABLET, FILM COATED ORAL at 10:04

## 2018-04-30 NOTE — CLINICAL REVIEW
Clinical Pharmacy Chart Review Note      Admit Date: 4/26/2018   LOS: 4 days       Mikaela Ghosh is a 84 y.o. female admitted to SNF for PT/OT after hospitalization for subdural hematoma.    Active Hospital Problems    Diagnosis  POA    *Subdural hematoma [I62.00]  Yes    Dry eyes [H04.123]  Yes    Hypercalcemia [E83.52]  Yes    Hypophosphatemia [E83.39]  Yes    Essential hypertension, benign [I10]  Yes      Resolved Hospital Problems    Diagnosis Date Resolved POA   No resolved problems to display.     Review of patient's allergies indicates:  No Known Allergies  Patient Active Problem List    Diagnosis Date Noted    Dry eyes 04/27/2018    Hypercalcemia 04/27/2018    Hypophosphatemia 04/27/2018    Intracranial bleed     Seizure prophylaxis 04/24/2018    Brain compression 04/23/2018    Pulmonary hypertension 04/23/2018    Acute on chronic intracranial subdural hematoma 04/19/2018    LVH (left ventricular hypertrophy) 04/19/2018    Essential hypertension, benign 04/18/2018    Subdural hematoma 04/18/2018       Scheduled Meds:    amLODIPine  10 mg Oral Daily    artificial tears  1 drop Both Eyes TID    bacitracin   Topical (Top) BID    heparin (porcine)  5,000 Units Subcutaneous Q8H    senna-docusate 8.6-50 mg  1 tablet Oral BID     Continuous Infusions:   PRN Meds: acetaminophen, aluminum & magnesium hydroxide-simethicone    OBJECTIVE:     Vital Signs (Last 24H)  Temp:  [97.2 °F (36.2 °C)]   Pulse:  [68]   Resp:  [18]   BP: (130-136)/(66-73)   SpO2:  [98 %]     Laboratory:  CBC:   Recent Labs  Lab 04/24/18 0159 04/25/18 0452 04/26/18  0357   WBC 7.13 6.69 6.48   RBC 3.63* 3.84* 3.62*   HGB 11.6* 11.9* 11.2*   HCT 35.5* 37.9 35.9*    197 185   MCV 98 99* 99*   MCH 32.0* 31.0 30.9   MCHC 32.7 31.4* 31.2*     BMP:   Recent Labs  Lab 04/24/18 0159 04/25/18 0452 04/26/18  0358    93 94    138 141   K 4.0 3.9 3.8    108 110   CO2 22* 23 24   BUN 10 12 21   CREATININE 0.7  0.7 0.8   CALCIUM 10.8* 11.1* 11.1*   MG 2.2 2.2 2.0     CMP:   Recent Labs  Lab 04/24/18 0159 04/25/18 0452 04/26/18  0358    93 94   CALCIUM 10.8* 11.1* 11.1*   ALBUMIN 2.7* 2.7* 2.6*   PROT 6.0 6.1 5.8*    138 141   K 4.0 3.9 3.8   CO2 22* 23 24    108 110   BUN 10 12 21   CREATININE 0.7 0.7 0.8   ALKPHOS 55 62 56   ALT 9* 11 11   AST 14 12 13   BILITOT 0.5 0.6 0.3     LFTs:   Recent Labs  Lab 04/24/18 0159 04/25/18 0452 04/26/18 0358   ALT 9* 11 11   AST 14 12 13   ALKPHOS 55 62 56   BILITOT 0.5 0.6 0.3   PROT 6.0 6.1 5.8*   ALBUMIN 2.7* 2.7* 2.6*     Coagulation:   Recent Labs  Lab 04/24/18 0159 04/25/18 0452 04/26/18 0357   INR 1.0 1.0 1.0         ASSESSMENT/PLAN:     Active Hospital Problems    Diagnosis    *Subdural hematoma   --PT/OT: APAP 650 mg q6hrs as needed for headaches  --bowel regimen for constipation; hold for loose or frequent stools: Senna-docusate twice daily  --DVT prophylaxis: Heparin 5000u q8hrs      Dry eyes   --Artificial tears 0.5% 1 gtt both eyes three times daily      Hypercalcemia  --Elevated since 5/2017, renal function normal, but phos level low   --Cont to monitor      Hypophosphatemia   **Monitor phos level, replete with phos packets  Lab Results   Component Value Date    CALCIUM 11.1 (H) 04/26/2018    PHOS 2.1 (L) 04/26/2018         Essential hypertension, benign   **Monitor BP and pulse  --Amlodipine 10 mg daily          I have reviewed the medications in compliance with CMS Regulation F329 of the FER Appendix PP.        aGbby Lane, Pharm. D.  Clinical Pharmacist  [facility]

## 2018-04-30 NOTE — PLAN OF CARE
Problem: Occupational Therapy Goal  Goal: Occupational Therapy Goal  Goals to be met by: 10 days     Patient will increase functional independence with ADLs by performing:    UE Dressing with Modified [place].  LE Dressing with Modified [place].  Grooming while standing at sink with Set-up Assistance.  Toileting from toilet with Modified [place] for hygiene and clothing management.   Bathing from  shower chair/bench with Supervision.  Supine to sit with Modified [place].  Stand pivot transfers with Modified [place].  Toilet transfer to toilet with Modified [place].  Upper extremity exercise program x 25 min to increase functional endurance for ADLs  Pt will perform a functional standing task x 15 min with sup   Outcome: Ongoing (interventions implemented as appropriate)  Progressing. ALVA Bacon  4/30/2018

## 2018-04-30 NOTE — PT/OT/SLP PROGRESS
"Physical Therapy  Treatment    Mikaela Ghosh   MRN: [id number]   Admitting Diagnosis: Subdural hematoma    PT Received On: 04/30/18  Total Time (min): 53       Billable Minutes:53    Gait Training 18, Therapeutic Activity 10 and Therapeutic Exercise 25    Treatment Type: Treatment  PT/PTA: PTA     PTA Visit Number: 1       General Precautions: Standard, fall  Orthopedic Precautions: N/A   Braces: N/A         Subjective:  "have to get these legs strong"    Pain/Comfort  Pain Rating 1: 0/10  Pain Rating Post-Intervention 1: 0/10    Objective:  Patient found in WC     Functional Status:  MDS G  Transfer Functional Status: CGA-Min (A)  Walk in Corridor Functional Status: CGA-Min (A)          AM-PAC 6 CLICK MOBILITY  Total Score:18      Transfers:  Sit<>Stand: with RW CGA vcs for tech/handplacement    Gait:  Amb with RW CGA ~ 120 ft and 125 ft with seated rest break, vcs for staying close/within RW when negotiating turns    Advanced Gait:  Stairs: asc/guicho 4 steps with BHR CGA    Therex:  2x10 reps AP,LAQ,hip flex    Additional Treatment:  LBE x 15 min    Patient left up in chair with call button in reach and belongings in reach.    Assessment:  Mikaela Ghosh is a 84 y.o. female with a medical diagnosis of Subdural hematoma.  Pt tolerated well, pt would continue to benefit from skilled PT services to improve overall functional mobility, strength and endurance.  .    Rehab identified problem list/impairments: weakness, impaired endurance, impaired functional mobilty, gait instability, impaired balance, decreased lower extremity function, pain    Rehab potential is good.    Activity tolerance: Fair    Discharge recommendations: home with home health A/S upon D/C for safety    Barriers to discharge: Inaccessible home environment, Decreased caregiver support    Equipment recommendations: walker, rolling, bedside commode     GOALS:    Physical Therapy Goals        Problem: Physical Therapy Goal    Goal Priority Disciplines " Outcome Goal Variances Interventions   Physical Therapy Goal     PT/OT, PT Ongoing (interventions implemented as appropriate)     Description:  Goals to be met by: 14 days     Patient will increase functional independence with mobility by performin. Supine to sit with Modified [place]  2. Sit to supine with Modified [place]  3. Sit to stand transfer with Supervision with appropriate assistive device, as needed  4. Bed to chair transfer with Supervision using Rolling Walker or least restrictive assistive device, as appropriate  5. Gait  x 300 feet with Supervision using Rolling Walker. Or least restrictive assistive device  6. Gait x 50 feet w/ single point cane with minimal assistance.  7.Wheelchair propulsion x150 feet with Stand-by Assistance using bilateral upper extremities  8. Ascend/descend 8 stair with bilateral Handrails Supervision using Single-point Cane or without assistive, or appropriate AD   9. Ascend/Descend 4 inch curb step with Stand-by Assistance using Rolling Walker.or appropriate assistive device  10. Stand for 5 minutes with Stand-by Assistance using Rolling Walker or appropriate assistive device and perform an activity  11. Lower extremity exercise program x20 reps per handout, with assistance as needed and gym therex  12. Perform Thompson or appropriate balance test.                      PLAN:    Patient to be seen 5 x/week  to address the above listed problems via gait training, therapeutic activities, therapeutic exercises, wheelchair management/training  Plan of Care expires: 18  Plan of Care reviewed with: patient    Pallavi Mayer, PTA  2018

## 2018-04-30 NOTE — PLAN OF CARE
Problem: SLP Goal  Goal: SLP Goal  Speech Language Pathology Goals  Goals expected to be met by 5/4:  1. Pt will complete immediate memory tasks with 70% accuracy min cues.  2. Following a delay, pt will recall 3/3 words given mod cues.  3. Pt will follow 3-step commands with 70% accuracy given mod cues.  4. Pt will list 8 items in one minute given min cues.  5. Pt will participate in ongoing assessment of reading, writing, visual spatial, and functional math abilities.       Outcome: Ongoing (interventions implemented as appropriate)  Pt seen for cognitive-linguistic tx and ongoing assessment. Cont POC. ILZZY Gomez, CCC-SLP  Speech Language Pathologist  [phone]  4/30/2018

## 2018-04-30 NOTE — PLAN OF CARE
Problem: Physical Therapy Goal  Goal: Physical Therapy Goal  Goals to be met by: 14 days     Patient will increase functional independence with mobility by performin. Supine to sit with Modified [place]  2. Sit to supine with Modified [place]  3. Sit to stand transfer with Supervision with appropriate assistive device, as needed  4. Bed to chair transfer with Supervision using Rolling Walker or least restrictive assistive device, as appropriate  5. Gait  x 300 feet with Supervision using Rolling Walker. Or least restrictive assistive device  6. Gait x 50 feet w/ single point cane with minimal assistance.  7.Wheelchair propulsion x150 feet with Stand-by Assistance using bilateral upper extremities  8. Ascend/descend 8 stair with bilateral Handrails Supervision using Single-point Cane or without assistive, or appropriate AD   9. Ascend/Descend 4 inch curb step with Stand-by Assistance using Rolling Walker.or appropriate assistive device  10. Stand for 5 minutes with Stand-by Assistance using Rolling Walker or appropriate assistive device and perform an activity  11. Lower extremity exercise program x20 reps per handout, with assistance as needed and gym therex  12. Perform Thompson or appropriate balance test.     Outcome: Ongoing (interventions implemented as appropriate)  Goals remain appropriate

## 2018-04-30 NOTE — PROGRESS NOTES
04/30/2018  12:52 PM  Discharge Planning Assessment     Payor: MEDICARE / Plan: MEDICARE PART A & B / Product Type: Government /     Expected length of stay:  [] 7 days   [] 10 days  [x] 14 days   [] 21 days   [] > 30 days    Communicated expected length of stay with patient/caregiver:  [x] Yes   [] No    Anticipated discharge date:  5/9/2018    Assessment information obtained from:  [x] Patient   [x] Caregiver     Arrived from:   [x] Home   [] Assisted Living    [] Nursing Home   [] SNF   [] Rehab  [] LTACH   [] Group Home   [] Foster Care   [] Psych   [] Shelter   [] Homeless   [] Transfer  [] Correctional Facility  [] Name of Facility:      Patient currently lives with:    [x] Alone   [] Spouse   [] Daughter   [] Son   [] Grandparents   []  Parents   [] Siblings   [] Friends   [] Domestic Partner   [] Facility Resident      [] Foster Home    [] Other:       Extended Emergency Contact Information  Primary Emergency Contact: Judie Donald  Address: [address] [place] [address]  Home Phone: [phone]  Mobile Phone: [phone]  Relation: Daughter     Prior to hospitalization cognitive status:   [x] Alert/Oriented  [] No Deficits [] Risk of Harm to Self/Others   [] Not Oriented to Person   [] Not Oriented to Place   [] Not Oriented to Time   [] Coma/Sedated/Intubated  [] Judgement Impaired    []  Unable to Assess   [] Inappropriate Behavior  [] Infant/Toddler    Prior to hospitalization functional status:   [x] Independent   [] Assistive Equipment   [] Assistive Person    [] Completely Dependent  [] Infant/Toddler/Child Appropriate   [] Infant/Toddler/Child Delayed    []  Adolescent     Current cognitive status:   [x] Alert/Oriented  [] No Deficits [] Risk of Harm to Self/Others   [] Not Oriented to Person   [] Not Oriented to Place   [] Not Oriented to Time   [] Coma/Sedated/Intubated  [] Judgement Impaired    []  Unable to Assess   [] Inappropriate Behavior  []  Infant/Toddler    Current functional status:     [] Independent   [x] Assistive Equipment   [x] Assistive Person     [] Completely Dependent   [] Infant/Toddler/Child Appropriate   [] Infant/Toddler/Child Delayed     [] Adolescent     Capacity to Care for Self:   Is patient able to return to prior living arrangements after discharge: [] Yes  [x] No     Is patient able to care for self after discharge?   [] Yes   [x] No     [] Pediatric     Does the patient have family/friends to assist after discharge?:  [x] Yes   [] No    [] N/A   Comments:  Daughter      Patient/caregiver perception of discharge disposition:   [] Home   [x] Home with Family  [x] Home Health   [] SNF   [] Rehab   [] LTAC    []  New Nursing Home Placement  [] Return to Nursing Home    [] Shelter     [] Assisted Living  [] Foster Home   [] Other:      Readmit:   Has patient jarad in the hospital in the last 30 days? [] Yes   [x] No     If YES, was patient admitted for the same reason?  [] Yes   [] No       Home Health:   Patient currently receives home health services?:   [] Yes   [x] No     Patient previously received home health services and would like to resume services if necessary   [] Yes   [x] No     DME:   Patient currently uses DME:   [] Yes   [x] No        List of equipment currently used:     [] Wheelchair   [] Standard Walker  [] Rolling Walker  []  Rollator    [] Oxygen    [] Portable oxygen   [] Nebulizer    [] Apnea Monitor    [] Crutches  [] Hospital Bed   []  Lift Device   [] Scooter [] Cane     [] Prosthesis   []  BSC   [] Tub Bench   [] Catheter Supplies    [] Ostomy Supplies   [] Trach Supplies     [] Suction Machine        [] Home Vent    [] Bipap   [] Other:         Medications:    Can the patient afford all prescribed medications?  [x] Yes   [] No     If NO, what medication:       Is the patient taking medications as prescribed?    [x] Yes   [] No    Financial Concerns:   Does the patient have any financial concerns?   [] Yes    [x] No      If YES, what are the concerns:      Transportation:   Does the patient have transportation to healthcare appointments?   [x] Yes   [] No     If YES, what means of transportation does the patient have?   [] Car   [x] Family/Friend  [] Bicycle   [] Motorcycle   [] Public Transportation [] Ambulance[] Wheelchair van   [] Name of Provider    Dialysis:   Does the patient currently receive dialysis?   [] Yes   [x] No    Felipe Mustafa MD   [address] [place]  [place] [address] [phone]         APS/CPS involved in the case:  [] Yes   [x] No   If YES, name of :     If YES, phone number of :        Discharge Plan A:  [] Home   [x] Home with Family  [x] Home Health   [] SNF   [] Rehab   [] LTAC   []  New Nursing Home Placement  [] Return to Nursing Home    [] Assisted Living    [] Shelter     [] Private Duty Nursing   [] Foster Home    [] Psych    [] Early Steps  [] WIC       [] Home Hospice   [] Inpatient Hospice   [] Other    Discharge Plan B:  [] Home   [] Home with Family  [] Home Health   [] SNF   [] Rehab   [] LTAC  []  New Nursing Home Placement   [] Return to  Nursing Home    [] Assisted Living   [] Shelter  [] Private Duty Nursing   [] Foster Home     [] Psych     [] Early Steps  [] WIC    [] Home Hospice     [] Inpatient Hospice   [] Other     [x] Patient and family in agreement with discharge plan.    Met with patient and daughter in room to discuss discharge plan and date. Patient AAO. Discharge plan is to return home. Daughter stated she would be staying with her mom after discharge from SNF. Daughter requested info on sitter services-[employer] given. Informed patient and daughter therapy recommends a 2 week SNF stay and that we (MD, nurse, therapy,SW,CM) meet today to review her case and come up with a discharge date. Daughter stated understanding. Daughter stated no home health preference. Informed patient and daughter of neurosurg follow up appt  on 5/10/2018 at 10am. Daughter stated understanding. CM and SW will continue to follow for any additional needs.  Jes Diaz RN, CM Skilled  [phone]

## 2018-04-30 NOTE — PT/OT/SLP PROGRESS
"Occupational Therapy  Treatment    Mikaela Ghosh   MRN: [id number]   Admitting Diagnosis: Subdural hematoma    OT Date of Treatment: 04/30/18  Total Time (min): 75 min    Billable Minutes:  Self Care/Home Management 15, Therapeutic Activity 25 and Therapeutic Exercise 35    General Precautions: Standard, fall  Orthopedic Precautions: N/A  Braces: N/A    Do you have any cultural, spiritual, Temple conflicts, given your current situation?: none stated    Subjective:  Communicated with pt prior to session.  "I want to get stronger"    Pain/Comfort  Pain Rating 1:  (discomfort in neck )    Objective:  Patient found with:  (supine in bed)    Functional Status:  MDS G  Bed Mobility Functional Status: S-SBA--supine to sit  Transfer Functional Status: S-SBA--pt stood from bed no AD 3x and t/f to w/c --cues for safety and direction  Dressing Functional Status: 1: S-SBA--setup with shirt; SBA with underwear and pants and shoes  Personal Hygiene Functional Status:  (I) seated  Bathing Functional Status: S-SBA--from EOB  Moving from seated to standing position: Not steady, but able to stabilize without staff assistance  Surface-to-surface transfer (transfer between bed and chair or wheelchair): Not steady, but able to stabilize without staff assistance             OT Exercises: UE Ergometer 15 min to increase functional endurance for ADLs  Rowing 10# 15 x 4 to increase UE strength and posture for mobility    Additional Treatment:  Pt educated on safety with t/f;s, and LBD.      Pt seen in PM for rickshaw 10# 15 x 4 to increase UE strength and posture for sit to stand  Pt propelled w/c from room to gym approx 160ft with sup  Pt stood at table from w/c with SBA x 15 min while performing a cognitive FM task to increase standing tolerance and problem solving for ADLs    Patient left up in chair with tech transport    ASSESSMENT:  Pt tolerated tx and demonstrated increased indep with LBD and functional endurance. Pt is motivated " and progressing toward goals. Pt cont to benefit from OT to address limitations and increase functional indep and safety to return to PLOF    Rehab identified problem list/impairments: weakness, impaired endurance, impaired self care skills, impaired functional mobilty, gait instability, impaired balance, impaired skin    Rehab potential is good    Activity tolerance: Good    Discharge recommendations: home with home health     Barriers to discharge: Inaccessible home environment, Decreased caregiver support     Equipment recommendations:  (TBD)     GOALS:    Occupational Therapy Goals        Problem: Occupational Therapy Goal    Goal Priority Disciplines Outcome Interventions   Occupational Therapy Goal     OT, PT/OT Ongoing (interventions implemented as appropriate)    Description:  Goals to be met by: 10 days     Patient will increase functional independence with ADLs by performing:    UE Dressing with Modified [place].  LE Dressing with Modified [place].  Grooming while standing at sink with Set-up Assistance.  Toileting from toilet with Modified [place] for hygiene and clothing management.   Bathing from  shower chair/bench with Supervision.  Supine to sit with Modified [place].  Stand pivot transfers with Modified [place].  Toilet transfer to toilet with Modified [place].  Upper extremity exercise program x 25 min to increase functional endurance for ADLs  Pt will perform a functional standing task x 15 min with sup                    Plan:  Patient to be seen 5 x/week to address the above listed problems via self-care/home management, community/work re-entry, therapeutic activities, therapeutic exercises  Plan of Care expires: 05/27/18  Plan of Care reviewed with:  Patient    ALVA Bacon  04/30/2018

## 2018-04-30 NOTE — PLAN OF CARE
Problem: Fall Risk (Adult)  Goal: Identify Related Risk Factors and Signs and Symptoms  Related risk factors and signs and symptoms are identified upon initiation of Human Response Clinical Practice Guideline (CPG)    04/30/18 0454   Fall Risk   Related Risk Factors (Fall Risk) fatigue/slow reaction;gait/mobility problems

## 2018-04-30 NOTE — PT/OT/SLP PROGRESS
"Speech Language Pathology  Treatment    Mikaela Ghosh   MRN: [id number]   Admitting Diagnosis: Subdural hematoma    Diet recommendations: Solid Diet Level: Regular  Liquid Diet Level: Thin Standard aspiration precautions    SLP Treatment Date: 04/30/18  Speech Start Time: 1515     Speech Stop Time: 1551     Speech Total (min): 36 min       TREATMENT BILLABLE MINUTES:  Speech Therapy Individual 28 and Seld Care/Home Management Training 8    Has the patient been evaluated by SLP for swallowing? : Yes  Keep patient NPO?: No   General Precautions: Standard, fall  Current Respiratory Status: room air       Subjective:  "You get old and get slower."         Objective:      Pt wore bifocals to participate in reading assessment.  Pt read long sentences with reduced fluency, self corrections, and uncorrected error x 1.  Pt stated she felt she "probably would have read it a little faster" premorbidly.  Pt used right/dominant hand to complete assessment of writing and visual spatial abilities. Pt with increased time needed to write name and address and draw the face of a clock.  Reduced attention noted, though pt demonstrating some awareness when errors were made due to decreased attention.  Pt followed 3-step commands with 25% accuracy given cues (0% ind'ly).  Pt immediately recalled functional directions with 40% acc. Ind'ly/80% given cues.  Education provided to pt regarding limitations associated with SDH, cognitive-linguistic tx, immediate memory, auditory processing, ongoing assessment, and treatment plan. Pt expressing understanding, though ongoing education and reinforcement may be needed.    Assessment:  Mikaela Ghosh is a 84 y.o. female with a medical diagnosis of Subdural hematoma and presents with cognitive-linguistic deficits.    Discharge recommendations: Discharge Facility/Level Of Care Needs:  (tbd)     Goals:    SLP Goals        Problem: SLP Goal    Goal Priority Disciplines Outcome   SLP Goal     SLP Ongoing " (interventions implemented as appropriate)   Description:  Speech Language Pathology Goals  Goals expected to be met by 5/4:  1. Pt will complete immediate memory tasks with 70% accuracy min cues.  2. Following a delay, pt will recall 3/3 words given mod cues.  3. Pt will follow 3-step commands with 70% accuracy given mod cues.  4. Pt will list 8 items in one minute given min cues.  5. Pt will participate in ongoing assessment of reading, writing, visual spatial, and functional math abilities.                         Plan:   Patient to be seen Therapy Frequency: 5 x/week   Plan of Care expires:    Plan of Care reviewed with: patient  SLP Follow-up?: Yes              LIZZY Gomez, CCC-SLP  04/30/2018     LIZZY Gomez, CCC-SLP  Speech Language Pathologist  [phone]  4/30/2018

## 2018-04-30 NOTE — PROGRESS NOTES
04/30/2018  3:04 PM    Patient not in room. Discharge date of 5/9/2018 written on dry erase board in room.  Jes Diaz RN, CM Skilled  [id number]

## 2018-05-01 PROCEDURE — 97535 SELF CARE MNGMENT TRAINING: CPT

## 2018-05-01 PROCEDURE — 25000003 PHARM REV CODE 250: Performed by: INTERNAL MEDICINE

## 2018-05-01 PROCEDURE — 97110 THERAPEUTIC EXERCISES: CPT

## 2018-05-01 PROCEDURE — 97530 THERAPEUTIC ACTIVITIES: CPT

## 2018-05-01 PROCEDURE — 97116 GAIT TRAINING THERAPY: CPT

## 2018-05-01 PROCEDURE — 92507 TX SP LANG VOICE COMM INDIV: CPT

## 2018-05-01 PROCEDURE — 12000000 HC SNF SEMI-PRIVATE ROOM

## 2018-05-01 PROCEDURE — 25000003 PHARM REV CODE 250: Performed by: PHYSICIAN ASSISTANT

## 2018-05-01 PROCEDURE — 25000003 PHARM REV CODE 250: Performed by: NURSE PRACTITIONER

## 2018-05-01 PROCEDURE — 63600175 PHARM REV CODE 636 W HCPCS: Performed by: PHYSICIAN ASSISTANT

## 2018-05-01 RX ADMIN — BACITRACIN: 500 OINTMENT TOPICAL at 09:05

## 2018-05-01 RX ADMIN — ACETAMINOPHEN 650 MG: 325 TABLET ORAL at 09:05

## 2018-05-01 RX ADMIN — HEPARIN SODIUM 5000 UNITS: 5000 INJECTION, SOLUTION INTRAVENOUS; SUBCUTANEOUS at 06:05

## 2018-05-01 RX ADMIN — HYPROMELLOSE 2910 1 DROP: 5 SOLUTION OPHTHALMIC at 08:05

## 2018-05-01 RX ADMIN — ACETAMINOPHEN 650 MG: 325 TABLET ORAL at 12:05

## 2018-05-01 RX ADMIN — HEPARIN SODIUM 5000 UNITS: 5000 INJECTION, SOLUTION INTRAVENOUS; SUBCUTANEOUS at 09:05

## 2018-05-01 RX ADMIN — STANDARDIZED SENNA CONCENTRATE AND DOCUSATE SODIUM 1 TABLET: 8.6; 5 TABLET, FILM COATED ORAL at 08:05

## 2018-05-01 RX ADMIN — HEPARIN SODIUM 5000 UNITS: 5000 INJECTION, SOLUTION INTRAVENOUS; SUBCUTANEOUS at 02:05

## 2018-05-01 RX ADMIN — AMLODIPINE BESYLATE 10 MG: 10 TABLET ORAL at 08:05

## 2018-05-01 RX ADMIN — HYPROMELLOSE 2910 1 DROP: 5 SOLUTION OPHTHALMIC at 09:05

## 2018-05-01 RX ADMIN — BACITRACIN: 500 OINTMENT TOPICAL at 08:05

## 2018-05-01 NOTE — PLAN OF CARE
Problem: Patient Care Overview  Goal: Plan of Care Review  Outcome: Revised  Repositions independently, no new skin breakdowns noted. Left head staples dry and intact. Afebrile. Monitored for pain and safety. Safety maintained. Denies pain

## 2018-05-01 NOTE — PLAN OF CARE
Problem: Occupational Therapy Goal  Goal: Occupational Therapy Goal  Goals to be met by: 10 days     Patient will increase functional independence with ADLs by performing:    UE Dressing with Modified [place].  LE Dressing with Modified [place].  Grooming while standing at sink with Set-up Assistance.  Toileting from toilet with Modified [place] for hygiene and clothing management.   Bathing from  shower chair/bench with Supervision.  Supine to sit with Modified [place].  Stand pivot transfers with Modified [place].  Toilet transfer to toilet with Modified [place].  Upper extremity exercise program x 25 min to increase functional endurance for ADLs  Pt will perform a functional standing task x 15 min with sup   Outcome: Ongoing (interventions implemented as appropriate)  Patient's goals are appropriate.  ALVA Sanabria  5/1/2018

## 2018-05-01 NOTE — PT/OT/SLP PROGRESS
Speech Language Pathology  Treatment    Mikaela Ghosh   MRN: [id number]   Admitting Diagnosis: Subdural hematoma    Diet recommendations: Solid Diet Level: Regular  Liquid Diet Level: Thin Standard aspiration precautions    SLP Treatment Date: 05/01/18  Speech Start Time: 1449     Speech Stop Time: 1519     Speech Total (min): 30 min       TREATMENT BILLABLE MINUTES:  Speech Therapy Individual 30    Has the patient been evaluated by SLP for swallowing? : Yes  Keep patient NPO?: No   General Precautions: Standard, fall  Current Respiratory Status: room air       Subjective:  Pt pleasant and cooperative.          Objective:      Pt O x 4 with supervision to recall start of new month today. Pt completed a delayed memory task recalling 2/3 facts following a 3 minute delay.  A word list retention task recalling words by attribute from fo4 was completed with 60% acc. Ind'ly/100% given cues.  Convergent categorization tasks were completed with 100% acc.  Pt compared 2 given items with 80% acc. And contrasted them with 60% acc. Ind'ly/80% given cues.     Assessment:  Mikaela Ghosh is a 84 y.o. female with a medical diagnosis of Subdural hematoma and presents with cognitive-linguistic deficits.     Discharge recommendations: Discharge Facility/Level Of Care Needs:  (tbd)     Goals:    SLP Goals        Problem: SLP Goal    Goal Priority Disciplines Outcome   SLP Goal     SLP Ongoing (interventions implemented as appropriate)   Description:  Speech Language Pathology Goals  Goals expected to be met by 5/4:  1. Pt will complete immediate memory tasks with 70% accuracy min cues.  2. Following a delay, pt will recall 3/3 words given mod cues.  3. Pt will follow 3-step commands with 70% accuracy given mod cues.  4. Pt will list 8 items in one minute given min cues.  5. Pt will participate in ongoing assessment of reading, writing, visual spatial, and functional math abilities.                         Plan:   Patient to be seen  Therapy Frequency: 5 x/week   Plan of Care expires:    Plan of Care reviewed with: patient  SLP Follow-up?: Yes              LIZZY Gomez, SHARAN-SLP  05/01/2018     LIZZY Gomez, CCC-SLP  Speech Language Pathologist  [phone]  5/1/2018

## 2018-05-01 NOTE — PT/OT/SLP PROGRESS
Occupational Therapy  Treatment    Mikaela Ghosh   MRN: [id number]   Admitting Diagnosis: Subdural hematoma    OT Date of Treatment: 05/01/18  Total Time (min): 40 min    Billable Minutes:  Self Care/Home Management 30 and Therapeutic Exercise 10    General Precautions: Standard, fall  Orthopedic Precautions: N/A  Braces: N/A    Do you have any cultural, spiritual, Mosque conflicts, given your current situation?: none stated    Subjective:  Communicated with patient prior to session.    Pain/Comfort  Pain Rating 1: 0/10  Pain Rating Post-Intervention 1: 0/10    Objective:       Functional Status:  MDS G  Bed Mobility Functional Status: S supine>sit  Transfer Functional Status: fluctuating SBA<>CGA bed<>BSC over toilet using RW /c functional ambulation; bed>w/c using RW; w/c<>shower bench using RW   Walk in Room Functional Status: fluctuating SBA<>CGA /c RW  Dressing Functional Status: 1: S [place] and donning pullover shirt; S [place] and donning pants and socks. Donning shoes.   Personal Hygiene Functional Status: SBA standing at sink for oral care and washing/drying hands       [facility] 6 Click:  [facility] Total Score: 19    OT Exercises: Patient performed B UE ROM exercises using 2# dowel renae 1 x 10 for improving strength and endurance to increase independence with ADLs following a HEP.      Patient left up in chair with PT.     ASSESSMENT:  Mikaela Ghosh is a 84 y.o. female with a medical diagnosis of Subdural hematoma and presents with the deficits listed below. Patient tolerated treatment session and daughter was present for family training on current level of performance. Reviewed ADLs, functional mobility, transfers to BSC, transfers to shower, and B UE ROM HEP. Patient continues to benefit from skilled OT services to achieve maximal independence.    Rehab identified problem list/impairments: weakness, impaired endurance, impaired self care skills, impaired functional mobilty, gait instability, impaired balance,  decreased lower extremity function, pain    Rehab potential is good    Activity tolerance: Good    Discharge recommendations:  (tbd)     Barriers to discharge: Inaccessible home environment, Decreased caregiver support     Equipment recommendations: bath bench, wheelchair (daughter reports that she is going to check to see if she needs a RW. )     GOALS:    Occupational Therapy Goals        Problem: Occupational Therapy Goal    Goal Priority Disciplines Outcome Interventions   Occupational Therapy Goal     OT, PT/OT Ongoing (interventions implemented as appropriate)    Description:  Goals to be met by: 10 days     Patient will increase functional independence with ADLs by performing:    UE Dressing with Modified [place].  LE Dressing with Modified [place].  Grooming while standing at sink with Set-up Assistance.  Toileting from toilet with Modified [place] for hygiene and clothing management.   Bathing from  shower chair/bench with Supervision.  Supine to sit with Modified [place].  Stand pivot transfers with Modified [place].  Toilet transfer to toilet with Modified [place].  Upper extremity exercise program x 25 min to increase functional endurance for ADLs  Pt will perform a functional standing task x 15 min with sup                    Plan:  Patient to be seen 5 x/week to address the above listed problems via self-care/home management, therapeutic activities, therapeutic exercises  Plan of Care expires: 05/27/18  Plan of Care reviewed with: patient    ALVA Sanabria  05/01/2018

## 2018-05-01 NOTE — SUBJECTIVE & OBJECTIVE
Interval History: ***    Review of Systems  Objective:     Vital Signs (Most Recent):  Temp: 97.4 °F (36.3 °C) (04/26/18 1128)  Pulse: 88 (04/26/18 1506)  Resp: 18 (04/26/18 1128)  BP: (!) 147/76 (04/26/18 1128)  SpO2: 99 % (04/26/18 1128) Vital Signs (24h Range):  Temp:  [97.1 °F (36.2 °C)-97.2 °F (36.2 °C)] 97.2 °F (36.2 °C)  Pulse:  [63-79] 79  Resp:  [18] 18  SpO2:  [96 %-98 %] 96 %  BP: (120-164)/(60-72) 164/65     Weight: 65.6 kg (144 lb 10 oz)  Body mass index is 26.45 kg/m².  No intake or output data in the 24 hours ending 05/01/18 1554   Physical Exam    Significant Labs: {Results:33673}    Significant Imaging: {Imaging Review:43268}

## 2018-05-01 NOTE — PLAN OF CARE
Problem: SLP Goal  Goal: SLP Goal  Speech Language Pathology Goals  Goals expected to be met by 5/4:  1. Pt will complete immediate memory tasks with 70% accuracy min cues.  2. Following a delay, pt will recall 3/3 words given mod cues.  3. Pt will follow 3-step commands with 70% accuracy given mod cues.  4. Pt will list 8 items in one minute given min cues.  5. Pt will participate in ongoing assessment of reading, writing, visual spatial, and functional math abilities.       Outcome: Ongoing (interventions implemented as appropriate)  Pt seen for ongoing cog-ling tx. Cont POC. LIZZY Gomez, CCC-SLP  Speech Language Pathologist  [phone]  5/1/2018

## 2018-05-01 NOTE — PLAN OF CARE
Problem: Physical Therapy Goal  Goal: Physical Therapy Goal  Goals to be met by: 14 days     Patient will increase functional independence with mobility by performin. Supine to sit with Modified [place]  2. Sit to supine with Modified [place]  3. Sit to stand transfer with Supervision with appropriate assistive device, as needed  4. Bed to chair transfer with Supervision using Rolling Walker or least restrictive assistive device, as appropriate  5. Gait  x 300 feet with Supervision using Rolling Walker. Or least restrictive assistive device  6. Gait x 50 feet w/ single point cane with minimal assistance.  7.Wheelchair propulsion x150 feet with Stand-by Assistance using bilateral upper extremities  8. Ascend/descend 8 stair with bilateral Handrails Supervision using Single-point Cane or without assistive, or appropriate AD   9. Ascend/Descend 4 inch curb step with Stand-by Assistance using Rolling Walker.or appropriate assistive device  10. Stand for 5 minutes with Stand-by Assistance using Rolling Walker or appropriate assistive device and perform an activity  11. Lower extremity exercise program x20 reps per handout, with assistance as needed and gym therex  12. Perform Thompson or appropriate balance test.     Outcome: Ongoing (interventions implemented as appropriate)  Goals remain appropriate

## 2018-05-01 NOTE — PROGRESS NOTES
Certification of Assistant at Surgery       Surgery Date: 4/20/2018     Participating Surgeons:  Surgeon(s) and Role:     * Wojciech Sandoval MD - Primary    Procedures:  Procedure(s) (LRB):  CRANIOTOMY OPEN FOR SUB-DURAL HEMATOMA left, with stealth. (Left)    Assistant Surgeon's Certification of Necessity:  I understand that section 1842 (b) (6) (d) of the Social Security Act generally prohibits Medicare Part B reasonable charge payment for the services of assistants at surgery in teaching hospitals when qualified residents are available to furnish such services. I certify that the services for which payment is claimed were medically necessary, and that no qualified resident was available to perform the services. I further understand that these services are subject to post-payment review by the Medicare carrier.      Yulisa Hewitt PA-C    05/01/2018  4:34 PM

## 2018-05-01 NOTE — PT/OT/SLP PROGRESS
"Physical Therapy  Treatment    Mikaela Ghosh   MRN: [id number]   Admitting Diagnosis: Subdural hematoma    PT Received On: 05/01/18  Total Time (min): 99       Billable Minutes:AM 53/46PM  Gait Training 23, Therapeutic Activity 41 and Therapeutic Exercise 35    Treatment Type: Treatment with daug present for fmly trng  PT/PTA: PTA     PTA Visit Number: 2       General Precautions: Standard, fall  Orthopedic Precautions: N/A   Braces: N/A         Subjective:  "need to get these legs stronger" daug present for fmly trng ed/demo safety/proper tech with bed mob,trfs,gait,therex,stairs,curb,level of A/S required to care for pt, pt and daug verbalized understanding,all questions answered, very appreciative of session      Pain/Comfort  Pain Rating 1: 0/10  Pain Rating Post-Intervention 1: 0/10    Objective:  Patient found with:  (in WC am and pm)       Functional Status:  MDS G  Bed Mobility Functional Status: S-SBA  Transfer Functional Status: CGA-Min (A)  Walk in Corridor Functional Status: CGA-Min (A)          AM-PAC 6 CLICK MOBILITY  Total Score:18    Bed Mobility: simple vcs for safety/tech  Sit>Supine:S HOB falt no rail  Supine>Sit: S HOB flat no rail    Transfers: simple vcs for safety/tech  Sit<>Stand: with RW CG/SBA  Stand Pivot Transfer: with RW CGA/SBA, no AD CGA/SBA if pt is able to hold on arm rest and bed rail if up WC<>EOB       Gait: vcs for safety/tech  AM-Amb with RW CGA/close SBA ~115 ft no LOB occ vcs for RW mgmt/closeness, especially when negotiating turn, awareness of inc MARIELLA, staying within RW  PM-Amb with RW CG/close  ft    Advanced Gait:vcs for safety/tech  Stairs: asc/guicho 4 steps BHR x 2 trials CGA  Curb Step: asc/guicho 4"curb with RW CGA    Therex:  2x10 reps AP,GS,LAQ,hip flex    Additional Treatment:  LBE x 15 min    Patient left supine with call button in reach and belongings in reach.    Assessment:  Mikaela Ghosh is a 84 y.o. female with a medical diagnosis of Subdural hematoma.  Pt " tolerated well, pt continues to require simple vcs for inc safety awareness with overall functional mobility, some carry over noted however pt tends to over compensate/analyze/concentrate/inc time for motor planning activity,  pt would continue to benefit from skilled PT services to improve overall functional mobility, strength and endurance.  .    Rehab identified problem list/impairments: weakness, impaired endurance, impaired functional mobilty, gait instability, impaired balance, decreased lower extremity function, pain    Rehab potential is good.    Activity tolerance: Fair    Discharge recommendations: home with home health     Barriers to discharge: Inaccessible home environment, Decreased caregiver support    Equipment recommendations: walker, rolling, bedside commode     GOALS:    Physical Therapy Goals        Problem: Physical Therapy Goal    Goal Priority Disciplines Outcome Goal Variances Interventions   Physical Therapy Goal     PT/OT, PT Ongoing (interventions implemented as appropriate)     Description:  Goals to be met by: 14 days     Patient will increase functional independence with mobility by performin. Supine to sit with Modified [place]  2. Sit to supine with Modified [place]  3. Sit to stand transfer with Supervision with appropriate assistive device, as needed  4. Bed to chair transfer with Supervision using Rolling Walker or least restrictive assistive device, as appropriate  5. Gait  x 300 feet with Supervision using Rolling Walker. Or least restrictive assistive device  6. Gait x 50 feet w/ single point cane with minimal assistance.  7.Wheelchair propulsion x150 feet with Stand-by Assistance using bilateral upper extremities  8. Ascend/descend 8 stair with bilateral Handrails Supervision using Single-point Cane or without assistive, or appropriate AD   9. Ascend/Descend 4 inch curb step with Stand-by Assistance using Rolling Walker.or appropriate assistive device  10.  Stand for 5 minutes with Stand-by Assistance using Rolling Walker or appropriate assistive device and perform an activity  11. Lower extremity exercise program x20 reps per handout, with assistance as needed and gym therex  12. Perform Thompson or appropriate balance test.                      PLAN:    Patient to be seen 5 x/week  to address the above listed problems via gait training, therapeutic activities, therapeutic exercises, wheelchair management/training  Plan of Care expires: 05/27/18  Plan of Care reviewed with: patient    Pallavi Leyla, PTA  05/01/2018

## 2018-05-01 NOTE — SUBJECTIVE & OBJECTIVE
Hospital Course:  The patient was admitted at [facility] from 4/18 to 4/26/2018.  4/26: Patient admitted to SNF for ongoing PT/Ot following a hospitalization for subdural hematoma requiring left frontoparietal craniotomy with evacuation.  4/30: Patient seen at bedside, doing well, reports mild HA tolerable with tylenol. No acute events overnight. Labs reviewed.     Interval History: Patient seen at bedside, doing well, mild HA tolerable with tylenol, no acute events overnight.    Review of Systems   Constitutional: Negative for appetite change, chills, fatigue and fever.   HENT: Negative for trouble swallowing.    Respiratory: Negative for cough, chest tightness, shortness of breath and wheezing.    Cardiovascular: Negative for chest pain, palpitations and leg swelling.   Gastrointestinal: Negative for abdominal pain, constipation, diarrhea and nausea.   Genitourinary: Negative for difficulty urinating, frequency and urgency.   Musculoskeletal: Negative for arthralgias and myalgias.   Skin: Negative for rash.   Neurological: Positive for headaches. Negative for dizziness, weakness and light-headedness.   Psychiatric/Behavioral: Negative for sleep disturbance.     Scheduled Meds:   amLODIPine  10 mg Oral Daily    artificial tears  1 drop Both Eyes TID    bacitracin   Topical (Top) BID    heparin (porcine)  5,000 Units Subcutaneous Q8H    senna-docusate 8.6-50 mg  1 tablet Oral BID     Continuous Infusions:  PRN Meds:.acetaminophen, aluminum & magnesium hydroxide-simethicone    Objective:     Vital Signs (Most Recent):  Temp: 97.2 °F (36.2 °C) (05/01/18 0801)  Pulse: 79 (05/01/18 1038)  Resp: 18 (05/01/18 0801)  BP: (!) 164/65 (05/01/18 1038)  SpO2: 96 % (05/01/18 0801) Vital Signs (24h Range):  Temp:  [97.1 °F (36.2 °C)-97.2 °F (36.2 °C)] 97.2 °F (36.2 °C)  Pulse:  [63-79] 79  Resp:  [18] 18  SpO2:  [96 %-98 %] 96 %  BP: (120-164)/(60-72) 164/65     Weight: 65.6 kg (144 lb 10 oz)  Body mass index is 26.45  kg/m².  No intake or output data in the 24 hours ending 05/01/18 1548   Physical Exam   Constitutional: She is oriented to person, place, and time. She appears well-developed and well-nourished. No distress.   HENT:   Left frontoparietal incision well approximated with staples. No edema to drainage noted.    Cardiovascular: Normal rate, regular rhythm and normal heart sounds.  Exam reveals no gallop and no friction rub.    No murmur heard.  Pulmonary/Chest: Effort normal and breath sounds normal. No respiratory distress. She has no wheezes. She has no rales.   Abdominal: Soft. Bowel sounds are normal. She exhibits no distension. There is no tenderness.   Musculoskeletal: Normal range of motion. She exhibits no edema or tenderness.   Neurological: She is alert and oriented to person, place, and time.   Skin: Skin is warm and dry. No rash noted. She is not diaphoretic. No cyanosis. Nails show no clubbing.   Psychiatric: She has a normal mood and affect. Her behavior is normal.     Significant Labs:     Recent Labs  Lab 04/25/18  0452 04/26/18  0357 04/30/18  0431   WBC 6.69 6.48 6.26   HGB 11.9* 11.2* 11.4*   HCT 37.9 35.9* 36.3*    185 270       Recent Labs  Lab 04/25/18 0452 04/26/18  0358 04/30/18  0431    141 139   K 3.9 3.8 3.9    110 107   CO2 23 24 25   BUN 12 21 16   CREATININE 0.7 0.8 0.7   CALCIUM 11.1* 11.1* 11.8*   PROT 6.1 5.8*  --    BILITOT 0.6 0.3  --    ALKPHOS 62 56  --    ALT 11 11  --    AST 12 13  --      Lab Results   Component Value Date    LABPROT 10.6 04/26/2018    ALBUMIN 2.6 (L) 04/26/2018     Lab Results   Component Value Date    CALCIUM 11.8 (H) 04/30/2018    PHOS 1.9 (L) 04/30/2018     Significant Imaging: n/a

## 2018-05-01 NOTE — PROGRESS NOTES
[facility] Department of Hospital Medicine  Progress Note    Patient Name: Mikaela Ghosh  MRN: [id number]  Code Status: Full Code  Admission Date: 4/26/2018  Length of Stay: 5 days  Attending Physician: Aren Lemos MD  Primary Care Provider: Felipe Mustafa MD    Subjective:     Principal Problem:Subdural hematoma    Chief Complaint/Reason for Admission: Subdural hematoma    History of Present Illness:  Patient is a 84 y.o. female with HTN who presents to SNF after hospitalization for subdural hematoma requiring left frontoparietal craniotomy with evacuation on 4/20 by Dr. Sandoval.  The patient initially presented with dizziness and was found to have SDH on CT. She complains of intermittent mild headache at incision site since surgery which is relieved with tylenol. She denies any exacerbating conditions.  She does have dry eyes and was taking tears BID at home and would like to resume.  No seizures or focal weakness.   The patient has been admitted to SNF for ongoing PT/OT due to insufficient progress to go home safely from the hospital.    Hospital Course:  The patient was admitted at [facility] from 4/18 to 4/26/2018.  4/26: Patient admitted to SNF for ongoing PT/Ot following a hospitalization for subdural hematoma requiring left frontoparietal craniotomy with evacuation.  4/30: Patient seen at bedside, doing well, reports mild HA tolerable with tylenol. No acute events overnight. Labs reviewed.     Interval History: Patient seen at bedside, doing well, mild HA tolerable with tylenol, no acute events overnight.    Review of Systems   Constitutional: Negative for appetite change, chills, fatigue and fever.   HENT: Negative for trouble swallowing.    Respiratory: Negative for cough, chest tightness, shortness of breath and wheezing.    Cardiovascular: Negative for chest pain, palpitations and leg swelling.   Gastrointestinal: Negative for abdominal pain, constipation, diarrhea and nausea.    Genitourinary: Negative for difficulty urinating, frequency and urgency.   Musculoskeletal: Negative for arthralgias and myalgias.   Skin: Negative for rash.   Neurological: Positive for headaches. Negative for dizziness, weakness and light-headedness.   Psychiatric/Behavioral: Negative for sleep disturbance.     Scheduled Meds:   amLODIPine  10 mg Oral Daily    artificial tears  1 drop Both Eyes TID    bacitracin   Topical (Top) BID    heparin (porcine)  5,000 Units Subcutaneous Q8H    senna-docusate 8.6-50 mg  1 tablet Oral BID     Continuous Infusions:  PRN Meds:.acetaminophen, aluminum & magnesium hydroxide-simethicone    Objective:     Vital Signs (Most Recent):  Temp: 97.2 °F (36.2 °C) (05/01/18 0801)  Pulse: 79 (05/01/18 1038)  Resp: 18 (05/01/18 0801)  BP: (!) 164/65 (05/01/18 1038)  SpO2: 96 % (05/01/18 0801) Vital Signs (24h Range):  Temp:  [97.1 °F (36.2 °C)-97.2 °F (36.2 °C)] 97.2 °F (36.2 °C)  Pulse:  [63-79] 79  Resp:  [18] 18  SpO2:  [96 %-98 %] 96 %  BP: (120-164)/(60-72) 164/65     Weight: 65.6 kg (144 lb 10 oz)  Body mass index is 26.45 kg/m².  No intake or output data in the 24 hours ending 05/01/18 1548   Physical Exam   Constitutional: She is oriented to person, place, and time. She appears well-developed and well-nourished. No distress.   HENT:   Left frontoparietal incision well approximated with staples. No edema to drainage noted.    Cardiovascular: Normal rate, regular rhythm and normal heart sounds.  Exam reveals no gallop and no friction rub.    No murmur heard.  Pulmonary/Chest: Effort normal and breath sounds normal. No respiratory distress. She has no wheezes. She has no rales.   Abdominal: Soft. Bowel sounds are normal. She exhibits no distension. There is no tenderness.   Musculoskeletal: Normal range of motion. She exhibits no edema or tenderness.   Neurological: She is alert and oriented to person, place, and time.   Skin: Skin is warm and dry. No rash noted. She is not  diaphoretic. No cyanosis. Nails show no clubbing.   Psychiatric: She has a normal mood and affect. Her behavior is normal.     Significant Labs:     Recent Labs  Lab 04/25/18 0452 04/26/18 0357 04/30/18  0431   WBC 6.69 6.48 6.26   HGB 11.9* 11.2* 11.4*   HCT 37.9 35.9* 36.3*    185 270       Recent Labs  Lab 04/25/18 0452 04/26/18 0358 04/30/18  0431    141 139   K 3.9 3.8 3.9    110 107   CO2 23 24 25   BUN 12 21 16   CREATININE 0.7 0.8 0.7   CALCIUM 11.1* 11.1* 11.8*   PROT 6.1 5.8*  --    BILITOT 0.6 0.3  --    ALKPHOS 62 56  --    ALT 11 11  --    AST 12 13  --      Lab Results   Component Value Date    LABPROT 10.6 04/26/2018    ALBUMIN 2.6 (L) 04/26/2018     Lab Results   Component Value Date    CALCIUM 11.8 (H) 04/30/2018    PHOS 1.9 (L) 04/30/2018     Significant Imaging: n/a    Assessment/Plan:      * Subdural hematoma    Evaluated on 4/30/18  · continue PT/OT to increase ambulation, ADL performance and endurance  · continue to monitor incision and continue bactroban BID to incision  · continue seizure prophylaxis with keppra to 4/29--completed  · continue heparin for DVT prophylaxis  · continue fall precautions  · continue senokot-s to prevent constipation; hold for frequent or loose stooling        Hypophosphatemia    Evaluated on 4/30/18  · Replete with phos packets  · Monitor with twice weekly labs        Hypercalcemia    Evaluated on 4/30/18  · Mild and Elevated since 5/2017  · Renal function normal but phos is low  · Encourage hydration. If Ca increases may need ivfs, will continue to monitor  · PTH elevated at 197--follow-up with Endocrine        Dry eyes    Evaluated on 4/30/18  · resume therapy with artificial tears as she takes at home        Essential hypertension, benign    Evaluated on 4/30/18  · Chronic with improved control  · Continue therapy with amlodipine and low Na diet  · will continue to monitor and adjust regimen as necessary          Future Appointments  Date  Time Provider Department Center   5/10/2018 10:15 AM [facility]-CT2 500 LB LIMIT [facility] CTS [facility]   5/10/2018 11:20 AM Brady Msutafa PA-C [facility] NEUROS7 [facility] NP  Department of Hospital Medicine  [facility]

## 2018-05-02 ENCOUNTER — TELEPHONE (OUTPATIENT)
Dept: NEUROSURGERY | Facility: CLINIC | Age: 83
End: 2018-05-02

## 2018-05-02 LAB — POCT GLUCOSE: 102 MG/DL (ref 70–110)

## 2018-05-02 PROCEDURE — 97116 GAIT TRAINING THERAPY: CPT

## 2018-05-02 PROCEDURE — 97530 THERAPEUTIC ACTIVITIES: CPT

## 2018-05-02 PROCEDURE — 92507 TX SP LANG VOICE COMM INDIV: CPT

## 2018-05-02 PROCEDURE — 63600175 PHARM REV CODE 636 W HCPCS: Performed by: PHYSICIAN ASSISTANT

## 2018-05-02 PROCEDURE — 25000003 PHARM REV CODE 250: Performed by: INTERNAL MEDICINE

## 2018-05-02 PROCEDURE — 97110 THERAPEUTIC EXERCISES: CPT

## 2018-05-02 PROCEDURE — 12000000 HC SNF SEMI-PRIVATE ROOM

## 2018-05-02 PROCEDURE — 97803 MED NUTRITION INDIV SUBSEQ: CPT | Performed by: DIETITIAN, REGISTERED

## 2018-05-02 PROCEDURE — 97535 SELF CARE MNGMENT TRAINING: CPT

## 2018-05-02 PROCEDURE — 25000003 PHARM REV CODE 250: Performed by: NURSE PRACTITIONER

## 2018-05-02 RX ORDER — LISINOPRIL 5 MG/1
5 TABLET ORAL DAILY
Status: DISCONTINUED | OUTPATIENT
Start: 2018-05-02 | End: 2018-05-02

## 2018-05-02 RX ADMIN — HEPARIN SODIUM 5000 UNITS: 5000 INJECTION, SOLUTION INTRAVENOUS; SUBCUTANEOUS at 01:05

## 2018-05-02 RX ADMIN — HEPARIN SODIUM 5000 UNITS: 5000 INJECTION, SOLUTION INTRAVENOUS; SUBCUTANEOUS at 09:05

## 2018-05-02 RX ADMIN — HYPROMELLOSE 2910 1 DROP: 5 SOLUTION OPHTHALMIC at 08:05

## 2018-05-02 RX ADMIN — BACITRACIN: 500 OINTMENT TOPICAL at 09:05

## 2018-05-02 RX ADMIN — ACETAMINOPHEN 650 MG: 325 TABLET ORAL at 01:05

## 2018-05-02 RX ADMIN — ACETAMINOPHEN 650 MG: 325 TABLET ORAL at 09:05

## 2018-05-02 RX ADMIN — AMLODIPINE BESYLATE 10 MG: 10 TABLET ORAL at 08:05

## 2018-05-02 RX ADMIN — HYPROMELLOSE 2910 1 DROP: 5 SOLUTION OPHTHALMIC at 09:05

## 2018-05-02 RX ADMIN — BACITRACIN: 500 OINTMENT TOPICAL at 08:05

## 2018-05-02 RX ADMIN — HEPARIN SODIUM 5000 UNITS: 5000 INJECTION, SOLUTION INTRAVENOUS; SUBCUTANEOUS at 06:05

## 2018-05-02 RX ADMIN — HYPROMELLOSE 2910 1 DROP: 5 SOLUTION OPHTHALMIC at 02:05

## 2018-05-02 NOTE — TREATMENT PLAN
"Rehab Services' DME recommendations    Mikaela Ghosh  MRN: [id number]          [x] Walker Chun (4'4"-5'7")    Wheels Yes    [x] Wheelchair  Number of hours up in a wheelchair per day 8    Style Light weight    Seat Width 18 (Standard adult)    Seat Depth 18    Back Height Standard    Leg Support Standard and Swing Away    Arm Height desk length and Swing Away    Lap Belt Velcro    Accessories Safety belt    Cushion Basic    Justification for Cushion maintain skin integrity    Justification for wheelchair order: (Please select all that apply) Caregiver is capable and willing to operate wheelchair safely, Patient's upper body strength is sufficient for propulsion, The patient requires the use of a wheelchair for ADLs within the home and Patient mobility limitations cannot be sufficiently resolved by the use of other ambulatory therapies      [x] 3 in 1 commode Standard    [x] Tub bench Standard (unpadded)    [x] Home health PT, OT, SLP and Aide     Pallavi Mayer, PTA 5/2/2018        "

## 2018-05-02 NOTE — PT/OT/SLP PROGRESS
"Occupational Therapy  Treatment    Mikaela Ghosh   MRN: [id number]   Admitting Diagnosis: Subdural hematoma    OT Date of Treatment: 05/02/18  Total Time (min): 33 min    Billable Minutes:  Therapeutic Activity 23 and Therapeutic Exercise 10    General Precautions: Standard, fall  Orthopedic Precautions: N/A  Braces: N/A    Do you have any cultural, spiritual, Mandaeism conflicts, given your current situation?: none stated    Subjective:  Communicated with pt and daughter prior to session.  "One more week would be helpful"    Pain/Comfort  Pain Rating 1: 6/10  Location - Side 1: Bilateral  Location - Orientation 1: posterior  Location 1: knee  Pain Addressed 1: Cessation of Activity, Distraction, Nurse notified  Pain Rating Post-Intervention 1: 6/10    Objective:  Patient found with:  (seated in w/c)    Functional Status:  MDS G  Transfer Functional Status: S-SBA--pt stood from w/c with RW --cues for hand placement; t/f to BSC over toilet with RW and sup  Walk in Room Functional Status: S-SBA-with RW in room to and from bathroom with SBA--cues for safety and turning  Locomotion on Unit Functional Status: S-SBA  Locomotion Off Unit Functional Status: S-SBA-pt propels w/c with supervision for safety with turns  Toilet Use Functional Status: S-SBA on BSC over toilet with RW  Personal Hygiene Functional Status: S-SBA--standing at sink with RW           AMPA 6 Click:  [facility] [address] Total Score: 19    OT Exercises: UE Ergometer 15 min standing to increase functional endurance, and core strength for ADLs    Additional Treatment:  Pt and daughter  educated re: functional status, goals, recommendations for extension, safety and DME use    Patient left up in chair with call button in reach    ASSESSMENT:   Pt tolerated tx and demonstrated increased indep with self care and functional mobility. Pt cont to benefit from OT to address limitations and increase functional indep and safety to return to PLOF.    Rehab identified problem " list/impairments: weakness, impaired endurance, impaired self care skills, impaired functional mobilty, gait instability, impaired balance, decreased lower extremity function, pain    Rehab potential is good    Activity tolerance: Good    Discharge recommendations:  (tbd)     Barriers to discharge: Inaccessible home environment, Decreased caregiver support     Equipment recommendations: bath bench, wheelchair (daughter reports that she is going to check to see if she needs a RW. )     GOALS:    Occupational Therapy Goals        Problem: Occupational Therapy Goal    Goal Priority Disciplines Outcome Interventions   Occupational Therapy Goal     OT, PT/OT Ongoing (interventions implemented as appropriate)    Description:  Goals to be met by: 10 days     Patient will increase functional independence with ADLs by performing:    UE Dressing with Modified [place].  LE Dressing with Modified [place].  Grooming while standing at sink with Set-up Assistance.  Toileting from toilet with Modified [place] for hygiene and clothing management.   Bathing from  shower chair/bench with Supervision.  Supine to sit with Modified [place].  Stand pivot transfers with Modified [place].  Toilet transfer to toilet with Modified [place].  Upper extremity exercise program x 25 min to increase functional endurance for ADLs  Pt will perform a functional standing task x 15 min with sup                    Plan:  Patient to be seen 5 x/week to address the above listed problems via self-care/home management, therapeutic activities, therapeutic exercises  Plan of Care expires: 05/27/18  Plan of Care reviewed with: patient, daughter    ALVA Bacon  05/02/2018

## 2018-05-02 NOTE — PT/OT/SLP PROGRESS
"Speech Language Pathology  Treatment    Mikaela Ghosh   MRN: [id number]   Admitting Diagnosis: Subdural hematoma    Diet recommendations: Solid Diet Level: Regular  Liquid Diet Level: Thin Standard aspiration precautions    SLP Treatment Date: 05/02/18  Speech Start Time: 0929     Speech Stop Time: 1001     Speech Total (min): 32 min       TREATMENT BILLABLE MINUTES:  Speech Therapy Individual 24 and Seld Care/Home Management Training 8    Has the patient been evaluated by SLP for swallowing? : Yes  Keep patient NPO?: No   General Precautions: Standard, fall  Current Respiratory Status: room air       Subjective:  "This wears you out." pt referring to cognitive tx.         Objective:      Pt reported today having received an 8th grade education and being unable to complete her GED because she had started a family.  Pt states is not sure whether she has fully returned to her cognitive baseline, but is agreeable to continuing to receive SLP services, including an HH evaluation in her home environment to determine if further SLP services are warranted. Pt's daughter arrived at end of session. SLP educated her on pt's progress and recommendations for further HH services with SLP evaluation.  Both verbalized understanding and agreement. Pt completed a functional memory task recalling information from a lita with 90% acc. Ind'ly/100% given cues.  Pt solved functional word problems related to time and money given 20% acc. Ind'ly/80% given cues.        Assessment:  Mikaela Ghosh is a 84 y.o. female with a medical diagnosis of Subdural hematoma and presents with mild cognitive-linguistic deficits.       Discharge recommendations: Discharge Facility/Level Of Care Needs: home health speech therapy     Goals:    SLP Goals        Problem: SLP Goal    Goal Priority Disciplines Outcome   SLP Goal     SLP Ongoing (interventions implemented as appropriate)   Description:  Speech Language Pathology Goals  Goals expected to be met by " 5/4:  1. Pt will complete immediate memory tasks with 70% accuracy min cues.  2. Following a delay, pt will recall 3/3 words given mod cues.  3. Pt will follow 3-step commands with 70% accuracy given mod cues.  4. Pt will list 8 items in one minute given min cues.  5. Pt will participate in ongoing assessment of reading, writing, visual spatial, and functional math abilities.                         Plan:   Patient to be seen Therapy Frequency: 5 x/week   Plan of Care expires:    Plan of Care reviewed with: patient, daughter  SLP Follow-up?: Yes              LIZZY Gomez, CCC-SLP  05/02/2018     LIZZY Gomez, CCC-SLP  Speech Language Pathologist  [phone]  5/2/2018

## 2018-05-02 NOTE — PLAN OF CARE
Problem: Occupational Therapy Goal  Goal: Occupational Therapy Goal  Goals to be met by: 10 days     Patient will increase functional independence with ADLs by performing:    UE Dressing with Modified [place].  LE Dressing with Modified [place].  Grooming while standing at sink with Set-up Assistance.  Toileting from toilet with Modified [place] for hygiene and clothing management.   Bathing from  shower chair/bench with Supervision.  Supine to sit with Modified [place].  Stand pivot transfers with Modified [place].  Toilet transfer to toilet with Modified [place].  Upper extremity exercise program x 25 min to increase functional endurance for ADLs  Pt will perform a functional standing task x 15 min with sup   Outcome: Ongoing (interventions implemented as appropriate)  Progressing. ALVA Bacon  5/2/2018

## 2018-05-02 NOTE — PLAN OF CARE
Problem: SLP Goal  Goal: SLP Goal  Speech Language Pathology Goals  Goals expected to be met by 5/4:  1. Pt will complete immediate memory tasks with 70% accuracy min cues.  2. Following a delay, pt will recall 3/3 words given mod cues.  3. Pt will follow 3-step commands with 70% accuracy given mod cues.  4. Pt will list 8 items in one minute given min cues.  5. Pt will participate in ongoing assessment of reading, writing, visual spatial, and functional math abilities.       Outcome: Ongoing (interventions implemented as appropriate)  Pt seen for ongoing cog-ling tx.  Recommending continued ST services upon d/c for SLP eval in home environment.  LIZZY Gomez, CCC-SLP  Speech Language Pathologist  [phone]  5/2/2018

## 2018-05-02 NOTE — PLAN OF CARE
Problem: Patient Care Overview  Goal: Plan of Care Review  Outcome: Revised  Repositions independently, no new skin breakdowns noted. Left head staples dry and intact, no redness, lower incision small soft hematoma. Afebrile. Monitored for pain and safety. Safety maintained. Denies pain

## 2018-05-02 NOTE — PROGRESS NOTES
" [facility]  Adult Nutrition  Progress Note    SUMMARY       Recommendations    Recommendation/Intervention: Consider adding low Na diet to order. If po decreases to less than 75% of meals, consider adding  Boost Plus daily. Encouraged intakes. Will continue to monitor weekly.  Goals: PO to meet 85% of EEN  Nutrition Goal Status: progressing towards goal  Communication of RD Recs: discussed on rounds    Reason for Assessment    Reason for Assessment: RD follow-up  Diagnosis:  (Debility)  Relevant Medical History: acute on chronic subdural hematoma  Interdisciplinary Rounds: attended  General Information Comments: Pt reports eating well, good appetite, regular BMs.  Nutrition Discharge Planning: dc on low sodium diet    Nutrition Risk Screen    Nutrition Risk Screen: no indicators present    Nutrition/Diet History    Patient Reported Diet/Restrictions/Preferences: low salt  Food Preferences: none follow no added salt diet at home and may limit her carbohydrates  Do you have any cultural, spiritual, Zoroastrianism conflicts, given your current situation?: none  Food Allergies: NKFA  Factors Affecting Nutritional Intake: None identified at this time    Anthropometrics    Temp: 97.5 °F (36.4 °C)  Height Method: Stated  Height: 5' 2" (157.5 cm)  Height (inches): 62 in  Weight Method: Standard Scale  Weight: 64.2 kg (141 lb 8.6 oz)  Weight (lb): 141.54 lb  Ideal Body Weight (IBW), Female: 110 lb  % Ideal Body Weight, Female (lb): 133.68 lb  BMI (Calculated): 27  BMI Grade: 25 - 29.9 - overweight       Lab/Procedures/Meds    Pertinent Labs Reviewed: reviewed  Pertinent Labs Comments: labs: ca 11.8, phos 1.1  Pertinent Medications Reviewed: reviewed  Pertinent Medications Comments: senna    Physical Findings/Assessment    Overall Physical Appearance: loss of muscle mass  Tubes:  (-)  Oral/Mouth Cavity: WDL  Skin:  (-)    Estimated/Assessed Needs    Weight Used For Calorie Calculations: 66.7 kg (147 lb 0.8 " oz)  Energy Calorie Requirements (kcal): 6471-0078  Energy Need Method:  (1.25 x (PAL) +/- 10%)  Protein Requirements: 66  Weight Used For Protein Calculations: 66.7 kg (147 lb 0.8 oz)  Fluid Requirements (mL): 1400 or per MD  Fluid Need Method: RDA Method  RDA Method (mL): 1338         Nutrition Prescription Ordered    Current Diet Order: Regular  Nutrition Order Comments: recommend low sodium diet  Oral Nutrition Supplement: none    Evaluation of Received Nutrient/Fluid Intake    I/O: LBM: today  Energy Calories Required: meeting needs  Protein Required: meeting needs  Fluid Required: meeting needs  Comments: Pt eating well so does not need supplements at this time  Tolerance: tolerating  % Intake of Estimated Energy Needs: 75 - 100 %  % Meal Intake: 75 - 100 %    Nutrition Risk    Level of Risk/Frequency of Follow-up: low     Assessment and Plan    No nutrition dx at this time, eating well, wt stable    Monitor and Evaluation    Food and Nutrient Intake: food and beverage intake  Food and Nutrient Adminstration: diet order  Physical Activity and Function: nutrition-related ADLs and IADLs  Anthropometric Measurements: weight change  Biochemical Data, Medical Tests and Procedures: electrolyte and renal panel, gastrointestinal profile  Nutrition-Focused Physical Findings: overall appearance     Nutrition Follow-Up    RD Follow-up?: Yes

## 2018-05-02 NOTE — PT/OT/SLP PROGRESS
"Physical Therapy  Treatment    Mikaela Ghosh   MRN: [id number]   Admitting Diagnosis: Subdural hematoma    PT Received On: 05/02/18  Total Time (min): 83       Billable Minutes:83 (45 am 38 pm)  Gait Training 30, Therapeutic Activity 23 and Therapeutic Exercise 30    Treatment Type: Treatment, Other (see comments) (AM/PM)  PT/PTA: PTA     PTA Visit Number: 3       General Precautions: Standard, fall  Orthopedic Precautions: N/A   Braces: N/A         Subjective:  "what ever I need to do, I'll do"      Pain/Comfort  Pain Rating 1: 0/10  Pain Rating Post-Intervention 1: 0/10    Objective:   Patient found with:  (in wc am and pm daug present)       Functional Status:  MDS G  Bed Mobility Functional Status: mod(I) - (I)  Transfer Functional Status: S-SBA  Walk in Corridor Functional Status: S-SBA          AM-PAC 6 CLICK MOBILITY  Total Score:18    Bed Mobility:  Sit>Supine:mod I HOB flat no rail AM and PM  Supine>Sit: mod I HOB flat no rail    Transfers:  Sit<>Stand: SBA/S with RW  Stand Pivot Transfer: SBA/CG no AD WC>EOB vcs for tech      Gait:  Amb with RW SBA ~ 206 ft in AM, PM amb with  ft and 126 ft SBA no LOB, decreasing vcs    Advanced Gait:  Stairs: asc/guicho 4 steps x 2 trials close SBA BHR     Therex:PM  2x10 reps AP,GS,LAQ,hip flex    Additional Treatment:AM  LBE x15 min    Patient left in WC AM and supine in PM with call button in reach and belongings in reach.    Assessment:  Mikaela Ghosh is a 84 y.o. female with a medical diagnosis of Subdural hematoma.  Pt tolerated well, pt tries very hard to remember safety/proper tech with trf/gait/stairs, requiring less vcs however requires inc time for motor planning,  would continue to benefit from skilled PT services to improve overall functional mobility, strength and endurance.      Rehab identified problem list/impairments: weakness, impaired endurance, impaired functional mobilty, gait instability, impaired balance, decreased lower extremity function, " pain    Rehab potential is good.    Activity tolerance: Fair    Discharge recommendations: home with home health     Barriers to discharge: Inaccessible home environment, Decreased caregiver support    Equipment recommendations: walker, rolling, bedside commode     GOALS:    Physical Therapy Goals        Problem: Physical Therapy Goal    Goal Priority Disciplines Outcome Goal Variances Interventions   Physical Therapy Goal     PT/OT, PT Ongoing (interventions implemented as appropriate)     Description:  Goals to be met by: 14 days     Patient will increase functional independence with mobility by performin. Supine to sit with Modified [place] met  2. Sit to supine with Modified [place] met  3. Sit to stand transfer with Supervision with appropriate assistive device, as needed  4. Bed to chair transfer with Supervision using Rolling Walker or least restrictive assistive device, as appropriate  5. Gait  x 300 feet with Supervision using Rolling Walker. Or least restrictive assistive device  6. Gait x 50 feet w/ single point cane with minimal assistance.  7.Wheelchair propulsion x150 feet with Stand-by Assistance using bilateral upper extremities  8. Ascend/descend 8 stair with bilateral Handrails Supervision using Single-point Cane or without assistive, or appropriate AD   9. Ascend/Descend 4 inch curb step with Stand-by Assistance using Rolling Walker.or appropriate assistive device  10. Stand for 5 minutes with Stand-by Assistance using Rolling Walker or appropriate assistive device and perform an activity  11. Lower extremity exercise program x20 reps per handout, with assistance as needed and gym therex met  12. Perform Thompson or appropriate balance test.                       PLAN:    Patient to be seen 5 x/week  to address the above listed problems via gait training, therapeutic activities, therapeutic exercises, wheelchair management/training  Plan of Care expires: 18  Plan of Care  reviewed with: patient    Pallavi Mayer, PTA  05/02/2018

## 2018-05-02 NOTE — PLAN OF CARE
Problem: Physical Therapy Goal  Goal: Physical Therapy Goal  Goals to be met by: 14 days     Patient will increase functional independence with mobility by performin. Supine to sit with Modified [place] met  2. Sit to supine with Modified [place] met  3. Sit to stand transfer with Supervision with appropriate assistive device, as needed  4. Bed to chair transfer with Supervision using Rolling Walker or least restrictive assistive device, as appropriate  5. Gait  x 300 feet with Supervision using Rolling Walker. Or least restrictive assistive device  6. Gait x 50 feet w/ single point cane with minimal assistance.  7.Wheelchair propulsion x150 feet with Stand-by Assistance using bilateral upper extremities  8. Ascend/descend 8 stair with bilateral Handrails Supervision using Single-point Cane or without assistive, or appropriate AD   9. Ascend/Descend 4 inch curb step with Stand-by Assistance using Rolling Walker.or appropriate assistive device  10. Stand for 5 minutes with Stand-by Assistance using Rolling Walker or appropriate assistive device and perform an activity  11. Lower extremity exercise program x20 reps per handout, with assistance as needed and gym therex met  12. Perform Thompson or appropriate balance test.     Outcome: Ongoing (interventions implemented as appropriate)  Goals remain appropriate

## 2018-05-02 NOTE — TELEPHONE ENCOUNTER
----- Message from Lulu Khan sent at 5/2/2018  3:17 PM CDT -----  Name of Who is Calling: Judie (Wife)      What is the request in detail: Judie is checking on the status of the FMLA forms to be signed by the        Can the clinic reply by [facility]:    No       What Number to Call Back if not in [facility]: [phone]

## 2018-05-03 LAB
ANION GAP SERPL CALC-SCNC: 6 MMOL/L
BASOPHILS # BLD AUTO: 0.05 K/UL
BASOPHILS NFR BLD: 0.9 %
BUN SERPL-MCNC: 15 MG/DL
CALCIUM SERPL-MCNC: 12.1 MG/DL
CHLORIDE SERPL-SCNC: 107 MMOL/L
CO2 SERPL-SCNC: 27 MMOL/L
CREAT SERPL-MCNC: 0.7 MG/DL
DIFFERENTIAL METHOD: ABNORMAL
EOSINOPHIL # BLD AUTO: 0.1 K/UL
EOSINOPHIL NFR BLD: 2.4 %
ERYTHROCYTE [DISTWIDTH] IN BLOOD BY AUTOMATED COUNT: 15.6 %
EST. GFR  (AFRICAN AMERICAN): >60 ML/MIN/1.73 M^2
EST. GFR  (NON AFRICAN AMERICAN): >60 ML/MIN/1.73 M^2
GLUCOSE SERPL-MCNC: 80 MG/DL
HCT VFR BLD AUTO: 37.3 %
HGB BLD-MCNC: 11.7 G/DL
IMM GRANULOCYTES # BLD AUTO: 0.02 K/UL
IMM GRANULOCYTES NFR BLD AUTO: 0.3 %
LYMPHOCYTES # BLD AUTO: 1.4 K/UL
LYMPHOCYTES NFR BLD: 24.7 %
MAGNESIUM SERPL-MCNC: 2.3 MG/DL
MCH RBC QN AUTO: 31.3 PG
MCHC RBC AUTO-ENTMCNC: 31.4 G/DL
MCV RBC AUTO: 100 FL
MONOCYTES # BLD AUTO: 0.6 K/UL
MONOCYTES NFR BLD: 9.7 %
NEUTROPHILS # BLD AUTO: 3.6 K/UL
NEUTROPHILS NFR BLD: 62 %
NRBC BLD-RTO: 0 /100 WBC
PHOSPHATE SERPL-MCNC: 2.3 MG/DL
PLATELET # BLD AUTO: 262 K/UL
PMV BLD AUTO: 10.8 FL
POTASSIUM SERPL-SCNC: 4 MMOL/L
RBC # BLD AUTO: 3.74 M/UL
SODIUM SERPL-SCNC: 140 MMOL/L
WBC # BLD AUTO: 5.78 K/UL

## 2018-05-03 PROCEDURE — 25000003 PHARM REV CODE 250: Performed by: INTERNAL MEDICINE

## 2018-05-03 PROCEDURE — 84100 ASSAY OF PHOSPHORUS: CPT

## 2018-05-03 PROCEDURE — 36415 COLL VENOUS BLD VENIPUNCTURE: CPT

## 2018-05-03 PROCEDURE — 99900058 HC 022 PAID UNDER SNF PPS

## 2018-05-03 PROCEDURE — 25000003 PHARM REV CODE 250: Performed by: NURSE PRACTITIONER

## 2018-05-03 PROCEDURE — 25000003 PHARM REV CODE 250: Performed by: PHYSICIAN ASSISTANT

## 2018-05-03 PROCEDURE — 80048 BASIC METABOLIC PNL TOTAL CA: CPT

## 2018-05-03 PROCEDURE — 11000004 HC SNF PRIVATE

## 2018-05-03 PROCEDURE — 63600175 PHARM REV CODE 636 W HCPCS: Performed by: PHYSICIAN ASSISTANT

## 2018-05-03 PROCEDURE — 83735 ASSAY OF MAGNESIUM: CPT

## 2018-05-03 PROCEDURE — 85025 COMPLETE CBC W/AUTO DIFF WBC: CPT

## 2018-05-03 RX ORDER — SODIUM CHLORIDE 9 MG/ML
INJECTION, SOLUTION INTRAVENOUS CONTINUOUS
Status: DISCONTINUED | OUTPATIENT
Start: 2018-05-03 | End: 2018-05-04

## 2018-05-03 RX ADMIN — STANDARDIZED SENNA CONCENTRATE AND DOCUSATE SODIUM 1 TABLET: 8.6; 5 TABLET, FILM COATED ORAL at 09:05

## 2018-05-03 RX ADMIN — HYPROMELLOSE 2910 1 DROP: 5 SOLUTION OPHTHALMIC at 09:05

## 2018-05-03 RX ADMIN — HEPARIN SODIUM 5000 UNITS: 5000 INJECTION, SOLUTION INTRAVENOUS; SUBCUTANEOUS at 09:05

## 2018-05-03 RX ADMIN — HYPROMELLOSE 2910 1 DROP: 5 SOLUTION OPHTHALMIC at 08:05

## 2018-05-03 RX ADMIN — AMLODIPINE BESYLATE 10 MG: 10 TABLET ORAL at 08:05

## 2018-05-03 RX ADMIN — HEPARIN SODIUM 5000 UNITS: 5000 INJECTION, SOLUTION INTRAVENOUS; SUBCUTANEOUS at 05:05

## 2018-05-03 RX ADMIN — HEPARIN SODIUM 5000 UNITS: 5000 INJECTION, SOLUTION INTRAVENOUS; SUBCUTANEOUS at 01:05

## 2018-05-03 RX ADMIN — ACETAMINOPHEN 650 MG: 325 TABLET ORAL at 09:05

## 2018-05-03 RX ADMIN — BACITRACIN: 500 OINTMENT TOPICAL at 08:05

## 2018-05-03 RX ADMIN — SODIUM CHLORIDE: 0.9 INJECTION, SOLUTION INTRAVENOUS at 03:05

## 2018-05-04 LAB
CA-I BLDV-SCNC: 1.65 MMOL/L
CALCIUM SERPL-MCNC: 12.6 MG/DL

## 2018-05-04 PROCEDURE — 25000003 PHARM REV CODE 250: Performed by: NURSE PRACTITIONER

## 2018-05-04 PROCEDURE — 25000003 PHARM REV CODE 250: Performed by: INTERNAL MEDICINE

## 2018-05-04 PROCEDURE — 82330 ASSAY OF CALCIUM: CPT

## 2018-05-04 PROCEDURE — 97110 THERAPEUTIC EXERCISES: CPT

## 2018-05-04 PROCEDURE — 63600175 PHARM REV CODE 636 W HCPCS: Performed by: PHYSICIAN ASSISTANT

## 2018-05-04 PROCEDURE — 82310 ASSAY OF CALCIUM: CPT

## 2018-05-04 PROCEDURE — 25000003 PHARM REV CODE 250: Performed by: PHYSICIAN ASSISTANT

## 2018-05-04 PROCEDURE — 97530 THERAPEUTIC ACTIVITIES: CPT

## 2018-05-04 PROCEDURE — 11000004 HC SNF PRIVATE

## 2018-05-04 PROCEDURE — 36415 COLL VENOUS BLD VENIPUNCTURE: CPT

## 2018-05-04 PROCEDURE — 97535 SELF CARE MNGMENT TRAINING: CPT

## 2018-05-04 PROCEDURE — 92507 TX SP LANG VOICE COMM INDIV: CPT

## 2018-05-04 PROCEDURE — 97116 GAIT TRAINING THERAPY: CPT

## 2018-05-04 RX ORDER — SODIUM CHLORIDE 9 MG/ML
INJECTION, SOLUTION INTRAVENOUS CONTINUOUS
Status: DISCONTINUED | OUTPATIENT
Start: 2018-05-04 | End: 2018-05-04

## 2018-05-04 RX ORDER — SODIUM CHLORIDE 9 MG/ML
INJECTION, SOLUTION INTRAVENOUS CONTINUOUS
Status: ACTIVE | OUTPATIENT
Start: 2018-05-04 | End: 2018-05-05

## 2018-05-04 RX ADMIN — BACITRACIN: 500 OINTMENT TOPICAL at 09:05

## 2018-05-04 RX ADMIN — HEPARIN SODIUM 5000 UNITS: 5000 INJECTION, SOLUTION INTRAVENOUS; SUBCUTANEOUS at 05:05

## 2018-05-04 RX ADMIN — SODIUM CHLORIDE: 0.9 INJECTION, SOLUTION INTRAVENOUS at 04:05

## 2018-05-04 RX ADMIN — HYPROMELLOSE 2910 1 DROP: 5 SOLUTION OPHTHALMIC at 09:05

## 2018-05-04 RX ADMIN — HEPARIN SODIUM 5000 UNITS: 5000 INJECTION, SOLUTION INTRAVENOUS; SUBCUTANEOUS at 02:05

## 2018-05-04 RX ADMIN — HEPARIN SODIUM 5000 UNITS: 5000 INJECTION, SOLUTION INTRAVENOUS; SUBCUTANEOUS at 09:05

## 2018-05-04 RX ADMIN — AMLODIPINE BESYLATE 10 MG: 10 TABLET ORAL at 09:05

## 2018-05-04 RX ADMIN — STANDARDIZED SENNA CONCENTRATE AND DOCUSATE SODIUM 1 TABLET: 8.6; 5 TABLET, FILM COATED ORAL at 09:05

## 2018-05-04 RX ADMIN — HYPROMELLOSE 2910 1 DROP: 5 SOLUTION OPHTHALMIC at 02:05

## 2018-05-04 RX ADMIN — ACETAMINOPHEN 650 MG: 325 TABLET ORAL at 02:05

## 2018-05-04 NOTE — PLAN OF CARE
Problem: SLP Goal  Goal: SLP Goal  Speech Language Pathology Goals  Goals expected to be met by 5/4:  1. Pt will complete immediate memory tasks with 70% accuracy min cues. Goal met  2. Following a delay, pt will recall 3/3 words given mod cues. Goal met x 1 out of 2 trials/ ongoing  3. Pt will follow 3-step commands with 70% accuracy given mod cues. Goal not met.  4. Pt will list 8 items in one minute given min cues. Goal not met.  5. Pt will participate in ongoing assessment of reading, writing, visual spatial, and functional math abilities. Goal met.     Revised goals expected to be met 5/11:  1. Pt will complete immediate memory tasks with 80% accuracy min cues.  2. Following a delay, pt will recall 3/3 words given mod cues.  3. Pt will follow 3-step commands with 60% accuracy given max cues.  4. Pt will list 8 items in one minute given min cues.  5. Pt will complete short paragraph level reading tasks with 80% accuracy and min cues.       Outcome: Revised  Goals reassessed and revised. Cont POC. LIZZY Gomez, CCC-SLP  Speech Language Pathologist  [phone]  5/4/2018

## 2018-05-04 NOTE — PLAN OF CARE
Problem: Physical Therapy Goal  Goal: Physical Therapy Goal  Goals to be met by: 14 days     Patient will increase functional independence with mobility by performin. Supine to sit with Modified [place] met  2. Sit to supine with Modified [place] met  3. Sit to stand transfer with Supervision with appropriate assistive device, as needed  4. Bed to chair transfer with Supervision using Rolling Walker or least restrictive assistive device, as appropriate  5. Gait  x 300 feet with Supervision using Rolling Walker. Or least restrictive assistive device  6. Gait x 50 feet w/ single point cane with minimal assistance.  7.Wheelchair propulsion x150 feet with Stand-by Assistance using bilateral upper extremities  8. Ascend/descend 8 stair with bilateral Handrails Supervision using Single-point Cane or without assistive, or appropriate AD   9. Ascend/Descend 4 inch curb step with Stand-by Assistance using Rolling Walker.or appropriate assistive device  10. Stand for 5 minutes with Stand-by Assistance using Rolling Walker or appropriate assistive device and perform an activity  11. Lower extremity exercise program x20 reps per handout, with assistance as needed and gym therex met  12. Perform Thompson or appropriate balance test.      Outcome: Ongoing (interventions implemented as appropriate)  Goals remain appropriate

## 2018-05-04 NOTE — PLAN OF CARE
Problem: Occupational Therapy Goal  Goal: Occupational Therapy Goal  Goals to be met by: 10 days     Patient will increase functional independence with ADLs by performing:    UE Dressing with Modified [place].  LE Dressing with Modified [place].  Grooming while standing at sink with Set-up Assistance.  Toileting from toilet with Modified [place] for hygiene and clothing management.   Bathing from  shower chair/bench with Supervision.  Supine to sit with Modified [place].  Stand pivot transfers with Modified [place].  Toilet transfer to toilet with Modified [place].  Upper extremity exercise program x 25 min to increase functional endurance for ADLs  Pt will perform a functional standing task x 15 min with sup   Outcome: Ongoing (interventions implemented as appropriate)  Patient's goals are appropriate.  ALVA Sanabria  5/4/2018

## 2018-05-04 NOTE — PLAN OF CARE
Problem: Patient Care Overview  Goal: Plan of Care Review  Outcome: Ongoing (interventions implemented as appropriate)  POC reviewed with patient.  Interventions documented on Flow sheet and MAR.

## 2018-05-04 NOTE — PT/OT/SLP PROGRESS
"Speech Language Pathology  Treatment    Mikaela Ghosh   MRN: [id number]   Admitting Diagnosis: Subdural hematoma    Diet recommendations: Solid Diet Level: Regular  Liquid Diet Level: Thin Standard aspiration precautions    SLP Treatment Date: 05/04/18  Speech Start Time: 0943     Speech Stop Time: 1015     Speech Total (min): 32 min       TREATMENT BILLABLE MINUTES:  Speech Therapy Individual 24 and [facility] Care/Home Management Training 8    Has the patient been evaluated by SLP for swallowing? : Yes  Keep patient NPO?: No   General Precautions: Standard, fall  Current Respiratory Status: room air       Subjective:  "It don;t have to be public." pt remarked when SLP recognized today was the pt's birthday.         Objective:      Pt was O x 4. Today is the pt's birthday and she is able to recall age correctly. Pt completed a delayed recall task recalling 3/3 unrelated words after a 5 minute delay given min cues.  Upon presentation of words, pt was guided through utilizing compensatory strategies to facilitate delayed recall.  Pt completed a mental manipulation task recalling largest single digit in fo4 with 80% acc. Ind'ly/100% given A.  During an untimed divergent naming task, pt listed 8 items in a concrete category without assistance.  When given 60 seconds, she was able to named 5 items in a category (reached goal of 8 given >1 additional minute). Pt completed a concrete category exclusion task with 40% acc. Ind'ly/80% given A.  Education provided to pt regarding cognitive-linguistic tx tasks, strategies to improve immediate and delayed recall, and SLP POC. Pt expressed good understanding. Pt remains highly motivated and cooperative.     Assessment:  Mikaela Ghosh is a 85 y.o. female with a medical diagnosis of Subdural hematoma and presents with mild cognitive-linguistic deficits.     Discharge recommendations: Discharge Facility/Level Of Care Needs: home health speech therapy     Goals:    SLP Goals        " Problem: SLP Goal    Goal Priority Disciplines Outcome   SLP Goal     SLP Revised   Description:  Speech Language Pathology Goals  Goals expected to be met by 5/4:  1. Pt will complete immediate memory tasks with 70% accuracy min cues. Goal met  2. Following a delay, pt will recall 3/3 words given mod cues. Goal met x 1 out of 2 trials/ ongoing  3. Pt will follow 3-step commands with 70% accuracy given mod cues. Goal not met.  4. Pt will list 8 items in one minute given min cues. Goal not met.  5. Pt will participate in ongoing assessment of reading, writing, visual spatial, and functional math abilities. Goal met.     Revised goals expected to be met 5/11:  1. Pt will complete immediate memory tasks with 80% accuracy min cues.  2. Following a delay, pt will recall 3/3 words given mod cues.  3. Pt will follow 3-step commands with 60% accuracy given max cues.  4. Pt will list 8 items in one minute given min cues.  5. Pt will complete short paragraph level reading tasks with 80% accuracy and min cues.                          Plan:   Patient to be seen Therapy Frequency: 5 x/week   Plan of Care expires:    Plan of Care reviewed with: patient  SLP Follow-up?: Yes              LIZZY Gomez, SHARAN-SLP  05/04/2018     LIZZY Gomez, CCC-SLP  Speech Language Pathologist  [phone]  5/4/2018

## 2018-05-04 NOTE — PLAN OF CARE
Problem: Fall Risk (Adult)  Goal: Identify Related Risk Factors and Signs and Symptoms  Related risk factors and signs and symptoms are identified upon initiation of Human Response Clinical Practice Guideline (CPG)   Outcome: Ongoing (interventions implemented as appropriate)   05/04/18 1772   Fall Risk   Related Risk Factors (Fall Risk) fatigue/slow reaction;gait/mobility problems

## 2018-05-04 NOTE — PT/OT/SLP PROGRESS
"Physical Therapy  Treatment    Mikaela Ghosh   MRN: [id number]   Admitting Diagnosis: Subdural hematoma    PT Received On: 05/04/18  Total Time (min): 45       Billable Minutes:45    Gait Training 15, Therapeutic Activity 15 and Therapeutic Exercise 15    Treatment Type: Treatment  PT/PTA: PTA     PTA Visit Number: 4       General Precautions: Standard, fall  Orthopedic Precautions: N/A   Braces: N/A         Subjective:  Communicated with nselina Frankel during session.re pts c/o pain, nsg aware at IV site attempts "we have to stick you again when you get back"  "doing good, but I had to come back here" smiling agreeable to therapy    Pain/Comfort  Pain Rating 1:  (did not rate)  Location - Side 1: Right  Location 1: arm (iv site)  Pain Addressed 1:  (heat debbi from nsg, nsg aware)  Pain Rating Post-Intervention 1:  ("sore")    Objective:   Patient found with:  (in wc)       Functional Status:  MDS G  Transfer Functional Status: S-SBA  Walk in Corridor Functional Status: S-SBA          AM-PAC 6 CLICK MOBILITY  Total Score:18      Transfers:  Sit<>Stand: SBA with RW from WC and BSCommode over toilet  Stand Pivot Transfer: SBA with RW      Gait:  Amb with RW SBA ~150 ft x 2 trials, seated rest break, vcs for erect posture     Advanced Gait:  Stairs asd/guicho 4 steps x 2 trials with BHR SBA/S    Therex:  2x10 reps AP,GS,LAQ,hip flex,abd/add    Additional Treatment:  toileting SBA with RW for trfs, SBA while pt manages clothes, I with olivier care and SBA with RW at sink to wash hands    Patient left up in chair with with nsg.    Assessment:  Mikaela Ghosh is a 85 y.o. female with a medical diagnosis of Subdural hematoma.  Pt tolerated well, pt continues to require vcs for inc safety awareness, pt tends to leave RW behind during transitions, pt would continue to benefit from skilled PT services to improve overall functional mobility, strength and endurance.  .    Rehab identified problem list/impairments: weakness, impaired " endurance, impaired functional mobilty, gait instability, impaired balance, decreased lower extremity function, pain    Rehab potential is good.    Activity tolerance: Fair    Discharge recommendations: home with home health     Barriers to discharge: Inaccessible home environment, Decreased caregiver support    Equipment recommendations: walker, rolling, bedside commode     GOALS:    Physical Therapy Goals        Problem: Physical Therapy Goal    Goal Priority Disciplines Outcome Goal Variances Interventions   Physical Therapy Goal     PT/OT, PT Ongoing (interventions implemented as appropriate)     Description:  Goals to be met by: 14 days     Patient will increase functional independence with mobility by performin. Supine to sit with Modified [place] met  2. Sit to supine with Modified [place] met  3. Sit to stand transfer with Supervision with appropriate assistive device, as needed  4. Bed to chair transfer with Supervision using Rolling Walker or least restrictive assistive device, as appropriate  5. Gait  x 300 feet with Supervision using Rolling Walker. Or least restrictive assistive device  6. Gait x 50 feet w/ single point cane with minimal assistance.  7.Wheelchair propulsion x150 feet with Stand-by Assistance using bilateral upper extremities  8. Ascend/descend 8 stair with bilateral Handrails Supervision using Single-point Cane or without assistive, or appropriate AD   9. Ascend/Descend 4 inch curb step with Stand-by Assistance using Rolling Walker.or appropriate assistive device  10. Stand for 5 minutes with Stand-by Assistance using Rolling Walker or appropriate assistive device and perform an activity  11. Lower extremity exercise program x20 reps per handout, with assistance as needed and gym therex met  12. Perform Thompson or appropriate balance test.                       PLAN:    Patient to be seen 5 x/week  to address the above listed problems via gait training, therapeutic  activities, therapeutic exercises, wheelchair management/training  Plan of Care expires: 05/27/18  Plan of Care reviewed with: patient    Pallavi Leyla, PTA  05/04/2018

## 2018-05-04 NOTE — NURSING
Called lab this morning and put in new order to add on Calcium level to patients blood work collected this morning.

## 2018-05-04 NOTE — PT/OT/SLP PROGRESS
Occupational Therapy  Treatment    Mikaela Ghosh   MRN: [id number]   Admitting Diagnosis: Subdural hematoma    OT Date of Treatment: 05/04/18  Total Time (min): 55 min    Billable Minutes:  Therapeutic Activity 15 and Therapeutic Exercise 40    General Precautions: Standard, fall  Orthopedic Precautions: N/A  Braces: N/A    Do you have any cultural, spiritual, Synagogue conflicts, given your current situation?: none stated    Subjective:  Communicated with patient prior to session.    Pain/Comfort  Pain Rating 1: 0/10  Pain Rating Post-Intervention 1: 0/10    Objective:       Functional Status:    AMPA 6 Click:  [facility] Total Score: 19    OT Exercises: UE Ergometer 15 min for improving endurance to increase independence with ADLs.    Patient performed B UE ROM Exercises using 2# dowel renae 2 x 10 in all planes and joints focusing to improve strength and endurance to increase independence with ADLs while reviewing UE HEP.      Additional Treatment:  Patient performed a functional task standing at table top while sidestepping L<>R with S for activity tolerance in order to stand and perform functional kitchen task at home.     Patient left up in chair with call button in reach and all needs met.     ASSESSMENT:  Mikaela Ghosh is a 85 y.o. female with a medical diagnosis of Subdural hematoma and presents with the deficits listed below. Patient tolerated treatment session and was motivated to complete tasks. Patient did need frequent rest breaks during therapeutic exercises due to recent discomfort from peripheral IV removal and patient did need a seated rest break during standing activity due to fatigue. Patient continues to benefit from skilled OT services to achieve maximal independence.    Rehab identified problem list/impairments: weakness, impaired endurance, impaired self care skills, impaired functional mobilty, gait instability, impaired balance, decreased lower extremity function, pain    Rehab potential is  good    Activity tolerance: Good    Discharge recommendations:  (tbd)     Barriers to discharge: Inaccessible home environment, Decreased caregiver support     Equipment recommendations: bath bench, wheelchair (daughter reports that she is going to check to see if she needs a RW. )     GOALS:    Occupational Therapy Goals        Problem: Occupational Therapy Goal    Goal Priority Disciplines Outcome Interventions   Occupational Therapy Goal     OT, PT/OT Ongoing (interventions implemented as appropriate)    Description:  Goals to be met by: 10 days     Patient will increase functional independence with ADLs by performing:    UE Dressing with Modified [place].  LE Dressing with Modified [place].  Grooming while standing at sink with Set-up Assistance.  Toileting from toilet with Modified [place] for hygiene and clothing management.   Bathing from  shower chair/bench with Supervision.  Supine to sit with Modified [place].  Stand pivot transfers with Modified [place].  Toilet transfer to toilet with Modified [place].  Upper extremity exercise program x 25 min to increase functional endurance for ADLs  Pt will perform a functional standing task x 15 min with sup                    Plan:  Patient to be seen 5 x/week to address the above listed problems via self-care/home management, therapeutic activities, therapeutic exercises  Plan of Care expires: 05/27/18  Plan of Care reviewed with: patient, daughter    ALVA Sanabria  05/04/2018

## 2018-05-05 LAB
ANION GAP SERPL CALC-SCNC: 5 MMOL/L
BUN SERPL-MCNC: 13 MG/DL
CA-I BLDV-SCNC: 1.72 MMOL/L
CALCIUM SERPL-MCNC: 11.7 MG/DL
CHLORIDE SERPL-SCNC: 111 MMOL/L
CO2 SERPL-SCNC: 25 MMOL/L
CREAT SERPL-MCNC: 0.7 MG/DL
EST. GFR  (AFRICAN AMERICAN): >60 ML/MIN/1.73 M^2
EST. GFR  (NON AFRICAN AMERICAN): >60 ML/MIN/1.73 M^2
GLUCOSE SERPL-MCNC: 85 MG/DL
POTASSIUM SERPL-SCNC: 4.6 MMOL/L
SODIUM SERPL-SCNC: 141 MMOL/L

## 2018-05-05 PROCEDURE — 25000003 PHARM REV CODE 250: Performed by: NURSE PRACTITIONER

## 2018-05-05 PROCEDURE — 25000003 PHARM REV CODE 250: Performed by: INTERNAL MEDICINE

## 2018-05-05 PROCEDURE — 11000004 HC SNF PRIVATE

## 2018-05-05 PROCEDURE — 25000003 PHARM REV CODE 250: Performed by: PHYSICIAN ASSISTANT

## 2018-05-05 PROCEDURE — 63600175 PHARM REV CODE 636 W HCPCS: Performed by: PHYSICIAN ASSISTANT

## 2018-05-05 PROCEDURE — 97110 THERAPEUTIC EXERCISES: CPT

## 2018-05-05 PROCEDURE — 97530 THERAPEUTIC ACTIVITIES: CPT

## 2018-05-05 PROCEDURE — 82330 ASSAY OF CALCIUM: CPT

## 2018-05-05 PROCEDURE — 80048 BASIC METABOLIC PNL TOTAL CA: CPT

## 2018-05-05 PROCEDURE — 97116 GAIT TRAINING THERAPY: CPT

## 2018-05-05 RX ADMIN — HYPROMELLOSE 2910 1 DROP: 5 SOLUTION OPHTHALMIC at 09:05

## 2018-05-05 RX ADMIN — HEPARIN SODIUM 5000 UNITS: 5000 INJECTION, SOLUTION INTRAVENOUS; SUBCUTANEOUS at 05:05

## 2018-05-05 RX ADMIN — BACITRACIN: 500 OINTMENT TOPICAL at 09:05

## 2018-05-05 RX ADMIN — STANDARDIZED SENNA CONCENTRATE AND DOCUSATE SODIUM 1 TABLET: 8.6; 5 TABLET, FILM COATED ORAL at 09:05

## 2018-05-05 RX ADMIN — HEPARIN SODIUM 5000 UNITS: 5000 INJECTION, SOLUTION INTRAVENOUS; SUBCUTANEOUS at 02:05

## 2018-05-05 RX ADMIN — HEPARIN SODIUM 5000 UNITS: 5000 INJECTION, SOLUTION INTRAVENOUS; SUBCUTANEOUS at 09:05

## 2018-05-05 RX ADMIN — AMLODIPINE BESYLATE 10 MG: 10 TABLET ORAL at 09:05

## 2018-05-05 RX ADMIN — ACETAMINOPHEN 650 MG: 325 TABLET ORAL at 09:05

## 2018-05-05 RX ADMIN — HYPROMELLOSE 2910 1 DROP: 5 SOLUTION OPHTHALMIC at 02:05

## 2018-05-05 NOTE — PT/OT/SLP PROGRESS
"Physical Therapy  Treatment    Mikaela Ghosh   MRN: [id number]   Admitting Diagnosis: Subdural hematoma    PT Received On: 05/05/18  Total Time (min): 53       Billable Minutes:53    Gait Training 15, Therapeutic Activity 23 and Therapeutic Exercise 15    Treatment Type: Treatment  PT/PTA: PTA     PTA Visit Number: 5       General Precautions: Standard, fall  Orthopedic Precautions: N/A   Braces: N/A         Subjective:  "feel alright"      Pain/Comfort  Pain Rating 1: 0/10  Pain Rating Post-Intervention 1: 0/10    Objective:  Patient found in wc daug present     Functional Status:  MDS G  Transfer Functional Status: S-SBA  Walk in Corridor Functional Status: S-SBA          AM-PAC 6 CLICK MOBILITY  Total Score:18    Transfers:  Sit<>Stand: with RW SBA vcs for handplacement  Stand Pivot Transfer: with RW SBA     Gait:  Amb with RW SBA occ vcs for erect posture ~150 ft x 2 trials seated rest break, no  LOB     Advanced Gait:  Curb Step: asc/guicho 4" curb with RW close SBA vcs for safety/tech x 2 trials    Therex:  2x10-15 reps AP,GS,LAQ,hip flex,abd/add    Balance:  Dyn standing act with SBA placing peg into board at counter ~ 5:26 sec    Patient left up in chair with call button in reach and belongings in reach.    Assessment:  Mikaela Ghosh is a 85 y.o. female with a medical diagnosis of Subdural hematoma.  Pt tolerated well, pt would continue to benefit from skilled PT services to improve overall functional mobility, strength and endurance.  .    Rehab identified problem list/impairments: weakness, impaired endurance, impaired functional mobilty, gait instability, impaired balance, decreased lower extremity function, pain    Rehab potential is good.    Activity tolerance: Fair    Discharge recommendations: home with home health     Barriers to discharge: Inaccessible home environment, Decreased caregiver support    Equipment recommendations: walker, rolling, bedside commode     GOALS:    Physical Therapy Goals        " Problem: Physical Therapy Goal    Goal Priority Disciplines Outcome Goal Variances Interventions   Physical Therapy Goal     PT/OT, PT Ongoing (interventions implemented as appropriate)     Description:  Goals to be met by: 14 days     Patient will increase functional independence with mobility by performin. Supine to sit with Modified [place] met  2. Sit to supine with Modified [place] met  3. Sit to stand transfer with Supervision with appropriate assistive device, as needed  4. Bed to chair transfer with Supervision using Rolling Walker or least restrictive assistive device, as appropriate  5. Gait  x 300 feet with Supervision using Rolling Walker. Or least restrictive assistive device  6. Gait x 50 feet w/ single point cane with minimal assistance.  7.Wheelchair propulsion x150 feet with Stand-by Assistance using bilateral upper extremities  8. Ascend/descend 8 stair with bilateral Handrails Supervision using Single-point Cane or without assistive, or appropriate AD   9. Ascend/Descend 4 inch curb step with Stand-by Assistance using Rolling Walker.or appropriate assistive device  10. Stand for 5 minutes with Stand-by Assistance using Rolling Walker or appropriate assistive device and perform an activity  11. Lower extremity exercise program x20 reps per handout, with assistance as needed and gym therex met  12. Perform Thompson or appropriate balance test.                       PLAN:    Patient to be seen 5 x/week  to address the above listed problems via gait training, therapeutic activities, therapeutic exercises, wheelchair management/training  Plan of Care expires: 18  Plan of Care reviewed with: patient    Pallavi Mayer, PTA  2018

## 2018-05-05 NOTE — PLAN OF CARE
Problem: Patient Care Overview  Goal: Plan of Care Review  Outcome: Ongoing (interventions implemented as appropriate)  Ms Ghosh requested assisted with transferring from w/c to toilet to attempt a void. Pt received verbal teaching on locking w/c wheels before getting in/out of w/c to prevent falls. Return demonstration given. She received contact guard assistance with tranfer. Ms Ghosh was able to perform all 3 phases of toileting independently. Safety measures maintained. Call light is within reach. Will coniinue with plan of care.

## 2018-05-05 NOTE — PT/OT/SLP PROGRESS
Occupational Therapy  Treatment    Mikaela Ghosh   MRN: [id number]   Admitting Diagnosis: Subdural hematoma    OT Date of Treatment: 05/05/18  Total Time (min): 45 min    Billable Minutes:  Therapeutic Activity 30 and Therapeutic Exercise 15    General Precautions: Standard, fall  Orthopedic Precautions: N/A  Braces: N/A    Do you have any cultural, spiritual, Alevism conflicts, given your current situation?: none stated    Subjective:  Communicated with patient prior to session.    Pain/Comfort  Pain Rating 1: 0/10  Pain Rating Post-Intervention 1: 0/10    Objective:       Functional Status:  MDS G  Transfer Functional Status: S w/c<>BSC stand pivot  Personal Hygiene Functional Status: S washing face and oral care seated in w/c           AMPA 6 Click:  [facility] Total Score: 19    OT Exercises: UE Ergometer 15 min for improving endurance to increase independence with ADLs.     Additional Treatment:  Patient performed a visual perception activity matching a pattern using pegs and pegboard while standing with S for activity tolerance in order to perform a functional household task.     Patient left up in chair with call button in reach and all needs met.     ASSESSMENT:  Mikaela Ghosh is a 85 y.o. female with a medical diagnosis of Subdural hematoma and presents with the deficits listed below. Patient tolerated treatment session and was motivated to complete tasks. Patient continues to benefit from skilled OT services to achieve maximal independence.    Rehab identified problem list/impairments: weakness, impaired endurance, impaired self care skills, impaired functional mobilty, gait instability, impaired balance, decreased lower extremity function, pain    Rehab potential is good    Activity tolerance: Good    Discharge recommendations:  (tbd)     Barriers to discharge: Inaccessible home environment, Decreased caregiver support     Equipment recommendations: bath bench, wheelchair (daughter reports that she is going  to check to see if she needs a RW. )     GOALS:    Occupational Therapy Goals        Problem: Occupational Therapy Goal    Goal Priority Disciplines Outcome Interventions   Occupational Therapy Goal     OT, PT/OT Ongoing (interventions implemented as appropriate)    Description:  Goals to be met by: 10 days     Patient will increase functional independence with ADLs by performing:    UE Dressing with Modified [place].  LE Dressing with Modified [place].  Grooming while standing at sink with Set-up Assistance.  Toileting from toilet with Modified [place] for hygiene and clothing management.   Bathing from  shower chair/bench with Supervision.  Supine to sit with Modified [place].  Stand pivot transfers with Modified [place].  Toilet transfer to toilet with Modified [place].  Upper extremity exercise program x 25 min to increase functional endurance for ADLs  Pt will perform a functional standing task x 15 min with sup                    Plan:  Patient to be seen 5 x/week to address the above listed problems via self-care/home management, therapeutic activities, therapeutic exercises  Plan of Care expires: 05/27/18  Plan of Care reviewed with: patient, daughter    ALVA Sanabria  05/05/2018

## 2018-05-05 NOTE — PLAN OF CARE
Problem: Physical Therapy Goal  Goal: Physical Therapy Goal  Goals to be met by: 14 days     Patient will increase functional independence with mobility by performin. Supine to sit with Modified [place] met  2. Sit to supine with Modified [place] met  3. Sit to stand transfer with Supervision with appropriate assistive device, as needed  4. Bed to chair transfer with Supervision using Rolling Walker or least restrictive assistive device, as appropriate  5. Gait  x 300 feet with Supervision using Rolling Walker. Or least restrictive assistive device  6. Gait x 50 feet w/ single point cane with minimal assistance.  7.Wheelchair propulsion x150 feet with Stand-by Assistance using bilateral upper extremities  8. Ascend/descend 8 stair with bilateral Handrails Supervision using Single-point Cane or without assistive, or appropriate AD   9. Ascend/Descend 4 inch curb step with Stand-by Assistance using Rolling Walker.or appropriate assistive device  10. Stand for 5 minutes with Stand-by Assistance using Rolling Walker or appropriate assistive device and perform an activity  11. Lower extremity exercise program x20 reps per handout, with assistance as needed and gym therex met  12. Perform Thompson or appropriate balance test.      Outcome: Ongoing (interventions implemented as appropriate)  Goals remain appropriate

## 2018-05-05 NOTE — PLAN OF CARE
Problem: Occupational Therapy Goal  Goal: Occupational Therapy Goal  Goals to be met by: 10 days     Patient will increase functional independence with ADLs by performing:    UE Dressing with Modified [place].  LE Dressing with Modified [place].  Grooming while standing at sink with Set-up Assistance.  Toileting from toilet with Modified [place] for hygiene and clothing management.   Bathing from  shower chair/bench with Supervision.  Supine to sit with Modified [place].  Stand pivot transfers with Modified [place].  Toilet transfer to toilet with Modified [place].  Upper extremity exercise program x 25 min to increase functional endurance for ADLs  Pt will perform a functional standing task x 15 min with sup   Outcome: Ongoing (interventions implemented as appropriate)  Patient's goals are appropriate.   ALVA Sanabria  5/5/2018

## 2018-05-06 PROCEDURE — 25000003 PHARM REV CODE 250: Performed by: NURSE PRACTITIONER

## 2018-05-06 PROCEDURE — 25000003 PHARM REV CODE 250: Performed by: INTERNAL MEDICINE

## 2018-05-06 PROCEDURE — 63600175 PHARM REV CODE 636 W HCPCS: Performed by: PHYSICIAN ASSISTANT

## 2018-05-06 PROCEDURE — 11000004 HC SNF PRIVATE

## 2018-05-06 RX ADMIN — BACITRACIN: 500 OINTMENT TOPICAL at 09:05

## 2018-05-06 RX ADMIN — HEPARIN SODIUM 5000 UNITS: 5000 INJECTION, SOLUTION INTRAVENOUS; SUBCUTANEOUS at 01:05

## 2018-05-06 RX ADMIN — HYPROMELLOSE 2910 1 DROP: 5 SOLUTION OPHTHALMIC at 09:05

## 2018-05-06 RX ADMIN — HEPARIN SODIUM 5000 UNITS: 5000 INJECTION, SOLUTION INTRAVENOUS; SUBCUTANEOUS at 09:05

## 2018-05-06 RX ADMIN — HEPARIN SODIUM 5000 UNITS: 5000 INJECTION, SOLUTION INTRAVENOUS; SUBCUTANEOUS at 05:05

## 2018-05-06 RX ADMIN — AMLODIPINE BESYLATE 10 MG: 10 TABLET ORAL at 09:05

## 2018-05-06 RX ADMIN — HYPROMELLOSE 2910 1 DROP: 5 SOLUTION OPHTHALMIC at 01:05

## 2018-05-06 RX ADMIN — ACETAMINOPHEN 650 MG: 325 TABLET ORAL at 09:05

## 2018-05-07 ENCOUNTER — TELEPHONE (OUTPATIENT)
Dept: ENDOCRINOLOGY | Facility: CLINIC | Age: 83
End: 2018-05-07

## 2018-05-07 LAB
ALBUMIN SERPL BCP-MCNC: 3.2 G/DL
ALP SERPL-CCNC: 75 U/L
ALT SERPL W/O P-5'-P-CCNC: 31 U/L
ANION GAP SERPL CALC-SCNC: 11 MMOL/L
ANION GAP SERPL CALC-SCNC: 9 MMOL/L
AST SERPL-CCNC: 26 U/L
BASOPHILS # BLD AUTO: 0.03 K/UL
BASOPHILS NFR BLD: 0.5 %
BILIRUB SERPL-MCNC: 0.4 MG/DL
BUN SERPL-MCNC: 14 MG/DL
BUN SERPL-MCNC: 15 MG/DL
CA-I BLDV-SCNC: 1.58 MMOL/L
CALCIUM SERPL-MCNC: 12.1 MG/DL
CALCIUM SERPL-MCNC: 12.5 MG/DL
CHLORIDE SERPL-SCNC: 104 MMOL/L
CHLORIDE SERPL-SCNC: 108 MMOL/L
CO2 SERPL-SCNC: 22 MMOL/L
CO2 SERPL-SCNC: 23 MMOL/L
CREAT SERPL-MCNC: 0.7 MG/DL
CREAT SERPL-MCNC: 0.7 MG/DL
DIFFERENTIAL METHOD: ABNORMAL
EOSINOPHIL # BLD AUTO: 0.1 K/UL
EOSINOPHIL NFR BLD: 2.6 %
ERYTHROCYTE [DISTWIDTH] IN BLOOD BY AUTOMATED COUNT: 15.6 %
EST. GFR  (AFRICAN AMERICAN): >60 ML/MIN/1.73 M^2
EST. GFR  (AFRICAN AMERICAN): >60 ML/MIN/1.73 M^2
EST. GFR  (NON AFRICAN AMERICAN): >60 ML/MIN/1.73 M^2
EST. GFR  (NON AFRICAN AMERICAN): >60 ML/MIN/1.73 M^2
GLUCOSE SERPL-MCNC: 106 MG/DL
GLUCOSE SERPL-MCNC: 87 MG/DL
HCT VFR BLD AUTO: 37 %
HGB BLD-MCNC: 11.6 G/DL
IMM GRANULOCYTES # BLD AUTO: 0.01 K/UL
IMM GRANULOCYTES NFR BLD AUTO: 0.2 %
LYMPHOCYTES # BLD AUTO: 1.4 K/UL
LYMPHOCYTES NFR BLD: 25.9 %
MAGNESIUM SERPL-MCNC: 2.1 MG/DL
MCH RBC QN AUTO: 31 PG
MCHC RBC AUTO-ENTMCNC: 31.4 G/DL
MCV RBC AUTO: 99 FL
MONOCYTES # BLD AUTO: 0.6 K/UL
MONOCYTES NFR BLD: 11.5 %
NEUTROPHILS # BLD AUTO: 3.3 K/UL
NEUTROPHILS NFR BLD: 59.3 %
NRBC BLD-RTO: 0 /100 WBC
PHOSPHATE SERPL-MCNC: 2.6 MG/DL
PLATELET # BLD AUTO: 227 K/UL
PMV BLD AUTO: 11.1 FL
POTASSIUM SERPL-SCNC: 4 MMOL/L
POTASSIUM SERPL-SCNC: 4 MMOL/L
PROT SERPL-MCNC: 6.8 G/DL
RBC # BLD AUTO: 3.74 M/UL
SODIUM SERPL-SCNC: 137 MMOL/L
SODIUM SERPL-SCNC: 140 MMOL/L
WBC # BLD AUTO: 5.49 K/UL

## 2018-05-07 PROCEDURE — 97110 THERAPEUTIC EXERCISES: CPT

## 2018-05-07 PROCEDURE — 82330 ASSAY OF CALCIUM: CPT

## 2018-05-07 PROCEDURE — 97535 SELF CARE MNGMENT TRAINING: CPT

## 2018-05-07 PROCEDURE — 83735 ASSAY OF MAGNESIUM: CPT

## 2018-05-07 PROCEDURE — 84100 ASSAY OF PHOSPHORUS: CPT

## 2018-05-07 PROCEDURE — 97530 THERAPEUTIC ACTIVITIES: CPT

## 2018-05-07 PROCEDURE — 85025 COMPLETE CBC W/AUTO DIFF WBC: CPT

## 2018-05-07 PROCEDURE — 63600175 PHARM REV CODE 636 W HCPCS: Performed by: PHYSICIAN ASSISTANT

## 2018-05-07 PROCEDURE — 25000003 PHARM REV CODE 250: Performed by: INTERNAL MEDICINE

## 2018-05-07 PROCEDURE — 97116 GAIT TRAINING THERAPY: CPT

## 2018-05-07 PROCEDURE — 36415 COLL VENOUS BLD VENIPUNCTURE: CPT

## 2018-05-07 PROCEDURE — 25000003 PHARM REV CODE 250: Performed by: NURSE PRACTITIONER

## 2018-05-07 PROCEDURE — 92507 TX SP LANG VOICE COMM INDIV: CPT

## 2018-05-07 PROCEDURE — 11000004 HC SNF PRIVATE

## 2018-05-07 PROCEDURE — 80053 COMPREHEN METABOLIC PANEL: CPT

## 2018-05-07 PROCEDURE — 80048 BASIC METABOLIC PNL TOTAL CA: CPT

## 2018-05-07 RX ORDER — SODIUM CHLORIDE 9 MG/ML
INJECTION, SOLUTION INTRAVENOUS CONTINUOUS
Status: DISCONTINUED | OUTPATIENT
Start: 2018-05-07 | End: 2018-05-08

## 2018-05-07 RX ADMIN — HYPROMELLOSE 2910 1 DROP: 5 SOLUTION OPHTHALMIC at 03:05

## 2018-05-07 RX ADMIN — ACETAMINOPHEN 650 MG: 325 TABLET ORAL at 09:05

## 2018-05-07 RX ADMIN — HEPARIN SODIUM 5000 UNITS: 5000 INJECTION, SOLUTION INTRAVENOUS; SUBCUTANEOUS at 09:05

## 2018-05-07 RX ADMIN — SODIUM CHLORIDE: 0.9 INJECTION, SOLUTION INTRAVENOUS at 12:05

## 2018-05-07 RX ADMIN — HEPARIN SODIUM 5000 UNITS: 5000 INJECTION, SOLUTION INTRAVENOUS; SUBCUTANEOUS at 03:05

## 2018-05-07 RX ADMIN — BACITRACIN: 500 OINTMENT TOPICAL at 08:05

## 2018-05-07 RX ADMIN — BACITRACIN: 500 OINTMENT TOPICAL at 09:05

## 2018-05-07 RX ADMIN — HYPROMELLOSE 2910 1 DROP: 5 SOLUTION OPHTHALMIC at 08:05

## 2018-05-07 RX ADMIN — ACETAMINOPHEN 650 MG: 325 TABLET ORAL at 08:05

## 2018-05-07 RX ADMIN — AMLODIPINE BESYLATE 10 MG: 10 TABLET ORAL at 08:05

## 2018-05-07 RX ADMIN — HEPARIN SODIUM 5000 UNITS: 5000 INJECTION, SOLUTION INTRAVENOUS; SUBCUTANEOUS at 07:05

## 2018-05-07 RX ADMIN — HYPROMELLOSE 2910 1 DROP: 5 SOLUTION OPHTHALMIC at 09:05

## 2018-05-07 NOTE — PLAN OF CARE
Problem: Occupational Therapy Goal  Goal: Occupational Therapy Goal  Goals to be met by: 10 days     Patient will increase functional independence with ADLs by performing:    UE Dressing with Modified [place].  LE Dressing with Modified [place].  Grooming while standing at sink with Set-up Assistance.  Toileting from toilet with Modified [place] for hygiene and clothing management.   Bathing from  shower chair/bench with Supervision.  Supine to sit with Modified [place].  Stand pivot transfers with Modified [place].  Toilet transfer to toilet with Modified [place].  Upper extremity exercise program x 25 min to increase functional endurance for ADLs  Pt will perform a functional standing task x 15 min with sup   Outcome: Ongoing (interventions implemented as appropriate)  Patient's goals are appropriate.   ALVA Sanabria  5/7/2018

## 2018-05-07 NOTE — PT/OT/SLP PROGRESS
Physical Therapy  Treatment    Mikaela Ghosh   MRN: [id number]   Admitting Diagnosis: Subdural hematoma    PT Received On: 05/07/18  Total Time (min): 45       Billable Minutes:  Gait Training 15, Therapeutic Activity 15 and Therapeutic Exercise 15    Treatment Type: Treatment  PT/PTA: PT     PTA Visit Number: 0       General Precautions: Standard, fall  Orthopedic Precautions: N/A   Braces: N/A    Face-to-face performed with PTAPallavi this morning.        Subjective:  Communicated with pt prior to session.  Pt was agreeable to PT services.       Pain/Comfort  Pain Rating 1: 0/10    Objective:  Patient found seated in wheelchair.          Functional Status:  MDS G  Bed Mobility Functional Status: mod(I) - (I)  Transfer Functional Status: S-SBA  Walk in Room Functional Status: S-SBA  Walk in Corridor Functional Status: S-SBA  Locomotion on Unit Functional Status: S-SBA  Locomotion Off Unit Functional Status: S-SBA  Moving from seated to standing position: Not steady, but able to stabilize without staff assistance  Walking (with assistive device if used): Not steady, but able to stabilize without staff assistance  Turning around and facing the opposite direction while walking: Not steady, but able to stabilize without staff assistance  Moving on and off the toilet: Not steady, but able to stabilize without staff assistance  Surface-to-surface transfer (transfer between bed and chair or wheelchair): Not steady, but able to stabilize without staff assistance          AM-PAC 6 CLICK MOBILITY  Total Score:18    Transfers:  Sit<>Stand: SBA from wheelchair  Stand Pivot Transfer: SBA with use of rolling walker    Gait:  Amb 150 feet with use of rolling walker with cues to improve dorsiflexion, SBA. Pt performed multiple turns during her gait trial to practice not crossing one leg over the other so drastically while turning to ensure that she does not trip herself.    Balance:  · Ambulated in parallel bars x 2 trials  with use of BUE support via tandem walking.  · 6 trials with unilateral UE support   · 2 trials with no UE support- CGA  · Weight-shifts while standing in semi-tandem with SBA and use of rolling walker  · Stood for 60 seconds with no UE support, feet together- SBA    Standing exercises:   Hip flexion, hip abduction, heel raises x 20 reps with BUE support and supervision    Patient left up in chair with call button in reach.    Assessment:  Mikaela Ghosh is a 85 y.o. female with a medical diagnosis of Subdural hematoma.  Ms. Ghosh will benefit from further higher level balance training to ensure safety upon return home.    Rehab identified problem list/impairments: weakness, impaired endurance, impaired functional mobilty, gait instability, impaired balance, decreased lower extremity function, pain    Rehab potential is good.    Activity tolerance: Good    Discharge recommendations: home with home health     Barriers to discharge: Inaccessible home environment, Decreased caregiver support    Equipment recommendations: walker, rolling, bedside commode     GOALS:    Physical Therapy Goals        Problem: Physical Therapy Goal    Goal Priority Disciplines Outcome Goal Variances Interventions   Physical Therapy Goal     PT/OT, PT Ongoing (interventions implemented as appropriate)     Description:  Goals to be met by: 14 days     Patient will increase functional independence with mobility by performin. Supine to sit with Modified [place] met  2. Sit to supine with Modified [place] met  3. Sit to stand transfer with Supervision with appropriate assistive device, as needed  4. Bed to chair transfer with Supervision using Rolling Walker or least restrictive assistive device, as appropriate  5. Gait  x 300 feet with Supervision using Rolling Walker. Or least restrictive assistive device  6. Gait x 50 feet w/ single point cane with minimal assistance.  7.Wheelchair propulsion x150 feet with Stand-by  Assistance using bilateral upper extremities  8. Ascend/descend 8 stair with bilateral Handrails Supervision using Single-point Cane or without assistive, or appropriate AD   9. Ascend/Descend 4 inch curb step with Stand-by Assistance using Rolling Walker.or appropriate assistive device  10. Stand for 5 minutes with Stand-by Assistance using Rolling Walker or appropriate assistive device and perform an activity  11. Lower extremity exercise program x20 reps per handout, with assistance as needed and gym therex met  12. Perform Thompson or appropriate balance test.                       PLAN:    Patient to be seen 5 x/week  to address the above listed problems via gait training, therapeutic activities, therapeutic exercises, wheelchair management/training  Plan of Care expires: 05/27/18  Plan of Care reviewed with: patient    Vanesa Hernandez, PT  05/07/2018

## 2018-05-07 NOTE — PLAN OF CARE
Problem: Patient Care Overview  Goal: Plan of Care Review  Outcome: Ongoing (interventions implemented as appropriate)  Mr Ghosh remains up in w/c in room 325. She is talking with her daughter at the bedside. Pt and daughter received verbal teaching on fall prevention when at home. They were instructed to  throw rugs and to make home clutter-free to prevent falls. They verbalized understanding of information. Safety measures maintained. Call light is within reach.

## 2018-05-07 NOTE — PLAN OF CARE
Problem: SLP Goal  Goal: SLP Goal  Speech Language Pathology Goals  Goals expected to be met by 5/4:  1. Pt will complete immediate memory tasks with 70% accuracy min cues. Goal met  2. Following a delay, pt will recall 3/3 words given mod cues. Goal met x 1 out of 2 trials/ ongoing  3. Pt will follow 3-step commands with 70% accuracy given mod cues. Goal not met.  4. Pt will list 8 items in one minute given min cues. Goal not met.  5. Pt will participate in ongoing assessment of reading, writing, visual spatial, and functional math abilities. Goal met.     Revised goals expected to be met 5/11:  1. Pt will complete immediate memory tasks with 80% accuracy min cues.  2. Following a delay, pt will recall 3/3 words given mod cues.  3. Pt will follow 3-step commands with 60% accuracy given max cues.  4. Pt will list 8 items in one minute given min cues.  5. Pt will complete short paragraph level reading tasks with 80% accuracy and min cues.        Outcome: Ongoing (interventions implemented as appropriate)  Pt seen for ongoing cog-ling tx. Cont POC. LIZZY Gomez, CCC-SLP  Speech Language Pathologist  [phone]  5/7/2018

## 2018-05-07 NOTE — PROGRESS NOTES
05/07/2018  2:30 PM    Discharge Planning- Message left with Endocrine ([id number]) for an appt for patient.  Jes Diaz RN, CM Skilled  [id number]

## 2018-05-07 NOTE — PT/OT/SLP PROGRESS
Occupational Therapy  Treatment    Mikaela Ghosh   MRN: [id number]   Admitting Diagnosis: Subdural hematoma    OT Date of Treatment: 05/07/18  Total Time (min): 45 min    Billable Minutes:  Self Care/Home Management 35 and Therapeutic Exercise 10    General Precautions: Standard, fall  Orthopedic Precautions: N/A  Braces: N/A    Do you have any cultural, spiritual, Moravian conflicts, given your current situation?: none stated    Subjective:  Communicated with patient prior to session.    Pain/Comfort  Pain Rating 1: 0/10  Pain Rating Post-Intervention 1: 0/10    Objective:       Functional Status:  MDS G  Transfer Functional Status: S bed>w/c stand pivot  Dressing Functional Status: 1: S [place] and donning pullover shirt; S: [place] and donning underwear, socks, and pants. Donning shoes.  Eating Functional Status: mod (I)  Personal Hygiene Functional Status: mod(I) for oral care and washing face (seated in w/c at sink)  Bathing Functional Status: S spongebath from w/c level at sink       [facility] 6 Click:  [facility] Total Score: 20    OT Exercises: Patient performed  B UE ROM exercises 2 x 10 (chest press, front rows, and back rows) focusing to improve strength and endurance to increase independence with ADLs.    Patient left up in chair with call button in reach and all needs met.     ASSESSMENT:  Mikaela Ghosh is a 85 y.o. female with a medical diagnosis of Subdural hematoma and presents with the deficits listed below. Patient tolerated treatment session and was motivated to complete tasks. Patient continues to benefit from skilled OT services to achieve maximal independence.    Rehab identified problem list/impairments: weakness, impaired endurance, impaired self care skills, impaired functional mobilty, gait instability, impaired balance, decreased lower extremity function, pain    Rehab potential is good    Activity tolerance: Good    Discharge recommendations:  (tbd)     Barriers to discharge: Inaccessible home  environment, Decreased caregiver support     Equipment recommendations: bath bench, wheelchair (daughter reports that she is going to check to see if she needs a RW. )     GOALS:    Occupational Therapy Goals        Problem: Occupational Therapy Goal    Goal Priority Disciplines Outcome Interventions   Occupational Therapy Goal     OT, PT/OT Ongoing (interventions implemented as appropriate)    Description:  Goals to be met by: 10 days     Patient will increase functional independence with ADLs by performing:    UE Dressing with Modified [place].  LE Dressing with Modified [place].  Grooming while standing at sink with Set-up Assistance.  Toileting from toilet with Modified [place] for hygiene and clothing management.   Bathing from  shower chair/bench with Supervision.  Supine to sit with Modified [place].  Stand pivot transfers with Modified [place].  Toilet transfer to toilet with Modified [place].  Upper extremity exercise program x 25 min to increase functional endurance for ADLs  Pt will perform a functional standing task x 15 min with sup                    Plan:  Patient to be seen 5 x/week to address the above listed problems via self-care/home management, therapeutic activities, therapeutic exercises  Plan of Care expires: 05/27/18  Plan of Care reviewed with: patient, daughter    ALVA Sanabria  05/07/2018

## 2018-05-07 NOTE — PLAN OF CARE
Problem: Physical Therapy Goal  Goal: Physical Therapy Goal  Goals to be met by: 14 days     Patient will increase functional independence with mobility by performin. Supine to sit with Modified [place] met  2. Sit to supine with Modified [place] met  3. Sit to stand transfer with Supervision with appropriate assistive device, as needed  4. Bed to chair transfer with Supervision using Rolling Walker or least restrictive assistive device, as appropriate  5. Gait  x 300 feet with Supervision using Rolling Walker. Or least restrictive assistive device  6. Gait x 50 feet w/ single point cane with minimal assistance.  7.Wheelchair propulsion x150 feet with Stand-by Assistance using bilateral upper extremities  8. Ascend/descend 8 stair with bilateral Handrails Supervision using Single-point Cane or without assistive, or appropriate AD   9. Ascend/Descend 4 inch curb step with Stand-by Assistance using Rolling Walker.or appropriate assistive device  10. Stand for 5 minutes with Stand-by Assistance using Rolling Walker or appropriate assistive device and perform an activity  11. Lower extremity exercise program x20 reps per handout, with assistance as needed and gym therex met  12. Perform Thompson or appropriate balance test.      Outcome: Ongoing (interventions implemented as appropriate)  Goals remain appropriate.

## 2018-05-07 NOTE — TELEPHONE ENCOUNTER
----- Message from Josette Gurrola sent at 5/7/2018  2:27 PM CDT -----  Contact: [facility]    [facility]  -  Jes   Ext [phone]    Dr Lemos  , At [facility],  Wants this patient  Within this week or next week.   Week starting  5/7       High calcium and PTH       Please call Jes with the appt date     Thanks

## 2018-05-07 NOTE — PT/OT/SLP PROGRESS
"Speech Language Pathology  Treatment    Mikaela Ghosh   MRN: [id number]   Admitting Diagnosis: Subdural hematoma    Diet recommendations: Solid Diet Level: Regular  Liquid Diet Level: Thin Standard aspiration precautions    SLP Treatment Date: 05/07/18  Speech Start Time: 1456     Speech Stop Time: 1530     Speech Total (min): 34 min       TREATMENT BILLABLE MINUTES:  Speech Therapy Individual 24 and [place] Care/Home Management Training 10    Has the patient been evaluated by SLP for swallowing? : Yes  Keep patient NPO?: No   General Precautions: Standard, fall  Current Respiratory Status: room air       Subjective:  "That should be easy, but it ain't." pt commented during a memory activity.          Objective:      Pt recalled facts from a paragraph read aloud by SLP with 40% acc. Ind'ly/90% given cues.  Pt completed a mental manipulation task recalling words in proper sequence/progression with 60% acc. Indly/100% given cues.  Education was provided to pt regarding compensatory memory strategies and progress.  Pt expressed understanding, but will benefit from further reinforcement.  Pt stated 2functional uses for common objects with 70% acc. Ind'ly/90% given cues.     Assessment:  Mikaela Ghosh is a 85 y.o. female with a medical diagnosis of Subdural hematoma and presents withmild cognitive-linguistic deficits.      Discharge recommendations: Discharge Facility/Level Of Care Needs: home health speech therapy     Goals:    SLP Goals        Problem: SLP Goal    Goal Priority Disciplines Outcome   SLP Goal     SLP Ongoing (interventions implemented as appropriate)   Description:  Speech Language Pathology Goals  Goals expected to be met by 5/4:  1. Pt will complete immediate memory tasks with 70% accuracy min cues. Goal met  2. Following a delay, pt will recall 3/3 words given mod cues. Goal met x 1 out of 2 trials/ ongoing  3. Pt will follow 3-step commands with 70% accuracy given mod cues. Goal not met.  4. Pt will " list 8 items in one minute given min cues. Goal not met.  5. Pt will participate in ongoing assessment of reading, writing, visual spatial, and functional math abilities. Goal met.     Revised goals expected to be met 5/11:  1. Pt will complete immediate memory tasks with 80% accuracy min cues.  2. Following a delay, pt will recall 3/3 words given mod cues.  3. Pt will follow 3-step commands with 60% accuracy given max cues.  4. Pt will list 8 items in one minute given min cues.  5. Pt will complete short paragraph level reading tasks with 80% accuracy and min cues.                          Plan:   Patient to be seen Therapy Frequency: 5 x/week   Plan of Care expires:    Plan of Care reviewed with: patient  SLP Follow-up?: Yes              LIZZY Gomez, SHARAN-SLP  05/07/2018     LIZZY Gomez, CCC-SLP  Speech Language Pathologist  [phone]  5/7/2018

## 2018-05-07 NOTE — PLAN OF CARE
Problem: Patient Care Overview  Goal: Plan of Care Review  Outcome: Ongoing (interventions implemented as appropriate)  Fall precautions maintained no injuries noted.

## 2018-05-08 ENCOUNTER — TELEPHONE (OUTPATIENT)
Dept: ADMINISTRATIVE | Facility: CLINIC | Age: 83
End: 2018-05-08

## 2018-05-08 LAB — POCT GLUCOSE: 86 MG/DL (ref 70–110)

## 2018-05-08 PROCEDURE — 97530 THERAPEUTIC ACTIVITIES: CPT

## 2018-05-08 PROCEDURE — 63600175 PHARM REV CODE 636 W HCPCS: Performed by: PHYSICIAN ASSISTANT

## 2018-05-08 PROCEDURE — 92507 TX SP LANG VOICE COMM INDIV: CPT

## 2018-05-08 PROCEDURE — 99309 SBSQ NF CARE MODERATE MDM 30: CPT | Mod: ,,, | Performed by: NURSE PRACTITIONER

## 2018-05-08 PROCEDURE — 97116 GAIT TRAINING THERAPY: CPT

## 2018-05-08 PROCEDURE — 97110 THERAPEUTIC EXERCISES: CPT

## 2018-05-08 PROCEDURE — 25000003 PHARM REV CODE 250: Performed by: NURSE PRACTITIONER

## 2018-05-08 PROCEDURE — 97535 SELF CARE MNGMENT TRAINING: CPT

## 2018-05-08 PROCEDURE — 25000003 PHARM REV CODE 250: Performed by: INTERNAL MEDICINE

## 2018-05-08 PROCEDURE — 11000004 HC SNF PRIVATE

## 2018-05-08 RX ADMIN — BACITRACIN: 500 OINTMENT TOPICAL at 09:05

## 2018-05-08 RX ADMIN — BACITRACIN: 500 OINTMENT TOPICAL at 08:05

## 2018-05-08 RX ADMIN — HYPROMELLOSE 2910 1 DROP: 5 SOLUTION OPHTHALMIC at 02:05

## 2018-05-08 RX ADMIN — AMLODIPINE BESYLATE 10 MG: 10 TABLET ORAL at 08:05

## 2018-05-08 RX ADMIN — HYPROMELLOSE 2910 1 DROP: 5 SOLUTION OPHTHALMIC at 10:05

## 2018-05-08 RX ADMIN — ACETAMINOPHEN 650 MG: 325 TABLET ORAL at 09:05

## 2018-05-08 RX ADMIN — HEPARIN SODIUM 5000 UNITS: 5000 INJECTION, SOLUTION INTRAVENOUS; SUBCUTANEOUS at 09:05

## 2018-05-08 RX ADMIN — HYPROMELLOSE 2910 1 DROP: 5 SOLUTION OPHTHALMIC at 08:05

## 2018-05-08 RX ADMIN — SODIUM CHLORIDE: 0.9 INJECTION, SOLUTION INTRAVENOUS at 08:05

## 2018-05-08 RX ADMIN — ACETAMINOPHEN 650 MG: 325 TABLET ORAL at 08:05

## 2018-05-08 RX ADMIN — HEPARIN SODIUM 5000 UNITS: 5000 INJECTION, SOLUTION INTRAVENOUS; SUBCUTANEOUS at 01:05

## 2018-05-08 RX ADMIN — HEPARIN SODIUM 5000 UNITS: 5000 INJECTION, SOLUTION INTRAVENOUS; SUBCUTANEOUS at 05:05

## 2018-05-08 NOTE — PROGRESS NOTES
Progress report discussed with Mesha Grewal and addended review    Clinical status Changes in calcium, elevated BP on 05/08/18    Progress made/loss; Progress made    Therapies Involved-PT/OT    PT:  Ambulation-Ambulates 280 ft with RW, S-SBA  Sit to stand  Pivot-Supervision with RW-goals met  Bed Function  Transfers-S SBA- goals met  OT:  ADLSs -MI   Upper body dressing-MI  Lower body dressing-MI  Toileting   Clothing management-MI   Hygiene- set up with assistance  ST:  Diet order-   Cognition    Barriers to progress- Inaccessible home environment, decreased caregiver report    New treatment is- labs, outpatient appt with endocrine 05/10/18    Projected length of stay/discharge- LOS 20, Projected discharge 05/16/18    Discharge Disposition- Home with

## 2018-05-08 NOTE — PLAN OF CARE
Problem: SLP Goal  Goal: SLP Goal  Speech Language Pathology Goals  Goals expected to be met by 5/4:  1. Pt will complete immediate memory tasks with 70% accuracy min cues. Goal met  2. Following a delay, pt will recall 3/3 words given mod cues. Goal met x 1 out of 2 trials/ ongoing  3. Pt will follow 3-step commands with 70% accuracy given mod cues. Goal not met.  4. Pt will list 8 items in one minute given min cues. Goal not met.  5. Pt will participate in ongoing assessment of reading, writing, visual spatial, and functional math abilities. Goal met.     Revised goals expected to be met 5/11:  1. Pt will complete immediate memory tasks with 80% accuracy min cues.  2. Following a delay, pt will recall 3/3 words given mod cues.  3. Pt will follow 3-step commands with 60% accuracy given max cues.  4. Pt will list 8 items in one minute given min cues.  5. Pt will complete short paragraph level reading tasks with 80% accuracy and min cues.        Outcome: Ongoing (interventions implemented as appropriate)  Cont POC.  LIZZY Gomez, CCC-SLP  Speech Language Pathologist  [phone]  5/8/2018

## 2018-05-08 NOTE — SUBJECTIVE & OBJECTIVE
Hospital Course:  The patient was admitted at [facility] from 4/18 to 4/26/2018.  4/26: Patient admitted to SNF for ongoing PT/Ot following a hospitalization for subdural hematoma requiring left frontoparietal craniotomy with evacuation.  4/30: Patient seen at bedside, doing well, reports mild HA tolerable with tylenol. No acute events overnight. Labs reviewed.   5/3: Ca++ elevated, treated with NS, will monitor Ca++ levels  5/4: ionized Ca++ 1.72 elevated treated with NS, will monitor Ca++ levels  5/7: Patient seen at bedside doing well, denies pain, discussed elevated Ca++ level; d/w with Dr. Lemos-believed to be primary hyperthyroidism patient asymptomatic secure outpatient appointment. ionzied Ca++ 1.58  5/8: Patient seen at bedside, doing well, discussed upcoming Endocrine appointment, verbalized understanding.     Interval History: Patient seen at bedside, doing well, no acute events overnight.    Review of Systems   Constitutional: Negative for appetite change, chills, fatigue and fever.   HENT: Negative for trouble swallowing.    Respiratory: Negative for cough, chest tightness, shortness of breath and wheezing.    Cardiovascular: Negative for chest pain, palpitations and leg swelling.   Gastrointestinal: Negative for abdominal pain, constipation, diarrhea and nausea.   Genitourinary: Negative for difficulty urinating, frequency and urgency.   Musculoskeletal: Negative for arthralgias and myalgias.   Skin: Negative for rash.   Neurological: Negative for dizziness, weakness, light-headedness and headaches.   Psychiatric/Behavioral: Negative for sleep disturbance.     Scheduled Meds:   amLODIPine  10 mg Oral Daily    artificial tears  1 drop Both Eyes TID    bacitracin   Topical (Top) BID    heparin (porcine)  5,000 Units Subcutaneous Q8H    senna-docusate 8.6-50 mg  1 tablet Oral BID     Continuous Infusions:  PRN Meds:.acetaminophen, aluminum & magnesium hydroxide-simethicone    Objective:     Vital  Signs (Most Recent):  Temp: 98.2 °F (36.8 °C) (05/08/18 0700)  Pulse: 66 (05/08/18 0700)  Resp: 18 (05/08/18 0700)  BP: 130/70 (05/08/18 0700)  SpO2: 98 % (05/08/18 0700) Vital Signs (24h Range):  Temp:  [98.1 °F (36.7 °C)-98.2 °F (36.8 °C)] 98.2 °F (36.8 °C)  Pulse:  [66-80] 66  Resp:  [18] 18  SpO2:  [98 %-100 %] 98 %  BP: (130-134)/(70-84) 130/70     Weight: 64.9 kg (143 lb 1.3 oz)  Body mass index is 26.17 kg/m².    Intake/Output Summary (Last 24 hours) at 05/08/18 1509  Last data filed at 05/08/18 1400   Gross per 24 hour   Intake          1843.33 ml   Output                0 ml   Net          1843.33 ml      Physical Exam   Constitutional: She is oriented to person, place, and time. She appears well-developed and well-nourished. No distress.   HENT:   Left frontoparietal incision well approximated with staples. No edema to drainage noted.    Cardiovascular: Normal rate, regular rhythm and normal heart sounds.  Exam reveals no gallop and no friction rub.    No murmur heard.  Pulmonary/Chest: Effort normal and breath sounds normal. No respiratory distress. She has no wheezes. She has no rales.   Abdominal: Soft. Bowel sounds are normal. She exhibits no distension. There is no tenderness.   Musculoskeletal: Normal range of motion. She exhibits no edema or tenderness.   Neurological: She is alert and oriented to person, place, and time.   Skin: Skin is warm and dry. No rash noted. She is not diaphoretic. No cyanosis. Nails show no clubbing.   Psychiatric: She has a normal mood and affect. Her behavior is normal.     Significant Labs:     Recent Labs  Lab 05/03/18  0425 05/07/18  0459   WBC 5.78 5.49   HGB 11.7* 11.6*   HCT 37.3 37.0    227       Recent Labs  Lab 05/05/18  0519 05/07/18  0458 05/07/18  1342    140 137   K 4.6 4.0 4.0   * 108 104   CO2 25 23 22*   BUN 13 14 15   CREATININE 0.7 0.7 0.7   CALCIUM 11.7* 12.1* 12.5*   PROT  --   --  6.8   BILITOT  --   --  0.4   ALKPHOS  --   --  75    ALT  --   --  31   AST  --   --  26     Lab Results   Component Value Date    LABPROT 10.6 04/26/2018    ALBUMIN 3.2 (L) 05/07/2018     Lab Results   Component Value Date    CALCIUM 12.5 (HH) 05/07/2018    PHOS 2.6 (L) 05/07/2018     Significant Imaging: n/a

## 2018-05-08 NOTE — PROGRESS NOTES
[facility] [place] [facility] - Skilled Nursing Care  Department of Hospital Medicine  Progress Note    Patient Name: Mikaela Ghosh  MRN: [id number]  Code Status: Full Code  Admission Date: 4/26/2018  Length of Stay: 12 days  Attending Physician: Aren Lemos MD  Primary Care Provider: Felipe Mustafa MD    Subjective:     Principal Problem:Subdural hematoma    Chief Complaint/Reason for Admission: Subdural hematoma    History of Present Illness:  Patient is a 84 y.o. female with HTN who presents to SNF after hospitalization for subdural hematoma requiring left frontoparietal craniotomy with evacuation on 4/20 by Dr. Sandoval.  The patient initially presented with dizziness and was found to have SDH on CT. She complains of intermittent mild headache at incision site since surgery which is relieved with tylenol. She denies any exacerbating conditions.  She does have dry eyes and was taking tears BID at home and would like to resume.  No seizures or focal weakness.   The patient has been admitted to SNF for ongoing PT/OT due to insufficient progress to go home safely from the hospital.    Hospital Course:  The patient was admitted at [facility] from 4/18 to 4/26/2018.  4/26: Patient admitted to SNF for ongoing PT/Ot following a hospitalization for subdural hematoma requiring left frontoparietal craniotomy with evacuation.  4/30: Patient seen at bedside, doing well, reports mild HA tolerable with tylenol. No acute events overnight. Labs reviewed.   5/3: Ca++ elevated, treated with NS, will monitor Ca++ levels  5/4: ionized Ca++ 1.72 elevated treated with NS, will monitor Ca++ levels  5/7: Patient seen at bedside doing well, denies pain, discussed elevated Ca++ level; d/w with Dr. Lemos-believed to be primary hyperthyroidism patient asymptomatic secure outpatient appointment. ionzied Ca++ 1.58  5/8: Patient seen at bedside, doing well, discussed upcoming Endocrine appointment, verbalized understanding.     Interval History: Patient seen at  bedside, doing well, no acute events overnight.    Review of Systems   Constitutional: Negative for appetite change, chills, fatigue and fever.   HENT: Negative for trouble swallowing.    Respiratory: Negative for cough, chest tightness, shortness of breath and wheezing.    Cardiovascular: Negative for chest pain, palpitations and leg swelling.   Gastrointestinal: Negative for abdominal pain, constipation, diarrhea and nausea.   Genitourinary: Negative for difficulty urinating, frequency and urgency.   Musculoskeletal: Negative for arthralgias and myalgias.   Skin: Negative for rash.   Neurological: Negative for dizziness, weakness, light-headedness and headaches.   Psychiatric/Behavioral: Negative for sleep disturbance.     Scheduled Meds:   amLODIPine  10 mg Oral Daily    artificial tears  1 drop Both Eyes TID    bacitracin   Topical (Top) BID    heparin (porcine)  5,000 Units Subcutaneous Q8H    senna-docusate 8.6-50 mg  1 tablet Oral BID     Continuous Infusions:  PRN Meds:.acetaminophen, aluminum & magnesium hydroxide-simethicone    Objective:     Vital Signs (Most Recent):  Temp: 98.2 °F (36.8 °C) (05/08/18 0700)  Pulse: 66 (05/08/18 0700)  Resp: 18 (05/08/18 0700)  BP: 130/70 (05/08/18 0700)  SpO2: 98 % (05/08/18 0700) Vital Signs (24h Range):  Temp:  [98.1 °F (36.7 °C)-98.2 °F (36.8 °C)] 98.2 °F (36.8 °C)  Pulse:  [66-80] 66  Resp:  [18] 18  SpO2:  [98 %-100 %] 98 %  BP: (130-134)/(70-84) 130/70     Weight: 64.9 kg (143 lb 1.3 oz)  Body mass index is 26.17 kg/m².    Intake/Output Summary (Last 24 hours) at 05/08/18 1509  Last data filed at 05/08/18 1400   Gross per 24 hour   Intake          1843.33 ml   Output                0 ml   Net          1843.33 ml      Physical Exam   Constitutional: She is oriented to person, place, and time. She appears well-developed and well-nourished. No distress.   HENT:   Left frontoparietal incision well approximated with staples. No edema to drainage noted.     Cardiovascular: Normal rate, regular rhythm and normal heart sounds.  Exam reveals no gallop and no friction rub.    No murmur heard.  Pulmonary/Chest: Effort normal and breath sounds normal. No respiratory distress. She has no wheezes. She has no rales.   Abdominal: Soft. Bowel sounds are normal. She exhibits no distension. There is no tenderness.   Musculoskeletal: Normal range of motion. She exhibits no edema or tenderness.   Neurological: She is alert and oriented to person, place, and time.   Skin: Skin is warm and dry. No rash noted. She is not diaphoretic. No cyanosis. Nails show no clubbing.   Psychiatric: She has a normal mood and affect. Her behavior is normal.     Significant Labs:     Recent Labs  Lab 05/03/18  0425 05/07/18  0459   WBC 5.78 5.49   HGB 11.7* 11.6*   HCT 37.3 37.0    227       Recent Labs  Lab 05/05/18  0519 05/07/18  0458 05/07/18  1342    140 137   K 4.6 4.0 4.0   * 108 104   CO2 25 23 22*   BUN 13 14 15   CREATININE 0.7 0.7 0.7   CALCIUM 11.7* 12.1* 12.5*   PROT  --   --  6.8   BILITOT  --   --  0.4   ALKPHOS  --   --  75   ALT  --   --  31   AST  --   --  26     Lab Results   Component Value Date    LABPROT 10.6 04/26/2018    ALBUMIN 3.2 (L) 05/07/2018     Lab Results   Component Value Date    CALCIUM 12.5 (HH) 05/07/2018    PHOS 2.6 (L) 05/07/2018     Significant Imaging: n/a    Assessment/Plan:      * Subdural hematoma    Evaluated on 5/8/18  · continue PT/OT to increase ambulation, ADL performance and endurance  · continue to monitor incision and continue bactroban BID to incision  · continue seizure prophylaxis with keppra to 4/29--completed  · continue heparin for DVT prophylaxis  · continue fall precautions  · continue senokot-s to prevent constipation; hold for frequent or loose stooling        Hypophosphatemia    Evaluated on 4/30/18  · Repletes with phos packets  · 2.6 on 5/7/18  · Monitor with twice weekly labs        Hypercalcemia    Evaluated on  5/8/18  · Mild and Elevated since 5/2017  · Renal function normal but phos is low 2.6  · Encourage hydration. If Ca increases may need ivfs, will continue to monitor--treated with IVFs without marked improvement, discussed with Dr. Lemos on 5/7/18 hold IVFs patient asymptomatic  · PTH elevated at 197--follow-up with Endocrine--f/u on 5/10/18        Dry eyes    Evaluated on 5/8/18  · resume therapy with artificial tears as she takes at home        Essential hypertension, benign    Evaluated on 5/8/18  · Chronic with improved control  · Continue therapy with amlodipine and low Na diet  · will continue to monitor and adjust regimen as necessary          Future Appointments  Date Time Provider Department Center   5/10/2018 10:15 AM [facility]-CT2 500 LB LIMIT [facility] IC Zachary Haque   5/10/2018 11:20 AM Brady Mustafa PA-C [facility] NEUROS7 Zachary Haque   5/10/2018 1:30 PM Ijeoma Hitchcock MD [facility] ENDOCRN Zachary deyanira Tomas NP  Department of Hospital Medicine  [facility] [place] [facility] - [facility]

## 2018-05-08 NOTE — PLAN OF CARE
Problem: Occupational Therapy Goal  Goal: Occupational Therapy Goal  Goals to be met by: 10 days     Patient will increase functional independence with ADLs by performing:    UE Dressing with Modified [place].  LE Dressing with Modified [place].  Grooming while standing at sink with Set-up Assistance.  Toileting from toilet with Modified [place] for hygiene and clothing management.   Bathing from  shower chair/bench with Supervision.  Supine to sit with Modified [place].  Stand pivot transfers with Modified [place].  Toilet transfer to toilet with Modified [place].  Upper extremity exercise program x 25 min to increase functional endurance for ADLs  Pt will perform a functional standing task x 15 min with sup   Outcome: Ongoing (interventions implemented as appropriate)  Patient's goals are appropriate.   ALVA Sanabria  5/8/2018

## 2018-05-08 NOTE — PT/OT/SLP PROGRESS
Occupational Therapy  Treatment    Mikaela Ghosh   MRN: [id number]   Admitting Diagnosis: Subdural hematoma    OT Date of Treatment: 05/08/18  Total Time (min): 60 min    Billable Minutes:  Self Care/Home Management 15, Therapeutic Activity 30 and Therapeutic Exercise 15    General Precautions: Standard, fall  Orthopedic Precautions: N/A  Braces: N/A    Do you have any cultural, spiritual, Church conflicts, given your current situation?: none stated    Subjective:  Communicated with patient prior to session.    Pain/Comfort  Pain Rating 1: 0/10  Pain Rating Post-Intervention 1: 0/10    Objective:       Functional Status:  MDS G  Bed Mobility Functional Status: mod(I) supine>sit  Transfer Functional Status: CGA bed>toilet /c functional ambulation /s RW; toilet>w/c /c functional ambulation /s RW  Walk in Room Functional Status: CGA  Toilet Use Functional Status: S   Personal Hygiene Functional Status: S oral care, washing hands, and washing face standing at sink      [facility] 6 Click:  [facility] Total Score: 19    OT Exercises: UE Ergometer 15 min for improving endurance to increase independence with ADLs.     Additional Treatment:  Patient performed a visual perception activity using spatial relations board while standing at counter top with S in order to stand to complete a functional household tasks.     Patient left up in chair with call button in reach and all needs met.    ASSESSMENT:  Mikaela Ghosh is a 85 y.o. female with a medical diagnosis of Subdural hematoma and presents with the deficits listed below. Patient tolerated treatment session and was motivated to complete tasks. Patient needed extra time to complete standing task and needed verbal and visual cues at times. Patient did take seated rest breaks during task. Patient continues to benefit from skilled OT services to achieve maximal independence.    Rehab identified problem list/impairments: weakness, impaired endurance, impaired self care skills, impaired  functional mobilty, gait instability, impaired balance, decreased lower extremity function, pain    Rehab potential is good    Activity tolerance: Good    Discharge recommendations:  (tbd)     Barriers to discharge: Inaccessible home environment, Decreased caregiver support     Equipment recommendations: bath bench, wheelchair (daughter reports that she is going to check to see if she needs a RW. )     GOALS:    Occupational Therapy Goals        Problem: Occupational Therapy Goal    Goal Priority Disciplines Outcome Interventions   Occupational Therapy Goal     OT, PT/OT Ongoing (interventions implemented as appropriate)    Description:  Goals to be met by: 10 days     Patient will increase functional independence with ADLs by performing:    UE Dressing with Modified [place].  LE Dressing with Modified [place].  Grooming while standing at sink with Set-up Assistance.  Toileting from toilet with Modified [place] for hygiene and clothing management.   Bathing from  shower chair/bench with Supervision.  Supine to sit with Modified [place].  Stand pivot transfers with Modified [place].  Toilet transfer to toilet with Modified [place].  Upper extremity exercise program x 25 min to increase functional endurance for ADLs  Pt will perform a functional standing task x 15 min with sup                    Plan:  Patient to be seen 5 x/week to address the above listed problems via self-care/home management, therapeutic activities, therapeutic exercises  Plan of Care expires: 05/27/18  Plan of Care reviewed with: patient, daughter    ALVA Sanabria  05/08/2018

## 2018-05-08 NOTE — PT/OT/SLP PROGRESS
"Speech Language Pathology  Treatment    Mikaela Ghosh   MRN: [id number]   Admitting Diagnosis: Subdural hematoma    Diet recommendations: Solid Diet Level: Regular  Liquid Diet Level: Thin Standard aspiration precautions    SLP Treatment Date: 05/08/18  Speech Start Time: 1453     Speech Stop Time: 1526     Speech Total (min): 33 min       TREATMENT BILLABLE MINUTES:  Speech Therapy Individual 23 and [place] Care/Home Management Training 10    Has the patient been evaluated by SLP for swallowing? : Yes  Keep patient NPO?: No   General Precautions: Standard, fall  Current Respiratory Status: room air       Subjective:  "I'm gonna have to wait and see. Whatever the doctor says." pt replied when SLP asked if she wished to return to work upon discharge.         Objective:      Pt was O x 4, but had difficulty recalling having eaten lunch.  Given additional time, pt did recall having had lunch and what she ate.  SLP discussed strategies for orienting to time of day. Pt provided solutions to hypothetical medical and safety situations with 95% accuracy.  Education was provided to pt regarding fall risks and reason for pt being identified as a fall risk. Pt expressed understanding.    Assessment:  Mikaela Ghosh is a 85 y.o. female with a medical diagnosis of Subdural hematoma and presents with mild cognitive-linguistic deficits.       Discharge recommendations: Discharge Facility/Level Of Care Needs: home health speech therapy     Goals:    SLP Goals        Problem: SLP Goal    Goal Priority Disciplines Outcome   SLP Goal     SLP Ongoing (interventions implemented as appropriate)   Description:  Speech Language Pathology Goals  Goals expected to be met by 5/4:  1. Pt will complete immediate memory tasks with 70% accuracy min cues. Goal met  2. Following a delay, pt will recall 3/3 words given mod cues. Goal met x 1 out of 2 trials/ ongoing  3. Pt will follow 3-step commands with 70% accuracy given mod cues. Goal not met.  4. Pt " will list 8 items in one minute given min cues. Goal not met.  5. Pt will participate in ongoing assessment of reading, writing, visual spatial, and functional math abilities. Goal met.     Revised goals expected to be met 5/11:  1. Pt will complete immediate memory tasks with 80% accuracy min cues.  2. Following a delay, pt will recall 3/3 words given mod cues.  3. Pt will follow 3-step commands with 60% accuracy given max cues.  4. Pt will list 8 items in one minute given min cues.  5. Pt will complete short paragraph level reading tasks with 80% accuracy and min cues.                          Plan:   Patient to be seen Therapy Frequency: 5 x/week   Plan of Care expires:    Plan of Care reviewed with: patient  SLP Follow-up?: Yes              LIZZY Gomez, CCC-SLP  05/08/2018     LIZZY Gomez, CCC-SLP  Speech Language Pathologist  [phone]  5/8/2018

## 2018-05-08 NOTE — PT/OT/SLP PROGRESS
Physical Therapy  Treatment    Mikaela Ghosh   MRN: [id number]   Admitting Diagnosis: Subdural hematoma    PT Received On: 05/08/18  Total Time (min): 38       Billable Minutes:  Gait Training 15, Therapeutic Activity 15 and Therapeutic Exercise 8    Treatment Type: Treatment  PT/PTA: PT     PTA Visit Number: 0       General Precautions: Standard, fall  Orthopedic Precautions: N/A   Braces: N/A         Subjective:  Communicated with pt prior to session.  Pt was agreeable to PT services.     Pain/Comfort  Pain Rating 1: 0/10    Objective:  Patient found seated in wheelchair, ready for therapy.         Functional Status:  MDS G  Bed Mobility Functional Status: mod(I) - (I)  Transfer Functional Status: S-SBA  Walk in Room Functional Status: S-SBA  Walk in Corridor Functional Status: S-SBA  Locomotion on Unit Functional Status: S-SBA  Locomotion Off Unit Functional Status: S-SBA          AM-PAC 6 CLICK MOBILITY  Total Score:18    Transfers:  Sit<>Stand: supervision with UE support (Goal met)  Stand Pivot Transfer: Supervision with rolling walker use (Goal met)    Gait:  Amb 280 feet with rolling walker, SBA using swing-through gait pattern.      Wheelchair Mobility:  Patient propels w/c 150 feet with use of UEs, mod I     Therex:  Seated- hip flexion, long arc quads, ankle pumps with knee extension x 20 reps BLE      Balance:  Stood in parallel bars with no UE support with feet together for 1.5 minutes to improve proprioceptive sense and ankle righting reaction.    Additional Treatment:  Completed 10 minutes on the NuStep to improve her LE and UE strength and endurance.    Patient left up in chair with call button in reach.    Assessment:  Mikaela Ghosh is a 85 y.o. female with a medical diagnosis of Subdural hematoma.  Ms. Ghosh met two goals today, denoting progress functionally.  She will benefit from further PT services to improve her higher level balance, endurance, and lower her fall risk.     Rehab identified  problem list/impairments: weakness, impaired endurance, impaired functional mobilty, gait instability, impaired balance, decreased lower extremity function, pain    Rehab potential is good.    Activity tolerance: Good    Discharge recommendations: home with home health     Barriers to discharge: Inaccessible home environment, Decreased caregiver support    Equipment recommendations: walker, rolling, bedside commode     GOALS:    Physical Therapy Goals        Problem: Physical Therapy Goal    Goal Priority Disciplines Outcome Goal Variances Interventions   Physical Therapy Goal     PT/OT, PT Ongoing (interventions implemented as appropriate)     Description:  Goals to be met by: 14 days     Patient will increase functional independence with mobility by performin. Supine to sit with Modified [place] met  2. Sit to supine with Modified [place] met  3. Sit to stand transfer with Supervision with appropriate assistive device, as needed. Met (2018)  4. Bed to chair transfer with Supervision using Rolling Walker or least restrictive assistive device, as appropriate. Met (2018)  5. Gait  x 300 feet with Supervision using Rolling Walker Or least restrictive assistive device.  6. Gait x 50 feet w/ single point cane with minimal assistance.   7.Wheelchair propulsion x150 feet with Stand-by Assistance using bilateral upper extremities.  8. Ascend/descend 8 stair with bilateral Handrails Supervision using Single-point Cane or without assistive, or appropriate AD.   9. Ascend/Descend 4 inch curb step with Stand-by Assistance using Rolling Walker.or appropriate assistive device.  10. Stand for 5 minutes with Stand-by Assistance using Rolling Walker or appropriate assistive device and perform an activity.  11. Lower extremity exercise program x20 reps per handout, with assistance as needed and gym therex met  12. Perform Thompson or appropriate balance test.                        PLAN:    Patient to be seen 5  x/week  to address the above listed problems via gait training, therapeutic activities, therapeutic exercises, wheelchair management/training. Will attempt to bring pt downstairs tomorrow to allow exposure to environmental factors.   Plan of Care expires: 05/27/18  Plan of Care reviewed with: patient    Vanesa WOOD Mary, PT  05/08/2018

## 2018-05-08 NOTE — PLAN OF CARE
Problem: Physical Therapy Goal  Goal: Physical Therapy Goal  Goals to be met by: 14 days     Patient will increase functional independence with mobility by performin. Supine to sit with Modified [place] met  2. Sit to supine with Modified [place] met  3. Sit to stand transfer with Supervision with appropriate assistive device, as needed. Met (2018)  4. Bed to chair transfer with Supervision using Rolling Walker or least restrictive assistive device, as appropriate. Met (2018)  5. Gait  x 300 feet with Supervision using Rolling Walker Or least restrictive assistive device.  6. Gait x 50 feet w/ single point cane with minimal assistance.   7.Wheelchair propulsion x150 feet with Stand-by Assistance using bilateral upper extremities.  8. Ascend/descend 8 stair with bilateral Handrails Supervision using Single-point Cane or without assistive, or appropriate AD.   9. Ascend/Descend 4 inch curb step with Stand-by Assistance using Rolling Walker.or appropriate assistive device.  10. Stand for 5 minutes with Stand-by Assistance using Rolling Walker or appropriate assistive device and perform an activity.  11. Lower extremity exercise program x20 reps per handout, with assistance as needed and gym therex met  12. Perform Thompson or appropriate balance test.      Outcome: Ongoing (interventions implemented as appropriate)  Met two goals today.

## 2018-05-08 NOTE — PLAN OF CARE
Problem: Patient Care Overview  Goal: Plan of Care Review  Outcome: Ongoing (interventions implemented as appropriate)   05/08/18 0223   Coping/Psychosocial   Plan Of Care Reviewed With patient       Problem: Fall Risk (Adult)  Intervention: Monitor/Assist with Self Care   05/08/18 0223   Activity   Activity Assistance Provided assistance, 1 person   Functional Level Current   Ambulation 2 - assistive person   Transferring 1 - assistive equipment   Toileting 2 - assistive person   Bathing 2 - assistive person   Dressing 2 - assistive person   Eating 0 - independent   Communication 0 - understands/communicates without difficulty   Swallowing 0 - swallows foods/liquids without difficulty       Goal: Identify Related Risk Factors and Signs and Symptoms  Related risk factors and signs and symptoms are identified upon initiation of Human [employer] Clinical Practice Guideline (CPG)   Outcome: Ongoing (interventions implemented as appropriate)   05/08/18 0223   Fall Risk   Related Risk Factors (Fall Risk) age-related changes;neuro disease/injury   Signs and Symptoms (Fall Risk) presence of risk factors

## 2018-05-09 PROCEDURE — 99900058 HC 022 PAID UNDER SNF PPS

## 2018-05-09 PROCEDURE — 97110 THERAPEUTIC EXERCISES: CPT

## 2018-05-09 PROCEDURE — 97530 THERAPEUTIC ACTIVITIES: CPT

## 2018-05-09 PROCEDURE — 11000004 HC SNF PRIVATE

## 2018-05-09 PROCEDURE — 25000003 PHARM REV CODE 250: Performed by: NURSE PRACTITIONER

## 2018-05-09 PROCEDURE — 63600175 PHARM REV CODE 636 W HCPCS: Performed by: PHYSICIAN ASSISTANT

## 2018-05-09 PROCEDURE — 97535 SELF CARE MNGMENT TRAINING: CPT

## 2018-05-09 PROCEDURE — 25000003 PHARM REV CODE 250: Performed by: INTERNAL MEDICINE

## 2018-05-09 PROCEDURE — 97116 GAIT TRAINING THERAPY: CPT

## 2018-05-09 PROCEDURE — 92507 TX SP LANG VOICE COMM INDIV: CPT

## 2018-05-09 RX ADMIN — HEPARIN SODIUM 5000 UNITS: 5000 INJECTION, SOLUTION INTRAVENOUS; SUBCUTANEOUS at 03:05

## 2018-05-09 RX ADMIN — HEPARIN SODIUM 5000 UNITS: 5000 INJECTION, SOLUTION INTRAVENOUS; SUBCUTANEOUS at 05:05

## 2018-05-09 RX ADMIN — AMLODIPINE BESYLATE 10 MG: 10 TABLET ORAL at 11:05

## 2018-05-09 RX ADMIN — BACITRACIN: 500 OINTMENT TOPICAL at 09:05

## 2018-05-09 RX ADMIN — HEPARIN SODIUM 5000 UNITS: 5000 INJECTION, SOLUTION INTRAVENOUS; SUBCUTANEOUS at 09:05

## 2018-05-09 RX ADMIN — HYPROMELLOSE 2910 1 DROP: 5 SOLUTION OPHTHALMIC at 09:05

## 2018-05-09 RX ADMIN — ACETAMINOPHEN 650 MG: 325 TABLET ORAL at 11:05

## 2018-05-09 RX ADMIN — HYPROMELLOSE 2910 1 DROP: 5 SOLUTION OPHTHALMIC at 03:05

## 2018-05-09 NOTE — PLAN OF CARE
Problem: Occupational Therapy Goal  Goal: Occupational Therapy Goal  Goals to be met by: 10 days     Patient will increase functional independence with ADLs by performing:    UE Dressing with Modified [place].  LE Dressing with Modified [place].  Grooming while standing at sink with Set-up Assistance.  Toileting from toilet with Modified [place] for hygiene and clothing management.   Bathing from  shower chair/bench with Supervision.  Supine to sit with Modified [place].  Stand pivot transfers with Modified [place].  Toilet transfer to toilet with Modified [place].  Upper extremity exercise program x 25 min to increase functional endurance for ADLs  Pt will perform a functional standing task x 15 min with sup   Outcome: Ongoing (interventions implemented as appropriate)  Cindy Bassett OT  5/9/2018

## 2018-05-09 NOTE — PT/OT/SLP PROGRESS
Occupational Therapy  Treatment    Mikaela Ghosh   MRN: [id number]   Admitting Diagnosis: Subdural hematoma    OT Date of Treatment: 05/09/18  Total Time (min): 53 min    Billable Minutes:  Self Care/Home Management 15 and Therapeutic Exercise 38    General Precautions: Standard, fall  Orthopedic Precautions: N/A  Braces: N/A    Do you have any cultural, spiritual, [employer]al conflicts, given your current situation?: none stated    Subjective:  Communicated with nursing prior to session.  Pt reports she is feeling so much better every day.    Pain/Comfort  Pain Rating 1: 0/10  Pain Rating Post-Intervention 1: 0/10    Objective:   Pt ambulated to gym for treatment following ADL retraining in her hospital room.    Functional Status:  MDS G  Transfer Functional Status: mod(I) - (I), w/c <> hospital bed; sit <> stand    Walk in Room Functional Status: S-SBA  Walk in Corridor Functional Status: S-SBA  Locomotion on Unit Functional Status: S-SBA  Locomotion Off Unit Functional Status: S-SBA    Dressing Functional Status: 1: S-SBA after set up. OT focused on pts ability to retrieve items from various height surfaces, closet, transferring items within her room and self set up for ADL's.    Eating Functional Status: mod(I) - (I)  Toilet Use Functional Status: S-SBA on 3in1 commode    Personal Hygiene Functional Status: S-SBA from standing without use of AD.  Bathing Functional Status: S-SBA  Moving from seated to standing position: Not steady, but able to stabilize without staff assistance  Walking (with assistive device if used): Not steady, but able to stabilize without staff assistance  Turning around and facing the opposite direction while walking: Not steady, but able to stabilize without staff assistance  Moving on and off the toilet: Not steady, but able to stabilize without staff assistance  Surface-to-surface transfer (transfer between bed and chair or wheelchair): Not steady, but able to stabilize without staff  assistance     [facility] 6 Click:  [facility] Total Score: 19    OT Exercises: UE Ergometer 8 min.    Additional Treatment:  Pt was seen for therex in gym focusing on improving proprioception and kinesthesia throughout BUE with use of bimanual tasks in sitting and standing, quick response and sustained scapular stabilization with overhead reach in all planes of movement. Such exercises were performed with BUE's while in stride and manipulating objects during functional gait.    Patient left up in chair with call button in reach    ASSESSMENT:  Mikaela Ghosh is a 85 y.o. female with a medical diagnosis of Subdural hematoma who is highly motivated to maximize functional use of BUE's while in standing for ADL's and for maximizing functional resumption of homemaking and community ADL's. She required intermittent rest breaks during session and required OT intervention due to one noted episode of LOB to R side with moderate lateral excursions. This patient has potential of returning to PLOF through ongoing skilled OT.    Rehab identified problem list/impairments: weakness, impaired endurance, impaired self care skills, impaired functional mobilty, gait instability, impaired balance, decreased lower extremity function, pain    Rehab potential is good    Activity tolerance: Excellent    Discharge recommendations:  (tbd)     Barriers to discharge: Inaccessible home environment, Decreased caregiver support     Equipment recommendations: bath bench, wheelchair (daughter reports that she is going to check to see if she needs a RW. )     GOALS:    Occupational Therapy Goals        Problem: Occupational Therapy Goal    Goal Priority Disciplines Outcome Interventions   Occupational Therapy Goal     OT, PT/OT Ongoing (interventions implemented as appropriate)    Description:  Goals to be met by: 10 days     Patient will increase functional independence with ADLs by performing:    UE Dressing with Modified [place].  LE Dressing with  Modified [place].  Grooming while standing at sink with Set-up Assistance.  Toileting from toilet with Modified [place] for hygiene and clothing management.   Bathing from  shower chair/bench with Supervision.  Supine to sit with Modified [place].  Stand pivot transfers with Modified [place].  Toilet transfer to toilet with Modified [place].  Upper extremity exercise program x 25 min to increase functional endurance for ADLs  Pt will perform a functional standing task x 15 min with sup                    Plan:  Patient to be seen 5 x/week to address the above listed problems via self-care/home management, therapeutic activities, therapeutic exercises  Plan of Care expires: 05/27/18  Plan of Care reviewed with: patient    Cindy Danyelle, OT  05/09/2018

## 2018-05-09 NOTE — PT/OT/SLP PROGRESS
Physical Therapy  Treatment    Mikaela Ghosh   MRN: [id number]   Admitting Diagnosis: Subdural hematoma    PT Received On: 05/09/18  Total Time (min): 45       Billable Minutes:  Gait Training 15, Therapeutic Activity 15 and Therapeutic Exercise 15    Treatment Type: Treatment  PT/PTA: PT     PTA Visit Number: 0       General Precautions: Standard, fall  Orthopedic Precautions: N/A   Braces: N/A         Subjective:  Communicated with pt prior to session.  Pt was agreeable to PT services today.    Pain/Comfort  Pain Rating 1: 0/10    Objective:  Patient found seated in wheelchair.         Functional Status:  MDS G  Bed Mobility Functional Status: mod(I) - (I)  Transfer Functional Status: mod(I) - (I)  Walk in Room Functional Status: S-SBA  Walk in Corridor Functional Status: S-SBA  Locomotion on Unit Functional Status: S-SBA  Locomotion Off Unit Functional Status: S-SBA  Moving from seated to standing position: Not steady, but able to stabilize without staff assistance  Walking (with assistive device if used): Not steady, but able to stabilize without staff assistance  Turning around and facing the opposite direction while walking: Not steady, but able to stabilize without staff assistance  Moving on and off the toilet: Not steady, but able to stabilize without staff assistance  Surface-to-surface transfer (transfer between bed and chair or wheelchair): Not steady, but able to stabilize without staff assistance          AM-PAC 6 CLICK MOBILITY  Total Score:18    Transfers:  Sit<>Stand: Mod I with use of hands for support  Stand Pivot Transfer: Mod I with use of hands for support    Gait:  · Amb 150 feet with no assistive device, SBA, swing-through gait, slow gait speed, sufficient dorsiflexion  · Amb 200 feet with no AD, SBA- naming things around the gym; did slow her pace and come to a full stop to describe objects in detail. Does demonstrate moderate armswing.     Advanced Gait:  Stairs: 4 steps x 3 trials with  SBA    Wheelchair Mobility:  Patient propels w/c 150 feet with BUE, mod I      Balance:  [place] BALANCE SCALE     1.SITTING TO STANDING:  (3) Pt able to stand independently using hands     2. STANDING UNSUPPORTED: (4) Pt able to stand safely 2 minutes     3. SITTING WITH BACK UNSUPPORTED BUT FEET SUPPORTED ON FLOOR: (4) Pt able to sit safely and securely 2 minutes     4. STANDING TO SITTING:(4) Pt sits safely with minimal use of hands     5. TRANSFERS:(4) Pt  able to transfer safely with minor use of hands    6. STANDING UNSUPPORTED WITH EYES CLOSED:(4) Pt  able to stand 10 seconds safely    7. STANDING UNSUPPORTED WITH FEET TOGETHER:(4) Pt   able to place feet together independently and stand 1 minute safely    8. REACHING FORWARD WITH OUTSTRETCHED ARM WHILE STANDING:(2) Pt can reach forward >5 cm safely (2 inches)    9.  OBJECT FROM THE FLOOR FROM A STANDING POSITION:(4) Pt  able to  slipper safely and easily    10. TURNING TO LOOK BEHIND OVER LEFT AND RIGHT SHOULDERS WHILE STANDING:(4) Pt looks behind from both sides and weight shifts well    11. TURN 360 DEGREES:(4) Pt able to turn 360 degrees safely in 4 seconds or less    12. PLACING ALTERNATE FOOT ON STEP OR STOOL WHILE STANDING UNSUPPORTED:(4) Pt able to stand independently and safely and complete 8 steps in 20 seconds    13. STANDING UNSUPPORTED ONE FOOT IN FRONT: (2) Pt able to take small step independently and hold 30 seconds     14. STANDING ON ONE LEG:(1) Pt tries to lift leg unable to hold 3 seconds but remains standing independently    Total Score 48/56    41-56 = low fall risk  21-40 = medium fall risk  0 -20 = high fall risk      Patient left up in chair with call button in reach.    Assessment:  Mikaela Ghosh is a 85 y.o. female with a medical diagnosis of Subdural hematoma. Ms. Ghosh showed great progress today, scoring a 48/56 on her [place] Balance Assessment Tool, which denotes a low fall risk. She met two goals, and demonstrated  her ability to ambualte without an assistive device. We will continue to work on her higher level balance skills via ambulation in an open environment.    Rehab identified problem list/impairments: weakness, impaired endurance, impaired functional mobilty, gait instability, impaired balance, decreased lower extremity function, pain    Rehab potential is good.    Activity tolerance: Good    Discharge recommendations: home with home health     Barriers to discharge: Inaccessible home environment, Decreased caregiver support    Equipment recommendations: walker, rolling, bedside commode     GOALS:    Physical Therapy Goals        Problem: Physical Therapy Goal    Goal Priority Disciplines Outcome Goal Variances Interventions   Physical Therapy Goal     PT/OT, PT Ongoing (interventions implemented as appropriate)     Description:  Goals to be met by: 14 days     Patient will increase functional independence with mobility by performin. Supine to sit with Modified [place] met  2. Sit to supine with Modified [place] met  3. Sit to stand transfer with Supervision with appropriate assistive device, as needed. Met (2018)  4. Bed to chair transfer with Supervision using Rolling Walker or least restrictive assistive device, as appropriate. Met (2018)  5. Gait  x 300 feet with Supervision using Rolling Walker Or least restrictive assistive device. Met (2018)  5a. Upgrade: Gait x 400 feet with supervision in open environment with no AD (going up/down stairs, elevator, outdoors, etc).   6. Gait x 50 feet w/ single point cane with minimal assistance. (Able to ambulate with no AD >50 feet, SBA). MET  7.Wheelchair propulsion x150 feet with Stand-by Assistance using bilateral upper extremities.  8. Ascend/descend 8 stair with bilateral Handrails Supervision using Single-point Cane or without assistive, or appropriate AD.   9. Ascend/Descend 4 inch curb step with Stand-by Assistance using Rolling Walker.or  appropriate assistive device.  10. Stand for 5 minutes with Stand-by Assistance using Rolling Walker or appropriate assistive device and perform an activity. Met (5/9/2018)  11. Lower extremity exercise program x20 reps per handout, with assistance as needed and gym therex met  12. Perform Thompson or appropriate balance test. Met (5/9/2018)- Low Fall Risk.                         PLAN:    Patient to be seen 5 x/week  to address the above listed problems via gait training, therapeutic activities, therapeutic exercises, wheelchair management/training  Plan of Care expires: 05/27/18  Plan of Care reviewed with: patient    Vanesa J Mary, PT  05/09/2018

## 2018-05-09 NOTE — PLAN OF CARE
Problem: SLP Goal  Goal: SLP Goal  Speech Language Pathology Goals  Goals expected to be met by 5/4:  1. Pt will complete immediate memory tasks with 70% accuracy min cues. Goal met  2. Following a delay, pt will recall 3/3 words given mod cues. Goal met x 1 out of 2 trials/ ongoing  3. Pt will follow 3-step commands with 70% accuracy given mod cues. Goal not met.  4. Pt will list 8 items in one minute given min cues. Goal not met.  5. Pt will participate in ongoing assessment of reading, writing, visual spatial, and functional math abilities. Goal met.     Revised goals expected to be met 5/11:  1. Pt will complete immediate memory tasks with 80% accuracy min cues.  2. Following a delay, pt will recall 3/3 words given mod cues.  3. Pt will follow 3-step commands with 60% accuracy given max cues.  4. Pt will list 8 items in one minute given min cues.  5. Pt will complete short paragraph level reading tasks with 80% accuracy and min cues.        Outcome: Ongoing (interventions implemented as appropriate)  Pt was seen for ST session.     Elizabeth Stout MS, CCC-SLP  Speech Language Pathologist  Pager: [phone]  5/9/2018

## 2018-05-09 NOTE — PT/OT/SLP PROGRESS
Speech Language Pathology  Treatment    Mikaela Ghosh   MRN: [id number]   Admitting Diagnosis: Subdural hematoma    Diet recommendations: Solid Diet Level: Regular  Liquid Diet Level: Thin Standard aspiration precautions    SLP Treatment Date: 05/09/18  Speech Start Time: 1434     Speech Stop Time: 1504     Speech Total (min): 30 min       TREATMENT BILLABLE MINUTES:  Speech Therapy Individual 30    Has the patient been evaluated by SLP for swallowing? : Yes  Keep patient NPO?: No   General Precautions: Standard, fall  Current Respiratory Status: room air       Subjective:  Awake & alert. Seen in speech room in .          Objective:   Given 1 min, pt named an average of 7 items in a concrete category over 5 trials given min A to utilize strategies reviewed before task. Pt recalled 1/3 unrelated wds given a 3 min filled delay IND'ly, 3/3 given mod A. Pt immediately recalled 3 digits with 100% acc, 4 digits with 50% acc IND'ly, 75% acc given mod A. & required max A to immediately recall 5 digits. Pt followed 3 step commands with 50% acc IND'ly, 100% acc given min to mod A.     FIM:                                 Assessment:  Mikaela Ghosh is a 85 y.o. female with a medical diagnosis of Subdural hematoma and presents with cognitive linguistic impairment        Discharge recommendations: Discharge Facility/Level Of Care Needs: home health speech therapy     Goals:    SLP Goals        Problem: SLP Goal    Goal Priority Disciplines Outcome   SLP Goal     SLP Ongoing (interventions implemented as appropriate)   Description:  Speech Language Pathology Goals  Goals expected to be met by 5/4:  1. Pt will complete immediate memory tasks with 70% accuracy min cues. Goal met  2. Following a delay, pt will recall 3/3 words given mod cues. Goal met x 1 out of 2 trials/ ongoing  3. Pt will follow 3-step commands with 70% accuracy given mod cues. Goal not met.  4. Pt will list 8 items in one minute given min cues. Goal not  met.  5. Pt will participate in ongoing assessment of reading, writing, visual spatial, and functional math abilities. Goal met.     Revised goals expected to be met 5/11:  1. Pt will complete immediate memory tasks with 80% accuracy min cues.  2. Following a delay, pt will recall 3/3 words given mod cues.  3. Pt will follow 3-step commands with 60% accuracy given max cues.  4. Pt will list 8 items in one minute given min cues.  5. Pt will complete short paragraph level reading tasks with 80% accuracy and min cues.                          Plan:   Patient to be seen Therapy Frequency: 5 x/week   Plan of Care expires:    Plan of Care reviewed with: patient  SLP Follow-up?: Yes              Elizabeth Stout CCC-SLP  05/09/2018

## 2018-05-09 NOTE — PLAN OF CARE
Problem: Patient Care Overview  Goal: Plan of Care Review  Outcome: Ongoing (interventions implemented as appropriate)   05/09/18 0017   Coping/Psychosocial   Plan Of Care Reviewed With patient       Problem: Fall Risk (Adult)  Goal: Identify Related Risk Factors and Signs and Symptoms  Related risk factors and signs and symptoms are identified upon initiation of Human Response Clinical Practice Guideline (CPG)   Outcome: Ongoing (interventions implemented as appropriate)   05/09/18 0017   Fall Risk   Related Risk Factors (Fall Risk) fatigue/slow reaction;gait/mobility problems

## 2018-05-09 NOTE — PLAN OF CARE
Problem: Physical Therapy Goal  Goal: Physical Therapy Goal  Goals to be met by: 14 days     Patient will increase functional independence with mobility by performin. Supine to sit with Modified [place] met  2. Sit to supine with Modified [place] met  3. Sit to stand transfer with Supervision with appropriate assistive device, as needed. Met (2018)  4. Bed to chair transfer with Supervision using Rolling Walker or least restrictive assistive device, as appropriate. Met (2018)  5. Gait  x 300 feet with Supervision using Rolling Walker Or least restrictive assistive device. Met (2018)  5a. Upgrade: Gait x 400 feet with supervision in open environment with no AD (going up/down stairs, elevator, outdoors, etc).   6. Gait x 50 feet w/ single point cane with minimal assistance. (Able to ambulate with no AD >50 feet, SBA). MET  7.Wheelchair propulsion x150 feet with Stand-by Assistance using bilateral upper extremities.  8. Ascend/descend 8 stair with bilateral Handrails Supervision using Single-point Cane or without assistive, or appropriate AD.   9. Ascend/Descend 4 inch curb step with Stand-by Assistance using Rolling Walker.or appropriate assistive device.  10. Stand for 5 minutes with Stand-by Assistance using Rolling Walker or appropriate assistive device and perform an activity. Met (2018)  11. Lower extremity exercise program x20 reps per handout, with assistance as needed and gym therex met  12. Perform Thompson or appropriate balance test. Met (2018)- Low Fall Risk.       Outcome: Ongoing (interventions implemented as appropriate)  Met 2 goals today. One goal upgraded.

## 2018-05-09 NOTE — PLAN OF CARE
05/09/2018  11:56 AM    SW met with pt in room to discuss d/c plans.  SW notified pt of scheduled SNF d/c date of 5/16/18.  Pt expressed understanding and satisfaction regarding d/c date.  Pt plans to return home upon SNF d/c where she lives alone in a H with 6 MARY JO with BHRs.  Pt reports her daughter (Judie) will be available to assist her when she d/c's home.  Pt believes she has adequate assistance available at home and doubts that she will need much assistance, if any at all, upon her return home.  Pt was unable to provide much detail about d/c plan with regard to how available Judie will be, whether Judie plans to stay with pt after d/c, whether home health services would be accepted, and whether appropriate DME items are present at home.  Pt asked that SW discuss above with Judie.  SW called and left message with Judie ([phone]) asking for a call back to discuss.  Pt denies having any questions or concerns regarding d/c plan and expresses readiness to return home.  SW remains available for further d/c planning assistance and will f/u as needed.    Addendum 2:42 PM  SW received return call from Judie to discuss above.  Judie states she can take off of work to stay with pt at home for 2 weeks before she would have to return to work.  Judie is unsure whether pt will be unsafe to be alone at home either temporarily or permanently and whether pt will require additional assistance following d/c.  SW provided Judie with update on pt's current functional status based on most recent PT/OT progress notes.  SW encouraged Judie to participate in family training to get more information and to better prepare for pt's return home.  Judie reports having participated in family training already during pt's SNF stay and plans to do so again prior to pt's d/c.  Judie stated pt may own a SW and BSC but will check at pt's home and notify SW.  Judie agreeable to home health services for pt upon SNF  d/c.  SW left home health agency list (included in Senior Resource Guide) with pt in room to review and make HH agency choice.  Judie denies having any additional concerns or questions regarding d/c plan at this time.  SW to continue to f/u as needed.    Yazan Beltran, YISEL  [id number]

## 2018-05-10 ENCOUNTER — OFFICE VISIT (OUTPATIENT)
Dept: ENDOCRINOLOGY | Facility: CLINIC | Age: 83
DRG: 950 | End: 2018-05-10
Attending: INTERNAL MEDICINE
Payer: MEDICARE

## 2018-05-10 ENCOUNTER — OFFICE VISIT (OUTPATIENT)
Dept: NEUROSURGERY | Facility: CLINIC | Age: 83
DRG: 950 | End: 2018-05-10
Attending: INTERNAL MEDICINE
Payer: MEDICARE

## 2018-05-10 ENCOUNTER — HOSPITAL ENCOUNTER (OUTPATIENT)
Dept: RADIOLOGY | Facility: HOSPITAL | Age: 83
Discharge: HOME | DRG: 950 | End: 2018-05-10
Attending: INTERNAL MEDICINE | Admitting: INTERNAL MEDICINE
Payer: MEDICARE

## 2018-05-10 VITALS
BODY MASS INDEX: 28.63 KG/M2 | HEIGHT: 59 IN | SYSTOLIC BLOOD PRESSURE: 108 MMHG | DIASTOLIC BLOOD PRESSURE: 78 MMHG | HEART RATE: 72 BPM | WEIGHT: 142 LBS

## 2018-05-10 VITALS
HEART RATE: 67 BPM | WEIGHT: 143 LBS | TEMPERATURE: 98 F | HEIGHT: 62 IN | SYSTOLIC BLOOD PRESSURE: 136 MMHG | BODY MASS INDEX: 26.31 KG/M2 | DIASTOLIC BLOOD PRESSURE: 65 MMHG

## 2018-05-10 DIAGNOSIS — E83.39 HYPOPHOSPHATEMIA: ICD-10-CM

## 2018-05-10 DIAGNOSIS — I62.00 SUBDURAL HEMORRHAGE: ICD-10-CM

## 2018-05-10 DIAGNOSIS — E83.52 HYPERCALCEMIA: ICD-10-CM

## 2018-05-10 DIAGNOSIS — E21.0 PRIMARY HYPERPARATHYROIDISM: Primary | ICD-10-CM

## 2018-05-10 DIAGNOSIS — S06.5XAA SUBDURAL HEMATOMA: ICD-10-CM

## 2018-05-10 DIAGNOSIS — Z51.89 VISIT FOR WOUND CHECK: Primary | ICD-10-CM

## 2018-05-10 LAB
ANION GAP SERPL CALC-SCNC: 9 MMOL/L
BASOPHILS # BLD AUTO: 0.02 K/UL
BASOPHILS NFR BLD: 0.5 %
BUN SERPL-MCNC: 13 MG/DL
CALCIUM SERPL-MCNC: 12.3 MG/DL
CHLORIDE SERPL-SCNC: 107 MMOL/L
CO2 SERPL-SCNC: 25 MMOL/L
CREAT SERPL-MCNC: 0.7 MG/DL
DIFFERENTIAL METHOD: ABNORMAL
EOSINOPHIL # BLD AUTO: 0.2 K/UL
EOSINOPHIL NFR BLD: 3.6 %
ERYTHROCYTE [DISTWIDTH] IN BLOOD BY AUTOMATED COUNT: 15.3 %
EST. GFR  (AFRICAN AMERICAN): >60 ML/MIN/1.73 M^2
EST. GFR  (NON AFRICAN AMERICAN): >60 ML/MIN/1.73 M^2
GLUCOSE SERPL-MCNC: 82 MG/DL
HCT VFR BLD AUTO: 35.3 %
HGB BLD-MCNC: 11.3 G/DL
IMM GRANULOCYTES # BLD AUTO: 0 K/UL
IMM GRANULOCYTES NFR BLD AUTO: 0 %
LYMPHOCYTES # BLD AUTO: 1.4 K/UL
LYMPHOCYTES NFR BLD: 34.4 %
MAGNESIUM SERPL-MCNC: 1.8 MG/DL
MCH RBC QN AUTO: 31.6 PG
MCHC RBC AUTO-ENTMCNC: 32 G/DL
MCV RBC AUTO: 99 FL
MONOCYTES # BLD AUTO: 0.6 K/UL
MONOCYTES NFR BLD: 13.9 %
NEUTROPHILS # BLD AUTO: 2 K/UL
NEUTROPHILS NFR BLD: 47.6 %
NRBC BLD-RTO: 0 /100 WBC
PHOSPHATE SERPL-MCNC: 2.6 MG/DL
PLATELET # BLD AUTO: 193 K/UL
PMV BLD AUTO: 11.4 FL
POTASSIUM SERPL-SCNC: 4.1 MMOL/L
RBC # BLD AUTO: 3.58 M/UL
SODIUM SERPL-SCNC: 141 MMOL/L
WBC # BLD AUTO: 4.16 K/UL

## 2018-05-10 PROCEDURE — 70450 CT HEAD/BRAIN W/O DYE: CPT | Mod: 26,,, | Performed by: RADIOLOGY

## 2018-05-10 PROCEDURE — 80048 BASIC METABOLIC PNL TOTAL CA: CPT

## 2018-05-10 PROCEDURE — 97530 THERAPEUTIC ACTIVITIES: CPT

## 2018-05-10 PROCEDURE — 97110 THERAPEUTIC EXERCISES: CPT

## 2018-05-10 PROCEDURE — 99214 OFFICE O/P EST MOD 30 MIN: CPT | Mod: PBBFAC,25,27 | Performed by: INTERNAL MEDICINE

## 2018-05-10 PROCEDURE — 36415 COLL VENOUS BLD VENIPUNCTURE: CPT

## 2018-05-10 PROCEDURE — 99999 PR PBB SHADOW E&M-EST. PATIENT-LVL IV: CPT | Mod: PBBFAC,GC,, | Performed by: INTERNAL MEDICINE

## 2018-05-10 PROCEDURE — 84100 ASSAY OF PHOSPHORUS: CPT

## 2018-05-10 PROCEDURE — 99204 OFFICE O/P NEW MOD 45 MIN: CPT | Mod: S$PBB,GC,, | Performed by: INTERNAL MEDICINE

## 2018-05-10 PROCEDURE — 99214 OFFICE O/P EST MOD 30 MIN: CPT | Mod: PBBFAC,25 | Performed by: PHYSICIAN ASSISTANT

## 2018-05-10 PROCEDURE — 85025 COMPLETE CBC W/AUTO DIFF WBC: CPT

## 2018-05-10 PROCEDURE — 11000004 HC SNF PRIVATE

## 2018-05-10 PROCEDURE — 97535 SELF CARE MNGMENT TRAINING: CPT

## 2018-05-10 PROCEDURE — 70450 CT HEAD/BRAIN W/O DYE: CPT | Mod: TC

## 2018-05-10 PROCEDURE — 25000003 PHARM REV CODE 250: Performed by: INTERNAL MEDICINE

## 2018-05-10 PROCEDURE — 99024 POSTOP FOLLOW-UP VISIT: CPT | Mod: POP,,, | Performed by: PHYSICIAN ASSISTANT

## 2018-05-10 PROCEDURE — 99999 PR PBB SHADOW E&M-EST. PATIENT-LVL IV: CPT | Mod: PBBFAC,,, | Performed by: PHYSICIAN ASSISTANT

## 2018-05-10 PROCEDURE — 83735 ASSAY OF MAGNESIUM: CPT

## 2018-05-10 PROCEDURE — 63600175 PHARM REV CODE 636 W HCPCS: Performed by: PHYSICIAN ASSISTANT

## 2018-05-10 RX ORDER — CINACALCET 30 MG/1
30 TABLET, FILM COATED ORAL 2 TIMES DAILY WITH MEALS
Qty: 60 TABLET | Refills: 11 | Status: SHIPPED | OUTPATIENT
Start: 2018-05-10 | End: 2018-05-15

## 2018-05-10 RX ADMIN — HEPARIN SODIUM 5000 UNITS: 5000 INJECTION, SOLUTION INTRAVENOUS; SUBCUTANEOUS at 08:05

## 2018-05-10 RX ADMIN — BACITRACIN: 500 OINTMENT TOPICAL at 09:05

## 2018-05-10 RX ADMIN — AMLODIPINE BESYLATE 10 MG: 10 TABLET ORAL at 03:05

## 2018-05-10 RX ADMIN — HYPROMELLOSE 2910 1 DROP: 5 SOLUTION OPHTHALMIC at 08:05

## 2018-05-10 RX ADMIN — BACITRACIN: 500 OINTMENT TOPICAL at 03:05

## 2018-05-10 RX ADMIN — HEPARIN SODIUM 5000 UNITS: 5000 INJECTION, SOLUTION INTRAVENOUS; SUBCUTANEOUS at 05:05

## 2018-05-10 RX ADMIN — HYPROMELLOSE 2910 1 DROP: 5 SOLUTION OPHTHALMIC at 03:05

## 2018-05-10 NOTE — PLAN OF CARE
Problem: Patient Care Overview  Goal: Plan of Care Review  Outcome: Ongoing (interventions implemented as appropriate)   05/09/18 2314   Coping/Psychosocial   Plan Of Care Reviewed With patient       Problem: Fall Risk (Adult)  Goal: Identify Related Risk Factors and Signs and Symptoms  Related risk factors and signs and symptoms are identified upon initiation of Human Response Clinical Practice Guideline (CPG)   Outcome: Ongoing (interventions implemented as appropriate)   05/09/18 4815   Fall Risk   Related Risk Factors (Fall Risk) fatigue/slow reaction;fear of falling;gait/mobility problems

## 2018-05-10 NOTE — PT/OT/SLP PROGRESS
Physical Therapy  Treatment    Mikaela Ghosh   MRN: [id number]   Admitting Diagnosis: Subdural hematoma    PT Received On: 05/10/18  Total Time (min): 15       Billable Minutes:  Therapeutic Activity 15    Treatment Type: Treatment  PT/PTA: PT     PTA Visit Number: 0       General Precautions: Standard, fall  Orthopedic Precautions: N/A   Braces: N/A         Subjective:  Communicated with pt prior to session.  Pt was agreeable to PT services. Reported hunger after all of her appointments.    Pain/Comfort  Pain Rating 1: 0/10    Objective:  Patient found seated in wheelchair with cousin and brother present. Daughter showed up to appointment also.         Functional Status:  MDS G  Bed Mobility Functional Status: mod(I) - (I)  Transfer Functional Status: mod(I) - (I)  Walk in Room Functional Status: S-SBA  Walk in Corridor Functional Status: S-SBA  Locomotion on Unit Functional Status: S-SBA  Moving from seated to standing position: Not steady, but able to stabilize without staff assistance  Walking (with assistive device if used): Not steady, but able to stabilize without staff assistance  Turning around and facing the opposite direction while walking: Not steady, but able to stabilize without staff assistance  Moving on and off the toilet: Not steady, but able to stabilize without staff assistance  Surface-to-surface transfer (transfer between bed and chair or wheelchair): Not steady, but able to stabilize without staff assistance      AM-PAC 6 CLICK MOBILITY  Total Score:18    Transfers:  Sit<>Stand: mod i  Stand Pivot Transfer: Mod I    Gait:  Amb >500 feet in hallway, to elevators, to mobility courtyard, over grass, over an incline/decline, through corridors with no assistive device and supervision. Pt does present with a slight lateral sway and slight forward flexion of trunk. With verbal cues, she can correct her posture.     Patient left up in chair with call button in reach, daughter  present.    Assessment:  Mikaela Ghosh is a 85 y.o. female with a medical diagnosis of Subdural hematoma.  Pt was able to navigate her environment with supervision - going downstairs (via elevator), through the mobility courtyard, opening doors, etc.  She does present with a slight limp/lateral sway and forward flexed trunk, but she remains safe with ambulating without an assistive device.  She will benefit from further PT services to ensure safety upon return home.    Rehab identified problem list/impairments: weakness, impaired endurance, impaired functional mobilty, gait instability, impaired balance, decreased lower extremity function, pain    Rehab potential is good.    Activity tolerance: good    Discharge recommendations: home with home health     Barriers to discharge: Inaccessible home environment, Decreased caregiver support    Equipment recommendations: walker, rolling, bedside commode     GOALS:    Physical Therapy Goals        Problem: Physical Therapy Goal    Goal Priority Disciplines Outcome Goal Variances Interventions   Physical Therapy Goal     PT/OT, PT Ongoing (interventions implemented as appropriate)     Description:  Goals to be met by: 14 days     Patient will increase functional independence with mobility by performin. Supine to sit with Modified [place] met  2. Sit to supine with Modified [place] met  3. Sit to stand transfer with Supervision with appropriate assistive device, as needed. Met (2018)  4. Bed to chair transfer with Supervision using Rolling Walker or least restrictive assistive device, as appropriate. Met (2018)  5. Gait  x 300 feet with Supervision using Rolling Walker Or least restrictive assistive device. Met (2018)  5a. Upgrade: Gait x 400 feet with supervision in open environment with no AD (going up/down stairs, elevator, outdoors, etc).  Met (5/10/2018)  6. Gait x 50 feet w/ single point cane with minimal assistance. (Able to ambulate with  no AD >50 feet, SBA). MET  7.Wheelchair propulsion x150 feet with Stand-by Assistance using bilateral upper extremities.  8. Ascend/descend 8 stair with bilateral Handrails Supervision using Single-point Cane or without assistive, or appropriate AD.   9. Ascend/Descend 4 inch curb step with Stand-by Assistance using Rolling Walker.or appropriate assistive device.  10. Stand for 5 minutes with Stand-by Assistance using Rolling Walker or appropriate assistive device and perform an activity. Met (5/9/2018)  11. Lower extremity exercise program x20 reps per handout, with assistance as needed and gym therex met  12. Perform Thompson or appropriate balance test. Met (5/9/2018)- Low Fall Risk.                           PLAN:    Patient to be seen 5 x/week  to address the above listed problems via gait training, therapeutic activities, therapeutic exercises, wheelchair management/training  Plan of Care expires: 05/27/18  Plan of Care reviewed with: patient    Vanesa Hernandez, PT  05/10/2018

## 2018-05-10 NOTE — PROGRESS NOTES
Neurosurgery Wound Check Progress Note    HPI  Mikaela Ghosh is 85 y.o. who [place] [facility] [place] with dizziness x2 days and after CT head which showed subacute left convexity SDH.  She underwent Left frontoparietal craniotomy for evacuation of subdural hematoma with Dr. Sandoval on 4/20/18. She did well and was discharged to [facility] on 4/26/18 for continued therapy. Patient presents today for 2 wk wound check with CT head prior. Daughter is present during exam. Patient states that she was walking for the first time with PT yesterday. She did not require rolling walker but was holding on to the side rails. She has no complaints on exam today. She has no headaches, blurry vision, seizure activity, fever, or incisional issues. She does notice a swelling area near incision. Daughter report that patient will be dc'ed from SNF in 1 week-[place].      EXAM  Vitals:    05/10/18 1102   BP: 136/65   Pulse: 67   Temp: 97.6 °F (36.4 °C)       General: well developed, well nourished. no acute distress. Comfortable.   Neurologic: Awake, alert and oriented x3. Thought content appropriate.  Head: normocephalic, atraumatic    GCS: Motor: 6/Verbal: 5/Eyes: 4 GCS Total: 15  Cranial nerves: face symmetric, tongue midline, pupils equal, round, reactive to light with accomodation, extraocular muscles intact. CN II-XII grossly intact.    Sensory: response to light touch throughout  Motor Strength: Moves all extremities spontaneously with good tone. Full strength upper and lower extremities. No abnormal movements seen.    No focal numbness or weakness   Heart: RRR, no cyanosis, pallor, or edema.    Lungs: normal respiratory effort  Abdomen: soft, non-tender and symmetric  Extremities: warm with no cyanosis, edema, or clubbing  Skin: warm, dry and intact. No visible rashes or lesions.      Surgical incisions are C/D/I without any signs of infection. Some dry blood and scabbing present along both the surgical incision and drain incision.    No erythema, warmth, edema, TTP.  No drainage.  Wound edges are well approximated.            Fat lipoma present           IMAGING  CT Head 5/10/18: personally reviewed and agree with findings  Images were compared to 4/18 and 4/24-18  Expected post op changes from left craniotomy. There is decrease attenuation and size of previous left mixed density hematoma. Midline shift improved. Previous right extra-axial collection resolved.Decreasing conspicuity of the left posterior temporal lobe subarachnoid hemorrhage---near resolvement. No new intracranial hemorrhage.       ASSESSMENT/PLAN    Patient is doing well postoperatively. She has no pain or abnormal symptoms. Incision is healing well. Staples intact and removed without difficulty. Imaging showed marked improvement in previous SDH and SAH. She has a fat lipoma present on her scalp (shown above), which was present before surgery. I have discussed this with Yulisa Hewitt PA-C since she was present during surgery. She agrees that there is no change from previous.  I have reiterated wound care, activity restrictions, and follow up appts as below.     -Do not submerge incision for another 6 weeks. Pat the incision dry after your shower.  -Patient to continue using bacitracin ointment once daily for 1 week.   -Keep incision open to air.  -Continue stool softener and miralax to prevent constipation with pain med use.   -Increase ambulation. No heavy lifting >10lbs.  -Patient has follow up with me in 4 wks with repeat CT head     All questions were answered. Patient was encouraged to call clinic with any future concerns prior to follow up appt. If any worsening symptoms, patient should report to ED.     Please call with any questions.    Brady Mustafa PA-C  Neurosurgery

## 2018-05-10 NOTE — PT/OT/SLP PROGRESS
Speech Language Pathology      Mikaela Ghosh  MRN: [id number]    Patient not seen today secondary to Out of hospital appointment. Will follow-up 5/11.    LIZZY Gomez, CCC-SLP     LIZZY Gomez, CCC-SLP  Speech Language Pathologist  [phone]  5/10/2018

## 2018-05-10 NOTE — PT/OT/SLP PROGRESS
Occupational Therapy  Treatment    Mikaela Ghosh   MRN: [id number]   Admitting Diagnosis: Subdural hematoma    OT Date of Treatment: 05/10/18  Total Time (min): 41 min    Billable Minutes:  Self Care/Home Management 30 and Therapeutic Exercise 11    General Precautions: Standard, fall  Orthopedic Precautions: N/A  Braces: N/A    Do you have any cultural, spiritual, Sabianism conflicts, given your current situation?: none stated    Subjective:  Communicated with nurse prior to session.  Pt. Reported she had a doctor's appointment on this date    Pain/Comfort  Pain Rating 1: 0/10  Pain Rating Post-Intervention 1: 0/10    Objective:  Patient found with:  (supine in bed)    Functional Status:  MDS G  Bed Mobility Functional Status: mod(I) - (I)  Transfer Functional Status: mod(I) - (I)  Walk in Room Functional Status: S-SBA  Walk In Room Level of (A):  (with RW)  Dressing Functional Status: 1: S-SBA  Eating Functional Status: mod(I) - (I)  Personal Hygiene Functional Status: S-SBA  Bathing Functional Status: S-SBA  Moving from seated to standing position: Not steady, but able to stabilize without staff assistance  Surface-to-surface transfer (transfer between bed and chair or wheelchair): Not steady, but able to stabilize without staff assistance           [facility] 6 Click:  [facility] Total Score: 19    OT Exercises: UE Ergometer x 8 minutes with minimum resistance    Additional Treatment:  Pt. Educated on safety with ADL task of sponge bath standing at sink  Required vc's for safety when donning and removing clothing items (not to stand on one foot, not to pull shirt overhead when standing sit for these above stated tasks)  Pt. Performed clothing retrieval in room with Supervsion/SBA and use of RW; recommend bag on rolling walker on d/c to hold items    Patient left up in chair with call button in reach    ASSESSMENT:  Mikaela Ghosh is a 85 y.o. female with a medical diagnosis of Subdural hematoma and presents with mild  deficits in higher ADL task performance and requires vc's for safety with ADL/IADL activities to reduce risk of falls.  Pt. Tolerated session well on this date.     Rehab identified problem list/impairments: weakness, impaired endurance, impaired self care skills, impaired functional mobilty, gait instability, impaired balance, decreased lower extremity function, pain    Rehab potential is good    Activity tolerance: Good    Discharge recommendations:  (tbd)   Will likely require supervision for safety initially on d/c and recommend driving evaluation    Barriers to discharge: Inaccessible home environment, Decreased caregiver support     Equipment recommendations: bath bench, wheelchair (daughter reports that she is going to check to see if she needs a RW. )     GOALS:    Occupational Therapy Goals        Problem: Occupational Therapy Goal    Goal Priority Disciplines Outcome Interventions   Occupational Therapy Goal     OT, PT/OT Ongoing (interventions implemented as appropriate)    Description:  Goals to be met by: 10 days     Patient will increase functional independence with ADLs by performing:    UE Dressing with Modified [place].  LE Dressing with Modified [place].  Grooming while standing at sink with Set-up Assistance.  Toileting from toilet with Modified [place] for hygiene and clothing management.   Bathing from  shower chair/bench with Supervision.  Supine to sit with Modified [place].  Stand pivot transfers with Modified [place].  Toilet transfer to toilet with Modified [place].  Upper extremity exercise program x 25 min to increase functional endurance for ADLs  Pt will perform a functional standing task x 15 min with sup                    Plan:  Patient to be seen 5 x/week to address the above listed problems via self-care/home management, therapeutic activities, therapeutic exercises  Plan of Care expires: 05/27/18  Plan of Care reviewed with: patient    Christine ADAN  ALVA Pugh  05/10/2018

## 2018-05-10 NOTE — PROGRESS NOTES
Subjective:      Patient ID: Mikaela Ghosh is a 85 y.o. female.    Chief Complaint:  Hypercalcemia      History of Present Illness  Initial for primary hyperparathyroidism     Pt suffered a fall that resulted in subdural hematoma that required craniotomy in 4/18/2018.   She was discharged to SNF and it was there that she was first told about her hypercalcemia.     On labs, Hypercalcemia with hypophosphatemia Present since 5/2017 with PTH elevated 197.   Most recent corrected calcium is 13.1  Labs notable for normal GFR and normal alk phos.     Denies clinical Fractures. No prior BMD. Not taking Vit D.   Denies symptomatic nephrolithiasis.   Denies HCTZ    Denies immobility. Prior to fall she was working at [employer].   She reports staying hydrated. Reports polyuria and polyuria.   Daughter denies any confusion and pt is oriented.   10 lbs wt loss since last year.   Denies abd pain, n/v, bone pain or hx pancreatitis.     No known FH calcium disorders     Has HTN, on norvasc.       Review of Systems   Constitutional: Positive for activity change (decreased), fatigue and unexpected weight change.   Eyes: Negative for visual disturbance.   Respiratory: Negative for shortness of breath.    Cardiovascular: Negative for palpitations and leg swelling.   Gastrointestinal: Positive for constipation. Negative for abdominal pain.   Endocrine: Positive for polydipsia and polyuria.   Musculoskeletal: Negative for arthralgias.   Skin: Negative for wound.   Neurological: Negative for weakness and headaches.   Hematological: Does not bruise/bleed easily.   Psychiatric/Behavioral: Negative for sleep disturbance.       Objective:   Physical Exam   Constitutional: She appears well-developed.   In wheelchair   Craniotomy sutures noted     HENT:   Right Ear: External ear normal.   Left Ear: External ear normal.   Nose: Nose normal.   Hearing normal  Dentition good    Neck: No tracheal deviation present. No thyromegaly present.    Cardiovascular: Normal rate.    No murmur heard.  Pulmonary/Chest: Effort normal and breath sounds normal.   Abdominal: Soft. There is no tenderness. No hernia.   Musculoskeletal: She exhibits no edema.   Gait normal  No clubbing or cyanosis noted   Neurological: She displays normal reflexes. No cranial nerve deficit.   Skin: No rash noted.   No nodules       Psychiatric: She has a normal mood and affect. Judgment normal.   Vitals reviewed.      Lab Review:   Results for MIKAELA FOSTER (MRN [id number]) as of 5/10/2018 14:25   Ref. Range 5/10/2018 05:07   Sodium Latest Ref Range: 136 - 145 mmol/L 141   Potassium Latest Ref Range: 3.5 - 5.1 mmol/L 4.1   Chloride Latest Ref Range: 95 - 110 mmol/L 107   CO2 Latest Ref Range: 23 - 29 mmol/L 25   Anion Gap Latest Ref Range: 8 - 16 mmol/L 9   BUN, Bld Latest Ref Range: 8 - 23 mg/dL 13   Creatinine Latest Ref Range: 0.5 - 1.4 mg/dL 0.7   eGFR if non African American Latest Ref Range: >60 mL/min/1.73 m^2 >60.0   eGFR if African American Latest Ref Range: >60 mL/min/1.73 m^2 >60.0   Glucose Latest Ref Range: 70 - 110 mg/dL 82   Calcium Latest Ref Range: 8.7 - 10.5 mg/dL 12.3 (HH)   Phosphorus Latest Ref Range: 2.7 - 4.5 mg/dL 2.6 (L)   Magnesium Latest Ref Range: 1.6 - 2.6 mg/dL 1.8   Results for MIKAELA FOSTER (MRN [id number]) as of 5/10/2018 14:25   Ref. Range 4/30/2018 04:31   PTH Latest Ref Range: 9.0 - 77.0 pg/mL 197.0 (H)         Assessment:     1. Primary hyperparathyroidism  DXA Bone Density Spine And Hip    Calcium, Timed Urine [facility]; 24 Hours    VITAMIN D    Creatinine, urine, timed 24 Hours    RENAL FUNCTION PANEL   2. Hypercalcemia     3. Hypophosphatemia          Plan:     Mikaela was seen today for hypercalcemia.    Diagnoses and all orders for this visit:    Primary hyperparathyroidism  Pt does not want surgery given age which is understandable, but she is also very hesitant about starting medication.   Discussed risk and  Benefits and potential complications  from uncontrolled hypercalcemia.   Recommend starting sensipar to control hypercalcemia - given handout on medication and sent to pharm but she will let me know if she decides to pick it up in the next 1-2 days.   Discussed that if she refuses sensipar, other alternative (though less desirable due to concern for rebound) may include IV reclast infusion.   In the meantime discussed importance of hydration   Repeat labs 3-4 weeks after starting sensipar.    -     DXA Bone Density Spine And Hip; Future  -     Calcium, Timed Urine [facility]; 24 Hours; Future  -     VITAMIN D; Future  -     Creatinine, urine, timed 24 Hours; Future  -     RENAL FUNCTION PANEL; Future  -     cinacalcet (SENSIPAR) 30 MG Tab; Take 1 tablet (30 mg total) by mouth 2 (two) times daily with meals. Start with 1 pill daily for 1 week then increase to 1 pill twice daily    Hypercalcemia  As above     Hypophosphatemia  As above     Discussed w Dr Gustafson   rtc 6 mo

## 2018-05-10 NOTE — PLAN OF CARE
Problem: Physical Therapy Goal  Goal: Physical Therapy Goal  Goals to be met by: 14 days     Patient will increase functional independence with mobility by performin. Supine to sit with Modified [place] met  2. Sit to supine with Modified [place] met  3. Sit to stand transfer with Supervision with appropriate assistive device, as needed. Met (2018)  4. Bed to chair transfer with Supervision using Rolling Walker or least restrictive assistive device, as appropriate. Met (2018)  5. Gait  x 300 feet with Supervision using Rolling Walker Or least restrictive assistive device. Met (2018)  5a. Upgrade: Gait x 400 feet with supervision in open environment with no AD (going up/down stairs, elevator, outdoors, etc).  Met (5/10/2018)  6. Gait x 50 feet w/ single point cane with minimal assistance. (Able to ambulate with no AD >50 feet, SBA). MET  7.Wheelchair propulsion x150 feet with Stand-by Assistance using bilateral upper extremities.  8. Ascend/descend 8 stair with bilateral Handrails Supervision using Single-point Cane or without assistive, or appropriate AD.   9. Ascend/Descend 4 inch curb step with Stand-by Assistance using Rolling Walker.or appropriate assistive device.  10. Stand for 5 minutes with Stand-by Assistance using Rolling Walker or appropriate assistive device and perform an activity. Met (2018)  11. Lower extremity exercise program x20 reps per handout, with assistance as needed and gym therex met  12. Perform Thompson or appropriate balance test. Met (2018)- Low Fall Risk.         Outcome: Ongoing (interventions implemented as appropriate)  Met one goal today.

## 2018-05-10 NOTE — LETTER
May 10, 2018      Jarret Cedillo MD  [address]  [facility] [address]           [facility] - Neurosurgery 7th Fl  [address]  [facility] [address]  Phone: [phone]          Patient: Mikaela Ghosh   MR Number: [id number]   YOB: 1933   Date of Visit: 5/10/2018       Dear Dr. Jarret Cedillo:    Thank you for referring Mikaela Ghosh to me for evaluation. Attached you will find relevant portions of my assessment and plan of care.    If you have questions, please do not hesitate to call me. I look forward to following Mikaela Ghosh along with you.    Sincerely,    Brady Mustafa PA-C    Enclosure  CC:  No Recipients    If you would like to receive this communication electronically, please contact [email or web address] or [phone] to request more information on [employer] Link access.    For providers and/or their staff who would like to refer a patient to [facility], please contact us through our one-stop-shop provider referral line, [facility], at [phone].    If you feel you have received this communication in error or would no longer like to receive these types of communications, please e-mail [email or web address]

## 2018-05-11 PROCEDURE — 63600175 PHARM REV CODE 636 W HCPCS: Performed by: PHYSICIAN ASSISTANT

## 2018-05-11 PROCEDURE — 97535 SELF CARE MNGMENT TRAINING: CPT

## 2018-05-11 PROCEDURE — 97127 HC THERAPEUTIC INTVTN, COGN FUNCTION - OT: CPT

## 2018-05-11 PROCEDURE — 97110 THERAPEUTIC EXERCISES: CPT

## 2018-05-11 PROCEDURE — 92507 TX SP LANG VOICE COMM INDIV: CPT

## 2018-05-11 PROCEDURE — 97530 THERAPEUTIC ACTIVITIES: CPT

## 2018-05-11 PROCEDURE — 25000003 PHARM REV CODE 250: Performed by: PHYSICIAN ASSISTANT

## 2018-05-11 PROCEDURE — 11000004 HC SNF PRIVATE

## 2018-05-11 PROCEDURE — 25000003 PHARM REV CODE 250: Performed by: NURSE PRACTITIONER

## 2018-05-11 PROCEDURE — 97116 GAIT TRAINING THERAPY: CPT

## 2018-05-11 RX ORDER — CINACALCET 30 MG/1
30 TABLET, FILM COATED ORAL 2 TIMES DAILY WITH MEALS
Status: DISCONTINUED | OUTPATIENT
Start: 2018-05-19 | End: 2018-05-16 | Stop reason: HOSPADM

## 2018-05-11 RX ORDER — CINACALCET 30 MG/1
30 TABLET, FILM COATED ORAL
Status: DISCONTINUED | OUTPATIENT
Start: 2018-05-12 | End: 2018-05-16 | Stop reason: HOSPADM

## 2018-05-11 RX ADMIN — BACITRACIN: 500 OINTMENT TOPICAL at 09:05

## 2018-05-11 RX ADMIN — HEPARIN SODIUM 5000 UNITS: 5000 INJECTION, SOLUTION INTRAVENOUS; SUBCUTANEOUS at 10:05

## 2018-05-11 RX ADMIN — BACITRACIN: 500 OINTMENT TOPICAL at 10:05

## 2018-05-11 RX ADMIN — HEPARIN SODIUM 5000 UNITS: 5000 INJECTION, SOLUTION INTRAVENOUS; SUBCUTANEOUS at 06:05

## 2018-05-11 RX ADMIN — ACETAMINOPHEN 650 MG: 325 TABLET ORAL at 10:05

## 2018-05-11 RX ADMIN — STANDARDIZED SENNA CONCENTRATE AND DOCUSATE SODIUM 1 TABLET: 8.6; 5 TABLET, FILM COATED ORAL at 10:05

## 2018-05-11 RX ADMIN — HEPARIN SODIUM 5000 UNITS: 5000 INJECTION, SOLUTION INTRAVENOUS; SUBCUTANEOUS at 02:05

## 2018-05-11 RX ADMIN — HYPROMELLOSE 2910 1 DROP: 5 SOLUTION OPHTHALMIC at 10:05

## 2018-05-11 RX ADMIN — HYPROMELLOSE 2910 1 DROP: 5 SOLUTION OPHTHALMIC at 02:05

## 2018-05-11 RX ADMIN — HYPROMELLOSE 2910 1 DROP: 5 SOLUTION OPHTHALMIC at 09:05

## 2018-05-11 NOTE — PLAN OF CARE
Problem: SLP Goal  Goal: SLP Goal  Speech Language Pathology Goals  Goals expected to be met by 5/4:  1. Pt will complete immediate memory tasks with 70% accuracy min cues. Goal met  2. Following a delay, pt will recall 3/3 words given mod cues. Goal met x 1 out of 2 trials/ ongoing  3. Pt will follow 3-step commands with 70% accuracy given mod cues. Goal not met.  4. Pt will list 8 items in one minute given min cues. Goal not met.  5. Pt will participate in ongoing assessment of reading, writing, visual spatial, and functional math abilities. Goal met.     Revised goals expected to be met 5/11:  1. Pt will complete immediate memory tasks with 80% accuracy min cues. ongoing  2. Following a delay, pt will recall 3/3 words given mod cues. ongoing  3. Pt will follow 3-step commands with 60% accuracy given max cues. Goal met  4. Pt will list 8 items in one minute given min cues. Goal met.  5. Pt will complete short paragraph level reading tasks with 80% accuracy and min cues. Ongoing.      Revised goals expected to be met 5/18:  1. Pt will complete immediate memory tasks with 80% accuracy min cues.   2. Following a delay, pt will recall 3/3 words given mod cues.  3. Pt will follow 3-step commands with 60% accuracy given mod cues.   4. Pt will list 10 items in one minute given min cues.   5. Pt will complete short paragraph level reading tasks with 80% accuracy and min cues.        Outcome: Revised  POC updated to 3x/wk. Goals revised.   LIZZY Gomez, CCC-SLP  Speech Language Pathologist  [phone]  5/11/2018

## 2018-05-11 NOTE — PT/OT/SLP PROGRESS
"Speech Language Pathology  Treatment    Mikaela Ghosh   MRN: [id number]   Admitting Diagnosis: Subdural hematoma    Diet recommendations: Solid Diet Level: Regular  Liquid Diet Level: Thin Standard aspiration precautions    SLP Treatment Date: 05/11/18  Speech Start Time: 1450     Speech Stop Time: 1523     Speech Total (min): 33 min       TREATMENT BILLABLE MINUTES:  Speech Therapy Individual 23 and [place] Care/Home Management Training 10    Has the patient been evaluated by SLP for swallowing? : Yes  Keep patient NPO?: No   General Precautions: Standard, fall  Current Respiratory Status: room air       Subjective:  "Just make sure someone's in the room." pt commented during discussion about independence with ambulation.        Objective:      Pt ID'd safety hazards/problems in pictures with 80% acc. He stated possible effects and solutions to problems with 100% acc. Given hypothetical solutions, pt predicted problems with 100% acc.  During 3 word fluency trials, pt listed 11 vegetables ind'ly, 8 girls' names given cue x 1, and 10 desserts within one minute per category. Ongoing education provided to pt regarding fall risks and reducing risks of fall.  Pt expressing understanding.     Assessment:  Mikaela Ghosh is a 85 y.o. female with a medical diagnosis of Subdural hematoma and presents with mild cognitive-linguistic limitations (appears to be returning to baseline).      Discharge recommendations: Discharge Facility/Level Of Care Needs: home health speech therapy     Goals:    SLP Goals        Problem: SLP Goal    Goal Priority Disciplines Outcome   SLP Goal     SLP Revised   Description:  Speech Language Pathology Goals  Goals expected to be met by 5/4:  1. Pt will complete immediate memory tasks with 70% accuracy min cues. Goal met  2. Following a delay, pt will recall 3/3 words given mod cues. Goal met x 1 out of 2 trials/ ongoing  3. Pt will follow 3-step commands with 70% accuracy given mod cues. Goal not " met.  4. Pt will list 8 items in one minute given min cues. Goal not met.  5. Pt will participate in ongoing assessment of reading, writing, visual spatial, and functional math abilities. Goal met.     Revised goals expected to be met 5/11:  1. Pt will complete immediate memory tasks with 80% accuracy min cues. ongoing  2. Following a delay, pt will recall 3/3 words given mod cues. ongoing  3. Pt will follow 3-step commands with 60% accuracy given max cues. Goal met  4. Pt will list 8 items in one minute given min cues. Goal met.  5. Pt will complete short paragraph level reading tasks with 80% accuracy and min cues. Ongoing.      Revised goals expected to be met 5/18:  1. Pt will complete immediate memory tasks with 80% accuracy min cues.   2. Following a delay, pt will recall 3/3 words given mod cues.  3. Pt will follow 3-step commands with 60% accuracy given mod cues.   4. Pt will list 10 items in one minute given min cues.   5. Pt will complete short paragraph level reading tasks with 80% accuracy and min cues.                           Plan:   Patient to be seen Therapy Frequency: 3 x/week   Plan of Care expires:    Plan of Care reviewed with: patient  SLP Follow-up?: Yes              LIZZY Gomez, SHARAN-SLP  05/11/2018     LIZZY Gomez, CCC-SLP  Speech Language Pathologist  [phone]  5/11/2018

## 2018-05-11 NOTE — PLAN OF CARE
05/11/2018  4:36 PM     05/11/18 1635   Medicare Message   Important Message from Medicare regarding Discharge Appeal Rights Given to patient/caregiver;Explained to patient/caregiver;Signed/date by patient/caregiver   SHAMIKA served NOMNC to patient notifying of discharge date of 5/16/18 and appeal right.  Patient expressed understanding and satisfaction regarding discharge date.  Patient signed NOMNC, and SW provided patient with copy.  SHAMIKA placed original in patient's blue chart in nurse's station.    SHAMIKA scheduled PT/OT family training with pt's daughter (Judie [phone]) for Monday 5/14/18 at 10:30 am.  SHAMIKA placed note in Epic chart notifying of same and notified SNF therapy supervisor (Cindy) of same.    Yazan Beltran, YISEL  [phone]

## 2018-05-11 NOTE — PT/OT/SLP PROGRESS
Occupational Therapy  Treatment    Mikaela Ghosh   MRN: [id number]   Admitting Diagnosis: Subdural hematoma    OT Date of Treatment: 05/11/18  Total Time (min): 38 min    Billable Minutes:  Self Care/Home Management 10 and Cognitive Retraining 28    General Precautions: Standard, fall no lift greater than 10 #  Orthopedic Precautions: N/A  Braces: N/A    Do you have any cultural, spiritual, Jew conflicts, given your current situation?: none stated    Subjective:  Communicated with nurse prior to session.  Pt. Reported she was doing well on this date.     Pain/Comfort  Pain Rating 1: 0/10  Pain Rating Post-Intervention 1: 0/10    Objective:  Patient found with:  (seated in bedside chair)    Functional Status:  MDS G  Bed Mobility Functional Status: mod(I) - (I)  Transfer Functional Status: mod(I) - (I)  Walk in Room Functional Status: S-SBA without an AD from room to gym  Walk in Corridor Functional Status: S-SBA without an AD from room to gym  Moving from seated to standing position: Steady at all times  Surface-to-surface transfer (transfer between bed and chair or wheelchair): Not steady, but able to stabilize without staff assistance           [facility] 6 Click:  [facility] Total Score: 21      Additional Treatment:  Pt. Engaged in the following cognitive tasks on this date:  Pt. Was able to count change and dollar bills with 12/12 accuracy  Pt. Was able to fill out emergency form with exception of telephone numbers  Pt. Was able to perform sequencing task with 100% accuracy for steps to make a sandwich  Pt. was able to identify hazards in kitchen from given puicture with good accuracy  Pt. Was able to complete 10 to 12 designs from model with 100% accuracy    Patient left up in chair with call button in reach    ASSESSMENT:  Mikaela Ghosh is a 85 y.o. female with a medical diagnosis of Subdural hematoma and presents with mild limitations in higher level ADL tasks as well as mobility and cognition.  Pt. Making good  improvements in therapy sessions.  Pt. Cognition noted to be better on this date when completing activities.   Pt. Would benefit from continued OT services.    Rehab identified problem list/impairments: weakness, impaired endurance, impaired self care skills, impaired functional mobilty, gait instability, impaired balance, decreased lower extremity function, pain    Rehab potential is good    Activity tolerance: Good    Discharge recommendations:  (tbd)  Will require supervision initially on d/c 2/2 cognition    Barriers to discharge: Inaccessible home environment, Decreased caregiver support     Equipment recommendations: bath bench, wheelchair (daughter reports that she is going to check to see if she needs a RW. )     GOALS:    Occupational Therapy Goals        Problem: Occupational Therapy Goal    Goal Priority Disciplines Outcome Interventions   Occupational Therapy Goal     OT, PT/OT Ongoing (interventions implemented as appropriate)    Description:  Goals to be met by: 10 days     Patient will increase functional independence with ADLs by performing:    UE Dressing with Modified [place].  LE Dressing with Modified [place].  Grooming while standing at sink with Set-up Assistance.  Toileting from toilet with Modified [place] for hygiene and clothing management.   Bathing from  shower chair/bench with Supervision.  Supine to sit with Modified [place].  Stand pivot transfers with Modified [place].  Toilet transfer to toilet with Modified [place].  Upper extremity exercise program x 25 min to increase functional endurance for ADLs  Pt will perform a functional standing task x 15 min with sup                    Plan:  Patient to be seen 5 x/week to address the above listed problems via self-care/home management, therapeutic activities, therapeutic exercises  Plan of Care expires: 05/27/18  Plan of Care reviewed with: patient    ALVA Alarcon  05/11/2018

## 2018-05-11 NOTE — PLAN OF CARE
Problem: Patient Care Overview  Goal: Plan of Care Review  Outcome: Ongoing (interventions implemented as appropriate)  Safety:  call light in reach, patient oriented to room & instructed how to notify nurse if assistance is needed, questions & concerns addressed, bed in lowest position with wheels locked & side rails up X 2, fall precautions followed, patient free from fall & injury thus far this shift;  VTE/bleeding precautions maintained.  Activity:  patient up with assistance, weight shifted at least every other hour.  Neurological:  patient A&O X 4, follows commands, equal  strength & dorsi/plantarflexion, neuro checks performed as ordered & WDL.  Respiratory:  patient tolerates room air without distress, denies SOB.  Cardiac:  Denies chest pain, BP stable, HR stable.  Afebrile this shift.  GI:  Patient tolerates PO intake well, denies nausea, LBM 5/9/2018.  :  patient voids clear yellow urine without foul odor spontaneously & without difficulty, adequate output for shift.  Skin:  Incision to L head CDI open to air staples removed at appointment this shift.  Pain:  patient denied pain.

## 2018-05-11 NOTE — CLINICAL REVIEW
14 day Re-certification:  I certify that continued inpatient skilled care is necessary on a daily basis for  and estimate that the duration of inpatient skilled care will be 6 more days with expected discharge on 5/16/2018  The paient will receive home health care with PT/OT and f/u appointments after skilled care as noted below:    Future Appointments  Date Time Provider Department Center   5/18/2018 2:00 PM [facility], DEXA1 [facility] BMD [facility]   5/31/2018 2:00 PM LAB, APPOINTMENT [facility]   7/3/2018 11:00 AM [facility] OIC-CT1 500 LB LIMIT [facility] CTS IC [facility]   7/3/2018 1:00 PM Wojciech Sandoval MD [facility] NEUROS7 [facility]       Continued SNF care is for conditions for which the patient received inpatient hosptial services: Subdural hematoma.

## 2018-05-11 NOTE — PT/OT/SLP PROGRESS
Physical Therapy  Treatment    Mikaela Ghosh   MRN: [id number]   Admitting Diagnosis: Subdural hematoma    PT Received On: 05/11/18  Total Time (min): 40       Billable Minutes:  Gait Training 15, Therapeutic Activity 15 and Therapeutic Exercise 10    Treatment Type: Treatment  PT/PTA: PT     PTA Visit Number: 0       General Precautions: Standard, fall  Orthopedic Precautions: N/A   Braces: N/A         Subjective:  Communicated with pt prior to session.  Pt reports that she's thankful for all of the staff and everything we've done for her.    Pain/Comfort  Pain Rating 1: 0/10    Objective:  Patient found seated in wheelchair.  Ambulated to gym- no wheelchair in tow. Supervision- no assistive device.         Functional Status:  MDS G  Bed Mobility Functional Status: mod(I) - (I)  Transfer Functional Status: mod(I) - (I)  Walk in Room Functional Status: S-SBA  Walk in Corridor Functional Status: S-SBA  Locomotion on Unit Functional Status: S-SBA  Locomotion Off Unit Functional Status: S-SBA  Moving from seated to standing position: Not steady, but able to stabilize without staff assistance  Walking (with assistive device if used): Not steady, but able to stabilize without staff assistance  Turning around and facing the opposite direction while walking: Not steady, but able to stabilize without staff assistance  Moving on and off the toilet: Not steady, but able to stabilize without staff assistance  Surface-to-surface transfer (transfer between bed and chair or wheelchair): Not steady, but able to stabilize without staff assistance          AM-PAC 6 CLICK MOBILITY  Total Score:18    Transfers:  Sit<>Stand: Mod I (cues to ensure WC is locked, foot pedals are removed on wheelchair for safety).  Stand Pivot Transfer: Mod I    Gait:  · Amb 115 feet, changing directions (weaving in/out of equipment in gym) with no loss of balance, supervision, no assistive device used.   · Amb 150 feet with supervision- no AD.    Advanced  Gait:  Stairs: 4 steps x 5 trials with use of B HR use, supervision, alternating between step-to and step-through pattern.    Balance:  Pt was able to  5 objects from the floor, ranging from .5- 6 lbs with supervision. Pt was educated about proper posture when picking up objects from the floor (wide base of support, flex both knees). Pt demonstrated a safe technique when picking up these objects.    Pt was able to help with cleaning the objects she picked up, brought them back to their proper place on the weight rack, and then ambulated back to her room- all with supervision.    Additional Treatment:  Completed 15 minutes on the NuStep at [employer] to improve LE/UE strength.    Patient left up in chair with call button in reach.    Assessment:  Mikaela Ghosh is a 85 y.o. female with a medical diagnosis of Subdural hematoma.  Ms. Ghosh appears to be doing well functionally in that she's able to ambulate with no assistive device. However, I recommend supervision to ensure safety upon returning home to ensure safety with regards to tasks that require higher level cognition and memory such as cooking, laundry, etc.    Rehab identified problem list/impairments: weakness, impaired endurance, impaired functional mobilty, gait instability, impaired balance, decreased lower extremity function, pain    Rehab potential is good.    Activity tolerance: Good    Discharge recommendations: home with home health     Barriers to discharge: Inaccessible home environment, Decreased caregiver support    Equipment recommendations: walker, rolling, bedside commode     GOALS:    Physical Therapy Goals        Problem: Physical Therapy Goal    Goal Priority Disciplines Outcome Goal Variances Interventions   Physical Therapy Goal     PT/OT, PT Ongoing (interventions implemented as appropriate)     Description:  Goals to be met by: 14 days     Patient will increase functional independence with mobility by performin. Supine  to sit with Modified [place] met  2. Sit to supine with Modified [place] met  3. Sit to stand transfer with Supervision with appropriate assistive device, as needed. Met (5/8/2018)  4. Bed to chair transfer with Supervision using Rolling Walker or least restrictive assistive device, as appropriate. Met (5/8/2018)  5. Gait  x 300 feet with Supervision using Rolling Walker Or least restrictive assistive device. Met (5/9/2018)  5a. Upgrade: Gait x 400 feet with supervision in open environment with no AD (going up/down stairs, elevator, outdoors, etc).  Met (5/10/2018)  6. Gait x 50 feet w/ single point cane with minimal assistance. (Able to ambulate with no AD >50 feet, SBA). MET  7.Wheelchair propulsion x150 feet with Stand-by Assistance using bilateral upper extremities.  8. Ascend/descend 8 stair with bilateral Handrails Supervision using Single-point Cane or without assistive, or appropriate AD.   9. Ascend/Descend 4 inch curb step with Stand-by Assistance using Rolling Walker.or appropriate assistive device.  10. Stand for 5 minutes with Stand-by Assistance using Rolling Walker or appropriate assistive device and perform an activity. Met (5/9/2018)  11. Lower extremity exercise program x20 reps per handout, with assistance as needed and gym therex met  12. Perform Thompson or appropriate balance test. Met (5/9/2018)- Low Fall Risk.                           PLAN:    Patient to be seen 5 x/week  to address the above listed problems via gait training, therapeutic activities, therapeutic exercises, wheelchair management/training  Plan of Care expires: 05/27/18  Plan of Care reviewed with: patient    Vanesa Hernandez, PT  05/11/2018

## 2018-05-11 NOTE — PLAN OF CARE
Problem: Physical Therapy Goal  Goal: Physical Therapy Goal  Goals to be met by: 14 days     Patient will increase functional independence with mobility by performin. Supine to sit with Modified [place] met  2. Sit to supine with Modified [place] met  3. Sit to stand transfer with Supervision with appropriate assistive device, as needed. Met (2018)  4. Bed to chair transfer with Supervision using Rolling Walker or least restrictive assistive device, as appropriate. Met (2018)  5. Gait  x 300 feet with Supervision using Rolling Walker Or least restrictive assistive device. Met (2018)  5a. Upgrade: Gait x 400 feet with supervision in open environment with no AD (going up/down stairs, elevator, outdoors, etc).  Met (5/10/2018)  6. Gait x 50 feet w/ single point cane with minimal assistance. (Able to ambulate with no AD >50 feet, SBA). MET  7.Wheelchair propulsion x150 feet with Stand-by Assistance using bilateral upper extremities.  8. Ascend/descend 8 stair with bilateral Handrails Supervision using Single-point Cane or without assistive, or appropriate AD.   9. Ascend/Descend 4 inch curb step with Stand-by Assistance using Rolling Walker.or appropriate assistive device.  10. Stand for 5 minutes with Stand-by Assistance using Rolling Walker or appropriate assistive device and perform an activity. Met (2018)  11. Lower extremity exercise program x20 reps per handout, with assistance as needed and gym therex met  12. Perform Thompson or appropriate balance test. Met (2018)- Low Fall Risk.          Outcome: Ongoing (interventions implemented as appropriate)  Goals remain appropriate.

## 2018-05-12 PROCEDURE — 11000004 HC SNF PRIVATE

## 2018-05-12 PROCEDURE — 63600175 PHARM REV CODE 636 W HCPCS: Performed by: PHYSICIAN ASSISTANT

## 2018-05-12 PROCEDURE — 25000003 PHARM REV CODE 250: Performed by: NURSE PRACTITIONER

## 2018-05-12 PROCEDURE — 97535 SELF CARE MNGMENT TRAINING: CPT

## 2018-05-12 PROCEDURE — 25000003 PHARM REV CODE 250: Performed by: INTERNAL MEDICINE

## 2018-05-12 RX ADMIN — HEPARIN SODIUM 5000 UNITS: 5000 INJECTION, SOLUTION INTRAVENOUS; SUBCUTANEOUS at 06:05

## 2018-05-12 RX ADMIN — CINACALCET HYDROCHLORIDE 30 MG: 30 TABLET, COATED ORAL at 10:05

## 2018-05-12 RX ADMIN — BACITRACIN: 500 OINTMENT TOPICAL at 09:05

## 2018-05-12 RX ADMIN — HYPROMELLOSE 2910 1 DROP: 5 SOLUTION OPHTHALMIC at 03:05

## 2018-05-12 RX ADMIN — HEPARIN SODIUM 5000 UNITS: 5000 INJECTION, SOLUTION INTRAVENOUS; SUBCUTANEOUS at 01:05

## 2018-05-12 RX ADMIN — HYPROMELLOSE 2910 1 DROP: 5 SOLUTION OPHTHALMIC at 09:05

## 2018-05-12 RX ADMIN — HEPARIN SODIUM 5000 UNITS: 5000 INJECTION, SOLUTION INTRAVENOUS; SUBCUTANEOUS at 09:05

## 2018-05-12 RX ADMIN — AMLODIPINE BESYLATE 10 MG: 10 TABLET ORAL at 09:05

## 2018-05-12 NOTE — PT/OT/SLP PROGRESS
Occupational Therapy  Treatment    Mikaela Ghosh   MRN: [id number]   Admitting Diagnosis: Subdural hematoma    OT Date of Treatment: 05/12/18  Total Time (min): 40 min    Billable Minutes:  Self Care/Home Management 40    General Precautions: Standard, fall  Orthopedic Precautions: N/A  Braces: N/A    Do you have any cultural, spiritual, Buddhism conflicts, given your current situation?: none stated    Subjective:  Communicated with nsg prior to session.  The nurse told me not to get up by myself     Pain/Comfort  Pain Rating 1: 0/10    Objective:   Pt. Supine on arrival     Functional Status:  MDS G  Bed Mobility Functional Status: mod(I) - (I) from supine to sit  Transfer Functional Status: mod(I) - (I) from EOB to in room bath with no AD  Walk in Room Functional Status: S-SBA to inroom  bath  Dressing Functional Status: 1: S-SBA for LB dressing with doffing /doning UW and pants and socks and (S) for UE dressing with gown management   Toilet Use Functional Status: S-SBA from standard toilet and clothing management   Personal Hygiene Functional Status: S-SBA at sink level for oral care  Bathing Functional Status: CGA-Min (A)  In shower from TTB and use of grab bars for (A) to steady for safety   Moving from seated to standing position: Not steady, but able to stabilize without staff assistance  Walking (with assistive device if used): Not steady, but able to stabilize without staff assistance  Turning around and facing the opposite direction while walking: Not steady, but able to stabilize without staff assistance  Moving on and off the toilet: Not steady, but able to stabilize without staff assistance  Surface-to-surface transfer (transfer between bed and chair or wheelchair): Not steady, but able to stabilize without staff assistance           AMPA 6 Click:  AMPAC Total Score: 21    Patient left supine with all lines intact and call button in reach    ASSESSMENT:  Mikaela Ghosh is a 85 y.o. female with a medical  diagnosis of Subdural hematoma ,Pt. participated well with session on this day.Pt demos physical deficits with balance  functional mobility, UB strength, endurance  level of functional indep with daily tasks and activities and selfcare skills .Pt. Will continue to benefit from continued OT to progress towards goals  .    Rehab identified problem list/impairments: weakness, impaired endurance, impaired self care skills, impaired functional mobilty, gait instability, impaired balance, decreased lower extremity function, pain    Rehab potential is fair    Activity tolerance: Fair    Discharge recommendations:  (tbd)     Barriers to discharge: Inaccessible home environment, Decreased caregiver support     Equipment recommendations: bath bench, wheelchair (daughter reports that she is going to check to see if she needs a RW. )     GOALS:    Occupational Therapy Goals        Problem: Occupational Therapy Goal    Goal Priority Disciplines Outcome Interventions   Occupational Therapy Goal     OT, PT/OT Ongoing (interventions implemented as appropriate)    Description:  Goals to be met by: 10 days     Patient will increase functional independence with ADLs by performing:    UE Dressing with Modified [place].  LE Dressing with Modified [place].  Grooming while standing at sink with Set-up Assistance.  Toileting from toilet with Modified [place] for hygiene and clothing management.   Bathing from  shower chair/bench with Supervision.  Supine to sit with Modified [place].  Stand pivot transfers with Modified [place].  Toilet transfer to toilet with Modified [place].  Upper extremity exercise program x 25 min to increase functional endurance for ADLs  Pt will perform a functional standing task x 15 min with sup                Plan:  Patient to be seen 5 x/week to address the above listed problems via self-care/home management, therapeutic activities, therapeutic exercises  Plan of Care expires:  05/27/18  Plan of Care reviewed with: patient    A client care conference was performed between the LOTR and RODRIGUEZ, prior to treatment to discuss the patient's status, treatment plan and established goals.     MICHAEL Goodson/VERN 5/12/2018

## 2018-05-12 NOTE — PLAN OF CARE
Problem: Occupational Therapy Goal  Goal: Occupational Therapy Goal  Goals to be met by: 10 days     Patient will increase functional independence with ADLs by performing:    UE Dressing with Modified [place].  LE Dressing with Modified [place].  Grooming while standing at sink with Set-up Assistance.  Toileting from toilet with Modified [place] for hygiene and clothing management.   Bathing from  shower chair/bench with Supervision.  Supine to sit with Modified [place].  Stand pivot transfers with Modified [place].  Toilet transfer to toilet with Modified [place].  Upper extremity exercise program x 25 min to increase functional endurance for ADLs  Pt will perform a functional standing task x 15 min with sup   Outcome: Ongoing (interventions implemented as appropriate)  .

## 2018-05-13 PROCEDURE — 63600175 PHARM REV CODE 636 W HCPCS: Performed by: PHYSICIAN ASSISTANT

## 2018-05-13 PROCEDURE — 11000004 HC SNF PRIVATE

## 2018-05-13 PROCEDURE — 25000003 PHARM REV CODE 250: Performed by: NURSE PRACTITIONER

## 2018-05-13 PROCEDURE — 25000003 PHARM REV CODE 250: Performed by: INTERNAL MEDICINE

## 2018-05-13 RX ADMIN — HYPROMELLOSE 2910 1 DROP: 5 SOLUTION OPHTHALMIC at 02:05

## 2018-05-13 RX ADMIN — HYPROMELLOSE 2910 1 DROP: 5 SOLUTION OPHTHALMIC at 08:05

## 2018-05-13 RX ADMIN — AMLODIPINE BESYLATE 10 MG: 10 TABLET ORAL at 08:05

## 2018-05-13 RX ADMIN — BACITRACIN: 500 OINTMENT TOPICAL at 08:05

## 2018-05-13 RX ADMIN — HEPARIN SODIUM 5000 UNITS: 5000 INJECTION, SOLUTION INTRAVENOUS; SUBCUTANEOUS at 05:05

## 2018-05-13 RX ADMIN — HEPARIN SODIUM 5000 UNITS: 5000 INJECTION, SOLUTION INTRAVENOUS; SUBCUTANEOUS at 02:05

## 2018-05-13 RX ADMIN — ACETAMINOPHEN 650 MG: 325 TABLET ORAL at 05:05

## 2018-05-13 RX ADMIN — CINACALCET HYDROCHLORIDE 30 MG: 30 TABLET, COATED ORAL at 08:05

## 2018-05-13 RX ADMIN — HEPARIN SODIUM 5000 UNITS: 5000 INJECTION, SOLUTION INTRAVENOUS; SUBCUTANEOUS at 09:05

## 2018-05-13 NOTE — PLAN OF CARE
Problem: Fall Risk (Adult)  Goal: Identify Related Risk Factors and Signs and Symptoms  Related risk factors and signs and symptoms are identified upon initiation of Human [employer] Clinical Practice Guideline (CPG)   Outcome: Ongoing (interventions implemented as appropriate)   05/13/18 0352   Fall Risk   Related Risk Factors (Fall Risk) fatigue/slow reaction;gait/mobility problems

## 2018-05-14 LAB
25(OH)D3+25(OH)D2 SERPL-MCNC: 17 NG/ML
ANION GAP SERPL CALC-SCNC: 5 MMOL/L
BASOPHILS # BLD AUTO: 0.03 K/UL
BASOPHILS NFR BLD: 0.7 %
BUN SERPL-MCNC: 11 MG/DL
CALCIUM 24H UR-MRATE: 4 MG/HR
CALCIUM SERPL-MCNC: 11.6 MG/DL
CALCIUM UR-MCNC: 8.4 MG/DL
CALCIUM URINE (MG/SPEC): 90 MG/SPEC
CHLORIDE SERPL-SCNC: 107 MMOL/L
CO2 SERPL-SCNC: 28 MMOL/L
CREAT CL/1.73 SQ M 12H UR+SERPL-ARVRAT: 49 ML/MIN
CREAT SERPL-MCNC: 0.8 MG/DL
CREAT SERPL-MCNC: 0.8 MG/DL
CREAT UR-MCNC: 52 MG/DL
CREATININE, URINE (MG/SPEC): 559 MG/SPEC
DIFFERENTIAL METHOD: ABNORMAL
EOSINOPHIL # BLD AUTO: 0.1 K/UL
EOSINOPHIL NFR BLD: 2.9 %
ERYTHROCYTE [DISTWIDTH] IN BLOOD BY AUTOMATED COUNT: 15.3 %
EST. GFR  (AFRICAN AMERICAN): >60 ML/MIN/1.73 M^2
EST. GFR  (NON AFRICAN AMERICAN): >60 ML/MIN/1.73 M^2
GLUCOSE SERPL-MCNC: 79 MG/DL
HCT VFR BLD AUTO: 35.7 %
HGB BLD-MCNC: 11 G/DL
IMM GRANULOCYTES # BLD AUTO: 0.01 K/UL
IMM GRANULOCYTES NFR BLD AUTO: 0.2 %
LYMPHOCYTES # BLD AUTO: 1.8 K/UL
LYMPHOCYTES NFR BLD: 44.1 %
MAGNESIUM SERPL-MCNC: 1.7 MG/DL
MCH RBC QN AUTO: 30.5 PG
MCHC RBC AUTO-ENTMCNC: 30.8 G/DL
MCV RBC AUTO: 99 FL
MONOCYTES # BLD AUTO: 0.4 K/UL
MONOCYTES NFR BLD: 10.6 %
NEUTROPHILS # BLD AUTO: 1.7 K/UL
NEUTROPHILS NFR BLD: 41.5 %
NRBC BLD-RTO: 0 /100 WBC
PHOSPHATE SERPL-MCNC: 2.4 MG/DL
PLATELET # BLD AUTO: 185 K/UL
PMV BLD AUTO: 11.4 FL
POTASSIUM SERPL-SCNC: 3.6 MMOL/L
RBC # BLD AUTO: 3.61 M/UL
SODIUM SERPL-SCNC: 140 MMOL/L
URINE COLLECTION DURATION: 24 HR
URINE COLLECTION DURATION: 24 HR
URINE VOLUME: 1075 ML
URINE VOLUME: 1075 ML
WBC # BLD AUTO: 4.15 K/UL

## 2018-05-14 PROCEDURE — 82306 VITAMIN D 25 HYDROXY: CPT

## 2018-05-14 PROCEDURE — 83735 ASSAY OF MAGNESIUM: CPT

## 2018-05-14 PROCEDURE — 63600175 PHARM REV CODE 636 W HCPCS: Performed by: PHYSICIAN ASSISTANT

## 2018-05-14 PROCEDURE — 80048 BASIC METABOLIC PNL TOTAL CA: CPT

## 2018-05-14 PROCEDURE — 11000004 HC SNF PRIVATE

## 2018-05-14 PROCEDURE — 92507 TX SP LANG VOICE COMM INDIV: CPT

## 2018-05-14 PROCEDURE — 82575 CREATININE CLEARANCE TEST: CPT

## 2018-05-14 PROCEDURE — 25000003 PHARM REV CODE 250: Performed by: NURSE PRACTITIONER

## 2018-05-14 PROCEDURE — 82340 ASSAY OF CALCIUM IN URINE: CPT

## 2018-05-14 PROCEDURE — 97530 THERAPEUTIC ACTIVITIES: CPT

## 2018-05-14 PROCEDURE — 85025 COMPLETE CBC W/AUTO DIFF WBC: CPT

## 2018-05-14 PROCEDURE — 36415 COLL VENOUS BLD VENIPUNCTURE: CPT

## 2018-05-14 PROCEDURE — 97535 SELF CARE MNGMENT TRAINING: CPT

## 2018-05-14 PROCEDURE — 84100 ASSAY OF PHOSPHORUS: CPT

## 2018-05-14 RX ADMIN — BACITRACIN: 500 OINTMENT TOPICAL at 09:05

## 2018-05-14 RX ADMIN — HEPARIN SODIUM 5000 UNITS: 5000 INJECTION, SOLUTION INTRAVENOUS; SUBCUTANEOUS at 05:05

## 2018-05-14 RX ADMIN — HEPARIN SODIUM 5000 UNITS: 5000 INJECTION, SOLUTION INTRAVENOUS; SUBCUTANEOUS at 10:05

## 2018-05-14 RX ADMIN — HYPROMELLOSE 2910 1 DROP: 5 SOLUTION OPHTHALMIC at 10:05

## 2018-05-14 RX ADMIN — HEPARIN SODIUM 5000 UNITS: 5000 INJECTION, SOLUTION INTRAVENOUS; SUBCUTANEOUS at 01:05

## 2018-05-14 RX ADMIN — CINACALCET HYDROCHLORIDE 30 MG: 30 TABLET, COATED ORAL at 01:05

## 2018-05-14 RX ADMIN — HYPROMELLOSE 2910 1 DROP: 5 SOLUTION OPHTHALMIC at 02:05

## 2018-05-14 RX ADMIN — HYPROMELLOSE 2910 1 DROP: 5 SOLUTION OPHTHALMIC at 09:05

## 2018-05-14 RX ADMIN — ACETAMINOPHEN 650 MG: 325 TABLET ORAL at 10:05

## 2018-05-14 RX ADMIN — BACITRACIN: 500 OINTMENT TOPICAL at 10:05

## 2018-05-14 NOTE — PLAN OF CARE
Problem: Physical Therapy Goal  Goal: Physical Therapy Goal  Goals to be met by: 14 days     Patient will increase functional independence with mobility by performin. Supine to sit with Modified [place] met  2. Sit to supine with Modified [place] met  3. Sit to stand transfer with Supervision with appropriate assistive device, as needed. Met (2018)  4. Bed to chair transfer with Supervision using Rolling Walker or least restrictive assistive device, as appropriate. Met (2018)  5. Gait  x 300 feet with Supervision using Rolling Walker Or least restrictive assistive device. Met (2018)  5a. Upgrade: Gait x 400 feet with supervision in open environment with no AD (going up/down stairs, elevator, outdoors, etc).  Met (5/10/2018)  6. Gait x 50 feet w/ single point cane with minimal assistance. (Able to ambulate with no AD >50 feet, SBA). MET  7.Wheelchair propulsion x150 feet with Stand-by Assistance using bilateral upper extremities.  8. Ascend/descend 8 stair with bilateral Handrails Supervision using Single-point Cane or without assistive, or appropriate AD.   9. Ascend/Descend 4 inch curb step with Stand-by Assistance using Rolling Walker.or appropriate assistive device.  10. Stand for 5 minutes with Stand-by Assistance using Rolling Walker or appropriate assistive device and perform an activity. Met (2018)  11. Lower extremity exercise program x20 reps per handout, with assistance as needed and gym therex met  12. Perform Thompson or appropriate balance test. Met (2018)- Low Fall Risk.          Outcome: Ongoing (interventions implemented as appropriate)  Goals remain appropriate.

## 2018-05-14 NOTE — PT/OT/SLP PROGRESS
Speech Language Pathology  Treatment    Mikaela Ghosh   MRN: [id number]   Admitting Diagnosis: Subdural hematoma    Diet recommendations: Solid Diet Level: Regular  Liquid Diet Level: Thin Standard aspiration precautions    SLP Treatment Date: 05/14/18  Speech Start Time: 0825     Speech Stop Time: 0855     Speech Total (min): 30 min       TREATMENT BILLABLE MINUTES:  Speech Therapy Individual 30    Has the patient been evaluated by SLP for swallowing? : Yes  Keep patient NPO?: No   General Precautions: Standard, fall  Current Respiratory Status: room air       Subjective:  Awake & alert. No pain pre/post session.          Objective:   Patient found with:  (seated at edge of bed)  SLP reviewed memory strategies with pt, pt recalled given min A. Pt recalled 3/3 unrelated wds given a 3 min filled delay x2 given min A to utilize strategies. PPt followed 3 step commands with 65% acc IND'ly, 100% acc given min A. Pt named items in a concrete category in 1 min - avg of 6 over 3 trials given min A. Pt read paragraph from [employer] book fluently given min A.   FIM:                                 Assessment:  Mikaela Ghosh is a 85 y.o. female with a medical diagnosis of Subdural hematoma and presents with cognitive linguistic impairment        Discharge recommendations: Discharge Facility/Level Of Care Needs: home health speech therapy     Goals:    SLP Goals        Problem: SLP Goal    Goal Priority Disciplines Outcome   SLP Goal     SLP Ongoing (interventions implemented as appropriate)   Description:  Speech Language Pathology Goals  Goals expected to be met by 5/4:  1. Pt will complete immediate memory tasks with 70% accuracy min cues. Goal met  2. Following a delay, pt will recall 3/3 words given mod cues. Goal met x 1 out of 2 trials/ ongoing  3. Pt will follow 3-step commands with 70% accuracy given mod cues. Goal not met.  4. Pt will list 8 items in one minute given min cues. Goal not met.  5. Pt will participate in  ongoing assessment of reading, writing, visual spatial, and functional math abilities. Goal met.     Revised goals expected to be met 5/11:  1. Pt will complete immediate memory tasks with 80% accuracy min cues. ongoing  2. Following a delay, pt will recall 3/3 words given mod cues. ongoing  3. Pt will follow 3-step commands with 60% accuracy given max cues. Goal met  4. Pt will list 8 items in one minute given min cues. Goal met.  5. Pt will complete short paragraph level reading tasks with 80% accuracy and min cues. Ongoing.      Revised goals expected to be met 5/18:  1. Pt will complete immediate memory tasks with 80% accuracy min cues.   2. Following a delay, pt will recall 3/3 words given mod cues.  3. Pt will follow 3-step commands with 60% accuracy given mod cues.   4. Pt will list 10 items in one minute given min cues.   5. Pt will complete short paragraph level reading tasks with 80% accuracy and min cues.                           Plan:   Patient to be seen Therapy Frequency: 3 x/week   Plan of Care expires:    Plan of Care reviewed with: patient  SLP Follow-up?: Yes              Elizabeth Stout, CCC-SLP  05/14/2018

## 2018-05-14 NOTE — PLAN OF CARE
Problem: SLP Goal  Goal: SLP Goal  Speech Language Pathology Goals  Goals expected to be met by 5/4:  1. Pt will complete immediate memory tasks with 70% accuracy min cues. Goal met  2. Following a delay, pt will recall 3/3 words given mod cues. Goal met x 1 out of 2 trials/ ongoing  3. Pt will follow 3-step commands with 70% accuracy given mod cues. Goal not met.  4. Pt will list 8 items in one minute given min cues. Goal not met.  5. Pt will participate in ongoing assessment of reading, writing, visual spatial, and functional math abilities. Goal met.     Revised goals expected to be met 5/11:  1. Pt will complete immediate memory tasks with 80% accuracy min cues. ongoing  2. Following a delay, pt will recall 3/3 words given mod cues. ongoing  3. Pt will follow 3-step commands with 60% accuracy given max cues. Goal met  4. Pt will list 8 items in one minute given min cues. Goal met.  5. Pt will complete short paragraph level reading tasks with 80% accuracy and min cues. Ongoing.      Revised goals expected to be met 5/18:  1. Pt will complete immediate memory tasks with 80% accuracy min cues.   2. Following a delay, pt will recall 3/3 words given mod cues.  3. Pt will follow 3-step commands with 60% accuracy given mod cues.   4. Pt will list 10 items in one minute given min cues.   5. Pt will complete short paragraph level reading tasks with 80% accuracy and min cues.         Outcome: Ongoing (interventions implemented as appropriate)  Pt was seen for ST session.     Elizabeth Stout MS, CCC-SLP  Speech Language Pathologist  Pager: [phone]  5/14/2018

## 2018-05-14 NOTE — PLAN OF CARE
Problem: Patient Care Overview  Goal: Plan of Care Review  Outcome: Revised  Repositions independently, no new skin breakdowns. Left head incision healed. Afebrile. Monitored for pain and safety. Safety maintained. Denies pain..

## 2018-05-14 NOTE — PT/OT/SLP PROGRESS
Physical Therapy  Treatment/family training    Mikaela Ghosh   MRN: [id number]   Admitting Diagnosis: Subdural hematoma    PT Received On: 05/14/18  Total Time (min): 23       Billable Minutes:  Therapeutic Activity 23    Treatment Type: Treatment  PT/PTA: PT     PTA Visit Number: 0       General Precautions: Standard, fall  Orthopedic Precautions: N/A   Braces: N/A         Subjective:  Communicated with pt prior to session.  Pt reported that every now and then she has pain in her L hip, causing a slight limp (decreased stance phase on LLE).    Pain/Comfort  Pain Rating 1: 0/10    Objective:  Patient found seated in wheelchair.  Daughter present for training.       Functional Status:  MDS G  Bed Mobility Functional Status: S-SBA  Transfer Functional Status: S-SBA  Walk in Room Functional Status: S-SBA  Walk in Corridor Functional Status: S-SBA  Locomotion on Unit Functional Status: S-SBA  Locomotion Off Unit Functional Status: S-SBA          AM-PAC 6 CLICK MOBILITY  Total Score:18    Bed Mobility (practiced lying flat on mat that is elevated to bed height at home):  Sit>Supine:supervision- needed cues for getting comfortable and safely onto mat  Supine>Sit: supervision    Transfers:  Sit<>Stand: supervision  Stand Pivot Transfer: supervision    Gait:  Amb throughout her PT session ~200 feet with no assistive device (supervision), no loss of balance, less stance time on LLE and cues given to  her feet, as she has a tendency to slide her feet through swing phase.     Advanced Gait:  Stairs: 4 steps x 3 trials with step-through pattern, supervision, B HR use.    Balance:  · Was able to  five objects from the floor (ranging from 0.5- 6 lbs) with cues to increase base of support, guard self from hitting head on nearby table/chair, use nearby table/chair for balance, and to increase knee flexion to protect pt's back. All of this done with SBA.   · She was able to carry two objects across the gym floor and  place them in their proper place.    Additional Treatment:  · Pt was given the task to tap every chair that she sees in the gym. She was able to identify all 9, working on her abilty to scan her environment- supervision assistance.   · She was also given the task of describing all of the equipment as she navigated around the gym. She was able to do this but her gait speed slowed during this activity. Amb 120 feet around the gym with supervision to stand-by assistance.  · Discussed with daughter removing trip/fall hazards from the floor like throw rugs, cords, etc. Discussed supervising pt for a week to determine if she is safe to stay home alone. Told her home health PT will help with this decision.  · Recommend pt using a walker in community but otherwise will not need it in her house.    Patient left up in chair with call button in reach.    Assessment:  Mikaela Ghosh is a 85 y.o. female with a medical diagnosis of Subdural hematoma.  Ms. Ghosh presents with delayed processing and possible distraction in an open environment but overall safe with mobility in that she requires supervision to stand-by assistance with no assistive device.  After about a week or two of supervision, I believe her daughter and home health therapist will be able to determine if she is safe to stay home alone. I think she will be most limited cognitively in this decision.    Rehab identified problem list/impairments: weakness, impaired endurance, impaired functional mobilty, gait instability, impaired balance, decreased lower extremity function, pain    Rehab potential is good.    Activity tolerance: Good    Discharge recommendations: home with home health     Barriers to discharge: Inaccessible home environment, Decreased caregiver support    Equipment recommendations: walker, rolling, bedside commode     GOALS:    Physical Therapy Goals        Problem: Physical Therapy Goal    Goal Priority Disciplines Outcome Goal Variances  Interventions   Physical Therapy Goal     PT/OT, PT Ongoing (interventions implemented as appropriate)     Description:  Goals to be met by: 14 days     Patient will increase functional independence with mobility by performin. Supine to sit with Modified [place] met  2. Sit to supine with Modified [place] met  3. Sit to stand transfer with Supervision with appropriate assistive device, as needed. Met (2018)  4. Bed to chair transfer with Supervision using Rolling Walker or least restrictive assistive device, as appropriate. Met (2018)  5. Gait  x 300 feet with Supervision using Rolling Walker Or least restrictive assistive device. Met (2018)  5a. Upgrade: Gait x 400 feet with supervision in open environment with no AD (going up/down stairs, elevator, outdoors, etc).  Met (5/10/2018)  6. Gait x 50 feet w/ single point cane with minimal assistance. (Able to ambulate with no AD >50 feet, SBA). MET  7.Wheelchair propulsion x150 feet with Stand-by Assistance using bilateral upper extremities.  8. Ascend/descend 8 stair with bilateral Handrails Supervision using Single-point Cane or without assistive, or appropriate AD.   9. Ascend/Descend 4 inch curb step with Stand-by Assistance using Rolling Walker.or appropriate assistive device.  10. Stand for 5 minutes with Stand-by Assistance using Rolling Walker or appropriate assistive device and perform an activity. Met (2018)  11. Lower extremity exercise program x20 reps per handout, with assistance as needed and gym therex met  12. Perform Thompson or appropriate balance test. Met (2018)- Low Fall Risk.                           PLAN:    Patient to be seen 5 x/week  to address the above listed problems via gait training, therapeutic activities, therapeutic exercises, wheelchair management/training  Plan of Care expires: 18  Plan of Care reviewed with: patient    Vanesa Hernandez, PT  2018

## 2018-05-14 NOTE — PT/OT/SLP PROGRESS
Occupational Therapy  Treatment    Mikaela Ghosh   MRN: [id number]   Admitting Diagnosis: Subdural hematoma    OT Date of Treatment: 05/14/18  Total Time (min): 60 min    Billable Minutes:  Self Care/Home Management 15, Therapeutic Activity 15 and Cognitive Retraining 15    General Precautions: Standard, fall (no lifting more than 10 #)  Orthopedic Precautions: N/A  Braces: N/A    Do you have any cultural, spiritual, [employer] conflicts, given your current situation?: none stated    Subjective:  Communicated with nurse prior to session.  Pt. Reported that she felt better but still needed her mind to work     Pain/Comfort  Pain Rating 1: 0/10  Pain Rating Post-Intervention 1: 0/10    Objective:  Patient found with:  (supine in bed )    Functional Status:  MDS G  Bed Mobility Functional Status: mod(I) - (I)  Transfer Functional Status: mod(I) - (I)  Walk in Room Functional Status: S-SBA  Walk In Room Level of (A):  (without an AD)  Toilet Use Functional Status: S-SBA  Personal Hygiene Functional Status: S-SBA  Personal Hygiene Level of (A) :  (in stand to wash hands)  Moving from seated to standing position: Steady at all times  Moving on and off the toilet: Not steady, but able to stabilize without staff assistance  Surface-to-surface transfer (transfer between bed and chair or wheelchair): Not steady, but able to stabilize without staff assistance     MDS GG  Eating Performance: Set-up or clean-up assistance.  Oral Hygiene Performance: Setup or clean-up assistance  Toileting Hygiene Performance: Supervision or touching assistance.  Sit to lying Performance: Independent  Lying to sitting on side of bed Performance: Independent.  Sit to Stand Performance: Independent.  Chair/bed-to-chair transfer Performance: Independent.  Chair/bed-to-chair transfer Goal: Supervision or touching assistance.  Toilet transfer Performance: Supervision or touching assistance.     AMPA 6 Click:  AMPAC Total Score: 21      Additional  Treatment:  Pt. enaged in multiple cognitive activities in stand  With supervision including copying 13 piece disc design, playing peg removal game and participated in calendar activities.  Pt. Required Min A for calendar as well as initially with peg removal activity/game.    Pt. Daughter educated on this date on pt. Requiring Supervision for standing activities as well as SBA for shower on TTB.  Pt. Daughter also educated that pt. Will need to confirm with MD when cleared to drive and OT also recommended driving Evaluation for pt.     Patient left up in chair with call button in reach, daughter present and in room with daughter    ASSESSMENT:  Mikaela Ghosh is a 85 y.o. female with a medical diagnosis of Subdural hematoma and presents with mild deficits with higher level ADL task performance and mobility.  Pt. Tolerated session well and has demonstrated good improvements with cognition and ADL tasks.  Daughter demonstrated understanding of level of assist pt. Requires with ADL tasks at this time. .    Rehab identified problem list/impairments: weakness, impaired endurance, impaired self care skills, impaired functional mobilty, gait instability, impaired balance, decreased lower extremity function, pain    Rehab potential is good    Activity tolerance: Good    Discharge recommendations:  (tbd) supervision on d/c for safety    Barriers to discharge: Inaccessible home environment, Decreased caregiver support     Equipment recommendations: bath bench, wheelchair (daughter reports that she is going to check to see if she needs a RW. )     GOALS:    Occupational Therapy Goals        Problem: Occupational Therapy Goal    Goal Priority Disciplines Outcome Interventions   Occupational Therapy Goal     OT, PT/OT Ongoing (interventions implemented as appropriate)    Description:  Goals to be met by: 10 days     Patient will increase functional independence with ADLs by performing:    UE Dressing with Modified  [place].  LE Dressing with Modified [place].  Grooming while standing at sink with Set-up Assistance.  Toileting from toilet with Modified [place] for hygiene and clothing management.   Bathing from  shower chair/bench with Supervision.  Supine to sit with Modified [place].  Stand pivot transfers with Modified [place].  Toilet transfer to toilet with Modified [place].  Upper extremity exercise program x 25 min to increase functional endurance for ADLs  Pt will perform a functional standing task x 15 min with sup                    Plan:  Patient to be seen 5 x/week to address the above listed problems via self-care/home management, therapeutic activities, therapeutic exercises  Plan of Care expires: 05/27/18  Plan of Care reviewed with: patient    ALVA Alarcon  05/14/2018

## 2018-05-14 NOTE — PLAN OF CARE
Problem: Occupational Therapy Goal  Goal: Occupational Therapy Goal  Goals to be met by: 10 days     Patient will increase functional independence with ADLs by performing:    UE Dressing with Modified [place].  LE Dressing with Modified [place].  Grooming while standing at sink with Set-up Assistance.  Toileting from toilet with Modified [place] for hygiene and clothing management.   Bathing from  shower chair/bench with Supervision.  Supine to sit with Modified [place].  Stand pivot transfers with Modified [place].  Toilet transfer to toilet with Modified [place].  Upper extremity exercise program x 25 min to increase functional endurance for ADLs  Pt will perform a functional standing task x 15 min with sup   Pt. Tolerated session well on this date

## 2018-05-15 ENCOUNTER — TELEPHONE (OUTPATIENT)
Dept: ADMINISTRATIVE | Facility: CLINIC | Age: 83
End: 2018-05-15

## 2018-05-15 PROCEDURE — 97530 THERAPEUTIC ACTIVITIES: CPT

## 2018-05-15 PROCEDURE — 97110 THERAPEUTIC EXERCISES: CPT

## 2018-05-15 PROCEDURE — 97535 SELF CARE MNGMENT TRAINING: CPT

## 2018-05-15 PROCEDURE — 97116 GAIT TRAINING THERAPY: CPT

## 2018-05-15 PROCEDURE — 63600175 PHARM REV CODE 636 W HCPCS: Performed by: PHYSICIAN ASSISTANT

## 2018-05-15 PROCEDURE — 25000003 PHARM REV CODE 250: Performed by: NURSE PRACTITIONER

## 2018-05-15 PROCEDURE — 25000003 PHARM REV CODE 250: Performed by: INTERNAL MEDICINE

## 2018-05-15 PROCEDURE — 99308 SBSQ NF CARE LOW MDM 20: CPT | Mod: ,,, | Performed by: NURSE PRACTITIONER

## 2018-05-15 PROCEDURE — 11000004 HC SNF PRIVATE

## 2018-05-15 RX ORDER — CINACALCET 30 MG/1
30 TABLET, FILM COATED ORAL 2 TIMES DAILY WITH MEALS
Qty: 60 TABLET | Refills: 11 | Status: ON HOLD | OUTPATIENT
Start: 2018-05-15 | End: 2018-06-27 | Stop reason: CLARIF

## 2018-05-15 RX ADMIN — HEPARIN SODIUM 5000 UNITS: 5000 INJECTION, SOLUTION INTRAVENOUS; SUBCUTANEOUS at 09:05

## 2018-05-15 RX ADMIN — BACITRACIN: 500 OINTMENT TOPICAL at 08:05

## 2018-05-15 RX ADMIN — HYPROMELLOSE 2910 1 DROP: 5 SOLUTION OPHTHALMIC at 02:05

## 2018-05-15 RX ADMIN — HEPARIN SODIUM 5000 UNITS: 5000 INJECTION, SOLUTION INTRAVENOUS; SUBCUTANEOUS at 01:05

## 2018-05-15 RX ADMIN — HYPROMELLOSE 2910 1 DROP: 5 SOLUTION OPHTHALMIC at 08:05

## 2018-05-15 RX ADMIN — CINACALCET HYDROCHLORIDE 30 MG: 30 TABLET, COATED ORAL at 08:05

## 2018-05-15 RX ADMIN — ACETAMINOPHEN 650 MG: 325 TABLET ORAL at 09:05

## 2018-05-15 RX ADMIN — AMLODIPINE BESYLATE 10 MG: 10 TABLET ORAL at 08:05

## 2018-05-15 RX ADMIN — HEPARIN SODIUM 5000 UNITS: 5000 INJECTION, SOLUTION INTRAVENOUS; SUBCUTANEOUS at 05:05

## 2018-05-15 RX ADMIN — BACITRACIN: 500 OINTMENT TOPICAL at 09:05

## 2018-05-15 NOTE — PLAN OF CARE
Problem: Physical Therapy Goal  Goal: Physical Therapy Goal  Goals to be met by: 14 days     Patient will increase functional independence with mobility by performin. Supine to sit with Modified [place] met  2. Sit to supine with Modified [place] met  3. Sit to stand transfer with Supervision with appropriate assistive device, as needed. Met (2018)  4. Bed to chair transfer with Supervision using Rolling Walker or least restrictive assistive device, as appropriate. Met (2018)  5. Gait  x 300 feet with Supervision using Rolling Walker Or least restrictive assistive device. Met (2018)  5a. Upgrade: Gait x 400 feet with supervision in open environment with no AD (going up/down stairs, elevator, outdoors, etc).  Met (5/10/2018)  6. Gait x 50 feet w/ single point cane with minimal assistance. (Able to ambulate with no AD >50 feet, SBA). MET  7.Discontinued: Wheelchair propulsion x150 feet with Stand-by Assistance using bilateral upper extremities.  8. Ascend/descend 8 stair with bilateral Handrails Supervision using Single-point Cane or without assistive, or appropriate AD.   9. Ascend/Descend 4 inch curb step with Stand-by Assistance using Rolling Walker.or appropriate assistive device.  10. Stand for 5 minutes with Stand-by Assistance using Rolling Walker or appropriate assistive device and perform an activity. Met (2018)  11. Discontinued: Lower extremity exercise program x20 reps per handout, with assistance as needed and gym therex met  12. Perform Thompson or appropriate balance test. Met (2018)- Low Fall Risk.          Outcome: Ongoing (interventions implemented as appropriate)  Goals remain appropriate.

## 2018-05-15 NOTE — PROGRESS NOTES
Attempted call to [facility] x 2 for follow-up report of patient status. Message left for Mesha. Return call pending.

## 2018-05-15 NOTE — PLAN OF CARE
[facility]    HOME HEALTH ORDERS  FACE TO FACE ENCOUNTER    Patient Name: Mikaela Ghosh  YOB: 1933    PCP: Felipe Mustafa MD   PCP Address: [address] / [place] [address]  PCP Phone Number: [phone]  PCP Fax: [phone]    Encounter Date: 05/15/2018    Admit to Home Health    Diagnoses:  Active Hospital Problems    Diagnosis  POA    *Subdural hematoma [I62.00]  Yes    Dry eyes [H04.123]  Yes    Hypercalcemia [E83.52]  Yes    Hypophosphatemia [E83.39]  Yes    Essential hypertension, benign [I10]  Yes      Resolved Hospital Problems    Diagnosis Date Resolved POA   No resolved problems to display.       Future Appointments  Date Time Provider Department Center   5/18/2018 2:00 PM [facility], DEXA1 [facility] BMD [facility]   5/21/2018 10:20 AM Felipe Mustafa MD [facility]   5/31/2018 2:00 PM LAB, APPOINTMENT [facility] LAB VNP [facility]   7/3/2018 11:00 AM [facility] OIC-CT1 500 LB LIMIT [facility] CTSC IC [facility]   7/3/2018 1:00 PM Wojciech Sandoval MD [facility] NEUROS7 [facility]     Follow-up Information     Felipe Mustafa MD. Go on 5/21/2018.    Specialty:  Family Medicine  Contact information:  [address] [place] [address]  [phone]             Wojciech Sandoval MD. Go on 7/3/2018.    Specialty:  Neurosurgery  Contact information:  [address]  [facility] [address]  [phone]               I have seen and examined this patient face to face today. My clinical findings that support the need for the home health skilled services and home bound status are the following:  Weakness/numbness causing balance and gait disturbance due to Weakness/Debility and Surgery making it taxing to leave home.    Allergies:Review of patient's allergies indicates:  No Known Allergies    Diet: regular diet    Activities: activity as tolerated and no lifting, Driving, or Strenuous exercise     Nursing:   SN to complete comprehensive assessment including routine vital signs. Instruct on  disease process and s/s of complications to report to MD. Review/verify medication list sent home with the patient at time of discharge  and instruct patient/caregiver as needed. Frequency may be adjusted depending on start of care date.    Notify MD if SBP > 160 or < 90; DBP > 90 or < 50; HR > 120 or < 50; Temp > 101      CONSULTS:    Physical Therapy to evaluate and treat. Evaluate for home safety and equipment needs; Establish/upgrade home exercise program. Perform / instruct on therapeutic exercises, gait training, transfer training, and Range of Motion.  Occupational Therapy to evaluate and treat. Evaluate home environment for safety and equipment needs. Perform/Instruct on transfers, ADL training, ROM, and therapeutic exercises.  Speech Therapy  to evaluate and treat for  Cognition.  Aide to provide assistance with personal care, ADLs, and vital signs.    MISCELLANEOUS CARE:  N/A    WOUND CARE ORDERS  n/a      Medications: Review discharge medications with patient and family and provide education.      Current Discharge Medication List      START taking these medications    Details   cinacalcet (SENSIPAR) 30 MG Tab Take 1 tablet (30 mg total) by mouth 2 (two) times daily with meals. Start twice a day dosing on 5/19/18  Qty: 60 tablet, Refills: 11         CONTINUE these medications which have NOT CHANGED    Details   amLODIPine (NORVASC) 10 MG tablet Take 1 tablet (10 mg total) by mouth once daily.  Qty: 90 tablet, Refills: 3    Associated Diagnoses: Essential hypertension, benign             I certify that this patient is confined to her home and needs intermittent skilled nursing care, physical therapy, speech therapy and occupational therapy.

## 2018-05-15 NOTE — SUBJECTIVE & OBJECTIVE
Hospital Course:  The patient was admitted at [facility] from 4/18 to 4/26/2018.  4/26: Patient admitted to SNF for ongoing PT/Ot following a hospitalization for subdural hematoma requiring left frontoparietal craniotomy with evacuation.  4/30: Patient seen at bedside, doing well, reports mild HA tolerable with tylenol. No acute events overnight. Labs reviewed.   5/3: Ca++ elevated, treated with NS, will monitor Ca++ levels  5/4: ionized Ca++ 1.72 elevated treated with NS, will monitor Ca++ levels  5/7: Patient seen at bedside doing well, denies pain, discussed elevated Ca++ level; d/w with Dr. Lemos-believed to be primary hyperthyroidism patient asymptomatic secure outpatient appointment. ionzied Ca++ 1.58  5/8: Patient seen at bedside, doing well, discussed upcoming Endocrine appointment, verbalized understanding.   5/10: seen in endocrine clinic patient trying to decided with will start medication for hypercalcemia will give answer tomorrow.   5/11: After discussing use of cinacalcet with patient and daughter at bedside, patient willing to start medication. Discussed 24hour urine with both. 24hour urine will start on Sunday discussed with nurse and charge nurse.   5/15: Patient seen at bedside doing well, no complaints, discussed upcoming discharge with patient, discussed no driving until cleared by neurosurgery and driving eval (discussed with daughter on 5/14/18).    Interval History: Patient seen at bedside, doing well, no acute events overnight.    Review of Systems   Constitutional: Negative for appetite change, chills, fatigue and fever.   HENT: Negative for trouble swallowing.    Respiratory: Negative for cough, chest tightness, shortness of breath and wheezing.    Cardiovascular: Negative for chest pain, palpitations and leg swelling.   Gastrointestinal: Negative for abdominal pain, constipation, diarrhea and nausea.   Genitourinary: Negative for difficulty urinating, frequency and urgency.    Musculoskeletal: Negative for arthralgias and myalgias.   Skin: Negative for rash.   Neurological: Negative for dizziness, weakness, light-headedness and headaches.   Psychiatric/Behavioral: Negative for sleep disturbance.     Scheduled Meds:   amLODIPine  10 mg Oral Daily    artificial tears  1 drop Both Eyes TID    bacitracin   Topical (Top) BID    cinacalcet  30 mg Oral Daily with breakfast    Followed by    [START ON 5/19/2018] cinacalcet  30 mg Oral BID WM    heparin (porcine)  5,000 Units Subcutaneous Q8H    senna-docusate 8.6-50 mg  1 tablet Oral BID     Continuous Infusions:  PRN Meds:.acetaminophen, aluminum & magnesium hydroxide-simethicone    Objective:     Vital Signs (Most Recent):  Temp: 97.7 °F (36.5 °C) (05/15/18 0847)  Pulse: 61 (05/15/18 0847)  Resp: 18 (05/15/18 0847)  BP: 136/70 (05/15/18 0847)  SpO2: 97 % (05/15/18 0847) Vital Signs (24h Range):  Temp:  [97.5 °F (36.4 °C)-97.7 °F (36.5 °C)] 97.7 °F (36.5 °C)  Pulse:  [61-81] 61  Resp:  [18] 18  SpO2:  [96 %-97 %] 97 %  BP: (136-158)/(70-80) 136/70     Weight: 64.4 kg (141 lb 15.6 oz)  Body mass index is 28.68 kg/m².  No intake or output data in the 24 hours ending 05/15/18 1559   Physical Exam   Constitutional: She is oriented to person, place, and time. She appears well-developed and well-nourished. No distress.   HENT:   Left frontoparietal incision well healed   Cardiovascular: Normal rate, regular rhythm and normal heart sounds.  Exam reveals no gallop and no friction rub.    No murmur heard.  Pulmonary/Chest: Effort normal and breath sounds normal. No respiratory distress. She has no wheezes. She has no rales.   Abdominal: Soft. Bowel sounds are normal. She exhibits no distension. There is no tenderness.   Musculoskeletal: Normal range of motion. She exhibits no edema or tenderness.   Neurological: She is alert and oriented to person, place, and time.   Skin: Skin is warm and dry. No rash noted. She is not diaphoretic. No cyanosis.  Nails show no clubbing.   Psychiatric: She has a normal mood and affect. Her behavior is normal.     Significant Labs:     Recent Labs  Lab 05/10/18  0507 05/14/18  0522   WBC 4.16 4.15   HGB 11.3* 11.0*   HCT 35.3* 35.7*    185       Recent Labs  Lab 05/10/18  0507 05/14/18  0012 05/14/18 0522     --  140   K 4.1  --  3.6     --  107   CO2 25  --  28   BUN 13  --  11   CREATININE 0.7 0.8 0.8   CALCIUM 12.3*  --  11.6*     Lab Results   Component Value Date    LABPROT 10.6 04/26/2018    ALBUMIN 3.2 (L) 05/07/2018     Lab Results   Component Value Date    CALCIUM 11.6 (H) 05/14/2018    PHOS 2.4 (L) 05/14/2018     Significant Imaging: n/a

## 2018-05-15 NOTE — PLAN OF CARE
Problem: Occupational Therapy Goal  Goal: Occupational Therapy Goal  Goals to be met by: 10 days     Patient will increase functional independence with ADLs by performing:    UE Dressing with Modified [place]. Met 5.15.18  LE Dressing with Modified [place]. Inconsistently met by d/c  Grooming while standing at sink with Set-up Assistance.  Met 5.15.18  Toileting from toilet with Modified [place] for hygiene and clothing management. Met 5.15.18  Bathing from  shower chair/bench with Supervision  Met 5.15.18  Supine to sit with Modified [place]. Met 5.15.18  Stand pivot transfers with Modified [place]. Met 5.15.18  Toilet transfer to toilet with Modified [place]. Met 5.15.18  Upper extremity exercise program x 25 min to increase functional endurance for ADLs Met 5.15.18  Pt will perform a functional standing task x 15 min with sup Met 5.15.18  Outcome: Ongoing (interventions implemented as appropriate)  Cindy Bassett OT  5/15/2018

## 2018-05-15 NOTE — PROGRESS NOTES
[facility]  Department of Hospital Medicine  Progress Note    Patient Name: Mikaela Ghosh  MRN: [id number]  Code Status: Full Code  Admission Date: 4/26/2018  Length of Stay: 19 days  Attending Physician: Luh Garcia MD  Primary Care Provider: Felipe Mustafa MD    Subjective:     Principal Problem:Subdural hematoma    Chief Complaint/Reason for Admission: Subdural hematoma    History of Present Illness:  Patient is a 84 y.o. female with HTN who presents to SNF after hospitalization for subdural hematoma requiring left frontoparietal craniotomy with evacuation on 4/20 by Dr. Sandoval.  The patient initially presented with dizziness and was found to have SDH on CT. She complains of intermittent mild headache at incision site since surgery which is relieved with tylenol. She denies any exacerbating conditions.  She does have dry eyes and was taking tears BID at home and would like to resume.  No seizures or focal weakness.   The patient has been admitted to SNF for ongoing PT/OT due to insufficient progress to go home safely from the hospital.    Hospital Course:  The patient was admitted at [facility] from 4/18 to 4/26/2018.  4/26: Patient admitted to SNF for ongoing PT/Ot following a hospitalization for subdural hematoma requiring left frontoparietal craniotomy with evacuation.  4/30: Patient seen at bedside, doing well, reports mild HA tolerable with tylenol. No acute events overnight. Labs reviewed.   5/3: Ca++ elevated, treated with NS, will monitor Ca++ levels  5/4: ionized Ca++ 1.72 elevated treated with NS, will monitor Ca++ levels  5/7: Patient seen at bedside doing well, denies pain, discussed elevated Ca++ level; d/w with Dr. Lemos-believed to be primary hyperthyroidism patient asymptomatic secure outpatient appointment. ionzied Ca++ 1.58  5/8: Patient seen at bedside, doing well, discussed upcoming Endocrine appointment, verbalized understanding.   5/10: seen in endocrine clinic patient  trying to decided with will start medication for hypercalcemia will give answer tomorrow.   5/11: After discussing use of cinacalcet with patient and daughter at bedside, patient willing to start medication. Discussed 24hour urine with both. 24hour urine will start on Sunday discussed with nurse and charge nurse.   5/15: Patient seen at bedside doing well, no complaints, discussed upcoming discharge with patient, discussed no driving until cleared by neurosurgery and driving eval (discussed with daughter on 5/14/18).    Interval History: Patient seen at bedside, doing well, no acute events overnight.    Review of Systems   Constitutional: Negative for appetite change, chills, fatigue and fever.   HENT: Negative for trouble swallowing.    Respiratory: Negative for cough, chest tightness, shortness of breath and wheezing.    Cardiovascular: Negative for chest pain, palpitations and leg swelling.   Gastrointestinal: Negative for abdominal pain, constipation, diarrhea and nausea.   Genitourinary: Negative for difficulty urinating, frequency and urgency.   Musculoskeletal: Negative for arthralgias and myalgias.   Skin: Negative for rash.   Neurological: Negative for dizziness, weakness, light-headedness and headaches.   Psychiatric/Behavioral: Negative for sleep disturbance.     Scheduled Meds:   amLODIPine  10 mg Oral Daily    artificial tears  1 drop Both Eyes TID    bacitracin   Topical (Top) BID    cinacalcet  30 mg Oral Daily with breakfast    Followed by    [START ON 5/19/2018] cinacalcet  30 mg Oral BID WM    heparin (porcine)  5,000 Units Subcutaneous Q8H    senna-docusate 8.6-50 mg  1 tablet Oral BID     Continuous Infusions:  PRN Meds:.acetaminophen, aluminum & magnesium hydroxide-simethicone    Objective:     Vital Signs (Most Recent):  Temp: 97.7 °F (36.5 °C) (05/15/18 0847)  Pulse: 61 (05/15/18 0847)  Resp: 18 (05/15/18 0847)  BP: 136/70 (05/15/18 0847)  SpO2: 97 % (05/15/18 0847) Vital Signs (24h  Range):  Temp:  [97.5 °F (36.4 °C)-97.7 °F (36.5 °C)] 97.7 °F (36.5 °C)  Pulse:  [61-81] 61  Resp:  [18] 18  SpO2:  [96 %-97 %] 97 %  BP: (136-158)/(70-80) 136/70     Weight: 64.4 kg (141 lb 15.6 oz)  Body mass index is 28.68 kg/m².  No intake or output data in the 24 hours ending 05/15/18 1559   Physical Exam   Constitutional: She is oriented to person, place, and time. She appears well-developed and well-nourished. No distress.   HENT:   Left frontoparietal incision well healed   Cardiovascular: Normal rate, regular rhythm and normal heart sounds.  Exam reveals no gallop and no friction rub.    No murmur heard.  Pulmonary/Chest: Effort normal and breath sounds normal. No respiratory distress. She has no wheezes. She has no rales.   Abdominal: Soft. Bowel sounds are normal. She exhibits no distension. There is no tenderness.   Musculoskeletal: Normal range of motion. She exhibits no edema or tenderness.   Neurological: She is alert and oriented to person, place, and time.   Skin: Skin is warm and dry. No rash noted. She is not diaphoretic. No cyanosis. Nails show no clubbing.   Psychiatric: She has a normal mood and affect. Her behavior is normal.     Significant Labs:     Recent Labs  Lab 05/10/18  0507 05/14/18  0522   WBC 4.16 4.15   HGB 11.3* 11.0*   HCT 35.3* 35.7*    185       Recent Labs  Lab 05/10/18  0507 05/14/18  0012 05/14/18  0522     --  140   K 4.1  --  3.6     --  107   CO2 25  --  28   BUN 13  --  11   CREATININE 0.7 0.8 0.8   CALCIUM 12.3*  --  11.6*     Lab Results   Component Value Date    LABPROT 10.6 04/26/2018    ALBUMIN 3.2 (L) 05/07/2018     Lab Results   Component Value Date    CALCIUM 11.6 (H) 05/14/2018    PHOS 2.4 (L) 05/14/2018     Significant Imaging: n/a    Assessment/Plan:      * Subdural hematoma    Evaluated on 5/15/18  · continue PT/OT to increase ambulation, ADL performance and endurance  · continue to monitor incision and continue bactroban BID to  incision--staples removed on 5/10  · continue seizure prophylaxis with keppra to 4/29--completed  · continue heparin for DVT prophylaxis  · continue fall precautions  · continue senokot-s to prevent constipation; hold for frequent or loose stooling        Primary hyperparathyroidism    Evaluated on 5/15/18  · Calcium Mild and Elevated since 5/2017  · Renal function normal but phos is low   · Encourage hydration.Patient did requiring IVFs that did not improve Ca++ levels, patient has been asymptomatic  · PTH elevated at 197--follow-up with Endocrine--f/u on 5/10/18  · Patient agreed to start senipar on 5/12, she was hesitant at first to start medication due to possible dizziness with medication. Patient has not become dizzy since starting medication         Dry eyes    Evaluated on 5/15/18  · resume therapy with artificial tears as she takes at home        Essential hypertension, benign    Evaluated on 5/15/18  · Chronic with improved control  · Continue therapy with amlodipine and low Na diet  · will continue to monitor and adjust regimen as necessary          Future Appointments  Date Time Provider Department Center   5/18/2018 2:00 PM [facility], DEXA1 [facility] BMD [facility]   5/21/2018 10:20 AM Felipe Mustafa MD [facility] MED [place]   5/31/2018 2:00 PM LAB, APPOINTMENT [facility] LAB VNP [facility]   7/3/2018 11:00 AM [facility] OIC-CT1 500 LB LIMIT [facility] IC Zachary deyanira   7/3/2018 1:00 PM Wojciech Sandoval MD [facility] NEUROS7 Zach[facility] deyanira Tomas NP  Department of Hospital Medicine  [facility]

## 2018-05-15 NOTE — PT/OT/SLP PROGRESS
Physical Therapy  Treatment    Mikaela Ghosh   MRN: [id number]   Admitting Diagnosis: Subdural hematoma    PT Received On: 05/15/18  Total Time (min): 45       Billable Minutes:  Gait Training 15, Therapeutic Activity 15 and Therapeutic Exercise 15    Treatment Type: Treatment  PT/PTA: PT     PTA Visit Number: 0       General Precautions: Standard, fall  Orthopedic Precautions: N/A   Braces: N/A    Subjective:  Communicated with pt prior to session.  Pt was agreeable to PT services.     Pain/Comfort  Pain Rating 1: 0/10    Objective:  Patient found seated in room.   Functional Status:  MDS G  Transfer Functional Status: mod(I) - (I)  Walk in Room Functional Status: S-SBA  Walk in Corridor Functional Status: S-SBA  Locomotion on Unit Functional Status: S-SBA  Moving from seated to standing position: Steady at all times  Walking (with assistive device if used): Not steady, but able to stabilize without staff assistance  Turning around and facing the opposite direction while walking: Steady at all times  Moving on and off the toilet: Steady at all times  Surface-to-surface transfer (transfer between bed and chair or wheelchair): Steady at all times    MDS GG  Toileting Hygiene Performance: Setup or clean-up assistance  Sit to Stand Performance: Independent.  Chair/bed-to-chair transfer Performance: Independent.  Toilet transfer Performance: Independent.  Walk 50 feet with two turns Performance: Independent.  Walk 150 feet Performance: Supervision or touching assistance.     AM-PAC 6 CLICK MOBILITY  Total Score:22    Memory and problem-solving:   · Pt was informed that we were going downstairs and needed to let the nurse know. She was able to find her room number on the board after a few minutes of searching/processing. She needed cueing to find her nurse, as she didn't remember what she looked like. She was not sure what to say to her when face-to-face with her.    · Once downstairs, pt was given the task to determine  which floor we needed to return to. Once she remembered her room number, she knew that we needed to go to the third floor.  · Pt was given the initial task of remembering to check the figurative rice she put on stove once we returned to the floor.  She needed reminding to do so.    Transfers:  Sit<>Stand: [place]  Stand Pivot Transfer: independence    Gait:  · Amb >400 feet with no use of an assistive device, supervision with slight sliding of feet every now and then.  · Pt performed side-stepping in parallel bars (x8 feet x 4 trials- no UE support) and backward walking (x4 trials with/without UE support)- supervision. Backwards ambulation was difficult for her with regards to weight-shifting and trusting herself to accept weight onto each LE. This will help with multidirectional gait and balance in the future.    Therex:  Standing- hip flexion x 20 reps    Additional Treatment:  NuStep x 15 minutes with Work Load of 3 based on her weight to improve her LE and UE endurance and strength.    Patient left up in chair with call button in reach.    Assessment:  Mikaela Ghosh is a 85 y.o. female with a medical diagnosis of Subdural hematoma.  Ms. Ghosh will need supervision upon returning home and will benefit from HHPT and possibly speech to improve her processing and memory.    Rehab identified problem list/impairments: weakness, impaired endurance, impaired functional mobilty, gait instability, impaired balance, decreased lower extremity function, pain    Rehab potential is good.    Activity tolerance: Good    Discharge recommendations: home with home health     Barriers to discharge: Inaccessible home environment, Decreased caregiver support    Equipment recommendations: walker, rolling, bedside commode     GOALS:    Physical Therapy Goals        Problem: Physical Therapy Goal    Goal Priority Disciplines Outcome Goal Variances Interventions   Physical Therapy Goal     PT/OT, PT Ongoing (interventions  implemented as appropriate)     Description:  Goals to be met by: 14 days     Patient will increase functional independence with mobility by performin. Supine to sit with Modified [place] met  2. Sit to supine with Modified [place] met  3. Sit to stand transfer with Supervision with appropriate assistive device, as needed. Met (2018)  4. Bed to chair transfer with Supervision using Rolling Walker or least restrictive assistive device, as appropriate. Met (2018)  5. Gait  x 300 feet with Supervision using Rolling Walker Or least restrictive assistive device. Met (2018)  5a. Upgrade: Gait x 400 feet with supervision in open environment with no AD (going up/down stairs, elevator, outdoors, etc).  Met (5/10/2018)  6. Gait x 50 feet w/ single point cane with minimal assistance. (Able to ambulate with no AD >50 feet, SBA). MET  7.Discontinued: Wheelchair propulsion x150 feet with Stand-by Assistance using bilateral upper extremities.  8. Ascend/descend 8 stair with bilateral Handrails Supervision using Single-point Cane or without assistive, or appropriate AD.   9. Ascend/Descend 4 inch curb step with Stand-by Assistance using Rolling Walker.or appropriate assistive device.  10. Stand for 5 minutes with Stand-by Assistance using Rolling Walker or appropriate assistive device and perform an activity. Met (2018)  11. Discontinued: Lower extremity exercise program x20 reps per handout, with assistance as needed and gym therex met  12. Perform [place] or appropriate balance test. Met (2018)- Low Fall Risk.                            PLAN:    Patient to be seen 5 x/week  to address the above listed problems via gait training, therapeutic activities, therapeutic exercises, wheelchair management/training  Plan of Care expires: 18  Plan of Care reviewed with: patient    Vanesa Hernandez, PT  05/15/2018

## 2018-05-15 NOTE — PT/OT/SLP PROGRESS
"Occupational Therapy  Treatment    Mikaela Ghosh   MRN: [id number]   Admitting Diagnosis: Subdural hematoma    OT Date of Treatment: 05/15/18  Total Time (min): 53 min    Billable Minutes:  Self Care/Home Management 15 and Neuromuscular Re-education 38    General Precautions: Standard, fall  Orthopedic Precautions: N/A  Braces: N/A    Do you have any cultural, spiritual, Muslim conflicts, given your current situation?: none stated    Subjective:  Communicated with nurse prior to session.  Pt reported her daughter will assist with homemaking and community ADL's upon d/c stating, "That's what she's staying with me for."    Pain/Comfort  Pain Rating 1: 0/10  Pain Rating Post-Intervention 1: 0/10    Objective:       Functional Status:  Bed Mobility Functional Status: mod(I)   Transfer Functional Status: mod(I) - (I)  Walk in Room Functional Status: S-SBA without AD  Walk in Corridor Functional Status: S-SBA    Locomotion on Unit Functional Status: S-SBA    Dressing Functional Status: 1: S after set up    Eating Functional Status: mod(I) - (I)  Toilet Use Functional Status: S on 3in1 commode over standard commode    Personal Hygiene Functional Status: S-SBA  Bathing Functional Status: S-SBA on TTB using shower hose and grab bars    Moving from seated to standing position: Steady at all times  Walking (with assistive device if used): Not steady, but able to stabilize without staff assistance  Turning around and facing the opposite direction while walking: Not steady, but able to stabilize without staff assistance  Moving on and off the toilet: Not steady, but able to stabilize without staff assistance  Surface-to-surface transfer (transfer between bed and chair or wheelchair): Not steady, but able to stabilize without staff assistance     MDS GG  Eating Performance: Independent.  Oral Hygiene Performance: Setup or clean-up assistance  Toileting Hygiene Performance: Setup or clean-up assistance  Sit to lying Performance: " Independent  Lying to sitting on side of bed Performance: Independent.  Sit to Stand Performance: Independent.  Chair/bed-to-chair transfer Performance: Independent.  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 She was seen for neuromuscular re-ed focusing on bimanual tasks. Also addressed core strengthening during BUE bilateral tasks of catching, tossing and stabilizing with medium therapy ball while in unsupported standing.    Chair <> floor transfer training - OT educated pt on technique to transition from kneeling to half kneeling to standing with UE support on static chair.    Patient left up in chair with call button in reach    ASSESSMENT:  Mikaela Ghosh is a 85 y.o. female with a medical diagnosis of Subdural hematoma who is scheduled for d/c to home with assist of her adult daughter on 5/16/18. She has shown tremendous functional gains during this SNF stay and is deemed appropriate for d/c with such assistance. She also demonstrates high potential for further functional improvement with skilled intervention in next level of care. She continues to demonstrate noted dysdiadochokinesia, difficulty with visual spatial awareness and higher level balance as required to safely complete homemaking and community ADL's.    Rehab identified problem list/impairments: weakness, impaired endurance, impaired self care skills, impaired functional mobilty, gait instability, impaired balance, decreased lower extremity function, pain    Rehab potential is good    Activity tolerance: Good    Discharge recommendations: home health OT     Barriers to discharge: Inaccessible home environment, Decreased caregiver  support     Equipment recommendations: bath bench, wheelchair (daughter reports that she is going to check to see if she needs a RW. )     GOALS:    Occupational Therapy Goals        Problem: Occupational Therapy Goal    Goal Priority Disciplines Outcome Interventions   Occupational Therapy Goal     OT, PT/OT Ongoing (interventions implemented as appropriate)    Description:  Goals to be met by: 10 days     Patient will increase functional independence with ADLs by performing:    UE Dressing with Modified [place]. Met 5.15.18  LE Dressing with Modified [place]. Inconsistently met by d/c  Grooming while standing at sink with Set-up Assistance.  Met 5.15.18  Toileting from toilet with Modified [place] for hygiene and clothing management. Met 5.15.18  Bathing from  shower chair/bench with Supervision  Met 5.15.18  Supine to sit with Modified [place]. Met 5.15.18  Stand pivot transfers with Modified [place]. Met 5.15.18  Toilet transfer to toilet with Modified [place]. Met 5.15.18  Upper extremity exercise program x 25 min to increase functional endurance for ADLs Met 5.15.18  Pt will perform a functional standing task x 15 min with sup Met 5.15.18                    Plan:  Patient to be seen 5 x/week to address the above listed problems via self-care/home management, therapeutic activities, therapeutic exercises  Plan of Care expires: 05/27/18  Plan of Care reviewed with: patient    Cindy Bassett, JOSSY  05/15/2018

## 2018-05-16 VITALS
HEART RATE: 77 BPM | RESPIRATION RATE: 18 BRPM | WEIGHT: 141.31 LBS | BODY MASS INDEX: 26.01 KG/M2 | HEIGHT: 62 IN | TEMPERATURE: 98 F | SYSTOLIC BLOOD PRESSURE: 123 MMHG | OXYGEN SATURATION: 97 % | DIASTOLIC BLOOD PRESSURE: 74 MMHG

## 2018-05-16 PROCEDURE — 25000003 PHARM REV CODE 250: Performed by: INTERNAL MEDICINE

## 2018-05-16 PROCEDURE — 63600175 PHARM REV CODE 636 W HCPCS: Performed by: PHYSICIAN ASSISTANT

## 2018-05-16 PROCEDURE — 25000003 PHARM REV CODE 250: Performed by: NURSE PRACTITIONER

## 2018-05-16 PROCEDURE — 99316 NF DSCHRG MGMT 30 MIN+: CPT | Mod: ,,, | Performed by: HOSPITALIST

## 2018-05-16 PROCEDURE — 92507 TX SP LANG VOICE COMM INDIV: CPT

## 2018-05-16 PROCEDURE — 97110 THERAPEUTIC EXERCISES: CPT

## 2018-05-16 PROCEDURE — 94761 N-INVAS EAR/PLS OXIMETRY MLT: CPT

## 2018-05-16 PROCEDURE — 97535 SELF CARE MNGMENT TRAINING: CPT

## 2018-05-16 RX ADMIN — HYPROMELLOSE 2910 1 DROP: 5 SOLUTION OPHTHALMIC at 02:05

## 2018-05-16 RX ADMIN — AMLODIPINE BESYLATE 10 MG: 10 TABLET ORAL at 09:05

## 2018-05-16 RX ADMIN — HEPARIN SODIUM 5000 UNITS: 5000 INJECTION, SOLUTION INTRAVENOUS; SUBCUTANEOUS at 05:05

## 2018-05-16 RX ADMIN — CINACALCET HYDROCHLORIDE 30 MG: 30 TABLET, COATED ORAL at 09:05

## 2018-05-16 RX ADMIN — HYPROMELLOSE 2910 1 DROP: 5 SOLUTION OPHTHALMIC at 09:05

## 2018-05-16 RX ADMIN — HEPARIN SODIUM 5000 UNITS: 5000 INJECTION, SOLUTION INTRAVENOUS; SUBCUTANEOUS at 02:05

## 2018-05-16 RX ADMIN — BACITRACIN: 500 OINTMENT TOPICAL at 09:05

## 2018-05-16 NOTE — PLAN OF CARE
05/16/2018  11:11 AM    SW sent home health referral (via [employer]) to [facility] per pt's request.  Per pt/daughter (Judie) request, Jr RW, WC, and TTB to be delivered to home today.  Judie refusing order for BSC at this time stating that pt already has one at home.  Per Charmaine with [facility], DME orders will be sent to [facility] for processing and delivery.  Pt to have assistance of Judie at home upon d/c.  Judie to accompany and transport pt home upon SNF d/c this afternoon.  SHAMIKA remains available for further d/c planning assistance and will f/u if needed.    Yazan Beltran, DASHAWNSW  [id number]

## 2018-05-16 NOTE — NURSING
Patient discharged home as ordered at this time. Discharge instructions given to patient and pt's daughter Judie per this nurse on medications, diet, follow up appointments, signs or symptoms to report to doctor or to call 911 in case of emergency, patient and pt's daughter verbalized understanding, and copy of all discharge instructions provided to patient.vitals signs taken, afebrile and stable, recorded. belongins taken home per pt's daughter . Patient discharged home at this time via w/c to main entrance to pt's daughter vehicle assisted per this nurse.ndn

## 2018-05-16 NOTE — PLAN OF CARE
Problem: SLP Goal  Goal: SLP Goal  Speech Language Pathology Goals  Goals expected to be met by 5/4:  1. Pt will complete immediate memory tasks with 70% accuracy min cues. Goal met  2. Following a delay, pt will recall 3/3 words given mod cues. Goal met x 1 out of 2 trials/ ongoing  3. Pt will follow 3-step commands with 70% accuracy given mod cues. Goal not met.  4. Pt will list 8 items in one minute given min cues. Goal not met.  5. Pt will participate in ongoing assessment of reading, writing, visual spatial, and functional math abilities. Goal met.     Revised goals expected to be met 5/11:  1. Pt will complete immediate memory tasks with 80% accuracy min cues. ongoing  2. Following a delay, pt will recall 3/3 words given mod cues. ongoing  3. Pt will follow 3-step commands with 60% accuracy given max cues. Goal met  4. Pt will list 8 items in one minute given min cues. Goal met.  5. Pt will complete short paragraph level reading tasks with 80% accuracy and min cues. Ongoing.      Revised goals expected to be met 5/18:  1. Pt will complete immediate memory tasks with 80% accuracy min cues.   2. Following a delay, pt will recall 3/3 words given mod cues.  3. Pt will follow 3-step commands with 60% accuracy given mod cues.   4. Pt will list 10 items in one minute given min cues.   5. Pt will complete short paragraph level reading tasks with 80% accuracy and min cues.         Outcome: Outcome(s) achieved Date Met: 05/16/18    Patient with anticipated discharge today. Continue SLP services upon discharge with MARCO A.     Philly Alvares M.S., CCC-SLP  Speech Language Pathologist  [phone] - pager   5/16/2018

## 2018-05-16 NOTE — PT/OT/SLP PROGRESS
"Speech Language Pathology  Treatment/Discharge Summary    Mikaela Ghosh   MRN: [id number]   Admitting Diagnosis: Subdural hematoma    Diet recommendations: Solid Diet Level: Regular  Liquid Diet Level: Thin Standard aspiration precautions    SLP Treatment Date: 05/16/18  Speech Start Time: 0922     Speech Stop Time: 0946     Speech Total (min): 24 min       TREATMENT BILLABLE MINUTES:  Speech Therapy Individual 16 and [place] Care/Home Management Training 8    Has the patient been evaluated by SLP for swallowing? : Yes  Keep patient NPO?: No   General Precautions: Standard, fall  Current Respiratory Status: room air       Subjective:  "It's my last day." Patient cooperative and alert throughout session.        Objective:   Patient seen in room with no family present. Patient AOX4 independently. Patient completed generative naming tasks with min-mod A (6 states/1 min; 8 fruits/1 min). For delayed recall, patient recalled +1/3 words mod I increasing to +3/3 with mod cues. Patient benefited from increased processing time to complete naming tasks. Patient completed short paragraph reading tasks mod I. Noted improvement in fluency of oral reading as compared to previous sessions. Patient education provided regarding discharge planning and recommendations for [facility] once patient discharges.       Assessment:  Mikaela Ghosh is a 85 y.o. female with a medical diagnosis of Subdural hematoma and presents with mild cognitive-linguistic deficits.       Discharge recommendations: Discharge Facility/Level Of Care Needs: home health speech therapy     Goals:    SLP Goals     Not on file          Multidisciplinary Problems (Resolved)        Problem: SLP Goal    Goal Priority Disciplines Outcome   SLP Goal   (Resolved)     SLP Outcome(s) achieved   Description:  Speech Language Pathology Goals  Goals expected to be met by 5/4:  1. Pt will complete immediate memory tasks with 70% accuracy min cues. Goal met  2. Following a delay, pt will " recall 3/3 words given mod cues. Goal met x 1 out of 2 trials/ ongoing  3. Pt will follow 3-step commands with 70% accuracy given mod cues. Goal not met.  4. Pt will list 8 items in one minute given min cues. Goal not met.  5. Pt will participate in ongoing assessment of reading, writing, visual spatial, and functional math abilities. Goal met.     Revised goals expected to be met 5/11:  1. Pt will complete immediate memory tasks with 80% accuracy min cues. ongoing  2. Following a delay, pt will recall 3/3 words given mod cues. ongoing  3. Pt will follow 3-step commands with 60% accuracy given max cues. Goal met  4. Pt will list 8 items in one minute given min cues. Goal met.  5. Pt will complete short paragraph level reading tasks with 80% accuracy and min cues. Ongoing.      Revised goals expected to be met 5/18:  1. Pt will complete immediate memory tasks with 80% accuracy min cues.   2. Following a delay, pt will recall 3/3 words given mod cues.  3. Pt will follow 3-step commands with 60% accuracy given mod cues.   4. Pt will list 10 items in one minute given min cues.   5. Pt will complete short paragraph level reading tasks with 80% accuracy and min cues.                           Plan:   Plan of Care reviewed with: patient  SLP Follow-up?: No (continue  SLP services)           Philly Alvares M.S., CCC-SLP  Speech Language Pathologist  [phone] - pager  5/16/2018

## 2018-05-16 NOTE — PLAN OF CARE
05/16/2018  11:29 AM    Patient to discharge home today with assist of family. Patient set up with [facility] per SHAMIKA Hand.     Future Appointments  Date Time Provider Department Center   5/18/2018 2:00 PM [facility], DEXA1 [facility] BMD [facility]   5/21/2018 10:20 AM Felipe Mustafa MD [facility] FAM MED [place]   5/31/2018 2:00 PM LAB, APPOINTMENT [facility] LAB VNP [facility]   7/3/2018 11:00 AM [facility] OIC-CT1 500 LB LIMIT [facility] CTS IC [facility]   7/3/2018 1:00 PM Wojciech Sandoval MD [facility] NEUROS7 [facility]        05/16/18 1128   Final Note   Assessment Type Discharge Planning Assessment   Discharge Disposition Home   What phone number can be called within the next 1-3 days to see how you are doing after discharge? [phone]   Hospital Follow Up  Appt(s) scheduled? Yes   Discharge plans and expectations educations in teach back method with documentation complete? Yes     Jes Diaz RN, CM Skilled  [id number]

## 2018-05-17 ENCOUNTER — PATIENT OUTREACH (OUTPATIENT)
Dept: ADMINISTRATIVE | Facility: CLINIC | Age: 83
End: 2018-05-17

## 2018-05-18 ENCOUNTER — TELEPHONE (OUTPATIENT)
Dept: ENDOCRINOLOGY | Facility: CLINIC | Age: 83
End: 2018-05-18

## 2018-05-18 ENCOUNTER — HOSPITAL ENCOUNTER (OUTPATIENT)
Dept: RADIOLOGY | Facility: CLINIC | Age: 83
Discharge: HOME | End: 2018-05-18
Payer: MEDICARE

## 2018-05-18 DIAGNOSIS — E21.0 PRIMARY HYPERPARATHYROIDISM: ICD-10-CM

## 2018-05-18 PROCEDURE — 77080 DXA BONE DENSITY AXIAL: CPT | Mod: TC

## 2018-05-18 PROCEDURE — 77080 DXA BONE DENSITY AXIAL: CPT | Mod: 26,,, | Performed by: INTERNAL MEDICINE

## 2018-05-18 NOTE — PT/OT/SLP PROGRESS
Occupational Therapy  Treatment/ Discharge Summary    Mikaela Ghosh   MRN: [id number]   Admitting Diagnosis: Subdural hematoma    OT Date of Treatment: 05/16/18  Total Time (min): 23 min    Billable Minutes:  Therapeutic Exercise 23    General Precautions: Standard, fall  Orthopedic Precautions: N/A  Braces: N/A    Do you have any cultural, spiritual, Orthodox conflicts, given your current situation?: none stated    Subjective:  Communicated with nusing prior to session.    Objective:   Pt was seen this day for therapeutic exercise to focus on improved BUE strength and functional endurance as required for ADL's in standing. She will have assistance for homemaking and community ADL's from her daughter. She will continue with home health OT.    [facility] 6 Click:  [facility] Total Score:      OT Exercises: UE Ergometer 8    Additional Treatment:  Completed family training with pt and pts daughter in preparation for d/c this day.    Patient left up in chair with call button in reach    ASSESSMENT:  Mikaela Ghosh is a 85 y.o. female with a medical diagnosis of Subdural hematoma who has demonstrated significant functional gains, standing balance/ endurance and ADL performance .    Rehab identified problem list/impairments: weakness, impaired endurance, impaired self care skills, impaired functional mobilty, gait instability, impaired balance, decreased lower extremity function, pain    Rehab potential is good    Activity tolerance: Good    Discharge recommendations: home health OT     Barriers to discharge: Inaccessible home environment, Decreased caregiver support     Equipment recommendations: bath bench, wheelchair (daughter reports that she is going to check to see if she needs a RW. )     GOALS:    Occupational Therapy Goals        Problem: Occupational Therapy Goal    Goal Priority Disciplines Outcome Interventions   Occupational Therapy Goal     OT, PT/OT Ongoing (interventions implemented as appropriate)    Description:   Goals to be met by: 10 days     Patient will increase functional independence with ADLs by performing:    UE Dressing with Modified [place]. Met 5.15.18  LE Dressing with Modified [place]. Inconsistently met by d/c  Grooming while standing at sink with Set-up Assistance.  Met 5.15.18  Toileting from toilet with Modified [place] for hygiene and clothing management. Met 5.15.18  Bathing from  shower chair/bench with Supervision  Met 5.15.18  Supine to sit with Modified [place]. Met 5.15.18  Stand pivot transfers with Modified [place]. Met 5.15.18  Toilet transfer to toilet with Modified [place]. Met 5.15.18  Upper extremity exercise program x 25 min to increase functional endurance for ADLs Met 5.15.18  Pt will perform a functional standing task x 15 min with sup Met 5.15.18                    Plan:  Patient to be seen 5 x/week to address the above listed problems via self-care/home management, therapeutic activities, therapeutic exercises  Plan of Care expires: 05/27/18  Plan of Care reviewed with: patient    Cindy Bassett OT  05/18/2018

## 2018-05-18 NOTE — DISCHARGE SUMMARY
[facility] [place], [place] [facility] - Skilled Nursing Care  Department of Hospital Medicine  Discharge Summary      Patient Name: Mikaela Ghosh  MRN: [id number]  Admission Date: 4/26/2018  Hospital Length of Stay: 20 days  Discharge Date and Time: 5/16/2018  4:52 PM  Attending Physician: Luh Garcia MD  Discharging Provider: oLrin Tomas NP  Primary Care Provider: Felipe Mustafa MD    Chief Complaint/Reason for Admission: Subdural hematoma    History of Present Illness:  Patient is a 84 y.o. female with HTN who presents to SNF after hospitalization for subdural hematoma requiring left frontoparietal craniotomy with evacuation on 4/20 by Dr. Sandoval.  The patient initially presented with dizziness and was found to have SDH on CT. She complains of intermittent mild headache at incision site since surgery which is relieved with tylenol. She denies any exacerbating conditions.  She does have dry eyes and was taking tears BID at home and would like to resume.  No seizures or focal weakness.   The patient has been admitted to SNF for ongoing PT/OT due to insufficient progress to go home safely from the hospital.    Hospital Course:   The patient was admitted at [facility] from 4/18 to 4/26/2018.  4/26: Patient admitted to SNF for ongoing PT/Ot following a hospitalization for subdural hematoma requiring left frontoparietal craniotomy with evacuation.  4/30: Patient seen at bedside, doing well, reports mild HA tolerable with tylenol. No acute events overnight. Labs reviewed.   5/3: Ca++ elevated, treated with NS, will monitor Ca++ levels  5/4: ionized Ca++ 1.72 elevated treated with NS, will monitor Ca++ levels  5/7: Patient seen at bedside doing well, denies pain, discussed elevated Ca++ level; d/w with Dr. Lemos-believed to be primary hyperthyroidism patient asymptomatic secure outpatient appointment. ionzied Ca++ 1.58  5/8: Patient seen at bedside, doing well, discussed upcoming Endocrine appointment, verbalized understanding.   5/10: seen in  endocrine clinic patient trying to decided with will start medication for hypercalcemia will give answer tomorrow.   5/11: After discussing use of cinacalcet with patient and daughter at bedside, patient willing to start medication. Discussed 24hour urine with both. 24hour urine will start on Sunday discussed with nurse and charge nurse.   5/15: Patient seen at bedside doing well, no complaints, discussed upcoming discharge with patient, discussed no driving until cleared by neurosurgery and driving eval (discussed with daughter on 5/14/18).  5/16: Patient seen at bedside, doing well, discussed discharge, answered all questions, patient being discharge home with home health services.      Significant Diagnostic Studies:     Recent Labs  Lab 05/14/18  0522   WBC 4.15   HGB 11.0*   HCT 35.7*          Recent Labs  Lab 05/14/18  0012 05/14/18  0522   NA  --  140   K  --  3.6   CL  --  107   CO2  --  28   BUN  --  11   CREATININE 0.8 0.8   CALCIUM  --  11.6*     Lab Results   Component Value Date    LABPROT 10.6 04/26/2018    ALBUMIN 3.2 (L) 05/07/2018     Lab Results   Component Value Date    CALCIUM 11.6 (H) 05/14/2018    PHOS 2.4 (L) 05/14/2018       Final Active Diagnoses:    Diagnosis Date Noted POA    PRINCIPAL PROBLEM:  Subdural hematoma [I62.00] 04/18/2018 Yes    Primary hyperparathyroidism [E21.0] 05/10/2018 Yes    Dry eyes [H04.123] 04/27/2018 Yes    Hypercalcemia [E83.52] 04/27/2018 Yes    Hypophosphatemia [E83.39] 04/27/2018 Yes    Essential hypertension, benign [I10] 04/18/2018 Yes      Problems Resolved During this Admission:    Diagnosis Date Noted Date Resolved POA      * Subdural hematoma    · continue PT/OT to increase ambulation, ADL performance and endurance  · continue to monitor incision and continue bactroban BID to incision--staples removed on 5/10  · continue seizure prophylaxis with keppra to 4/29--completed  · continue heparin for DVT prophylaxis  · continue fall  precautions  · continue senokot-s to prevent constipation; hold for frequent or loose stooling        Primary hyperparathyroidism    · Calcium Mild and Elevated since 5/2017  · Renal function normal but phos is low   · Encourage hydration.Patient did requiring IVFs that did not improve Ca++ levels, patient has been asymptomatic  · PTH elevated at 197--follow-up with Endocrine--f/u on 5/10/18  · Patient agreed to start senipar on 5/12, she was hesitant at first to start medication due to possible dizziness with medication. Patient has not become dizzy since starting medication         Hypophosphatemia    · Repletes with phos packets  · 2.6 on 5/7/18  · Monitor with twice weekly labs        Hypercalcemia    · Mild and Elevated since 5/2017  · Renal function normal but phos is low 2.6  · Encourage hydration. If Ca increases may need ivfs, will continue to monitor--treated with IVFs without marked improvement, discussed with Dr. Lemos on 5/7/18 hold IVFs patient asymptomatic  · PTH elevated at 197--follow-up with Endocrine--f/u on 5/10/18        Dry eyes    · resume therapy with artificial tears as she takes at home        Essential hypertension, benign    · Chronic with improved control  · Continue therapy with amlodipine and low Na diet  · will continue to monitor and adjust regimen as necessary        PT note, SUDHEER Hernandez, PT 5/15/18  General Precautions: Standard, fall  Orthopedic Precautions: N/A   Braces: N/A  Functional Status:  MDS G  Transfer Functional Status: mod(I) - (I)  Walk in Room Functional Status: S-SBA  Walk in Corridor Functional Status: S-SBA  Locomotion on Unit Functional Status: S-SBA  Moving from seated to standing position: Steady at all times  Walking (with assistive device if used): Not steady, but able to stabilize without staff assistance  Turning around and facing the opposite direction while walking: Steady at all times  Moving on and off the toilet: Steady at all times  Surface-to-surface  transfer (transfer between bed and chair or wheelchair): Steady at all times     MDS GG  Toileting Hygiene Performance: Setup or clean-up assistance  Sit to Stand Performance: Independent.  Chair/bed-to-chair transfer Performance: Independent.  Toilet transfer Performance: Independent.  Walk 50 feet with two turns Performance: Independent.  Walk 150 feet Performance: Supervision or touching assistance.   Memory and problem-solving:   · Pt was informed that we were going downstairs and needed to let the nurse know. She was able to find her room number on the board after a few minutes of searching/processing. She needed cueing to find her nurse, as she didn't remember what she looked like. She was not sure what to say to her when face-to-face with her.     · Once downstairs, pt was given the task to determine which floor we needed to return to. Once she remembered her room number, she knew that we needed to go to the third floor.  · Pt was given the initial task of remembering to check the figurative rice she put on stove once we returned to the floor.  She needed reminding to do so.     Transfers:  Sit<>Stand: [place]  Stand Pivot Transfer: independence     Gait:  · Amb >400 feet with no use of an assistive device, supervision with slight sliding of feet every now and then.  · Pt performed side-stepping in parallel bars (x8 feet x 4 trials- no UE support) and backward walking (x4 trials with/without UE support)- supervision. Backwards ambulation was difficult for her with regards to weight-shifting and trusting herself to accept weight onto each LE. This will help with multidirectional gait and balance in the future.  Discharge recommendations: home with home health       Ot ALTHEA scruggs, OT 5/15/18  General Precautions: Standard, fall  Orthopedic Precautions: N/A  Braces: N/A  Functional Status:  Bed Mobility Functional Status: mod(I)   Transfer Functional Status: mod(I) - (I)  Walk in Room Functional  Status: S-SBA without AD  Walk in Corridor Functional Status: S-SBA     Locomotion on Unit Functional Status: S-SBA     Dressing Functional Status: 1: S after set up     Eating Functional Status: mod(I) - (I)  Toilet Use Functional Status: S on 3in1 commode over standard commode     Personal Hygiene Functional Status: S-SBA  Bathing Functional Status: S-SBA on TTB using shower hose and grab bars     Moving from seated to standing position: Steady at all times  Walking (with assistive device if used): Not steady, but able to stabilize without staff assistance  Turning around and facing the opposite direction while walking: Not steady, but able to stabilize without staff assistance  Moving on and off the toilet: Not steady, but able to stabilize without staff assistance  Surface-to-surface transfer (transfer between bed and chair or wheelchair): Not steady, but able to stabilize without staff assistance     MDS GG  Eating Performance: Independent.  Oral Hygiene Performance: Setup or clean-up assistance  Toileting Hygiene Performance: Setup or clean-up assistance  Sit to lying Performance: Independent  Lying to sitting on side of bed Performance: Independent.  Sit to Stand Performance: Independent.  Chair/bed-to-chair transfer Performance: Independent.  Toilet transfer Performance: Setup or clean-up assistance  Does the resident walk?: Yes --> Continue to Walk 50 feet with two turns assessment   Discharge recommendations: home health OT       SLP KULWANT scruggs, SLP 5/16/18  Assessment:  Mikaela Ghosh is a 85 y.o. female with a medical diagnosis of Subdural hematoma and presents with mild cognitive-linguistic deficits.   Diet recommendations: Solid Diet Level: Regular  Liquid Diet Level: Thin Standard aspiration precautions  Discharge recommendations: Discharge Facility/Level Of Care Needs: home health speech therapy       Discharged Condition: stable    Disposition: Home-Health Care c    Follow Up:  Follow-up  "Information     Felipe Mustafa MD. Go on 5/21/2018.    Specialty:  Family Medicine  Contact information:  [address]  [place] [address]  [phone]             Wojciech Sandoval MD. Go on 7/3/2018.    Specialty:  Neurosurgery  Contact information:  [address]  [place] [address]  [phone]             [facility].    Specialty:  Home Health Services  Why:  Home Health Questions/Concerns  Contact information:  [address] [place] [address]  [phone]             [employer].    Specialty:  DME Provider  Why:  Home Medical Equipment Questions/Concerns  Contact information:  [address] [place] [address]  [phone]                 Future Appointments  Date Time Provider Department Center   5/21/2018 10:20 AM Felipe Mustafa MD [facility] MED [place]   5/31/2018 2:00 PM LAB, APPOINTMENT [facility] LAB VNP [facility]   7/3/2018 11:00 AM [facility] OIC-CT1 500 LB LIMIT [facility] CTSC IC [address]   7/3/2018 1:00 PM Wojciech Sandoval MD [facility] NEUROS7 [facility]     Patient Instructions:     WALKER FOR HOME USE   Order Specific Question Answer Comments   Type of Walker: Chun (4'4"-5'7")    With wheels? Yes    Height: 5' 2" (1.575 m)    Weight: 64.4 kg (141 lb 15.6 oz)    Length of need (1-99 months): 99    Does patient have medical equipment at home? none    Please check all that apply: Patient's condition impairs ambulation.    Please check all that apply: Patient is unable to safely ambulate without equipment.    Please check all that apply: Walker will be used for gait training.    Vendor: Other (use comments) [employer]   Expected Date of Delivery: 5/16/2018      WHEELCHAIR FOR HOME USE   Order Specific Question Answer Comments   Hours in W/C per day: 8    Type of Wheelchair: Lightweight    Patient unable to propel in Standard wheelchair? Yes    Size(Width): 18"(STD adult)    Leg Support: Swing Away    Arm Height: Detachable    Arm Height: Swing away    Desired " "seat depth: 18    Back height: standard    Lap Belt: Velcro    Accessories: Safety belt    Cushion: Basic    Justification for cushion: Prevent pressure ulcers    Height: 5' 2" (1.575 m)    Weight: 64.4 kg (141 lb 15.6 oz)    Does patient have medical equipment at home? none    Length of need (1-99 months): 99    Please check all that apply: Caregiver is capable and willing to operate wheelchair safely.    Please check all that apply: Patient's upper body strength is sufficient for propulsion.    Please check all that apply: Patient mobility limitations cannot be sufficiently resolved by the use of other ambulatory therapies.    Vendor: Other (use comments) [employer]   Expected Date of Delivery: 5/16/2018      3 IN 1 COMMODE FOR HOME USE   Order Specific Question Answer Comments   Type: Standard    Height: 5' 2" (1.575 m)    Weight: 64.4 kg (141 lb 15.6 oz)    Does patient have medical equipment at home? none    Length of need (1-99 months): 99    Vendor: Other (use comments) Pt has needed equipment   Expected Date of Delivery: 5/16/2018      TRANSFER TUB BENCH FOR HOME USE   Order Specific Question Answer Comments   Type of Transfer Tub Bench: Unpadded    Height: 5' 2" (1.575 m)    Weight: 64.4 kg (141 lb 15.6 oz)    Does patient have medical equipment at home? none    Length of need (1-99 months): 99    Vendor: Other (use comments) [employer]   Expected Date of Delivery: 5/16/2018      Activity as tolerated     No driving until:     Notify your health care provider if you experience any of the following:  temperature >100.4     Notify your health care provider if you experience any of the following:  persistent nausea and vomiting or diarrhea     Notify your health care provider if you experience any of the following:  severe uncontrolled pain     Notify your health care provider if you experience any of the following:  redness, tenderness, or signs of infection (pain, swelling, redness, odor or " green/yellow discharge around incision site)     Notify your health care provider if you experience any of the following:  difficulty breathing or increased cough     Notify your health care provider if you experience any of the following:  severe persistent headache     Notify your health care provider if you experience any of the following:  persistent dizziness, light-headedness, or visual disturbances     Notify your health care provider if you experience any of the following:  increased confusion or weakness       Medications:  Reconciled Home Medications:      Medication List      START taking these medications    cinacalcet 30 MG Tab  Commonly known as:  SENSIPAR  Take 1 tablet (30 mg total) by mouth 2 (two) times daily with meals. Start twice a day dosing on 5/19/18        CONTINUE taking these medications    amLODIPine 10 MG tablet  Commonly known as:  NORVASC  Take 1 tablet (10 mg total) by mouth once daily.          Time spent on the discharge of patient: 25 minutes    Lorin Tomas NP  Department of Hospital Medicine  [facility] [place] [facility] - Skilled Nursing Care

## 2018-05-18 NOTE — TELEPHONE ENCOUNTER
----- Message from Anca Ansley sent at 5/17/2018  1:47 PM CDT -----  Contact: Daughter : Judie   tel:  [phone]   Needs Medical Advice    Who Called:  Judie   Tel:  [phone]     Caller says pt. Has decided to NOT take the pills and to have the surgery instead.   Pt. Left the [facility] skilled nursing facility yesterday and has not had any medications since she left.       Pls call to discuss this further.

## 2018-05-21 ENCOUNTER — TELEPHONE (OUTPATIENT)
Dept: ENDOCRINOLOGY | Facility: CLINIC | Age: 83
End: 2018-05-21

## 2018-05-21 ENCOUNTER — OFFICE VISIT (OUTPATIENT)
Dept: FAMILY MEDICINE | Facility: CLINIC | Age: 83
End: 2018-05-21
Payer: MEDICARE

## 2018-05-21 VITALS
BODY MASS INDEX: 29.56 KG/M2 | DIASTOLIC BLOOD PRESSURE: 68 MMHG | HEART RATE: 65 BPM | HEIGHT: 59 IN | WEIGHT: 146.63 LBS | OXYGEN SATURATION: 98 % | TEMPERATURE: 98 F | SYSTOLIC BLOOD PRESSURE: 110 MMHG

## 2018-05-21 DIAGNOSIS — I10 ESSENTIAL HYPERTENSION, BENIGN: Primary | ICD-10-CM

## 2018-05-21 DIAGNOSIS — Z86.79 HISTORY OF SUBDURAL HEMATOMA: ICD-10-CM

## 2018-05-21 DIAGNOSIS — G93.5 BRAIN COMPRESSION: ICD-10-CM

## 2018-05-21 PROCEDURE — 99495 TRANSJ CARE MGMT MOD F2F 14D: CPT | Mod: PBBFAC,PO | Performed by: FAMILY MEDICINE

## 2018-05-21 PROCEDURE — 99495 TRANSJ CARE MGMT MOD F2F 14D: CPT | Mod: S$PBB,,, | Performed by: FAMILY MEDICINE

## 2018-05-21 PROCEDURE — 99999 PR PBB SHADOW E&M-EST. PATIENT-LVL III: CPT | Mod: PBBFAC,,, | Performed by: FAMILY MEDICINE

## 2018-05-21 PROCEDURE — 99213 OFFICE O/P EST LOW 20 MIN: CPT | Mod: PBBFAC,PO | Performed by: FAMILY MEDICINE

## 2018-05-21 RX ORDER — AMLODIPINE BESYLATE 2.5 MG/1
2.5 TABLET ORAL DAILY
Qty: 90 TABLET | Refills: 3 | Status: SHIPPED | OUTPATIENT
Start: 2018-05-21 | End: 2019-02-07 | Stop reason: SDUPTHER

## 2018-05-21 NOTE — TELEPHONE ENCOUNTER
----- Message from Anca Panchal sent at 5/21/2018  1:57 PM CDT -----  Contact: Daughter:   Judie    tel:  [phone]  / Call daughter only  Daughter of pt. Asks that you pls try and call her again.     Pls only call the Daughter to discuss the test results, as caller.

## 2018-05-21 NOTE — TELEPHONE ENCOUNTER
----- Message from Brittany Castro sent at 5/18/2018  3:09 PM CDT -----  Contact: MARCO A Panchal calling to state that pt needs verbal order for pt to have speech therapy. Please call [phone].

## 2018-05-21 NOTE — PROGRESS NOTES
[facility]  Progress Note    SUBJECTIVE:     Chief Complaint   Patient presents with    Transitional Care       HPI   Mikaela Ghosh  is a 85 y.o. female brought here by her daughter for hospital follow-up. She was dx with a SDH w/ brain compression about 3 weeks ago, s/p craniotomy. She is doing much better, and is making progress with physical therapy. She is regaining her strength back. She is able to dress herself, use the bathroom, and feed herself independently. Her daughter takes care of the food preparations/groceries.     Review of patient's allergies indicates:  No Known Allergies    Past Medical History:   Diagnosis Date    Hypertension      Past Surgical History:   Procedure Laterality Date    EYE SURGERY      cataract    HYSTERECTOMY       Family History   Problem Relation Age of Onset    Diabetes Sister     Hypertension Sister      Social History   Substance Use Topics    Smoking status: Never Smoker    Smokeless tobacco: Never Used    Alcohol use No        Review of Systems   Constitutional: Negative for chills, fever and malaise/fatigue.   HENT: Negative.    Respiratory: Negative.  Negative for cough and shortness of breath.    Cardiovascular: Negative.  Negative for chest pain.   Gastrointestinal: Negative.  Negative for abdominal pain, nausea and vomiting.   Genitourinary: Negative.    Neurological: Negative for weakness and headaches.   All other systems reviewed and are negative.    OBJECTIVE:     Vitals:    05/21/18 0959   BP: 110/68   Pulse: 65   Temp: 97.8 °F (36.6 °C)     Body mass index is 29.61 kg/m².    Physical Exam   Constitutional: She is oriented to person, place, and time and well-developed, well-nourished, and in no distress. No distress.   HENT:   Head: Normocephalic and atraumatic.   Nose: Nose normal.   Eyes: Conjunctivae and EOM are normal.   Cardiovascular: Normal rate, regular rhythm and normal heart sounds.  Exam reveals no gallop and no friction rub.    No  murmur heard.  Pulmonary/Chest: Effort normal and breath sounds normal. No respiratory distress. She has no wheezes. She has no rales. She exhibits no tenderness.   Abdominal: Soft. Bowel sounds are normal. She exhibits no distension. There is no tenderness. There is no rebound.   Neurological: She is alert and oriented to person, place, and time.   Motor 4/5 in both upper/lower extremities   Skin: Skin is warm. She is not diaphoretic.     Transitional Care Note    Family and/or Caretaker present at visit?  Yes.  Diagnostic tests reviewed/disposition: No diagnosic tests pending after this hospitalization.  Disease/illness education: Yes  Home health/community services discussion/referrals: Patient does not have home health established from hospital visit.  They do need home health.  If needed, we will set up home health for the patient.   Establishment or re-establishment of referral orders for community resources: No other necessary community resources.   Discussion with other health care providers: No discussion with other health care providers necessary.         Old records were reviewed. Labs and/or images were independently reviewed.    ASSESSMENT     1. Essential hypertension, benign    2. History of subdural hematoma    3. Brain compression        PLAN:     Essential hypertension, benign  -     amLODIPine (NORVASC) 2.5 MG tablet; Take 1 tablet (2.5 mg total) by mouth once daily.  Dispense: 90 tablet; Refill: 3  -     Decreased norvasc from 10 to 2.5 mg. She didn't take her bp meds today and has been off of it since d/c, and her bp is well, 110/68. Advised daughter to monitor closely, need tight bp control to prevent a repeat subdural hematoma.     History of subdural hematoma  -     Ambulatory referral to Home Health for further evaluation and treatment.    Brain compression  -     Ambulatory referral to Home Health      RTC TIMOTEO Mustafa MD  05/21/2018 10:27 AM

## 2018-05-21 NOTE — TELEPHONE ENCOUNTER
Talk to patient daughter who had question about patient done density informed patient that I spoke with Dr Gustafson and per  her mother bone density shows that she has osteoporosis in the arm and spine also went over lab range that was in questing patient daughter  verbalized under standing  Is requesting that a referral be put in for her mother to see a sharonda

## 2018-05-21 NOTE — TELEPHONE ENCOUNTER
----- Message from Anca Ansley sent at 5/21/2018 11:10 AM CDT -----  Contact: Daughter : Judie Donald   tel:  [phone]   Medication you prescribed was very costly, pt. Has decided to have the surgery.  Left a msg last week.  No response from you.     Asking for the calcium levels report, and wants results of the bone density also.    Asking for you to please respond to her msgs.of last week.

## 2018-05-22 ENCOUNTER — TELEPHONE (OUTPATIENT)
Dept: ENDOCRINOLOGY | Facility: CLINIC | Age: 83
End: 2018-05-22

## 2018-05-23 ENCOUNTER — TELEPHONE (OUTPATIENT)
Dept: ENDOCRINOLOGY | Facility: CLINIC | Age: 83
End: 2018-05-23

## 2018-05-23 NOTE — TELEPHONE ENCOUNTER
----- Message from Libertad Hidalgo sent at 5/22/2018  4:47 PM CDT -----  Contact: Judie    Patient Returning Call    Who Called:  Pt    Who Left Message for Patient: Dr Gustafson   Does the patient know what this is regarding?:  Surgery ? Bone Density   Communication Preference (Fox response to Pt. (or) Call Back # and timeframe) [phone]  Additional Information: Pt daughter returning the  phone call

## 2018-05-24 ENCOUNTER — TELEPHONE (OUTPATIENT)
Dept: ENDOCRINOLOGY | Facility: HOSPITAL | Age: 83
End: 2018-05-24

## 2018-05-24 DIAGNOSIS — E21.0 PRIMARY HYPERPARATHYROIDISM: Primary | ICD-10-CM

## 2018-05-24 NOTE — TELEPHONE ENCOUNTER
Pt with primary HPT  With calcium >1 ULN and OP  She declines medical management with sensipar or antiresorptive therapy.   She is requesting referral to surgery   Order placed to see dr Treadwell.

## 2018-05-30 ENCOUNTER — HOSPITAL ENCOUNTER (OUTPATIENT)
Dept: RADIOLOGY | Facility: HOSPITAL | Age: 83
Discharge: HOME | End: 2018-05-30
Attending: INTERNAL MEDICINE
Payer: MEDICARE

## 2018-05-30 DIAGNOSIS — E21.0 PRIMARY HYPERPARATHYROIDISM: ICD-10-CM

## 2018-05-30 PROCEDURE — A9500 TC99M SESTAMIBI: HCPCS

## 2018-05-30 PROCEDURE — 78071 PARATHYRD PLANAR W/WO SUBTRJ: CPT | Mod: 26,,, | Performed by: RADIOLOGY

## 2018-05-31 ENCOUNTER — LAB VISIT (OUTPATIENT)
Dept: LAB | Facility: HOSPITAL | Age: 83
End: 2018-05-31
Payer: MEDICARE

## 2018-05-31 ENCOUNTER — TELEPHONE (OUTPATIENT)
Dept: ENDOCRINOLOGY | Facility: CLINIC | Age: 83
End: 2018-05-31

## 2018-05-31 ENCOUNTER — TELEPHONE (OUTPATIENT)
Dept: FAMILY MEDICINE | Facility: CLINIC | Age: 83
End: 2018-05-31

## 2018-05-31 DIAGNOSIS — S06.5XAA SDH (SUBDURAL HEMATOMA): Primary | ICD-10-CM

## 2018-05-31 DIAGNOSIS — E21.0 PRIMARY HYPERPARATHYROIDISM: ICD-10-CM

## 2018-05-31 LAB
25(OH)D3+25(OH)D2 SERPL-MCNC: 19 NG/ML
ALBUMIN SERPL BCP-MCNC: 3.5 G/DL
ANION GAP SERPL CALC-SCNC: 6 MMOL/L
BUN SERPL-MCNC: 8 MG/DL
CALCIUM SERPL-MCNC: 12.5 MG/DL
CHLORIDE SERPL-SCNC: 107 MMOL/L
CO2 SERPL-SCNC: 27 MMOL/L
CREAT SERPL-MCNC: 0.9 MG/DL
EST. GFR  (AFRICAN AMERICAN): >60 ML/MIN/1.73 M^2
EST. GFR  (NON AFRICAN AMERICAN): 58.5 ML/MIN/1.73 M^2
GLUCOSE SERPL-MCNC: 67 MG/DL
PHOSPHATE SERPL-MCNC: 2.5 MG/DL
POTASSIUM SERPL-SCNC: 4.3 MMOL/L
SODIUM SERPL-SCNC: 140 MMOL/L

## 2018-05-31 PROCEDURE — 82306 VITAMIN D 25 HYDROXY: CPT

## 2018-05-31 PROCEDURE — 80069 RENAL FUNCTION PANEL: CPT

## 2018-05-31 PROCEDURE — 36415 COLL VENOUS BLD VENIPUNCTURE: CPT

## 2018-05-31 NOTE — TELEPHONE ENCOUNTER
----- Message from Melissa Ruiz sent at 5/31/2018 11:29 AM CDT -----  Contact: archana GAMINO [facility] [place]   Marian would like an order for speech therapy eval. It can be faxed to her office at FAX:[phone] Call:[phone]

## 2018-05-31 NOTE — TELEPHONE ENCOUNTER
Called pt - calcium 12.5 - not higher than previous, pt feeling fine, no confusion or fatigue.  Asked her to keep hydrated, avoid calcium supplements/MVI for now. Cannot afford Sensipar. She will see Dr. Treadwell in a couple weeks.  Will fyi Dr. Hitchcock and Dr. Gustafson.     Jarod Lin MD        Component      Latest Ref Rng & Units 5/31/2018 5/14/2018 5/10/2018   Glucose      70 - 110 mg/dL 67 (L) 79 82   Sodium      136 - 145 mmol/L 140 140 141   Potassium      3.5 - 5.1 mmol/L 4.3 3.6 4.1   Chloride      95 - 110 mmol/L 107 107 107   CO2      23 - 29 mmol/L 27 28 25   BUN, Bld      8 - 23 mg/dL 8 11 13   Calcium      8.7 - 10.5 mg/dL 12.5 (HH) 11.6 (H) 12.3 (HH)   Creatinine      0.5 - 1.4 mg/dL 0.9 0.8 0.7   Albumin      3.5 - 5.2 g/dL 3.5     Phosphorus      2.7 - 4.5 mg/dL 2.5 (L)     eGFR if African American      >60 mL/min/1.73 m:2 >60.0 >60.0 >60.0   eGFR if non African American      >60 mL/min/1.73 m:2 58.5 (A) >60.0 >60.0   Anion Gap      8 - 16 mmol/L 6 (L) 5 (L) 9   Vit D, 25-Hydroxy      30 - 96 ng/mL 19 (L)

## 2018-05-31 NOTE — TELEPHONE ENCOUNTER
----- Message from Brittany Castro sent at 5/31/2018 10:29 AM CDT -----  Contact: [facility] calling to get permission to r/s speech therapy evaluation. Please call Daphnie at [phone].

## 2018-05-31 NOTE — PROGRESS NOTES
I, Diana Gustafson, have personally taken the history and physical exam and I agree with Dr. Hitchcock's assessment and plan.

## 2018-06-14 ENCOUNTER — TELEPHONE (OUTPATIENT)
Dept: ADMINISTRATIVE | Facility: CLINIC | Age: 83
End: 2018-06-14

## 2018-06-14 ENCOUNTER — OFFICE VISIT (OUTPATIENT)
Dept: SURGERY | Facility: CLINIC | Age: 83
End: 2018-06-14
Payer: MEDICARE

## 2018-06-14 VITALS
BODY MASS INDEX: 28.46 KG/M2 | SYSTOLIC BLOOD PRESSURE: 160 MMHG | TEMPERATURE: 98 F | HEART RATE: 62 BPM | DIASTOLIC BLOOD PRESSURE: 79 MMHG | HEIGHT: 59 IN | WEIGHT: 141.19 LBS

## 2018-06-14 DIAGNOSIS — E21.0 PRIMARY HYPERPARATHYROIDISM: Primary | ICD-10-CM

## 2018-06-14 DIAGNOSIS — E21.3 HYPERPARATHYROIDISM: Primary | ICD-10-CM

## 2018-06-14 PROCEDURE — 99213 OFFICE O/P EST LOW 20 MIN: CPT | Mod: PBBFAC,PO | Performed by: SURGERY

## 2018-06-14 PROCEDURE — 99204 OFFICE O/P NEW MOD 45 MIN: CPT | Mod: S$PBB,,, | Performed by: SURGERY

## 2018-06-14 PROCEDURE — 99999 PR PBB SHADOW E&M-EST. PATIENT-LVL III: CPT | Mod: PBBFAC,,, | Performed by: SURGERY

## 2018-06-14 NOTE — LETTER
June 16, 2018      Ijeoma Hitchcock MD  [address] [address]           [facility]  [address] [address]  Phone: [phone]  Fax: [phone]          Patient: Mikaela Ghosh   MR Number: [id number]   YOB: 1933   Date of Visit: 6/14/2018       Dear Dr. Ijeoma Hitchcock:    Thank you for referring Mikaela Ghosh to me for evaluation. Attached you will find relevant portions of my assessment and plan of care.    If you have questions, please do not hesitate to call me. I look forward to following Mikaela Ghosh along with you.    Sincerely,    Mahin Treadwell MD    Enclosure  CC:  No Recipients    If you would like to receive this communication electronically, please contact [email or web address] or [phone] to request more information on [facility] access.    For providers and/or their staff who would like to refer a patient to [facility], please contact us through our one-stop-shop provider referral line, [facility], at [phone].    If you feel you have received this communication in error or would no longer like to receive these types of communications, please e-mail [email or web address]

## 2018-06-15 ENCOUNTER — TELEPHONE (OUTPATIENT)
Dept: PREADMISSION TESTING | Facility: HOSPITAL | Age: 83
End: 2018-06-15

## 2018-06-15 ENCOUNTER — TELEPHONE (OUTPATIENT)
Dept: SURGERY | Facility: CLINIC | Age: 83
End: 2018-06-15

## 2018-06-15 DIAGNOSIS — Z01.818 PRE-OP EVALUATION: Primary | ICD-10-CM

## 2018-06-15 NOTE — TELEPHONE ENCOUNTER
----- Message from Leana Tejada RN sent at 6/15/2018  8:51 AM CDT -----  Patient having surgery on the 27th with Dr. Treadwell, will need pre-op appt, will add labs and EKG/CXR same day. Thanks!

## 2018-06-15 NOTE — TELEPHONE ENCOUNTER
Patient's daughter called me back, she has been scheduled with pre-op and they also assisted her in getting the other tests scheduled. No questions or concerns at this time

## 2018-06-15 NOTE — TELEPHONE ENCOUNTER
Called patient to discuss need for anesthesia evaluation prior to surgery. Patient and I discussed why this is necessary, and that we will also want to get labs, EKG, and a chest xray. Patient requests that I go through her daughter for these things.    Message sent to Susie in pre-op scheduling to call daughter to set up pre-op. Will schedule labs and other tests around anesthesia appointment.     Called daughter and left voicemail explaining that she will be getting a call and why. Encouraged her to call me back to let me know she got this message.

## 2018-06-17 NOTE — PROGRESS NOTES
Patient ID: Mikaela Ghosh is a 85 y.o. female.     Chief Complaint:  Hypercalcemia        History of Present Illness  Initial for primary hyperparathyroidism      Pt suffered a fall that resulted in subdural hematoma that required craniotomy in 4/18/2018.   She was discharged to SNF and it was there that she was first told about her hypercalcemia.      On labs, Hypercalcemia with hypophosphatemia Present since 5/2017 with PTH elevated 197.   Most recent corrected calcium is 13.1  Labs notable for normal GFR and normal alk phos.      Denies clinical Fractures. No prior BMD. Not taking Vit D.   Denies symptomatic nephrolithiasis.   Denies HCTZ     Denies immobility. Prior to fall she was working at [employer].   She reports staying hydrated. Reports polyuria and polyuria.   Daughter denies any confusion and pt is oriented.   10 lbs wt loss since last year.   Denies abd pain, n/v, bone pain or hx pancreatitis.      No known FH calcium disorders      Has HTN, on norvasc.         Review of Systems   Constitutional: Positive for activity change (decreased), fatigue and unexpected weight change.   Eyes: Negative for visual disturbance.   Respiratory: Negative for shortness of breath.    Cardiovascular: Negative for palpitations and leg swelling.   Gastrointestinal: Positive for constipation. Negative for abdominal pain.   Endocrine: Positive for polydipsia and polyuria.   Musculoskeletal: Negative for arthralgias.   Skin: Negative for wound.   Neurological: Negative for weakness and headaches.   Hematological: Does not bruise/bleed easily.   Psychiatric/Behavioral: Negative for sleep disturbance.         Objective:   Physical Exam   Constitutional: She appears well-developed.   In wheelchair   Craniotomy sutures noted     HENT:   Right Ear: External ear normal.   Left Ear: External ear normal.   Nose: Nose normal.   Hearing normal  Dentition good    Neck: No tracheal deviation present. No thyromegaly present.   Cardiovascular:  Normal rate.    No murmur heard.  Pulmonary/Chest: Effort normal and breath sounds normal.   Abdominal: Soft. There is no tenderness. No hernia.   Musculoskeletal: She exhibits no edema.   Gait normal  No clubbing or cyanosis noted   Neurological: She displays normal reflexes. No cranial nerve deficit.   Skin: No rash noted.   No nodules       Psychiatric: She has a normal mood and affect. Judgment normal.   Vitals reviewed.        Lab Review:   Results for MIKAELA FOSTER (MRN [id number]) as of 5/10/2018 14:25    Ref. Range 5/10/2018 05:07   Sodium Latest Ref Range: 136 - 145 mmol/L 141   Potassium Latest Ref Range: 3.5 - 5.1 mmol/L 4.1   Chloride Latest Ref Range: 95 - 110 mmol/L 107   CO2 Latest Ref Range: 23 - 29 mmol/L 25   Anion Gap Latest Ref Range: 8 - 16 mmol/L 9   BUN, Bld Latest Ref Range: 8 - 23 mg/dL 13   Creatinine Latest Ref Range: 0.5 - 1.4 mg/dL 0.7   eGFR if non African American Latest Ref Range: >60 mL/min/1.73 m^2 >60.0   eGFR if African American Latest Ref Range: >60 mL/min/1.73 m^2 >60.0   Glucose Latest Ref Range: 70 - 110 mg/dL 82   Calcium Latest Ref Range: 8.7 - 10.5 mg/dL 12.3 (HH)   Phosphorus Latest Ref Range: 2.7 - 4.5 mg/dL 2.6 (L)   Magnesium Latest Ref Range: 1.6 - 2.6 mg/dL 1.8   Results for MIKAELA FOSTER (MRN [id number]) as of 5/10/2018 14:25    Ref. Range 4/30/2018 04:31   PTH Latest Ref Range: 9.0 - 77.0 pg/mL 197.0 (H)            Assessment:      1. Primary hyperparathyroidism  DXA Bone Density Spine And Hip     Calcium, Timed Urine [facility]; 24 Hours     VITAMIN D     Creatinine, urine, timed 24 Hours     RENAL FUNCTION PANEL   2. Hypercalcemia      3. Hypophosphatemia            Plan:      Mikaela was seen today for hypercalcemia.     Diagnoses and all orders for this visit:     Primary hyperparathyroidism  Pt does not want surgery given age which is understandable, but she is also very hesitant about starting medication.   Discussed risk and  Benefits and potential complications  from uncontrolled hypercalcemia.   Recommend starting sensipar to control hypercalcemia - given handout on medication and sent to pharm but she will let me know if she decides to pick it up in the next 1-2 days.   Discussed that if she refuses sensipar, other alternative (though less desirable due to concern for rebound) may include IV reclast infusion.   In the meantime discussed importance of hydration   Repeat labs 3-4 weeks after starting sensipar.    -     DXA Bone Density Spine And Hip; Future  -     Calcium, Timed Urine [facility]; 24 Hours; Future  -     VITAMIN D; Future  -     Creatinine, urine, timed 24 Hours; Future  -     RENAL FUNCTION PANEL; Future  -     cinacalcet (SENSIPAR) 30 MG Tab; Take 1 tablet (30 mg total) by mouth 2 (two) times daily with meals. Start with 1 pill daily for 1 week then increase to 1 pill twice daily     Hypercalcemia  As above      Hypophosphatemia    Note from Endocrinology as noted above.  Reviewed.  No significant changes per patient subjective hx.  She has HTN with last iPTH level 197 with Ca of 12.5.  Pt with primary hyperparathyroidism.  Patient concerned about cost of sensipar and is now considering and willing to have surgery for medical issues caused by PHPT.  FHH has been ruled out with normal urinary calcium level on 24 hour urine collection with normal urinary Ca/Cr ratio.  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  Dexa BMD scan reveals osteoporosis.  Pt with multiple indications for parathyroidectomy given HTN, osteoporosis, and Ca level of 12.5  Consented and scheduled for a left lower inferior parathyroidectomy on 6-27-18 with intra-op iPTH levels.  Risks of RLN injury (rare) and post-op hungry bone syndrome discussed with patient.  Will check neck/thyroid US pre-op which we have ordered pre-op.

## 2018-06-25 ENCOUNTER — HOSPITAL ENCOUNTER (OUTPATIENT)
Dept: RADIOLOGY | Facility: HOSPITAL | Age: 83
Discharge: HOME | DRG: 627 | End: 2018-06-25
Attending: SURGERY
Payer: MEDICARE

## 2018-06-25 ENCOUNTER — ANESTHESIA EVENT (OUTPATIENT)
Dept: SURGERY | Facility: HOSPITAL | Age: 83
DRG: 627 | End: 2018-06-25
Payer: MEDICARE

## 2018-06-25 ENCOUNTER — HOSPITAL ENCOUNTER (OUTPATIENT)
Dept: PREADMISSION TESTING | Facility: HOSPITAL | Age: 83
Discharge: HOME | DRG: 627 | End: 2018-06-25
Attending: ANESTHESIOLOGY
Payer: MEDICARE

## 2018-06-25 ENCOUNTER — TELEPHONE (OUTPATIENT)
Dept: FAMILY MEDICINE | Facility: CLINIC | Age: 83
End: 2018-06-25

## 2018-06-25 ENCOUNTER — HOSPITAL ENCOUNTER (OUTPATIENT)
Dept: CARDIOLOGY | Facility: CLINIC | Age: 83
Discharge: HOME | DRG: 627 | End: 2018-06-25
Attending: SURGERY
Payer: MEDICARE

## 2018-06-25 ENCOUNTER — TELEPHONE (OUTPATIENT)
Dept: SURGERY | Facility: CLINIC | Age: 83
End: 2018-06-25

## 2018-06-25 ENCOUNTER — CLINICAL SUPPORT (OUTPATIENT)
Dept: REHABILITATION | Facility: HOSPITAL | Age: 83
End: 2018-06-25
Attending: FAMILY MEDICINE
Payer: MEDICARE

## 2018-06-25 VITALS
HEART RATE: 62 BPM | OXYGEN SATURATION: 98 % | TEMPERATURE: 98 F | SYSTOLIC BLOOD PRESSURE: 165 MMHG | RESPIRATION RATE: 18 BRPM | BODY MASS INDEX: 26.52 KG/M2 | DIASTOLIC BLOOD PRESSURE: 78 MMHG | HEIGHT: 62 IN | WEIGHT: 144.13 LBS

## 2018-06-25 DIAGNOSIS — E21.0 PRIMARY HYPERPARATHYROIDISM: ICD-10-CM

## 2018-06-25 DIAGNOSIS — I69.319 COGNITIVE DEFICIT DUE TO RECENT CEREBROVASCULAR ACCIDENT (CVA): ICD-10-CM

## 2018-06-25 DIAGNOSIS — Z01.818 PRE-OP EVALUATION: ICD-10-CM

## 2018-06-25 PROCEDURE — 76536 US EXAM OF HEAD AND NECK: CPT | Mod: TC

## 2018-06-25 PROCEDURE — 76536 US EXAM OF HEAD AND NECK: CPT | Mod: 26,,, | Performed by: RADIOLOGY

## 2018-06-25 PROCEDURE — 96125 COGNITIVE TEST BY HC PRO: CPT | Mod: PO

## 2018-06-25 PROCEDURE — 93010 ELECTROCARDIOGRAM REPORT: CPT | Mod: S$PBB,,, | Performed by: INTERNAL MEDICINE

## 2018-06-25 PROCEDURE — 93005 ELECTROCARDIOGRAM TRACING: CPT | Mod: PBBFAC | Performed by: INTERNAL MEDICINE

## 2018-06-25 PROCEDURE — G9169 MEMORY GOAL STATUS: HCPCS | Mod: CH,PO

## 2018-06-25 PROCEDURE — G9168 MEMORY CURRENT STATUS: HCPCS | Mod: CI,PO

## 2018-06-25 PROCEDURE — 71046 X-RAY EXAM CHEST 2 VIEWS: CPT | Mod: TC

## 2018-06-25 PROCEDURE — 71046 X-RAY EXAM CHEST 2 VIEWS: CPT | Mod: 26,,, | Performed by: RADIOLOGY

## 2018-06-25 NOTE — TELEPHONE ENCOUNTER
Left a message asking pt to call back regarding scheduling her pre-op clearance before her surgery in 2 days on 6/27/18

## 2018-06-25 NOTE — TELEPHONE ENCOUNTER
----- Message from Nikki Fortune sent at 6/25/2018  1:31 PM CDT -----  Contact: Phillip /   Chemistry ext [phone]  Mr Fisher states has patient critical value chemistry value to report.   Please call Mr Phillip ext [phone]

## 2018-06-25 NOTE — TELEPHONE ENCOUNTER
"----- Message from Disha Ortiz MA sent at 6/25/2018 10:38 AM CDT -----  Regarding: Surgery Clearance   Patient is having a Parathyroidectomy with Dr Treadwell on 6/27/2018. Requesting a "clearance" from you, prior to surgery date. Patient was last seen by you on 5/21/2018. Await your reply. Thank You. Sincerely Disha Ortiz MA Pre-op/Anesthesia ext [phone]   "

## 2018-06-25 NOTE — PLAN OF CARE
"Speech and Language Therapy Evaluation    Date: 6/25/2018   Start Time:  1300  Stop Time:  1345    Name: Mikaela Ghosh   MRN: [id number]    Therapy Diagnosis:   Encounter Diagnosis   Name Primary?   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   Cognitive Communication Evaluation    45           Total Minutes: 45  Total Untimed Units: 1  Charges Billed/# of units: 1    Precautions:Standard  Subjective   Date of Onset: 4/16/18  History of Current Condition:   Pt reports almost back to premorbid level in terms of cognitive skills. "I think my thinking is back to normal"   Past Medical History: Mikaela Ghosh  has a past medical history of Hypertension.  Mikaela Ghosh  has a past surgical history that includes Hysterectomy and Eye surgery.  Medical Hx and Allergies:  Mikaela has a current medication list which includes the following prescription(s): amlodipine, cinacalcet, and multivitamin. Review of patient's allergies indicates:  No Known Allergies  Imaging: CT of brain (5/10/18): "Prior left subdural hematoma evacuation with interval decrease in size of the residual collection.  There has also been essentially complete resolution of the prior right subdural hematoma. No new hemorrhage or other significant detrimental interval change."  Prior Therapy:  Home speech therapy which ended last week.  Social History:  Pt lives alone but family is in the area  lives alone. Good family support. Patient only completed grammar school but did not remember the grade level. She was a homemaker.   Prior Level of Function: Independent  Current Level of Function: Independent Family checks in daily.  Pain:   0/10  Pain Location / " "Description:   Nutrition:  Regular consistency and thin liquids  Patient Therapy Goals:  "I'm thinking alright."  Objective   Cognitive Linguistic Quick Test (CLQT) was administered to quickly assess the patient's overall cognitive-linguistic function and to determine cognitive strengths and weaknesses. Testing could not be completed due to time constraints.           Cognitive Domain Score     Severity Rating      Attention    TBD / 215 TBD  Memory    127 / 185 mild  Executive Functions   TBD / 40 TBD  Language    27 / 37  mild  Visuospatial Skills    TBD / 105 TBD  Clock Drawing    8 / 13  moderate    Composite Severity Rating  TBD / 4.0 TBD      Auditory Comprehension: Directions needed to be repeated and rephrased for some tasks. Overall auditory comprehension for simple, routine information was WFL.    Reading Comprehension: Did not assess    Verbal Expression: Patient was able to express herself adequately when answering questions. Confrontational naming was good but generative naming for concrete and abstract items was below norm. She listed  No concerns were reported or observed. Verbal expression was WFL.     Written Expression: Did not assess    Cognition: Based on subtests administered, the patient had difficulty retelling a story and answering questions about the story. Delayed processing was noted and directions needed to be rephrased and/or repeated. She had difficulty with alternating shapes and sizes which are indicative of planning and attention deficits. Thought organization for generative naming for concrete (8) and abstract (3) items was below norm (15 to 20 within one minute).    Motor Speech/Fluency/Voice:  Not formally addressed but speech was intelligible 100% of the time. Voice and fluency were WFL. No concerns were reported.     Swallowing: Swallowing was not formally assessed but no concerns were reported.    Hearing: Appeared to be within functional limits in conversation. Will monitor " and refer as necessary.    Functional Communication Measure (FCM):   Severity Modifier for Medicare G-Code:  Memory  Current status: FCM:  LEVEL 6: The individual is able to recall or use external aids/memory strategies for complexinformation and planning complex future events most of the time. When there is abreakdown in the use of recall/memory strategies/external memory aids, the individualoccasionally requires minimal cues. These breakdowns may occasionally interfere withthe individual¢s functioning in vocational, avocational, and social activities.   - CI at least 1% but less than 20% impaired, limited or restricted  Projected status:  FCM:  LEVEL 7: The individual is successful and independent in recalling or using external aids/memory strategies for complex information and planning future events in all vocational, avocational, and social activities.   -  CH 0% impaired, limited or restricted     Treatment   Treatment Time In: n/a  Treatment Time Out: n/a  Total Treatment Time: n/a  n/a    Education: POC, role of SLP and scheduling/ cancellation policy  was discussed with pt. Patient and family members expressed understanding.     Home Program: n/a  Assessment   Mikaela presents to [facility] s/p medical diagnosis of  SDH.  Demonstrates impairments including limitations as described in the problem list. She presents with mild cognitive-communicative impairment c/b decreased short term memory, delayed processing, and decreased thought organization and word fluency. Cognitive testing will be completed next session. Patient also wants to return to driving. OT driving evaluation may be warranted and discussed. Positive prognostic factors include family support and mild deficits. Negative prognostic factors include age and decreased awareness of deficits. Barriers to progress include none. Patient will benefit from skilled, outpatient neurological rehabilitation speech  therapy.  Rehab Potential: good  Environmental Concerns/Cultural/Spiritual/Developmental/Educational Needs: Pt's spiritual, cultural and educational needs considered and pt agreeable to plan of care and goals.    Short Term Goals (2 weeks):   1. Patient will participate in completion of cognitive-communicative assessment. Specific goals will be established at next session.     Long Term Goals (6 weeks):   1. Patient will use appropriate memory strategies to schedule and recall weekly activities, express needs and recall names to maintain safety and participate socially in functional living environment.     Plan   Plan of Care Certification Period: 6/25/18 to 8/10/18  Recommended Treatment Plan:  Patient will participate in the [facility] neurological rehabilitation program for speech therapy 2 times per week to address her  Communication and Cognition deficits, to educate patient and their family, and to participate in a home exercise program.    Other Recommendations: OT Driving Evaluation    Therapist's Name:   JULIUS Sierra, CCC-SLP, CBIS  Speech-Language Pathologist    Date: 6/25/2018

## 2018-06-25 NOTE — ANESTHESIA PREPROCEDURE EVALUATION
06/25/2018  Mikaela Ghosh is a 85 y.o., female.  Pre-operative evaluation for Procedure(s) (LRB):  PARATHYROIDECTOMY - left lower inferior gland with intraoperative PTH levels, possible subtotal parathyroidectomy (Left)    Mikaela Ghosh is a 85 y.o. female     Patient Active Problem List   Diagnosis    Essential hypertension, benign    LVH (left ventricular hypertrophy)    Brain compression    Pulmonary hypertension    Seizure prophylaxis    Dry eyes    Hypercalcemia    Hypophosphatemia    Primary hyperparathyroidism    Cognitive deficit due to recent cerebrovascular accident (CVA)       Review of patient's allergies indicates:  No Known Allergies    No current facility-administered medications on file prior to encounter.      Current Outpatient Prescriptions on File Prior to Encounter   Medication Sig Dispense Refill    amLODIPine (NORVASC) 2.5 MG tablet Take 1 tablet (2.5 mg total) by mouth once daily. 90 tablet 3       Past Surgical History:   Procedure Laterality Date    BRAIN SURGERY      EYE SURGERY      cataract    HYSTERECTOMY         Social History     Social History    Marital status: Single     Spouse name: N/A    Number of children: N/A    Years of education: N/A     Occupational History    Not on file.     Social History Main Topics    Smoking status: Never Smoker    Smokeless tobacco: Never Used    Alcohol use No    Drug use: No    Sexual activity: No     Other Topics Concern    Not on file     Social History Narrative    No narrative on file         CBC:   Recent Labs      06/25/18   1218   WBC  4.90   RBC  4.17   HGB  12.9   HCT  42.0   PLT  161   MCV  101*   MCH  30.9   MCHC  30.7*       CMP:   Recent Labs      06/25/18   1218   NA  138   K  4.4   CL  108   CO2  25   BUN  16   CREATININE  0.7   GLU  85   CALCIUM  12.1*   ALBUMIN  3.4*   PROT  6.9   ALKPHOS  99   ALT   12   AST  14   BILITOT  0.3             2D Echo:  Results for orders placed or performed during the hospital encounter of 04/18/18   Echo doppler color flow   Result Value Ref Range    EF 68 55 - 65    Mitral Valve Regurgitation MILD     Est. PA Systolic Pressure 63.53 (A)     Mitral Valve Mobility MASHA     Tricuspid Valve Regurgitation MILD        Anesthesia Evaluation    I have reviewed the Patient Summary Reports.     I have reviewed the Medications.     Review of Systems  Anesthesia Hx:  No problems with previous Anesthesia  History of prior surgery of interest to airway management or planning: Previous anesthesia: General craniotomy for SDH 4/20/18 with general anesthesia.  Procedure performed at an [facility] Facility.  Denies Personal Hx of Anesthesia complications.   Social:  Non-Smoker, No Alcohol Use    Hematology/Oncology:  Hematology Normal   Oncology Normal     EENT/Dental:   glasses   Cardiovascular:   Hypertension Dysrhythmias: L BBB  Denies syncope Functional Capacity good / => 4 METS, walk around the block twice in neighborhood; walked from garage to clinic; housework; denies CP, SOB  Valvular Heart Disease: Mitral Regurgitation (MR) (per ECHO 4/19/18), mild, Tricuspid Regurgitation (TR) (per ECHO 4/19/18), mild     Pulmonary:   Denies Asthma.  Denies Sleep Apnea.  Pulmonary Hypertension (PASP 64 on ECHO 4/19/18)    Renal/:  Renal/ Normal     Hepatic/GI:   Denies GERD.    Musculoskeletal:   Arthritis  osteoporosis   Neurological:   Denies CVA. Denies Seizures. Subdural hematoma after fall s/p craniotomy 4/2018 Pain , onset is acute , location of left hip soreness , quality of aching/dull , severity is a 5    Endocrine:   Denies Diabetes. Hypercalcemia (Ca 12.5 on 5/31/18) Parathyroid Disease, Hyperparathyroidism, Primary Hyperparathyroidism    Psych:  Psychiatric Normal           Physical Exam  General:  Well nourished    Airway/Jaw/Neck:  Airway Findings: Mouth Opening: Normal Tongue: Normal   General Airway Assessment: Adult  Improves to II with phonation.  TM Distance: 4 - 6 cm  Jaw/Neck Findings:     Neck ROM: Normal ROM      Dental:  Dental Findings:    Chest/Lungs:  Chest/Lungs Findings: Clear to auscultation, Normal Respiratory Rate     Heart/Vascular:  Heart Findings: Rate: Normal  Rhythm: Regular Rhythm  Heart Murmur  Systolic  Systolic Heart Murmur Description: R Upper Sternal Border  Systolic Heart Murmur Grade: Grade II        Mental Status:  Mental Status Findings:  Cooperative, Alert and Oriented       Pt was seen in POC accompanied by daughterJudie,  6/25/18; PT REFUSES BLOOD; STATES NOT FULL [employer]; findings discussed with Dr Arreola; requested PCP clearance (pt saw Dr Mustafa 5/21/18) /Judit Mustafa RN        Anesthesia Plan  Type of Anesthesia, risks & benefits discussed:  Anesthesia Type:  general  Patient's Preference:   Intra-op Monitoring Plan: standard ASA monitors  Intra-op Monitoring Plan Comments:   Post Op Pain Control Plan: multimodal analgesia and per primary service following discharge from PACU  Post Op Pain Control Plan Comments:   Induction:   IV  Beta Blocker:  Patient is not currently on a Beta-Blocker (No further documentation required).       Informed Consent: Patient understands risks and agrees with Anesthesia plan.  Questions answered. Anesthesia consent signed with patient.  ASA Score: 3     Day of Surgery Review of History & Physical:    H&P update referred to the surgeon.     Anesthesia Plan Notes: Patient is not a [employer], however she attends their meetings. Blood consent & blood refusal consent were reviewed, patient & her daughter would like to take the form home & discuss further.   Krys Arreola MD  Pt will allow albumin and did sign consent.        Ready For Surgery From Anesthesia Perspective.

## 2018-06-25 NOTE — PROGRESS NOTES
"Outpatient Neurological Rehabilitation  Speech and Language Therapy Evaluation    Date: 6/25/2018   Start Time:  1300  Stop Time:  1345    Name: Mikaela Ghosh   MRN: [id number]    Therapy Diagnosis:   Encounter Diagnosis   Name Primary?   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   Cognitive Communication Evaluation    45           Total Minutes: 45  Total Untimed Units: 1  Charges Billed/# of units: 1    Precautions:Standard  Subjective   Date of Onset: 4/16/18  History of Current Condition:   Pt reports almost back to premorbid level in terms of cognitive skills. "I think my thinking is back to normal"   Past Medical History: Mikaela Ghosh  has a past medical history of Hypertension.  Mikaela Ghosh  has a past surgical history that includes Hysterectomy and Eye surgery.  Medical Hx and Allergies:  Mikaela has a current medication list which includes the following prescription(s): amlodipine, cinacalcet, and multivitamin. Review of patient's allergies indicates:  No Known Allergies  Imaging: CT of brain (5/10/18): "Prior left subdural hematoma evacuation with interval decrease in size of the residual collection.  There has also been essentially complete resolution of the prior right subdural hematoma. No new hemorrhage or other significant detrimental interval change."  Prior Therapy:  Home speech therapy which ended last week.  Social History:  Pt lives alone but family is in the area  lives alone. Good family support. Patient only completed grammar school but did not remember the grade level. She was a homemaker.   Prior Level of Function: Independent  Current Level of Function: Independent Family checks in " "daily.  Pain:   0/10  Pain Location / Description:   Nutrition:  Regular consistency and thin liquids  Patient Therapy Goals:  "I'm thinking alright."  Objective   Cognitive Linguistic Quick Test (CLQT) was administered to quickly assess the patient's overall cognitive-linguistic function and to determine cognitive strengths and weaknesses. Testing could not be completed due to time constraints.           Cognitive Domain Score     Severity Rating      Attention    TBD / 215 TBD  Memory    127 / 185 mild  Executive Functions   TBD / 40 TBD  Language    27 / 37  mild  Visuospatial Skills    TBD / 105 TBD  Clock Drawing    8 / 13  moderate    Composite Severity Rating  TBD / 4.0 TBD      Auditory Comprehension: Directions needed to be repeated and rephrased for some tasks. Overall auditory comprehension for simple, routine information was WFL.    Reading Comprehension: Did not assess    Verbal Expression: Patient was able to express herself adequately when answering questions. Confrontational naming was good but generative naming for concrete and abstract items was below norm. She listed  No concerns were reported or observed. Verbal expression was WFL.     Written Expression: Did not assess    Cognition: Based on subtests administered, the patient had difficulty retelling a story and answering questions about the story. Delayed processing was noted and directions needed to be rephrased and/or repeated. She had difficulty with alternating shapes and sizes which are indicative of planning and attention deficits. Thought organization for generative naming for concrete (8) and abstract (3) items was below norm (15 to 20 within one minute).    Motor Speech/Fluency/Voice:  Not formally addressed but speech was intelligible 100% of the time. Voice and fluency were WFL. No concerns were reported.     Swallowing: Swallowing was not formally assessed but no concerns were reported.    Hearing: Appeared to be within functional " limits in conversation. Will monitor and refer as necessary.    Functional Communication Measure (FCM):   Severity Modifier for Medicare G-Code:  Memory  Current status: FCM:  LEVEL 6: The individual is able to recall or use external aids/memory strategies for complexinformation and planning complex future events most of the time. When there is abreakdown in the use of recall/memory strategies/external memory aids, the individualoccasionally requires minimal cues. These breakdowns may occasionally interfere withthe individual¢s functioning in vocational, avocational, and social activities.   - CI at least 1% but less than 20% impaired, limited or restricted  Projected status:  FCM:  LEVEL 7: The individual is successful and independent in recalling or using external aids/memory strategies for complex information and planning future events in all vocational, avocational, and social activities.   -  CH 0% impaired, limited or restricted     Treatment   Treatment Time In: n/a  Treatment Time Out: n/a  Total Treatment Time: n/a  n/a    Education: POC, role of SLP and scheduling/ cancellation policy  was discussed with pt. Patient and family members expressed understanding.     Home Program: n/a  Assessment   Mikaela presents to [facility] s/p medical diagnosis of  SDH.  Demonstrates impairments including limitations as described in the problem list. She presents with mild cognitive-communicative impairment c/b decreased short term memory, delayed processing, and decreased thought organization and word fluency. Cognitive testing will be completed next session. Patient also wants to return to driving. OT driving evaluation may be warranted and discussed. Positive prognostic factors include family support and mild deficits. Negative prognostic factors include age and decreased awareness of deficits. Barriers to progress include none. Patient will benefit from skilled, outpatient  neurological rehabilitation speech therapy.  Rehab Potential: good  Environmental Concerns/Cultural/Spiritual/Developmental/Educational Needs: Pt's spiritual, cultural and educational needs considered and pt agreeable to plan of care and goals.    Short Term Goals (2 weeks):   1. Patient will participate in completion of cognitive-communicative assessment. Specific goals will be established at next session.     Long Term Goals (6 weeks):   1. Patient will use appropriate memory strategies to schedule and recall weekly activities, express needs and recall names to maintain safety and participate socially in functional living environment.     Plan   Plan of Care Certification Period: 6/25/18 to 8/10/18  Recommended Treatment Plan:  Patient will participate in the [facility] program for speech therapy 2 times per week to address her  Communication and Cognition deficits, to educate patient and their family, and to participate in a home exercise program.    Other Recommendations: OT Driving Evaluation    Therapist's Name:   JULIUS Sierra, CCC-SLP, CBIS  Speech-Language Pathologist    Date: 6/25/2018

## 2018-06-25 NOTE — TELEPHONE ENCOUNTER
----- Message from Mahin Treadwell MD sent at 6/25/2018  2:26 PM CDT -----  Just tell her to stay well hydrated until surgery.  Thanks, [facility]  ----- Message -----  From: June Breaux RN  Sent: 6/25/2018   1:46 PM  To: Mahin Treadwell MD    The lab called me with a Panic level CA++ of 12.1, it  Looks like that is  her norm, she is scheduled for a parathyroidectomy on wed 6/27/2017, let me know if you needs to to call her with any intervention  Bijal

## 2018-06-25 NOTE — DISCHARGE INSTRUCTIONS
Your surgery has been scheduled for:__________________________________________    You should report to:  ____[facility], located on the [place] side of the first floor of the           [facility] ([phone])  ____[facility], located on the [facility] side of the            Second floor of the [facility] ([phone])  ____3rd Floor [facility] located on the [facility] side of the [facility] [phone]  Please Note   - Tell your doctor if you take Aspirin, products containing Aspirin, herbal medications  or blood thinners, such as Coumadin, Ticlid, or Plavix.  (Consult your provider regarding holding or stopping before surgery).  - Arrange for someone to drive you home following surgery.  You will not be allowed to leave the surgical facility alone or drive yourself home following sedation and anesthesia.  Before Surgery  - Stop taking all herbal medications 14days prior to surgery  - No Motrin/Advil (Ibuprofen) 7 days before surgery  - No Aleve (Naproxen) 7 days before surgery  - No Goody's/BC powder 7 days before surgery  - Stop Taking Asprin, products containing Asprin _____days before surgery  - Stop taking blood thinners_______days before surgery  - Refrain from drinking alcoholic beverages for 24hours before and after surgery  - Stop or limit smoking _________days before surgery  - You may take Tylenol for pain  Night before Surgery  - DO NOT EAT OR DRINK ANYTHING AFTER MIDNIGHT, INCLUDING GUM, HARD CANDY, MINTS, OR CHEWING TOBACCO.  - Take a shower or bath (shower is recommended).  Bathe with Hibiclens soap or an antibacterial soap from the neck down.  If not supplied by your surgeon, hibiclens soap will need to be purchased over the counter in pharmacy.  Rinse soap off thoroughly.  - Shampoo your hair with your regular shampoo  The Day of Surgery  - Take another bath or shower with hibiclens or any  antibacterial soap, to reduce the chance of infection.  - Take heart and blood pressure medications with a small sip of water, as advised by the perioperative team.  - Do not take fluid pills  - You may brush your teeth and rinse your mouth, but do not swall any additional water.   - Do not apply perfumes, powder, body lotions or deodorant on the day of surgery.  - Nail polish should be removed.  - Do not wear makeup or moisturizer  - Wear comfortable clothes, such as a button front shirt and loose fitting pants.  - Leave all jewelry, including body piercings, and valuables at home.    - Bring any devices you will neeed after surgery such as crutches or canes.  - If you have sleep apnea, please bring your CPAP machine  In the event that your physical condition changes including the onset of a cold or respiratory illness, or if you have to delay or cancel your surgery, please notify your surgeon.Anesthesia: General Anesthesia  Youre due to have surgery. During surgery, youll be given medication called anesthesia. (It is also called anesthetic.) This will keep you comfortable and pain-free. Your anesthesia provider will use general anesthesia. This sheet tells you more about it.  What is general anesthesia?     You are watched continuously during your procedure by the anesthesia provider   General anesthesia puts you into a state like deep sleep. It goes into the bloodstream (IV anesthetics), into the lungs (gas anesthetics), or both. You feel nothing during the procedure. You will not remember it. During the procedure, the anesthesia provider monitors you continuously. He or she checks your heart rate and rhythm, blood pressure, breathing, and blood oxygen.  · IV Anesthetics. IV anesthetics are given through an IV line in your arm. Theyre often given first. This is so you are asleep before a gas anesthetic is started. Some kinds of IV anesthetics relieve pain. Others relax you. Your doctor will decide which kind  is best in your case.  · Gas Anesthetics. Gas anesthetics are breathed into the lungs. They are often used to keep you asleep. They can be given through a facemask or a tube placed in your larynx or trachea (breathing tube).  ? If you have a facemask, your anesthesia provider will most likely place it over your nose and mouth while youre still awake. Youll breathe oxygen through the mask as your IV anesthetic is started. Gas anesthetic may be added through the mask.  ? If you have a tube in the larynx or trachea, it will be inserted into your throat after youre asleep.  Anesthesia tools and medications  You will likely have:  · IV anesthetics. These are put into an IV line into your bloodstream.  · Gas anesthetics. You breathe these anesthetics into your lungs, where they pass into your bloodstream.  · Pulse oximeter. This is a small clip that is attached to the end of your finger. This measures your blood oxygen level.  · Electrocardiography leads (electrodes). These are small sticky pads that are placed on your chest. They record your heart rate and rhythm.  · Blood pressure cuff. This reads your blood pressure.  Risks and possible complications  General anesthesia has some risks. These include:  · Breathing problems  · Nausea and vomiting  · Sore throat or hoarseness (usually temporary)  · Allergic reaction to the anesthetic  · Irregular heartbeat (rare)  · Cardiac arrest (rare)   Anesthesia safety  · Follow all instructions you are given for how long not to eat or drink before your procedure.  · Be sure your doctor knows what medications and drugs you take. This includes over-the-counter medications, herbs, supplements, alcohol or other drugs. You will be asked when those were last taken.  · Have an adult family member or friend drive you home after the procedure.  · For the first 24 hours after your surgery:  ? Do not drive or use heavy equipment.  ? Have a trusted family member or spouse make important  decisions or sign documents.  ? Avoid alcohol.  ? Have a responsible adult stay with you. He or she can watch for problems and help keep you safe.  Date Last Reviewed: 10/16/2014  © 1723-9338 [employer]. [address] [place] [address], [place] [address]. All rights reserved. This information is not intended as a substitute for professional medical care. Always follow your healthcare professional's instructions.

## 2018-06-25 NOTE — TELEPHONE ENCOUNTER
Called pt, no answer, left message on voicemail explaining the high calcium level and to remain as hydrated as possible prior to surgery on Wednesday

## 2018-06-26 ENCOUNTER — OFFICE VISIT (OUTPATIENT)
Dept: FAMILY MEDICINE | Facility: CLINIC | Age: 83
DRG: 627 | End: 2018-06-26
Payer: MEDICARE

## 2018-06-26 VITALS
HEART RATE: 74 BPM | BODY MASS INDEX: 26.27 KG/M2 | TEMPERATURE: 99 F | HEIGHT: 62 IN | WEIGHT: 142.75 LBS | DIASTOLIC BLOOD PRESSURE: 70 MMHG | SYSTOLIC BLOOD PRESSURE: 110 MMHG | OXYGEN SATURATION: 96 %

## 2018-06-26 DIAGNOSIS — E21.0 PRIMARY HYPERPARATHYROIDISM: ICD-10-CM

## 2018-06-26 DIAGNOSIS — Z01.818 PRE-OP EXAM: Primary | ICD-10-CM

## 2018-06-26 DIAGNOSIS — I10 ESSENTIAL HYPERTENSION, BENIGN: ICD-10-CM

## 2018-06-26 DIAGNOSIS — E83.52 HYPERCALCEMIA: ICD-10-CM

## 2018-06-26 DIAGNOSIS — I27.20 PULMONARY HYPERTENSION: ICD-10-CM

## 2018-06-26 PROBLEM — I62.03 ACUTE ON CHRONIC INTRACRANIAL SUBDURAL HEMATOMA: Status: RESOLVED | Noted: 2018-04-19 | Resolved: 2018-06-26

## 2018-06-26 PROBLEM — I62.01 ACUTE ON CHRONIC INTRACRANIAL SUBDURAL HEMATOMA: Status: RESOLVED | Noted: 2018-04-19 | Resolved: 2018-06-26

## 2018-06-26 PROBLEM — I62.9 INTRACRANIAL BLEED: Status: RESOLVED | Noted: 2018-04-30 | Resolved: 2018-06-26

## 2018-06-26 PROBLEM — S06.5XAA SUBDURAL HEMATOMA: Status: RESOLVED | Noted: 2018-04-18 | Resolved: 2018-06-26

## 2018-06-26 PROCEDURE — 99214 OFFICE O/P EST MOD 30 MIN: CPT | Mod: S$PBB,,, | Performed by: NURSE PRACTITIONER

## 2018-06-26 PROCEDURE — 99213 OFFICE O/P EST LOW 20 MIN: CPT | Mod: PBBFAC,PO | Performed by: NURSE PRACTITIONER

## 2018-06-26 PROCEDURE — 99999 PR PBB SHADOW E&M-EST. PATIENT-LVL III: CPT | Mod: PBBFAC,,, | Performed by: NURSE PRACTITIONER

## 2018-06-26 RX ORDER — ACETAMINOPHEN 500 MG
500 TABLET ORAL EVERY 6 HOURS PRN
COMMUNITY
Start: 2018-06-26

## 2018-06-26 NOTE — PROGRESS NOTES
Subjective:       Patient ID: Mikaela Ghosh is a 85 y.o. female.    Chief Complaint: Pre-op Exam    85-year-old female presents to the clinic today for preop for a parathyroidectomy scheduled tomorrow at 8 a.m. She denies any complaints today.  She denies any cardiac chest pain, heart palpitations, shortness of breath, or swelling to lower extremities.  The she denies any headache, dizziness, or blurred vision.  She denies any dysuria, urinary frequency, hematuria, or flank pain. She does occasionally have some pain to her left sciatica.  She had a craniotomy for subdural hematoma on April 18th 2018.  She did fine with that surgery. She denies any use of Aspirin, Aspirin products, or herbal medications. She has not been on prolonged courses of steroids. She denies any history of problems or history of  family problems with anesthesia. She reports no history of blood clots or excessive bleeding. She report fair exercise tolerance.         Past Medical History:   Diagnosis Date    Hypertension      Past Surgical History:   Procedure Laterality Date    EYE SURGERY      cataract    HYSTERECTOMY        reports that she has never smoked. She has never used smokeless tobacco. She reports that she does not drink alcohol or use drugs.  Review of Systems   Constitutional: Negative for chills and fever.   HENT: Negative for congestion, ear discharge, ear pain, postnasal drip, rhinorrhea, sinus pressure and sore throat.    Eyes: Negative for pain, discharge, redness and itching.   Respiratory: Negative for cough, shortness of breath and wheezing.    Cardiovascular: Negative for chest pain, palpitations and leg swelling.   Gastrointestinal: Negative for abdominal pain, constipation, diarrhea, nausea and vomiting.   Genitourinary: Negative for dysuria and frequency.   Musculoskeletal: Negative for gait problem, neck pain and neck stiffness.   Skin: Negative for rash.   Neurological: Negative for dizziness, light-headedness  and headaches.       Objective:      Physical Exam   Constitutional: She is oriented to person, place, and time. She appears well-developed and well-nourished. No distress.   HENT:   Head: Normocephalic and atraumatic.   Right Ear: External ear normal.   Left Ear: External ear normal.   Nose: Nose normal.   Mouth/Throat: Oropharynx is clear and moist.   Eyes: Conjunctivae and EOM are normal. Pupils are equal, round, and reactive to light. Right eye exhibits no discharge. Left eye exhibits no discharge. No scleral icterus.   Neck: Normal range of motion. Neck supple. No JVD present. No thyromegaly present.   Cardiovascular: Normal rate and regular rhythm.  Exam reveals no friction rub.    No murmur heard.  Pulmonary/Chest: Effort normal and breath sounds normal. No respiratory distress. She has no wheezes. She has no rales.   Abdominal: Soft. Bowel sounds are normal. There is no tenderness. There is no rebound and no guarding.   Musculoskeletal: Normal range of motion. She exhibits no edema.   Lymphadenopathy:     She has no cervical adenopathy.   Neurological: She is alert and oriented to person, place, and time. She displays normal reflexes.   Skin: Skin is warm and dry. No rash noted. She is not diaphoretic.   Psychiatric: She has a normal mood and affect. Her behavior is normal. Judgment and thought content normal.       Assessment:       1. Pre-op exam    2. Hypercalcemia    3. Primary hyperparathyroidism    4. Pulmonary hypertension    5. Essential hypertension, benign        Plan:         Pre-op exam    Hypercalcemia  - scheduled for parathyroidectomy tomorrow     Primary hyperparathyroidism  - calcium 12.1    Pulmonary hypertension  - stable observe asymptomatic     Essential hypertension, benign  - The current medical regimen is effective;  continue present plan and medications.      All labs acceptable but calcium is 12.1  Chest x-ray are normal  EKG when compared to previous vent rate has decreased  otherwise unchanged     Cleared for surgery with moderate risk

## 2018-06-26 NOTE — PROGRESS NOTES
Subjective:       Patient ID: Mikaela Ghosh is a 85 y.o. female.    Chief Complaint: Pre-op Exam    HPI  Past Medical History:   Diagnosis Date    Hypertension      Past Surgical History:   Procedure Laterality Date    EYE SURGERY      cataract    HYSTERECTOMY        reports that she has never smoked. She has never used smokeless tobacco. She reports that she does not drink alcohol or use drugs.  Review of Systems    Objective:      Physical Exam    Assessment:       No diagnosis found.    Plan:       ***    No Follow-up on file.

## 2018-06-27 ENCOUNTER — SURGERY (OUTPATIENT)
Age: 83
End: 2018-06-27

## 2018-06-27 ENCOUNTER — ANESTHESIA (OUTPATIENT)
Dept: SURGERY | Facility: HOSPITAL | Age: 83
DRG: 627 | End: 2018-06-27
Payer: MEDICARE

## 2018-06-27 ENCOUNTER — HOSPITAL ENCOUNTER (INPATIENT)
Facility: HOSPITAL | Age: 83
LOS: 1 days | Discharge: HOME | DRG: 627 | End: 2018-06-28
Attending: SURGERY | Admitting: SURGERY
Payer: MEDICARE

## 2018-06-27 DIAGNOSIS — E21.0 PRIMARY HYPERPARATHYROIDISM: Primary | ICD-10-CM

## 2018-06-27 LAB
PTH-INTACT SERPL-MCNC: 26 PG/ML
PTH-INTACT SERPL-MCNC: 48 PG/ML
PTH-INTACT SERPL-MCNC: 77 PG/ML

## 2018-06-27 PROCEDURE — 94761 N-INVAS EAR/PLS OXIMETRY MLT: CPT

## 2018-06-27 PROCEDURE — 63600175 PHARM REV CODE 636 W HCPCS: Performed by: NURSE ANESTHETIST, CERTIFIED REGISTERED

## 2018-06-27 PROCEDURE — 25000003 PHARM REV CODE 250: Performed by: SURGERY

## 2018-06-27 PROCEDURE — D9220A PRA ANESTHESIA: Mod: CRNA,,, | Performed by: NURSE ANESTHETIST, CERTIFIED REGISTERED

## 2018-06-27 PROCEDURE — 63600175 PHARM REV CODE 636 W HCPCS: Performed by: STUDENT IN AN ORGANIZED HEALTH CARE EDUCATION/TRAINING PROGRAM

## 2018-06-27 PROCEDURE — 27201423 OPTIME MED/SURG SUP & DEVICES STERILE SUPPLY: Performed by: SURGERY

## 2018-06-27 PROCEDURE — 36000706: Performed by: SURGERY

## 2018-06-27 PROCEDURE — 25000003 PHARM REV CODE 250: Performed by: ANESTHESIOLOGY

## 2018-06-27 PROCEDURE — 88331 PATH CONSLTJ SURG 1 BLK 1SPC: CPT | Mod: 26,,, | Performed by: PATHOLOGY

## 2018-06-27 PROCEDURE — 37000008 HC ANESTHESIA 1ST 15 MINUTES: Performed by: SURGERY

## 2018-06-27 PROCEDURE — 27201037 HC PRESSURE MONITORING SET UP

## 2018-06-27 PROCEDURE — 71000039 HC RECOVERY, EACH ADD'L HOUR: Performed by: SURGERY

## 2018-06-27 PROCEDURE — 25000003 PHARM REV CODE 250: Performed by: STUDENT IN AN ORGANIZED HEALTH CARE EDUCATION/TRAINING PROGRAM

## 2018-06-27 PROCEDURE — 71000033 HC RECOVERY, INTIAL HOUR: Performed by: SURGERY

## 2018-06-27 PROCEDURE — 88305 TISSUE EXAM BY PATHOLOGIST: CPT | Performed by: PATHOLOGY

## 2018-06-27 PROCEDURE — 60500 EXPLORE PARATHYROID GLANDS: CPT | Mod: LT,,, | Performed by: SURGERY

## 2018-06-27 PROCEDURE — 0GTR0ZZ RESECTION OF PARATHYROID GLAND, OPEN APPROACH: ICD-10-PCS | Performed by: SURGERY

## 2018-06-27 PROCEDURE — 27000221 HC OXYGEN, UP TO 24 HOURS

## 2018-06-27 PROCEDURE — 36000707: Performed by: SURGERY

## 2018-06-27 PROCEDURE — 37000009 HC ANESTHESIA EA ADD 15 MINS: Performed by: SURGERY

## 2018-06-27 PROCEDURE — 63600175 PHARM REV CODE 636 W HCPCS: Performed by: ANESTHESIOLOGY

## 2018-06-27 PROCEDURE — 83970 ASSAY OF PARATHORMONE: CPT

## 2018-06-27 PROCEDURE — 88305 TISSUE EXAM BY PATHOLOGIST: CPT | Mod: 26,,, | Performed by: PATHOLOGY

## 2018-06-27 PROCEDURE — 12000002 HC ACUTE/MED SURGE SEMI-PRIVATE ROOM

## 2018-06-27 PROCEDURE — D9220A PRA ANESTHESIA: Mod: ANES,,, | Performed by: ANESTHESIOLOGY

## 2018-06-27 RX ORDER — ONDANSETRON 8 MG/1
8 TABLET, ORALLY DISINTEGRATING ORAL EVERY 8 HOURS PRN
Status: CANCELLED | OUTPATIENT
Start: 2018-06-27

## 2018-06-27 RX ORDER — HYDROCODONE BITARTRATE AND ACETAMINOPHEN 5; 325 MG/1; MG/1
2 TABLET ORAL EVERY 4 HOURS PRN
Status: DISCONTINUED | OUTPATIENT
Start: 2018-06-27 | End: 2018-06-28 | Stop reason: HOSPADM

## 2018-06-27 RX ORDER — PROPOFOL 10 MG/ML
VIAL (ML) INTRAVENOUS
Status: DISCONTINUED | OUTPATIENT
Start: 2018-06-27 | End: 2018-06-27

## 2018-06-27 RX ORDER — HYDRALAZINE HYDROCHLORIDE 20 MG/ML
INJECTION INTRAMUSCULAR; INTRAVENOUS
Status: DISPENSED
Start: 2018-06-27 | End: 2018-06-28

## 2018-06-27 RX ORDER — ACETAMINOPHEN 10 MG/ML
INJECTION, SOLUTION INTRAVENOUS
Status: DISCONTINUED | OUTPATIENT
Start: 2018-06-27 | End: 2018-06-27

## 2018-06-27 RX ORDER — LIDOCAINE HCL/PF 100 MG/5ML
SYRINGE (ML) INTRAVENOUS
Status: DISCONTINUED | OUTPATIENT
Start: 2018-06-27 | End: 2018-06-27

## 2018-06-27 RX ORDER — HYDROCODONE BITARTRATE AND ACETAMINOPHEN 5; 325 MG/1; MG/1
TABLET ORAL
Status: DISPENSED
Start: 2018-06-27 | End: 2018-06-27

## 2018-06-27 RX ORDER — LIDOCAINE HYDROCHLORIDE 10 MG/ML
1 INJECTION, SOLUTION EPIDURAL; INFILTRATION; INTRACAUDAL; PERINEURAL ONCE
Status: COMPLETED | OUTPATIENT
Start: 2018-06-27 | End: 2018-06-27

## 2018-06-27 RX ORDER — SODIUM CHLORIDE 9 MG/ML
INJECTION, SOLUTION INTRAVENOUS CONTINUOUS
Status: DISCONTINUED | OUTPATIENT
Start: 2018-06-27 | End: 2018-06-28 | Stop reason: HOSPADM

## 2018-06-27 RX ORDER — LABETALOL HYDROCHLORIDE 5 MG/ML
10 INJECTION, SOLUTION INTRAVENOUS
Status: COMPLETED | OUTPATIENT
Start: 2018-06-27 | End: 2018-06-27

## 2018-06-27 RX ORDER — ACETAMINOPHEN 325 MG/1
650 TABLET ORAL EVERY 8 HOURS PRN
Status: CANCELLED | OUTPATIENT
Start: 2018-06-27

## 2018-06-27 RX ORDER — SODIUM CHLORIDE 0.9 % (FLUSH) 0.9 %
3 SYRINGE (ML) INJECTION
Status: CANCELLED | OUTPATIENT
Start: 2018-06-27

## 2018-06-27 RX ORDER — HYDRALAZINE HYDROCHLORIDE 20 MG/ML
5 INJECTION INTRAMUSCULAR; INTRAVENOUS
Status: DISCONTINUED | OUTPATIENT
Start: 2018-06-27 | End: 2018-06-27 | Stop reason: HOSPADM

## 2018-06-27 RX ORDER — PHENYLEPHRINE HYDROCHLORIDE 10 MG/ML
INJECTION INTRAVENOUS
Status: DISCONTINUED | OUTPATIENT
Start: 2018-06-27 | End: 2018-06-27

## 2018-06-27 RX ORDER — FENTANYL CITRATE 50 UG/ML
INJECTION, SOLUTION INTRAMUSCULAR; INTRAVENOUS
Status: DISCONTINUED | OUTPATIENT
Start: 2018-06-27 | End: 2018-06-27

## 2018-06-27 RX ORDER — ONDANSETRON 2 MG/ML
INJECTION INTRAMUSCULAR; INTRAVENOUS
Status: DISCONTINUED | OUTPATIENT
Start: 2018-06-27 | End: 2018-06-27

## 2018-06-27 RX ORDER — CEFAZOLIN SODIUM 1 G/3ML
2 INJECTION, POWDER, FOR SOLUTION INTRAMUSCULAR; INTRAVENOUS
Status: COMPLETED | OUTPATIENT
Start: 2018-06-27 | End: 2018-06-27

## 2018-06-27 RX ORDER — LABETALOL HYDROCHLORIDE 5 MG/ML
INJECTION, SOLUTION INTRAVENOUS
Status: DISPENSED
Start: 2018-06-27 | End: 2018-06-27

## 2018-06-27 RX ORDER — SODIUM CHLORIDE 0.9 % (FLUSH) 0.9 %
3 SYRINGE (ML) INJECTION
Status: DISCONTINUED | OUTPATIENT
Start: 2018-06-27 | End: 2018-06-27 | Stop reason: HOSPADM

## 2018-06-27 RX ORDER — AMLODIPINE BESYLATE 2.5 MG/1
2.5 TABLET ORAL DAILY
Status: CANCELLED | OUTPATIENT
Start: 2018-06-28

## 2018-06-27 RX ORDER — HYDROCODONE BITARTRATE AND ACETAMINOPHEN 5; 325 MG/1; MG/1
1 TABLET ORAL EVERY 4 HOURS PRN
Qty: 31 TABLET | Refills: 0 | Status: SHIPPED | OUTPATIENT
Start: 2018-06-27

## 2018-06-27 RX ORDER — HYDROCODONE BITARTRATE AND ACETAMINOPHEN 5; 325 MG/1; MG/1
1 TABLET ORAL EVERY 4 HOURS PRN
Status: DISCONTINUED | OUTPATIENT
Start: 2018-06-27 | End: 2018-06-28 | Stop reason: HOSPADM

## 2018-06-27 RX ORDER — SUCCINYLCHOLINE CHLORIDE 20 MG/ML
INJECTION INTRAMUSCULAR; INTRAVENOUS
Status: DISCONTINUED | OUTPATIENT
Start: 2018-06-27 | End: 2018-06-27

## 2018-06-27 RX ORDER — FENTANYL CITRATE 50 UG/ML
25 INJECTION, SOLUTION INTRAMUSCULAR; INTRAVENOUS EVERY 5 MIN PRN
Status: DISCONTINUED | OUTPATIENT
Start: 2018-06-27 | End: 2018-06-27 | Stop reason: HOSPADM

## 2018-06-27 RX ADMIN — ACETAMINOPHEN 1000 MG: 10 INJECTION, SOLUTION INTRAVENOUS at 09:06

## 2018-06-27 RX ADMIN — CEFAZOLIN 2 G: 330 INJECTION, POWDER, FOR SOLUTION INTRAMUSCULAR; INTRAVENOUS at 08:06

## 2018-06-27 RX ADMIN — SODIUM CHLORIDE: 0.9 INJECTION, SOLUTION INTRAVENOUS at 07:06

## 2018-06-27 RX ADMIN — HYDRALAZINE HYDROCHLORIDE 5 MG: 20 INJECTION INTRAMUSCULAR; INTRAVENOUS at 12:06

## 2018-06-27 RX ADMIN — HYDROCODONE BITARTRATE AND ACETAMINOPHEN 1 TABLET: 5; 325 TABLET ORAL at 10:06

## 2018-06-27 RX ADMIN — FENTANYL CITRATE 50 MCG: 50 INJECTION, SOLUTION INTRAMUSCULAR; INTRAVENOUS at 08:06

## 2018-06-27 RX ADMIN — FENTANYL CITRATE 25 MCG: 50 INJECTION INTRAMUSCULAR; INTRAVENOUS at 10:06

## 2018-06-27 RX ADMIN — FENTANYL CITRATE 25 MCG: 50 INJECTION INTRAMUSCULAR; INTRAVENOUS at 11:06

## 2018-06-27 RX ADMIN — LABETALOL HYDROCHLORIDE 10 MG: 5 INJECTION, SOLUTION INTRAVENOUS at 10:06

## 2018-06-27 RX ADMIN — FENTANYL CITRATE 50 MCG: 50 INJECTION, SOLUTION INTRAMUSCULAR; INTRAVENOUS at 09:06

## 2018-06-27 RX ADMIN — LIDOCAINE HYDROCHLORIDE 10 MG: 10 INJECTION, SOLUTION EPIDURAL; INFILTRATION; INTRACAUDAL; PERINEURAL at 07:06

## 2018-06-27 RX ADMIN — FENTANYL CITRATE 25 MCG: 50 INJECTION, SOLUTION INTRAMUSCULAR; INTRAVENOUS at 10:06

## 2018-06-27 RX ADMIN — PHENYLEPHRINE HYDROCHLORIDE 100 MCG: 10 INJECTION INTRAVENOUS at 08:06

## 2018-06-27 RX ADMIN — LIDOCAINE HYDROCHLORIDE 50 MG: 20 INJECTION, SOLUTION INTRAVENOUS at 08:06

## 2018-06-27 RX ADMIN — PROPOFOL 80 MG: 10 INJECTION, EMULSION INTRAVENOUS at 08:06

## 2018-06-27 RX ADMIN — FENTANYL CITRATE 25 MCG: 50 INJECTION, SOLUTION INTRAMUSCULAR; INTRAVENOUS at 09:06

## 2018-06-27 RX ADMIN — SUCCINYLCHOLINE CHLORIDE 100 MG: 20 INJECTION, SOLUTION INTRAMUSCULAR; INTRAVENOUS at 08:06

## 2018-06-27 RX ADMIN — HYDROCODONE BITARTRATE AND ACETAMINOPHEN 1 TABLET: 5; 325 TABLET ORAL at 11:06

## 2018-06-27 RX ADMIN — ONDANSETRON 4 MG: 2 INJECTION INTRAMUSCULAR; INTRAVENOUS at 09:06

## 2018-06-27 NOTE — TRANSFER OF CARE
"Anesthesia Transfer of Care Note    Patient: Mikaela Ghosh    Procedure(s) Performed: Procedure(s) (LRB):  PARATHYROIDECTOMY - left lower inferior gland (Left)    Patient location: PACU    Anesthesia Type: general    Transport from OR: Transported from OR on 6-10 L/min O2 by face mask with adequate spontaneous ventilation    Post pain: adequate analgesia    Post assessment: no apparent anesthetic complications and tolerated procedure well    Post vital signs: stable (Elevated BP)    Level of consciousness: responds to stimulation    Nausea/Vomiting: no nausea/vomiting    Complications: none, Elevated BP, treated in PACU    Transfer of care protocol was followed      Last vitals:   Visit Vitals  BP (!) 238/117   Pulse 92   Temp 36.9 °C (98.4 °F) (Temporal)   Resp 18   Ht 5' 3" (1.6 m)   Wt 65.3 kg (144 lb)   SpO2 98%   Breastfeeding? No   BMI 25.51 kg/m²     "

## 2018-06-27 NOTE — ANESTHESIA POSTPROCEDURE EVALUATION
"Anesthesia Post Evaluation    Patient: Mikaela Ghosh    Procedure(s) Performed: Procedure(s) (LRB):  PARATHYROIDECTOMY - left lower inferior gland (Left)    Final Anesthesia Type: general  Patient location during evaluation: PACU  Patient participation: Yes- Able to Participate  Level of consciousness: awake and alert  Post-procedure vital signs: reviewed and stable  Pain management: adequate  Airway patency: patent  PONV status at discharge: No PONV  Anesthetic complications: no      Cardiovascular status: blood pressure returned to baseline  Respiratory status: unassisted  Hydration status: euvolemic  Follow-up not needed.        Visit Vitals  BP (!) 170/84   Pulse 62   Temp 36.5 °C (97.7 °F) (Oral)   Resp (!) 28   Ht 5' 3" (1.6 m)   Wt 65.3 kg (144 lb)   SpO2 96%   Breastfeeding? No   BMI 25.51 kg/m²       Pain/Verna Score: Pain Assessment Performed: Yes (6/27/2018 10:50 AM)  Presence of Pain: complains of pain/discomfort (6/27/2018 11:18 AM)  Pain Rating Prior to Med Admin: 8 (6/27/2018 11:18 AM)  Verna Score: 9 (6/27/2018 10:50 AM)      "

## 2018-06-27 NOTE — INTERVAL H&P NOTE
The patient has been examined and the H&P has been reviewed:    No acute interval changes.  US reviewed.  No thyroid abnormalities.  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    Anesthesia/Surgery risks, benefits and alternative options discussed and understood by patient/family.          Active Hospital Problems    Diagnosis  POA    Primary hyperparathyroidism [E21.0]  Yes      Resolved Hospital Problems    Diagnosis Date Resolved POA   No resolved problems to display.

## 2018-06-27 NOTE — OP NOTE
DATE OF PROCEDURE: 06/27/2018     PREOPERATIVE DIAGNOSIS: Primary hyperthyroidism    POSTOPERATIVE DIAGNOSIS: Primary hyperthyroidism, left inferior parathyroid adenoma    PROCEDURE PERFORMED: Minimally invasive parathyroidectomy, 1 gland    ATTENDING SURGEON: Mhain Treadwell M.D.     HOUSESTAFF SURGEON: Yusuf Edwards M.D. (RES)     ANESTHESIA: General endotracheal.     ESTIMATED BLOOD LOSS: 20 mL.     FINDINGS: Enlarged, irregular left inferior parathyroid gland, consistent with adenoma.  Normal appearing left superior parathyroid gland.  PTH dropped from 77 to 26 intraop.    SPECIMEN: Left inferior parathyroid gland    DRAINS: None.     COMPLICATIONS: None.     INDICATIONS: Mikaela Ghosh is a 85 y.o.female referred to clinic with hypercalcemia and elevated parathyroid hormone.  The patient meets criteria for parathyroid surgery based on osteoporosis and calcium level of 12.5.  Sestamibi scan and ultrasound suggested an adenoma at the left inferior position.  We recommended minimally invasive parathyroidectomy, with possible four gland exploration and the patient agreed to proceed. The patient signed informed consent and expressed understanding of the risks and benefits of surgery.     OPERATIVE PROCEDURE: The patient was identified in Preoperative Holding and  brought back to the Operating Room. Placed supine on the operating table and padded appropriately. Monitors were applied and there was smooth induction of general endotracheal anesthesia. A shoulder roll was placed to help extend the neck.      The patient's neck was prepped and draped in the standard sterile surgical fashion. A time-out was performed and all team   members present agreed this was the correct procedure on the correct patient.   We also confirmed administration of appropriate preoperative antibiotics.    A transverse incision was made in a neck crease.  This was higher than 2 fingerbreadths superior to the sternal notch. This was carried  deeply through the platysma.  Platysmal flaps were created inferiorly and superiorly. Strap muscles were divided in the midline raphe.  Thyroid was then encountered.  Attention was turned to the left side.    Thyroid was rolled away from the strap muscles with a mixture of blunt dissection and electrocautery.  We then identified an inferior parathyroid gland, which was reddish brown and enlarged.  This was consistent with adenoma.  This was resected and sent for permanent section.  The blood supply was clipped    Intra op PTH levels were sent and came back as: 77 at 3 minutes, 48 at 10 minutes, and 26 at 20 minutes.    Being satisfied with the results, we acquired meticulous hemostasis.  We closed the strap muscles with 2-0 Vicryl interrupted sutures, and the platysma with 3-0 Vicryl interrupted sutures. The skin was run with 4-0 Monocryl.  Steri strips were applied.    The patient was extubated in the Operating Room and transported to the Recovery Room in stable condition. All sponge, instrument and needle counts were correct at the   end of the case.

## 2018-06-27 NOTE — H&P (VIEW-ONLY)
Patient ID: Mikaela Ghosh is a 85 y.o. female.     Chief Complaint:  Hypercalcemia        History of Present Illness  Initial for primary hyperparathyroidism      Pt suffered a fall that resulted in subdural hematoma that required craniotomy in 4/18/2018.   She was discharged to SNF and it was there that she was first told about her hypercalcemia.      On labs, Hypercalcemia with hypophosphatemia Present since 5/2017 with PTH elevated 197.   Most recent corrected calcium is 13.1  Labs notable for normal GFR and normal alk phos.      Denies clinical Fractures. No prior BMD. Not taking Vit D.   Denies symptomatic nephrolithiasis.   Denies HCTZ     Denies immobility. Prior to fall she was working at "[employer],".   She reports staying hydrated. Reports polyuria and polyuria.   Daughter denies any confusion and pt is oriented.   10 lbs wt loss since last year.   Denies abd pain, n/v, bone pain or hx pancreatitis.      No known FH calcium disorders      Has HTN, on norvasc.         Review of Systems   Constitutional: Positive for activity change (decreased), fatigue and unexpected weight change.   Eyes: Negative for visual disturbance.   Respiratory: Negative for shortness of breath.    Cardiovascular: Negative for palpitations and leg swelling.   Gastrointestinal: Positive for constipation. Negative for abdominal pain.   Endocrine: Positive for polydipsia and polyuria.   Musculoskeletal: Negative for arthralgias.   Skin: Negative for wound.   Neurological: Negative for weakness and headaches.   Hematological: Does not bruise/bleed easily.   Psychiatric/Behavioral: Negative for sleep disturbance.         Objective:   Physical Exam   Constitutional: She appears well-developed.   In wheelchair   Craniotomy sutures noted     HENT:   Right Ear: External ear normal.   Left Ear: External ear normal.   Nose: Nose normal.   Hearing normal  Dentition good    Neck: No tracheal deviation present. No thyromegaly present.   Cardiovascular:  Normal rate.    No murmur heard.  Pulmonary/Chest: Effort normal and breath sounds normal.   Abdominal: Soft. There is no tenderness. No hernia.   Musculoskeletal: She exhibits no edema.   Gait normal  No clubbing or cyanosis noted   Neurological: She displays normal reflexes. No cranial nerve deficit.   Skin: No rash noted.   No nodules       Psychiatric: She has a normal mood and affect. Judgment normal.   Vitals reviewed.        Lab Review:   Results for MIKAELA FOSTER (MRN [id number]) as of 5/10/2018 14:25    Ref. Range 5/10/2018 05:07   Sodium Latest Ref Range: 136 - 145 mmol/L 141   Potassium Latest Ref Range: 3.5 - 5.1 mmol/L 4.1   Chloride Latest Ref Range: 95 - 110 mmol/L 107   CO2 Latest Ref Range: 23 - 29 mmol/L 25   Anion Gap Latest Ref Range: 8 - 16 mmol/L 9   BUN, Bld Latest Ref Range: 8 - 23 mg/dL 13   Creatinine Latest Ref Range: 0.5 - 1.4 mg/dL 0.7   eGFR if non African American Latest Ref Range: >60 mL/min/1.73 m^2 >60.0   eGFR if African American Latest Ref Range: >60 mL/min/1.73 m^2 >60.0   Glucose Latest Ref Range: 70 - 110 mg/dL 82   Calcium Latest Ref Range: 8.7 - 10.5 mg/dL 12.3 (HH)   Phosphorus Latest Ref Range: 2.7 - 4.5 mg/dL 2.6 (L)   Magnesium Latest Ref Range: 1.6 - 2.6 mg/dL 1.8   Results for MIKAELA FOSTER (MRN [id number]) as of 5/10/2018 14:25    Ref. Range 4/30/2018 04:31   PTH Latest Ref Range: 9.0 - 77.0 pg/mL 197.0 (H)            Assessment:      1. Primary hyperparathyroidism  DXA Bone Density Spine And Hip     Calcium, Timed Urine [facility]; 24 Hours     VITAMIN D     Creatinine, urine, timed 24 Hours     RENAL FUNCTION PANEL   2. Hypercalcemia      3. Hypophosphatemia            Plan:      Mikaela was seen today for hypercalcemia.     Diagnoses and all orders for this visit:     Primary hyperparathyroidism  Pt does not want surgery given age which is understandable, but she is also very hesitant about starting medication.   Discussed risk and  Benefits and potential complications  from uncontrolled hypercalcemia.   Recommend starting sensipar to control hypercalcemia - given handout on medication and sent to pharm but she will let me know if she decides to pick it up in the next 1-2 days.   Discussed that if she refuses sensipar, other alternative (though less desirable due to concern for rebound) may include IV reclast infusion.   In the meantime discussed importance of hydration   Repeat labs 3-4 weeks after starting sensipar.    -     DXA Bone Density Spine And Hip; Future  -     Calcium, Timed Urine [facility]; 24 Hours; Future  -     VITAMIN D; Future  -     Creatinine, urine, timed 24 Hours; Future  -     RENAL FUNCTION PANEL; Future  -     cinacalcet (SENSIPAR) 30 MG Tab; Take 1 tablet (30 mg total) by mouth 2 (two) times daily with meals. Start with 1 pill daily for 1 week then increase to 1 pill twice daily     Hypercalcemia  As above      Hypophosphatemia    Note from Endocrinology as noted above.  Reviewed.  No significant changes per patient subjective hx.  She has HTN with last iPTH level 197 with Ca of 12.5.  Pt with primary hyperparathyroidism.  Patient concerned about cost of sensipar and is now considering and willing to have surgery for medical issues caused by PHPT.  FHH has been ruled out with normal urinary calcium level on 24 hour urine collection with normal urinary Ca/Cr ratio.  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  Dexa BMD scan reveals osteoporosis.  Pt with multiple indications for parathyroidectomy given HTN, osteoporosis, and Ca level of 12.5  Consented and scheduled for a left lower inferior parathyroidectomy on 6-27-18 with intra-op iPTH levels.  Risks of RLN injury (rare) and post-op hungry bone syndrome discussed with patient.  Will check neck/thyroid US pre-op which we have ordered pre-op.

## 2018-06-27 NOTE — NURSING TRANSFER
Nursing Transfer Note      6/27/2018     Transfer To: 545B    Transfer via stretcher    Transfer with O2    Transported by PCT    Medicines sent: none    Chart send with patient: Yes    Notified: daughter    Patient reassessed at: 1322

## 2018-06-28 ENCOUNTER — OFFICE VISIT (OUTPATIENT)
Dept: FAMILY MEDICINE | Facility: CLINIC | Age: 83
End: 2018-06-28
Payer: MEDICARE

## 2018-06-28 VITALS
HEIGHT: 63 IN | OXYGEN SATURATION: 96 % | DIASTOLIC BLOOD PRESSURE: 70 MMHG | BODY MASS INDEX: 25.52 KG/M2 | TEMPERATURE: 98 F | HEART RATE: 77 BPM | RESPIRATION RATE: 18 BRPM | WEIGHT: 144 LBS | SYSTOLIC BLOOD PRESSURE: 120 MMHG

## 2018-06-28 VITALS
TEMPERATURE: 99 F | SYSTOLIC BLOOD PRESSURE: 118 MMHG | OXYGEN SATURATION: 95 % | HEART RATE: 95 BPM | DIASTOLIC BLOOD PRESSURE: 74 MMHG

## 2018-06-28 DIAGNOSIS — M62.838 NECK MUSCLE SPASM: Primary | ICD-10-CM

## 2018-06-28 DIAGNOSIS — M54.12 CERVICAL RADICULOPATHY: ICD-10-CM

## 2018-06-28 LAB
CA-I BLDV-SCNC: 1.29 MMOL/L
PTH-INTACT SERPL-MCNC: 7 PG/ML

## 2018-06-28 PROCEDURE — 36415 COLL VENOUS BLD VENIPUNCTURE: CPT

## 2018-06-28 PROCEDURE — 99214 OFFICE O/P EST MOD 30 MIN: CPT | Mod: S$PBB,,, | Performed by: NURSE PRACTITIONER

## 2018-06-28 PROCEDURE — 99213 OFFICE O/P EST LOW 20 MIN: CPT | Mod: PBBFAC,PO | Performed by: NURSE PRACTITIONER

## 2018-06-28 PROCEDURE — 25000003 PHARM REV CODE 250: Performed by: SURGERY

## 2018-06-28 PROCEDURE — 83970 ASSAY OF PARATHORMONE: CPT

## 2018-06-28 PROCEDURE — 82330 ASSAY OF CALCIUM: CPT

## 2018-06-28 PROCEDURE — 99999 PR PBB SHADOW E&M-EST. PATIENT-LVL III: CPT | Mod: PBBFAC,,, | Performed by: NURSE PRACTITIONER

## 2018-06-28 RX ORDER — TIZANIDINE 4 MG/1
4 TABLET ORAL EVERY 6 HOURS PRN
Qty: 45 TABLET | Refills: 0 | Status: SHIPPED | OUTPATIENT
Start: 2018-06-28 | End: 2018-07-13

## 2018-06-28 RX ORDER — CALCIUM CARBONATE 200(500)MG
1000 TABLET,CHEWABLE ORAL 2 TIMES DAILY
Status: DISCONTINUED | OUTPATIENT
Start: 2018-06-28 | End: 2018-06-28 | Stop reason: HOSPADM

## 2018-06-28 RX ORDER — CALCIUM CARBONATE 500(1250)
2 TABLET ORAL 2 TIMES DAILY
Qty: 90 TABLET | Refills: 0 | Status: SHIPPED | OUTPATIENT
Start: 2018-06-28 | End: 2018-07-28

## 2018-06-28 RX ADMIN — CALCIUM CARBONATE (ANTACID) CHEW TAB 500 MG 1000 MG: 500 CHEW TAB at 08:06

## 2018-06-28 NOTE — PLAN OF CARE
Patient discharged home with no needs with daughter.    Future Appointments  Date Time Provider Department Center   7/3/2018 11:00 AM [facility] OIC-CT1 500 LB LIMIT [facility] IC [facility]   7/3/2018 1:00 PM Wojciech Sandoval MD [facility] NEUROS7 [facility]   7/5/2018 1:45 PM LIZZY Bui, CCC-SLP VETH OP [facility]   7/11/2018 1:00 PM LIZZY Bui CCC-SLP VETH OP [facility]   7/12/2018 4:30 PM Mahin Treadwell MD [facility] GENSUR [facility]   7/18/2018 1:00 PM LIZZY Bui CCC-SLP VETH OP [facility]   7/25/2018 1:45 PM LIZZY Bui CCC-SLP VETH OP [facility]          06/28/18 1143   Final Note   Assessment Type Final Discharge Note   Discharge Disposition Home   Hospital Follow Up  Appt(s) scheduled? Yes   Right Care Referral Info   Post Acute Recommendation No Care

## 2018-06-28 NOTE — PROGRESS NOTES
This dictation has been generated using Dragon Dictation some phonetic errors may occur.     Problem List Items Addressed This Visit     None      Visit Diagnoses     Neck muscle spasm    -  Primary    Cervical radiculopathy            Orders Placed This Encounter    tiZANidine (ZANAFLEX) 4 MG tablet     Spasm of neck, shoulder, and trapezius muscles.  Add Zanaflex as above.  I've asked that the patient/family call surgery and advise them of current symptomology.  I will also copy them on the chart.  Earlier complained of a tingling in the fingers consider cervical radiculopathy.  She does have the pain medication at the pharmacy as prescribed by surgery.  I discussed with her appropriate follow-up and emergent follow-up as needed.  At this time she is without cervical radiculopathy symptoms.    I suspect symptoms are muscular in nature and likely due to sleeping wrong in unfamiliar bed with different pillow.    Patient Instructions   Heat and ice to the back of the neck.   10 minutes each.   Call surgery     Follow-up if symptoms worsen or fail to improve.    ________________________________________________________________  ________________________________________________________________      Chief Complaint   Patient presents with    Shoulder Pain    Arm Pain    Neck Pain     History of present illness  This 85 y.o. presents today for complaint of neck pain.  Patient notes left-sided neck pain and stroke her pain.  She had surgery yesterday with discharge from the hospital today.  She began experiencing neck pain and drop them on my schedule.  I am seeing her early.  She had parathyroid surgery yesterday.  She denies any history of trauma.  No prior complaints of neck pain.  Patient denies presence of a rash.  She notes onset of symptoms after she left the hospital.  She is here with her daughter who reports that earlier mother complaint of a little bit of tingling in her fingers.  Currently patient denies any  pain going down the arm beyond the deltoid muscle.  She indicates cervical pain and trapezius pain and supraspinatus pain.  She is a little bit of discomfort in the anterior portion of the shoulder.  ROS:   CONST: weight stable.  EYES: no vision change.  ENT: No sore throat, headache, or earache.  CV: no chest pain w/ exertion.  RESP: No shortness of breath.  GI: no nausea, vomiting, diarrhea. No dysphagia. Appetite good.   : no urinary issues.  SKIN: no new changes.  NEURO: no focal deficits. Denies headache.  PSYCH: no new issues.  ENDOCRINE: no polyuria.  HEME: no lymph nodes.  ALLERGY: no general pruritis.     Past Medical History:   Diagnosis Date    Hypertension     Thyroid disease        Past Surgical History:   Procedure Laterality Date    BRAIN SURGERY      EYE SURGERY      cataract    HYSTERECTOMY      PARATHYROIDECTOMY Left 6/27/2018    Procedure: PARATHYROIDECTOMY - left lower inferior gland;  Surgeon: Mahin Treadwell MD;  Location: [facility] OR [address] [place] [address];  Service: General;  Laterality: Left;       Family History   Problem Relation Age of Onset    Diabetes Sister     Hypertension Sister        Social History     Social History    Marital status: Single     Spouse name: N/A    Number of children: N/A    Years of education: N/A     Social History Main Topics    Smoking status: Never Smoker    Smokeless tobacco: Never Used    Alcohol use No    Drug use: No    Sexual activity: No     Other Topics Concern    None     Social History Narrative    None       Current Outpatient Prescriptions   Medication Sig Dispense Refill    acetaminophen (TYLENOL EXTRA STRENGTH) 500 MG tablet Take 500 mg by mouth every 6 (six) hours as needed for Pain.      amLODIPine (NORVASC) 2.5 MG tablet Take 1 tablet (2.5 mg total) by mouth once daily. 90 tablet 3    calcium carbonate (OS-RUBY) 500 mg calcium (1,250 mg) tablet Take 2 tablets (1,000 mg total) by mouth 2 (two) times daily. 90 tablet 0     HYDROcodone-acetaminophen (NORCO) 5-325 mg per tablet Take 1 tablet by mouth every 4 (four) hours as needed for Pain (Do not drive while taking pain medications). 31 tablet 0    multivitamin (ONE-A-DAY ESSENTIAL ORAL) Take by mouth.      tiZANidine (ZANAFLEX) 4 MG tablet Take 1 tablet (4 mg total) by mouth every 6 (six) hours as needed. 45 tablet 0     No current facility-administered medications for this visit.        Review of patient's allergies indicates:  No Known Allergies    Physical examination  Vitals Reviewed  Gen. Well-dressed well-nourished no apparent distress  Skin warm dry and intact.  No rashes noted.  Neck is supple without adenopathy  Chest.  Respirations are even unlabored.  Lungs are clear to auscultation.  Cardiac regular rate and rhythm.  No chest wall adenopathy noted.  Neuro. Awake alert oriented x4.  Normal judgment and cognition noted.  Extremities no clubbing cyanosis or edema noted.  Palpable spasms in the cervical paraspinal muscles in the trapezius muscle.  Also tenderness to palpation of the supraspinatus muscle infraspinatus muscle.  No deltoid tenderness with palpation.  No chest wall tenderness to palpation.  No abnormal bruising.  She does have a little bit of an effusion just above the clavicle which is probably some postsurgical swelling from yesterday's procedure.  No localized warmth palpable.  No evidence of bruising in that area.  No swelling in the arm or hand.     Call or return to clinic prn if these symptoms worsen or fail to improve as anticipated.

## 2018-06-28 NOTE — DISCHARGE SUMMARY
[facility]  General Surgery  Discharge Summary      Patient Name: Mikaela Ghosh  MRN: [id number]  Admission Date: 6/27/2018  Hospital Length of Stay: 1 days  Discharge Date and Time:  06/28/2018 6:47 AM  Attending Physician: Mahin Treadwell MD   Discharging Provider: Taylor Quintanilla MD  Primary Care Provider: Felipe Mustafa MD     HPI: Initial for primary hyperparathyroidism      Pt suffered a fall that resulted in subdural hematoma that required craniotomy in 4/18/2018.   She was discharged to SNF and it was there that she was first told about her hypercalcemia.      On labs, Hypercalcemia with hypophosphatemia Present since 5/2017 with PTH elevated 197.   Most recent corrected calcium is 13.1  Labs notable for normal GFR and normal alk phos.      Denies clinical Fractures. No prior BMD. Not taking Vit D.   Denies symptomatic nephrolithiasis.   Denies HCTZ     Denies immobility. Prior to fall she was working at .   She reports staying hydrated. Reports polyuria and polyuria.   Daughter denies any confusion and pt is oriented.   10 lbs wt loss since last year.   Denies abd pain, n/v, bone pain or hx pancreatitis.      No known FH calcium disorders     Procedure(s) (LRB):  PARATHYROIDECTOMY - left lower inferior gland (Left)     Hospital Course: Mikaela Ghosh underwent the above procedure on 6/27/18 as treatment for primary hyperparathyroidism. She tolerated the procedure well and her post-op course was uncomplicated. Prior to discharge home on 06/28/2018 her pain was well controlled on oral medications, tolerated diet, ambulated, spontaneously voided. She was discharged home in good condition on POD#1.      Consults:      Physical exam:  General: NAD  Neck: Incision covered with dressing, not saturated, soft  Neuro: AAOx4  Cardio: S1 and S2, RRR  Resp: Moving air appropriately, breathing even and unlabored  Ext: Warm and well perfused      Significant Diagnostic Studies: Labs: PTH, Ca   Lab  Results   Component Value Date    PTH 7.0 (L) 06/28/2018    CALCIUM 12.1 (HH) 06/25/2018    CAION 1.29 06/28/2018    PHOS 2.5 (L) 05/31/2018         Pending Diagnostic Studies:     None        Final Active Diagnoses:    Diagnosis Date Noted POA    PRINCIPAL PROBLEM:  Primary hyperparathyroidism [E21.0] 05/10/2018 Yes      Problems Resolved During this Admission:    Diagnosis Date Noted Date Resolved POA      Discharged Condition: good    Disposition:     Follow Up:  Follow-up Information     Mahin Treadwell MD In 2 weeks.    Specialty:  General Surgery  Why:  Post-op  Contact information:  [address]  [facility] [address]  [phone]                 Patient Instructions:     Diet Adult Regular     No driving until:   Order Comments: Do not drive while taking pain medications.     Notify your health care provider if you experience any of the following:  temperature >100.4     Notify your health care provider if you experience any of the following:  persistent nausea and vomiting or diarrhea     Notify your health care provider if you experience any of the following:  severe uncontrolled pain     Notify your health care provider if you experience any of the following:  redness, tenderness, or signs of infection (pain, swelling, redness, odor or green/yellow discharge around incision site)     Notify your health care provider if you experience any of the following:  difficulty breathing or increased cough     No dressing needed   Order Comments: Remove telfa and tegaderm (outer two dressings) tomorrow, can keep incision covered with gauze as needed to protect clothing.     Shower on day dressing removed (No bath)   Order Comments: Shower after 48 hours after surgery, do not submerge incisions for 6 weeks.     Activity as tolerated   Order Comments: It is ok to shower now over the dressing.  Remove dressing in 48 hours after surgery.  At that time, wash gently with warm soap and water at least once a day.   Do not scrub hard.  Do not soak incision in water for 2 weeks. Steri strips (small white pieces of surgical tape) will fall off on their own (10-14 days)       Medications:  Reconciled Home Medications:      Medication List      START taking these medications    calcium carbonate 500 mg calcium (1,250 mg) tablet  Commonly known as:  OS-RUBY  Take 2 tablets (1,000 mg total) by mouth 2 (two) times daily.     HYDROcodone-acetaminophen 5-325 mg per tablet  Commonly known as:  NORCO  Take 1 tablet by mouth every 4 (four) hours as needed for Pain (Do not drive while taking pain medications).        CONTINUE taking these medications    amLODIPine 2.5 MG tablet  Commonly known as:  NORVASC  Take 1 tablet (2.5 mg total) by mouth once daily.     ONE-A-DAY ESSENTIAL ORAL  Take by mouth.     TYLENOL EXTRA STRENGTH 500 MG tablet  Generic drug:  acetaminophen  Take 500 mg by mouth every 6 (six) hours as needed for Pain.            Taylor Quintanilla MD  General Surgery  [facility]

## 2018-06-28 NOTE — ANESTHESIA POSTPROCEDURE EVALUATION
"Anesthesia Post Evaluation    Patient: Mikaela Ghosh    Procedure(s) Performed: Procedure(s) (LRB):  PARATHYROIDECTOMY - left lower inferior gland (Left)    Final Anesthesia Type: general  Patient location during evaluation: PACU  Patient participation: Yes- Able to Participate  Level of consciousness: awake and alert  Post-procedure vital signs: reviewed and stable  Pain management: adequate  Airway patency: patent  PONV status at discharge: No PONV  Anesthetic complications: no      Cardiovascular status: stable  Respiratory status: unassisted and spontaneous ventilation  Hydration status: euvolemic  Follow-up not needed.        Visit Vitals  /70 (BP Location: Right arm, Patient Position: Lying)   Pulse 77   Temp 36.7 °C (98.1 °F) (Oral)   Resp 18   Ht 5' 3" (1.6 m)   Wt 65.3 kg (144 lb)   SpO2 96%   Breastfeeding? No   BMI 25.51 kg/m²       Pain/Verna Score: Pain Assessment Performed: Yes (6/28/2018  9:00 AM)  Presence of Pain: denies (6/28/2018  9:00 AM)  Pain Rating Prior to Med Admin: 6 (6/27/2018 10:01 PM)  Pain Rating Post Med Admin: 6 (6/27/2018 11:40 AM)  Verna Score: 10 (6/27/2018  1:00 PM)      "

## 2018-06-28 NOTE — Clinical Note
I saw your pt today for neck and shoulder pain. I asked that she call you. I suspect all MS pain. No swelling or bruising. No DVT.  Added ms relaxer and asked that she call you.  Thanks ext [phone] or [phone] if you need to speak to me. Or cell this pm [phone]

## 2018-06-28 NOTE — PLAN OF CARE
Patient is an 85 year old female admitted from home and underwent Minimally invasive parathyroidectomy, 1 gland 6/27/2018.  Patient is discharging home today with no needs.    Patient lives alone in a one story home with 6 steps to climb with railing to get to door.  Patient was active with [facility] recently, discharged 6/21/2018.  Patient's last admitted to [facility] [place] [facility]H (Craniotomy) with discharge to [facility] 4/26/2018 and discharge home with [facility] 5/16/2018.  Patient stated her daughter is driving her home and has been with her all the time since she discharged home from SNF.  Patient given [facility] with understanding verbalized.  No discharge needs noted.    PCP  Felipe Mustafa MD  [address] [facility] / [place] [address] [phone]      [facility] [place] - [address]  [address] [place] [address]  Phone: [phone] Fax: [phone]    [facility] [address] [address]  Phone: [phone] Fax: [phone]      Extended Emergency Contact Information  Primary Emergency Contact: DonaldJudie  Address: [address] [place] [address] of [place]  Home Phone: [phone]  Mobile Phone: [phone]  Relation: Daughter       06/28/18 1004   Discharge Assessment   Assessment Type Discharge Planning Assessment   Confirmed/corrected address and phone number on facesheet? Yes   Assessment information obtained from? Patient   Expected Length of Stay (days) 2   Communicated expected length of stay with patient/caregiver yes   Prior to hospitilization cognitive status: Alert/Oriented   Prior to hospitalization functional status: Independent;Assistive Equipment;Needs Assistance   Current cognitive status: Alert/Oriented   Current Functional Status: Independent;Assistive Equipment;Needs Assistance   Lives With alone   Able to Return to Prior Arrangements  yes   Is patient able to care for self after discharge? Yes   Who are your caregiver(s) and their phone number(s)? daughter   Patient's perception of discharge disposition home or selfcare   Equipment Currently Used at Home bedside commode;walker, rolling;wheelchair   Do you have any problems affording any of your prescribed medications? No   Is the patient taking medications as prescribed? yes   Does the patient have transportation home? Yes   Transportation Available family or friend will provide   Discharge Plan A Home with family   Discharge Plan B Home with family;Home Health   Patient/Family In Agreement With Plan yes

## 2018-06-29 ENCOUNTER — TELEPHONE (OUTPATIENT)
Dept: SURGERY | Facility: CLINIC | Age: 83
End: 2018-06-29

## 2018-06-29 NOTE — TELEPHONE ENCOUNTER
----- Message from Shea Porras sent at 6/29/2018 12:21 PM CDT -----  Contact: Pt daughter-Juanita  Needs Advice    Reason for call: Calling to speak with June in regards to the pt being in pain.    Communication Preference: [phone]  Additional Information: N/A

## 2018-06-29 NOTE — TELEPHONE ENCOUNTER
Spoke with Daughter, she was not aware that they had the written pain RX in the discharge paper work, they will go to pharmacy and get filled

## 2018-07-02 ENCOUNTER — TELEPHONE (OUTPATIENT)
Dept: SURGERY | Facility: CLINIC | Age: 83
End: 2018-07-02

## 2018-07-02 NOTE — TELEPHONE ENCOUNTER
Spoke to pt's Daughter who stated that her mother was having trouble  picking up things and had hand cramps , she also has questions about wound care, I instructed her that she needs to be washing the incision once a day with soap and water to keep clean, she stated understanding. After speaking to the the pat her self she stated that is no longer having hand cramp and  currently taking the prescribes dosage  Of calcium, she will call me if she starts to have any more symptoms. She is scheduled  To have a CT of her head tomorrow

## 2018-07-02 NOTE — TELEPHONE ENCOUNTER
----- Message from Can Wilcox sent at 7/2/2018  8:31 AM CDT -----  Contact: Judie- daughter  Caller said pt has been having spasm where she couldn't even hold the comb. Caller said pt can barley  anything. Caller wants to know if this is normal due to surgery. Please call regarding this at [phone]

## 2018-07-03 ENCOUNTER — TELEPHONE (OUTPATIENT)
Dept: SURGERY | Facility: CLINIC | Age: 83
End: 2018-07-03

## 2018-07-03 ENCOUNTER — HOSPITAL ENCOUNTER (OUTPATIENT)
Dept: RADIOLOGY | Facility: HOSPITAL | Age: 83
Discharge: HOME | End: 2018-07-03
Attending: FAMILY MEDICINE
Payer: MEDICARE

## 2018-07-03 ENCOUNTER — OFFICE VISIT (OUTPATIENT)
Dept: NEUROSURGERY | Facility: CLINIC | Age: 83
End: 2018-07-03
Payer: MEDICARE

## 2018-07-03 VITALS
DIASTOLIC BLOOD PRESSURE: 71 MMHG | SYSTOLIC BLOOD PRESSURE: 140 MMHG | BODY MASS INDEX: 25.67 KG/M2 | TEMPERATURE: 97 F | WEIGHT: 144.88 LBS | HEART RATE: 80 BPM

## 2018-07-03 DIAGNOSIS — I62.00 SUBDURAL HEMORRHAGE: ICD-10-CM

## 2018-07-03 DIAGNOSIS — S06.5XAA SDH (SUBDURAL HEMATOMA): Primary | ICD-10-CM

## 2018-07-03 PROCEDURE — 99024 POSTOP FOLLOW-UP VISIT: CPT | Mod: S$PBB,,, | Performed by: NEUROLOGICAL SURGERY

## 2018-07-03 PROCEDURE — 70450 CT HEAD/BRAIN W/O DYE: CPT | Mod: TC

## 2018-07-03 PROCEDURE — 70450 CT HEAD/BRAIN W/O DYE: CPT | Mod: 26,,, | Performed by: RADIOLOGY

## 2018-07-03 PROCEDURE — 99213 OFFICE O/P EST LOW 20 MIN: CPT | Mod: PBBFAC,25 | Performed by: NEUROLOGICAL SURGERY

## 2018-07-03 PROCEDURE — 99999 PR PBB SHADOW E&M-EST. PATIENT-LVL III: CPT | Mod: PBBFAC,,, | Performed by: NEUROLOGICAL SURGERY

## 2018-07-03 NOTE — PROGRESS NOTES
Subjective:    I, Carmen Lieberman, attest that this documentation has been prepared under the direction and in the presence of JANELL Sandoval MD.     Patient ID: Mikaela Ghosh is a 85 y.o. female.    Chief Complaint: Post-op Evaluation    HPI   Pt is a 85 y.o. female who presents S/pa craniotomy for subdural hematoma, dated 4/20/2018. Pt states that she has done well, but has endured mild should pain after an injury. Pt denies any LEE. Pt does note some LÉRI weakness and swelling. Pt has received PT with significant relief.      Review of Systems   Constitutional: Negative for chills, fatigue and fever.   HENT: Negative for sinus pressure and trouble swallowing.    Eyes: Negative.  Negative for visual disturbance.   Respiratory: Negative.    Cardiovascular: Negative.    Gastrointestinal: Negative.  Negative for nausea and vomiting.   Endocrine: Negative.    Genitourinary: Negative.    Musculoskeletal: Positive for gait problem.   Neurological: Negative for dizziness, seizures, syncope, speech difficulty, weakness and numbness.       Objective:      Physical Exam:  Nursing note and vitals reviewed.    Constitutional: She appears well-developed.     Eyes: Pupils are equal, round, and reactive to light. Conjunctivae and EOM are normal.     Cardiovascular: Normal rate, regular rhythm, normal pulses and intact distal pulses.     Abdominal: Soft.     Psych/Behavior: She is alert. She is oriented to person, place, and time. She has a normal mood and affect.     Musculoskeletal: Gait is normal.        Neck: Range of motion is full. There is no tenderness. Muscle strength is 5/5. Tone is normal.        Back: Range of motion is full. There is no tenderness. Muscle strength is 5/5. Tone is normal.        Right Upper Extremities: Range of motion is full. There is no tenderness. Muscle strength is 5/5. Tone is normal.        Left Upper Extremities: Range of motion is full. There is no tenderness. Muscle strength is 5/5. Tone is normal.        Right Lower Extremities: Range of motion is full. There is no tenderness. Muscle strength is 5/5. Tone is normal.        Left Lower Extremities: Range of motion is full. There is no tenderness. Muscle strength is 5/5. Tone is normal.     Neurological:        Coordination: She has a normal Romberg Test, normal finger to nose coordination, normal heel to shin coordination and normal tandem walking coordination.        DTRs: DTRs are normal. Tricep reflexes are 2+ on the right side and 2+ on the left side. Bicep reflexes are 2+ on the right side and 2+ on the left side. Brachioradialis reflexes are 2+ on the right side and 2+ on the left side. Patellar reflexes are 2+ on the right side and 2+ on the left side. Achilles reflexes are 2+ on the right side and 2+ on the left side.        Cranial nerves: Cranial nerve(s) II, III, IV, V, VI, VII, VIII, IX, X, XI and XII are intact.       Pt S./pa left sided craniotomy 4/20/2018. Since then she has done relatively well from a subdural perspective. Her right hemiparesis has improved, but she has some right leg and foot weakness and she still drags it a little bit.     Wound is well healed.   No drift, no lag.     Imaging:   CT Head, dated 7/3/2018, shows no residual hematoma on right side. Ho hydro or buildup in head.  Overall I am very happy with that.     I, JANELL Sandoval MD, personally reviewed the imaging and interpreted independent of the radiology report.    Assessment/Plan:   Pt s/p left craniotomy astrocytoma overall doing well. I don't think she needs anymore repeat imaging. More PT to get more functionally mobile. Plan to see her back on a PRN basis.       I, JANELL Sandoval MD, personally performed the services described in this documentation. All medical record entries made by the scribe, Carmen Lieberman, were at my direction and in my presence.  I have reviewed the chart and agree that the record reflects my personal performance and is accurate and complete.

## 2018-07-03 NOTE — TELEPHONE ENCOUNTER
----- Message from Lana Mosher sent at 7/3/2018  1:23 PM CDT -----  Contact: Pt daughter Judie Dimas says  is cramping and having swelling in leg and is requesting a callback at [phone]    Judie says they are in clinic now and asked if she can get a callback today    Thanks

## 2018-07-03 NOTE — TELEPHONE ENCOUNTER
Spoke with pt's daughter she had concerns about shoulder and neck pain and some feet swelling  Scheduled appointment 7/5/2018

## 2018-07-05 ENCOUNTER — CLINICAL SUPPORT (OUTPATIENT)
Dept: REHABILITATION | Facility: HOSPITAL | Age: 83
End: 2018-07-05
Attending: FAMILY MEDICINE
Payer: MEDICARE

## 2018-07-05 ENCOUNTER — OFFICE VISIT (OUTPATIENT)
Dept: SURGERY | Facility: CLINIC | Age: 83
End: 2018-07-05
Payer: MEDICARE

## 2018-07-05 VITALS
DIASTOLIC BLOOD PRESSURE: 73 MMHG | BODY MASS INDEX: 25.52 KG/M2 | TEMPERATURE: 99 F | WEIGHT: 144 LBS | HEART RATE: 77 BPM | SYSTOLIC BLOOD PRESSURE: 147 MMHG | HEIGHT: 63 IN

## 2018-07-05 DIAGNOSIS — I69.319 COGNITIVE DEFICIT DUE TO RECENT CEREBROVASCULAR ACCIDENT (CVA): ICD-10-CM

## 2018-07-05 DIAGNOSIS — Z90.89 STATUS POST PARATHYROIDECTOMY: Primary | ICD-10-CM

## 2018-07-05 DIAGNOSIS — Z98.890 STATUS POST PARATHYROIDECTOMY: Primary | ICD-10-CM

## 2018-07-05 PROCEDURE — G9168 MEMORY CURRENT STATUS: HCPCS | Mod: CH,PO

## 2018-07-05 PROCEDURE — G9169 MEMORY GOAL STATUS: HCPCS | Mod: CH,PO

## 2018-07-05 PROCEDURE — 99999 PR PBB SHADOW E&M-EST. PATIENT-LVL III: CPT | Mod: PBBFAC,,, | Performed by: SURGERY

## 2018-07-05 PROCEDURE — G9170 MEMORY D/C STATUS: HCPCS | Mod: CH,PO

## 2018-07-05 PROCEDURE — 99024 POSTOP FOLLOW-UP VISIT: CPT | Mod: POP,,, | Performed by: SURGERY

## 2018-07-05 PROCEDURE — 97127 HC THERAPEUTIC INTVTN, COGN FUNCTION - ST: CPT | Mod: PO

## 2018-07-05 PROCEDURE — 99213 OFFICE O/P EST LOW 20 MIN: CPT | Mod: PBBFAC | Performed by: SURGERY

## 2018-07-05 NOTE — LETTER
[facility] - General Surgery  [address]  [place] [address]  Phone: [phone] July 5, 2018      Felipe Mustafa MD  [address] [facility] [place] [address]    Patient: Mikaela Ghosh   MR Number: [id number]   YOB: 1933   Date of Visit: 7/5/2018     Dear Dr. Mustafa:    Thank you for referring Mikaela Ghosh to me for evaluation. Below are the relevant portions of my assessment and plan of care.    Mikaela Ghosh is a very pleasant 85-year-old -American female who presents with her daughter approximately one week status post a left lower inferior parathyroidectomy for parathyroid gland adenoma on 06/27/2018.  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  The intact parathyroid hormone level on postop day #1 was down to 7.  She is currently on calcium carbonate with Os-Crispin tablets 500 mg for which she and her daughter tell me she is taking two tablets in the morning and two at night.  She also complains of some swelling in her lower extremities and some left shoulder and neck pain, all of which are improving since surgery.  She denies any numbness or tingling of her lips or circumoral region or of the hands and feet.  She denies any recent trouble holding on to a cup or a glass in the last couple of days and this was only noted the first few days after surgery.      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  We will check her intact parathyroid  hormone level as well as ionized calcium and CMP today and phone review those results.  In the meantime, she will continue the calcium carbonate supplementation as directed pending phone review of her labs.  Copy of the benign pathology report was discussed with the patient and her daughter and she will otherwise follow up p.r.n.    The incision site is clean, dry and intact, healing appropriately without signs of infection, hematoma or seroma.  The Steri-Strip was removed and she has no hoarseness or changes in her voice and will follow up p.r.n. pending phone review of the labs ordered today.    Addendum:  I called the patient and left a voice mail on the daughter's phone.  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      Repeat the labs again in one month (ionized calcium, CMP, and iPTH levels) as iPTh should normalize once Ca level is corrected from the hungry bone syndrome.  If she calls again with signs or symptoms of hypocalcemia, then have her double up on the calcium supplementation until labs are repeated.  Should be surgically corrected of her PHPT given drop of iPTH down to 7 on POD 1 following unilateral only left neck exploration.    If you have questions, please do not hesitate to call me. I look forward to following Mikaela along with you.    Sincerely,    Mahin Treadwell MD   Medical Director, [facility]  Staff Attending Surgeon - Department of Surgery  [facility]  Associate Professor of Surgery  [employer]  [employer]    RLC/hcr    CC  MD Diana Jensen MD

## 2018-07-05 NOTE — PLAN OF CARE
Outpatient Therapy Discharge Summary     Name: Mikaela Ghosh  [employer] Number: [id number]    Therapy Diagnosis:   Encounter Diagnosis   Name Primary?   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 Patient will participate in completion of cognitive-communicative assessment. Specific goals will be established at next session. Goal met    Long Term Goals (6 weeks):   1. Patient will use appropriate memory strategies to schedule and recall weekly activities, express needs and recall names to maintain safety and participate socially in functional living environment. Goal met    Discharge reason: Patient has met all of her goals and cognitive-communicative skills were at baseline per patient and family report.     Plan   This patient is discharged from Speech Therapy    JULIUS Sierra, CCC-SLP, IS  Speech-Language Pathologist  7/5/2018

## 2018-07-05 NOTE — PROGRESS NOTES
"Outpatient Neurological Rehabilitation   Speech and Language Therapy Daily Note  Date:  7/5/2018     Start Time:  1350  Stop Time:  1420    Name: Mikaela Ghosh   MRN: [id number]   Therapy Diagnosis:   Encounter Diagnosis   Name Primary?    Cognitive deficit due to recent cerebrovascular accident (CVA)    Physician: Felipe Mustafa MD  Physician Orders: ST evaluate and treat  Medical Diagnosis: CVA    Visit #:  2  Visits Authorized: 20  Visits Cancelled: 0  Visits No Showed:0  Date of Evaluation:  6/25/18  Insurance Authorization Period: 5/31/18 to 12/31/18  Plan of Care Expiration:   6/25/18 to 8/10/18  Extended POC:  n/a  G-CODE   2 /10    Precautions: Standard  History:   Current Medical History: Mikaela Ghosh  presents to the [facility] with minimally impaired cognitive-communicative skills secondary to the medical diagnosis of CVA.  Subjective:   Pt reports: her right foot is swollen since Tuesday.  She also reported that "the doctor said all the fluid from my head is gone." She and her daughter reported that her cognitive-linguistic skills are back to baseline.   Response to previous treatment: n/a   Pain Scale:  8/10 on VAS currently. "sore"  Pain Location: right foot  Objective:   TIMED  Procedure Min.   Cognitive Communication Therapy    30         UNTIMED  Procedure Min.                  Total Minutes: 30  Total Timed Units: 2  Total Untimed Units: 0  Charges Billed/# of units: 2    Short Term Goals: (2 weeks) Current Progress:   1. Patient will participate in completion of cognitive-communicative assessment. Specific goals will be established at next session. Goal Met See chart below     Cognitive Linguistic Quick Test (CLQT) was administered to quickly assess the patient's overall cognitive-linguistic function and to determine cognitive strengths and weaknesses. Testing could not be completed due to time constraints.                                          "             Cognitive Domain Score          Severity Rating                       Attention                                              118 / 215         mild  Memory                                               127 / 185           mild  Executive Functions                            12 / 40             moderate  Language                                            27 / 37               mild  Visuospatial Skills                               54 / 105           mild  Clock Drawing                                     8 / 13                 moderate     Composite Severity Rating             2.8 / 4.0            mild    Description: The patient had difficulty retelling a story and answering questions about the story. Delayed processing was noted and directions needed to be rephrased and/or repeated. She had difficulty with alternating shapes and sizes which are indicative of planning and attention deficits. Thought organization for generative naming for concrete (8) and abstract (3) items was below norm (15 to 20 within one minute). Patient was able to trace a path through a simple maze but had difficulty with a complex maze and had difficulty generating different designs connecting 4 dots with 4 lines indicating deficits in planning and mental flexibility skills.  However overall cognitive-communicative skills were reported to be within functional limits for her everyday activities per her daughter's report.     Long Term Goals (6 weeks):   1. Patient will use appropriate memory strategies to schedule and recall weekly activities, express needs and recall names to maintain safety and participate socially in functional living environment    Patient Education/Response:   Patient and her daughter reported cognitive-linguistic skills were at baseline. Discussed d/c from speech therapy. They verbalized understanding and agreed with the discharge.   Home Program: n/a  Assessment:   Mikaela Ghosh has reached baseline in  terms of cognitive-communicative skills per patient and family report. Speech therapy is no longer warranted.  Pt prognosis is Good.    Medical necessity is demonstrated by the following IMPAIRMENTS:  None at this time. Patient and family report cognitive-communicative skills were at baseline.     Barriers to Therapy: none  Pt's spiritual, cultural and educational needs considered and pt agreeable to plan of care and goals.    Functional Communication Measure (FCM):   Severity Modifier for Medicare G-Code:  Functional Communication Measure (FCM):   Severity Modifier for Medicare G-Code:  Memory  Current status: FCM:  LEVEL 6:  - CI at least 1% but less than 20% impaired, limited or restricted  Projected status:  FCM:  LEVEL 7:  -  CH 0% impaired, limited or restricted   Discharge status:  FCM:  LEVEL 7:   -  CH 0% impaired, limited or restricted     Plan:   Discharge from speech therapy.    TANIA Sierra., CCC-SLP, CBIS  Speech-Language Pathologist  7/5/2018

## 2018-07-05 NOTE — PROGRESS NOTES
Mikaela Ghosh is a very pleasant 85-year-old -American female who   presents with her daughter approximately one week status post a left lower   inferior parathyroidectomy for parathyroid gland adenoma on 06/27/2018.  Final   pathology revealed a benign hypercellular parathyroid adenoma at the left lower   inferior position, it weighed 2.19 g.    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  The   intact parathyroid hormone level on postop day #1 was down to 7.  She is   currently on calcium carbonate with Os-Crispin tablets 500 mg for which she and her   daughter tell me she is taking two tablets in the morning and two at night.  She   also complains of some swelling in her lower extremities and some left shoulder   and neck pain, all of which are improving since surgery.  She denies any   numbness or tingling of her lips or circumoral region or of the hands and feet.    She denies any recent trouble holding on to a cup or a glass in the last couple   of days and this was only noted the first few days after surgery.  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  We will check her intact parathyroid   hormone level as well as ionized calcium and CMP today and phone review those   results.  In the meantime, she will continue the calcium carbonate   supplementation as directed pending phone review of her labs.  Copy of the   benign pathology report was discussed with the patient and her daughter and she   will otherwise follow up p.r.n.    The incision site is clean, dry and intact, healing  appropriately without signs   of infection, hematoma or seroma.  The Steri-Strip was removed and she has no   hoarseness or changes in her voice and will follow up p.r.n. pending phone   review of the labs ordered today.      ROSIO/SID  dd: 07/05/2018 18:04:43 (CDT)  td: 07/06/2018 03:08:15 (CDT)  Doc ID   #[id number]  Job ID #[id number]    CC:     Job # [id number].    Addendum:  June, I called the patient and left a voice mail on the daughter's phone.  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  Please repeat the labs again in one month (ionized calcium, CMP, and iPTH levels)as iPTh should normalize once Ca level is corrected from the hungry bone syndrome.  If she calls again with signs or symptoms of hypocalcemia, then have her double up on the calcium supplementation until labs are repeated.  Should be surgically corrected of her PHPT given drop of iPTH down to 7 on POD 1 following unilateral only left neck exploration.  Thanks, SAIRA Treadwell

## 2018-07-09 DIAGNOSIS — Z90.89 STATUS POST PARATHYROIDECTOMY: Primary | ICD-10-CM

## 2018-07-09 DIAGNOSIS — Z98.890 STATUS POST PARATHYROIDECTOMY: Primary | ICD-10-CM

## 2018-07-18 ENCOUNTER — OFFICE VISIT (OUTPATIENT)
Dept: FAMILY MEDICINE | Facility: CLINIC | Age: 83
End: 2018-07-18
Payer: MEDICARE

## 2018-07-18 ENCOUNTER — TELEPHONE (OUTPATIENT)
Dept: SURGERY | Facility: CLINIC | Age: 83
End: 2018-07-18

## 2018-07-18 VITALS
TEMPERATURE: 98 F | HEIGHT: 63 IN | DIASTOLIC BLOOD PRESSURE: 88 MMHG | OXYGEN SATURATION: 98 % | SYSTOLIC BLOOD PRESSURE: 148 MMHG | BODY MASS INDEX: 25.25 KG/M2 | HEART RATE: 73 BPM | WEIGHT: 142.5 LBS

## 2018-07-18 DIAGNOSIS — M71.22 BAKER CYST, LEFT: Primary | ICD-10-CM

## 2018-07-18 DIAGNOSIS — M19.90 OSTEOARTHRITIS, UNSPECIFIED OSTEOARTHRITIS TYPE, UNSPECIFIED SITE: ICD-10-CM

## 2018-07-18 DIAGNOSIS — I87.2 VENOUS INSUFFICIENCY: ICD-10-CM

## 2018-07-18 PROCEDURE — 99213 OFFICE O/P EST LOW 20 MIN: CPT | Mod: PBBFAC,PO | Performed by: FAMILY MEDICINE

## 2018-07-18 PROCEDURE — 99999 PR PBB SHADOW E&M-EST. PATIENT-LVL III: CPT | Mod: PBBFAC,,, | Performed by: FAMILY MEDICINE

## 2018-07-18 PROCEDURE — 99214 OFFICE O/P EST MOD 30 MIN: CPT | Mod: S$PBB,,, | Performed by: FAMILY MEDICINE

## 2018-07-18 RX ORDER — DICLOFENAC SODIUM 10 MG/G
2 GEL TOPICAL 4 TIMES DAILY
Qty: 100 G | Refills: 0 | Status: SHIPPED | OUTPATIENT
Start: 2018-07-18 | End: 2018-07-28

## 2018-07-18 NOTE — TELEPHONE ENCOUNTER
----- Message from Katherine Seymour sent at 7/18/2018 12:27 PM CDT -----  Contact: Judie Donald (Daughter)  Judie Donald states she has left several messages regarding disability paperwork for her mother, she hasnt received a return call and is very upset, she is asking to please be contacted today    Contact number [phone]  Thanks

## 2018-07-18 NOTE — TELEPHONE ENCOUNTER
Returned call, she is asking for an extension for her [place], I will instruct a  Letter, she will call back with fax number

## 2018-07-18 NOTE — PROGRESS NOTES
[facility]  Progress Note    SUBJECTIVE:     Chief Complaint   Patient presents with    Leg Pain       HPI   Mikaela Ghosh  is a 85 y.o. female here for c/o left lower leg pain. This has been going on for past couple weeks. Certain positions make it worst. She also has concerns about lower leg swelling.     Review of patient's allergies indicates:  No Known Allergies    Past Medical History:   Diagnosis Date    Hypertension     Thyroid disease      Past Surgical History:   Procedure Laterality Date    BRAIN SURGERY      EYE SURGERY      cataract    HYSTERECTOMY      PARATHYROIDECTOMY Left 6/27/2018    Procedure: PARATHYROIDECTOMY - left lower inferior gland;  Surgeon: Mahin Treadwell MD;  Location: [facility] OR [address] [place] [address];  Service: General;  Laterality: Left;     Family History   Problem Relation Age of Onset    Diabetes Sister     Hypertension Sister      Social History   Substance Use Topics    Smoking status: Never Smoker    Smokeless tobacco: Never Used    Alcohol use No        Review of Systems   Constitutional: Negative for chills, fever and malaise/fatigue.   HENT: Negative.    Respiratory: Negative.  Negative for cough and shortness of breath.    Cardiovascular: Positive for leg swelling (lower leg). Negative for chest pain.   Gastrointestinal: Negative.  Negative for abdominal pain, nausea and vomiting.   Genitourinary: Negative.    Musculoskeletal: Positive for joint pain (L knee).   Neurological: Negative for weakness and headaches.   All other systems reviewed and are negative.    OBJECTIVE:     Vitals:    07/18/18 1323   BP: (!) 148/88   Pulse: 73   Temp: 97.8 °F (36.6 °C)     Body mass index is 25.25 kg/m².    Physical Exam   Constitutional: She is oriented to person, place, and time and well-developed, well-nourished, and in no distress. No distress.   HENT:   Head: Normocephalic and atraumatic.   Nose: Nose normal.   Eyes: Conjunctivae and EOM are normal.   Cardiovascular: Normal  rate, regular rhythm and normal heart sounds.  Exam reveals no gallop and no friction rub.    No murmur heard.  Pulmonary/Chest: Effort normal and breath sounds normal. No respiratory distress. She has no wheezes. She has no rales. She exhibits no tenderness.   Abdominal: Soft. Bowel sounds are normal. She exhibits no distension. There is no tenderness. There is no rebound.   Musculoskeletal: She exhibits edema (pitting edema b/l lower extremities). She exhibits no tenderness (no true tenderness, baker cyst behind left knee, size about 3 cm. no skin changes.).   Neurological: She is alert and oriented to person, place, and time.   Skin: Skin is warm. She is not diaphoretic.       Old records were reviewed. Labs and/or images were independently reviewed.    ASSESSMENT     1. Baker cyst, left    2. Venous insufficiency    3. Osteoarthritis, unspecified osteoarthritis type, unspecified site        PLAN:     Baker cyst, left   -       discussed conservative vs interventional treatment options. She will watch it for now, and get a knee sleeve.     Venous insufficiency  -     COMPRESSION STOCKINGS    Osteoarthritis, unspecified osteoarthritis type, unspecified site  -     diclofenac sodium (VOLTAREN) 1 % Gel; Apply 2 g topically 4 (four) times daily. for 10 days  Dispense: 100 g; Refill: 0  -     Doesn't want to try PO NSAIDs at this time.       RTC PRN    Felipe Mustafa MD  07/18/2018 2:07 PM

## 2018-07-24 ENCOUNTER — TELEPHONE (OUTPATIENT)
Dept: SURGERY | Facility: CLINIC | Age: 83
End: 2018-07-24

## 2018-08-06 ENCOUNTER — LAB VISIT (OUTPATIENT)
Dept: LAB | Facility: HOSPITAL | Age: 83
End: 2018-08-06
Attending: SURGERY
Payer: MEDICARE

## 2018-08-06 DIAGNOSIS — Z98.890 STATUS POST PARATHYROIDECTOMY: ICD-10-CM

## 2018-08-06 DIAGNOSIS — Z90.89 STATUS POST PARATHYROIDECTOMY: ICD-10-CM

## 2018-08-06 LAB
ALBUMIN SERPL BCP-MCNC: 3.3 G/DL
ALP SERPL-CCNC: 83 U/L
ALT SERPL W/O P-5'-P-CCNC: 6 U/L
ANION GAP SERPL CALC-SCNC: 11 MMOL/L
AST SERPL-CCNC: 10 U/L
BILIRUB SERPL-MCNC: 0.8 MG/DL
BUN SERPL-MCNC: 11 MG/DL
CA-I BLDV-SCNC: 1.15 MMOL/L
CALCIUM SERPL-MCNC: 9.5 MG/DL
CHLORIDE SERPL-SCNC: 106 MMOL/L
CO2 SERPL-SCNC: 23 MMOL/L
CREAT SERPL-MCNC: 0.9 MG/DL
EST. GFR  (AFRICAN AMERICAN): >60 ML/MIN/1.73 M^2
EST. GFR  (NON AFRICAN AMERICAN): 58.5 ML/MIN/1.73 M^2
GLUCOSE SERPL-MCNC: 119 MG/DL
POTASSIUM SERPL-SCNC: 4.2 MMOL/L
PROT SERPL-MCNC: 7.4 G/DL
PTH-INTACT SERPL-MCNC: 52 PG/ML
SODIUM SERPL-SCNC: 140 MMOL/L

## 2018-08-06 PROCEDURE — 82330 ASSAY OF CALCIUM: CPT

## 2018-08-06 PROCEDURE — 36415 COLL VENOUS BLD VENIPUNCTURE: CPT

## 2018-08-06 PROCEDURE — 83970 ASSAY OF PARATHORMONE: CPT

## 2018-08-06 PROCEDURE — 80053 COMPREHEN METABOLIC PANEL: CPT

## 2018-08-29 ENCOUNTER — TELEPHONE (OUTPATIENT)
Dept: FAMILY MEDICINE | Facility: CLINIC | Age: 83
End: 2018-08-29

## 2018-08-29 NOTE — TELEPHONE ENCOUNTER
Pt's daughter is calling regarding her mother possibly having to go back to work in the near future after she is completely healed from her surgeries. The insurance company wants additional information stating her ability to return to work. Pt is on short term disability which is getting ready to run out and will need long term disability papers filled out. Pt's daughter will fax us the paper work.

## 2018-08-29 NOTE — TELEPHONE ENCOUNTER
----- Message from Schuyler Webb sent at 8/29/2018  1:57 PM CDT -----  Contact: Judie/Daughter/[phone]  Judie would like to speak to the staff about paperwork (returning back to work). Thank you.

## 2018-10-15 ENCOUNTER — OFFICE VISIT (OUTPATIENT)
Dept: FAMILY MEDICINE | Facility: CLINIC | Age: 83
End: 2018-10-15
Payer: MEDICARE

## 2018-10-15 VITALS
OXYGEN SATURATION: 97 % | SYSTOLIC BLOOD PRESSURE: 140 MMHG | DIASTOLIC BLOOD PRESSURE: 62 MMHG | BODY MASS INDEX: 26.49 KG/M2 | HEART RATE: 79 BPM | TEMPERATURE: 98 F | WEIGHT: 143.94 LBS | HEIGHT: 62 IN

## 2018-10-15 DIAGNOSIS — M19.90 OSTEOARTHRITIS, UNSPECIFIED OSTEOARTHRITIS TYPE, UNSPECIFIED SITE: ICD-10-CM

## 2018-10-15 DIAGNOSIS — I10 ESSENTIAL HYPERTENSION, BENIGN: Primary | ICD-10-CM

## 2018-10-15 PROCEDURE — 99999 PR PBB SHADOW E&M-EST. PATIENT-LVL III: CPT | Mod: PBBFAC,,, | Performed by: FAMILY MEDICINE

## 2018-10-15 PROCEDURE — 99214 OFFICE O/P EST MOD 30 MIN: CPT | Mod: S$PBB,,, | Performed by: FAMILY MEDICINE

## 2018-10-15 PROCEDURE — 99213 OFFICE O/P EST LOW 20 MIN: CPT | Mod: PBBFAC,PO | Performed by: FAMILY MEDICINE

## 2018-10-15 NOTE — PROGRESS NOTES
[facility]  Progress Note    SUBJECTIVE:     Chief Complaint   Patient presents with    Hypertension       HPI   Mikaela Ghosh  is a 85 y.o. female here for follow up of her chronic conditions. Takes meds as directed. She c/o left shoulder pain and left knee pain which has been going on for a while. Certain positions make it worst. Takes tylenol which somewhat helps. No falls/trauma.     Review of patient's allergies indicates:  No Known Allergies    Past Medical History:   Diagnosis Date    Hypertension     Thyroid disease      Past Surgical History:   Procedure Laterality Date    BRAIN SURGERY      CRANIOTOMY OPEN FOR SUB-DURAL HEMATOMA left, with stealth. Left 4/20/2018    Performed by Wojciech Sandoval MD at [facility] OR [address] [place] [address]    EYE SURGERY      cataract    HYSTERECTOMY      PARATHYROIDECTOMY Left 6/27/2018    Procedure: PARATHYROIDECTOMY - left lower inferior gland;  Surgeon: Mahin Treadwell MD;  Location: [facility] OR [address] [place] [address];  Service: General;  Laterality: Left;    PARATHYROIDECTOMY - left lower inferior gland Left 6/27/2018    Performed by Mahin Treadwell MD at [facility] OR [address] [place] [address]     Family History   Problem Relation Age of Onset    Diabetes Sister     Hypertension Sister      Social History     Tobacco Use    Smoking status: Never Smoker    Smokeless tobacco: Never Used   Substance Use Topics    Alcohol use: No    Drug use: No        Review of Systems   Constitutional: Negative for chills, fever and malaise/fatigue.   HENT: Negative.    Respiratory: Negative.  Negative for cough and shortness of breath.    Cardiovascular: Negative.  Negative for chest pain.   Gastrointestinal: Negative.  Negative for abdominal pain, nausea and vomiting.   Genitourinary: Negative.    Musculoskeletal: Positive for joint pain (L shoulder, L knee).   Neurological: Negative for weakness and headaches.   All other systems reviewed and are negative.    OBJECTIVE:     Vitals:    10/15/18 1513   BP: (!) 140/62    Pulse: 79   Temp: 98.3 °F (36.8 °C)     Body mass index is 26.33 kg/m².    Physical Exam   Constitutional: She is oriented to person, place, and time and well-developed, well-nourished, and in no distress. No distress.   HENT:   Head: Normocephalic and atraumatic.   Eyes: Conjunctivae and EOM are normal.   Pulmonary/Chest: Effort normal.   Musculoskeletal: She exhibits tenderness (mild point tenderness to left biceps tendon of left shoulder. decreased abduction due to pain, > 90 degrees. no obvious fractures, dislocations. good hand strength. no tenderness to subacromial bursa.).   + decreased joint space of left knee compartment. ACL/PCL intact. Negative mcmurrays.    Neurological: She is alert and oriented to person, place, and time.   Skin: She is not diaphoretic.       Old records were reviewed. Labs and/or images were independently reviewed.    ASSESSMENT     1. Essential hypertension, benign    2. Osteoarthritis, unspecified osteoarthritis type, unspecified site        PLAN:     Essential hypertension, benign   -     Educated patient to take medications as prescribed, and to record bp logs daily to bring along to next visit. Instructed patient to decrease salt intake. Also told patient to call if any new signs or symptoms develop.    Osteoarthritis, unspecified osteoarthritis type, unspecified site   -      Recommend ICE, tylenol for now. Offered shoulder/knee injection but would like to hold off on it for now.     RTC PRN  More than 50% of the encounter was spent counseling patient about hypertension/osteoarthritis, in an outpatient setting. Total time spent counseling patient: 25.    Felipe Mustafa MD  10/15/2018 3:34 PM

## 2018-10-23 ENCOUNTER — TELEPHONE (OUTPATIENT)
Dept: FAMILY MEDICINE | Facility: CLINIC | Age: 83
End: 2018-10-23

## 2018-10-23 NOTE — TELEPHONE ENCOUNTER
----- Message from Daysi Tran sent at 10/23/2018  1:48 PM CDT -----  Contact: Judie-daughter  Patient daughter called to check the status of long term disability that was given to complete by the Dr. Please call to discuss at [phone]

## 2018-10-23 NOTE — TELEPHONE ENCOUNTER
Spoke with pts calling to follow up on paperwork. Informed that it has been received and will be faxed once completed.

## 2018-10-26 ENCOUNTER — TELEPHONE (OUTPATIENT)
Dept: FAMILY MEDICINE | Facility: CLINIC | Age: 83
End: 2018-10-26

## 2018-10-26 PROBLEM — Z86.79 HISTORY OF SUBDURAL HEMATOMA: Status: ACTIVE | Noted: 2018-10-26

## 2019-01-25 ENCOUNTER — TELEPHONE (OUTPATIENT)
Dept: FAMILY MEDICINE | Facility: CLINIC | Age: 84
End: 2019-01-25

## 2019-01-25 NOTE — TELEPHONE ENCOUNTER
----- Message from Shreya Negrete sent at 1/24/2019  4:24 PM CST -----  Contact: daughter  Pts daughter is calling to speak with staff regarding pt. Please call at [phone]

## 2019-02-07 ENCOUNTER — OFFICE VISIT (OUTPATIENT)
Dept: FAMILY MEDICINE | Facility: CLINIC | Age: 84
End: 2019-02-07
Payer: MEDICARE

## 2019-02-07 VITALS
HEIGHT: 62 IN | HEART RATE: 73 BPM | OXYGEN SATURATION: 98 % | DIASTOLIC BLOOD PRESSURE: 82 MMHG | TEMPERATURE: 98 F | BODY MASS INDEX: 27.77 KG/M2 | WEIGHT: 150.88 LBS | SYSTOLIC BLOOD PRESSURE: 138 MMHG

## 2019-02-07 DIAGNOSIS — Z98.890 STATUS POST PARATHYROIDECTOMY: ICD-10-CM

## 2019-02-07 DIAGNOSIS — S06.5XAA SDH (SUBDURAL HEMATOMA): ICD-10-CM

## 2019-02-07 DIAGNOSIS — I27.20 PULMONARY HYPERTENSION: ICD-10-CM

## 2019-02-07 DIAGNOSIS — I10 ESSENTIAL HYPERTENSION, BENIGN: Primary | ICD-10-CM

## 2019-02-07 DIAGNOSIS — Z90.89 STATUS POST PARATHYROIDECTOMY: ICD-10-CM

## 2019-02-07 DIAGNOSIS — M19.90 OSTEOARTHRITIS, UNSPECIFIED OSTEOARTHRITIS TYPE, UNSPECIFIED SITE: ICD-10-CM

## 2019-02-07 PROBLEM — E83.52 HYPERCALCEMIA: Status: RESOLVED | Noted: 2018-04-27 | Resolved: 2019-02-07

## 2019-02-07 PROBLEM — G93.5 BRAIN COMPRESSION: Status: RESOLVED | Noted: 2018-04-23 | Resolved: 2019-02-07

## 2019-02-07 PROCEDURE — 99214 OFFICE O/P EST MOD 30 MIN: CPT | Mod: S$PBB,,, | Performed by: FAMILY MEDICINE

## 2019-02-07 PROCEDURE — 99999 PR PBB SHADOW E&M-EST. PATIENT-LVL III: CPT | Mod: PBBFAC,,, | Performed by: FAMILY MEDICINE

## 2019-02-07 PROCEDURE — 99214 PR OFFICE/OUTPT VISIT, EST, LEVL IV, 30-39 MIN: ICD-10-PCS | Mod: S$PBB,,, | Performed by: FAMILY MEDICINE

## 2019-02-07 PROCEDURE — 99213 OFFICE O/P EST LOW 20 MIN: CPT | Mod: PBBFAC,PO | Performed by: FAMILY MEDICINE

## 2019-02-07 PROCEDURE — 99999 PR PBB SHADOW E&M-EST. PATIENT-LVL III: ICD-10-PCS | Mod: PBBFAC,,, | Performed by: FAMILY MEDICINE

## 2019-02-07 RX ORDER — AMLODIPINE BESYLATE 2.5 MG/1
2.5 TABLET ORAL DAILY
Qty: 90 TABLET | Refills: 3 | Status: SHIPPED | OUTPATIENT
Start: 2019-02-07 | End: 2022-03-02 | Stop reason: SDUPTHER

## 2019-02-07 NOTE — PROGRESS NOTES
[facility]  Progress Note    SUBJECTIVE:     Chief Complaint   Patient presents with    Shoulder Pain    Back Pain       HPI   Mikaela Ghosh  is a 85 y.o. female here for c/o occasional on/off shoulder/back pain. Has been trying to exercise more. Rates pain as mild, but no current pain now. No falls/trauma. Takes tylenol PRN which helps. Patient has no other new complaints/problems at this time.      Review of patient's allergies indicates:  No Known Allergies    Past Medical History:   Diagnosis Date    Hypertension     Thyroid disease      Past Surgical History:   Procedure Laterality Date    BRAIN SURGERY      CRANIOTOMY OPEN FOR SUB-DURAL HEMATOMA left, with stealth. Left 4/20/2018    Performed by Wojciech Sandoval MD at [facility] OR [address] [place] [address]    EYE SURGERY      cataract    HYSTERECTOMY      PARATHYROIDECTOMY - left lower inferior gland Left 6/27/2018    Performed by Mahin Treadwell MD at [facility] OR [address] [place] [address]     Family History   Problem Relation Age of Onset    Diabetes Sister     Hypertension Sister      Social History     Tobacco Use    Smoking status: Never Smoker    Smokeless tobacco: Never Used   Substance Use Topics    Alcohol use: No    Drug use: No        Review of Systems   Constitutional: Negative for chills, fever and malaise/fatigue.   HENT: Negative.    Respiratory: Negative.  Negative for cough and shortness of breath.    Cardiovascular: Negative.  Negative for chest pain.   Gastrointestinal: Negative.  Negative for abdominal pain, nausea and vomiting.   Genitourinary: Negative.    Musculoskeletal: Positive for back pain and joint pain.   Neurological: Negative for weakness and headaches.   All other systems reviewed and are negative.    OBJECTIVE:     Vitals:    02/07/19 1428   BP: 138/82   Pulse: 73   Temp: 97.7 °F (36.5 °C)     Body mass index is 27.6 kg/m².    Physical Exam   Constitutional: She is oriented to person, place, and time and well-developed, well-nourished, and in no  distress. No distress.   HENT:   Head: Normocephalic and atraumatic.   Nose: Nose normal.   Eyes: Conjunctivae and EOM are normal.   Cardiovascular: Normal rate, regular rhythm and normal heart sounds. Exam reveals no gallop and no friction rub.   No murmur heard.  Pulmonary/Chest: Effort normal and breath sounds normal. No respiratory distress. She has no wheezes. She has no rales. She exhibits no tenderness.   Abdominal: Soft. Bowel sounds are normal. She exhibits no distension. There is no tenderness. There is no rebound.   Musculoskeletal: She exhibits no tenderness (good ROM of both shoulders. no point tendernes today. no obvious fractures, dislocations.).   Neurological: She is alert and oriented to person, place, and time.   Good strength in both upper/lower extremities, 4/5 motor.    Skin: Skin is warm. She is not diaphoretic.       Old records were reviewed. Labs and/or images were independently reviewed.    ASSESSMENT     1. Essential hypertension, benign    2. Status post parathyroidectomy    3. SDH (subdural hematoma)    4. Pulmonary hypertension    5. Osteoarthritis, unspecified osteoarthritis type, unspecified site        PLAN:     Essential hypertension, benign  -     amLODIPine (NORVASC) 2.5 MG tablet; Take 1 tablet (2.5 mg total) by mouth once daily.  Dispense: 90 tablet; Refill: 3  -     Educated patient to take medications as prescribed, and to record bp logs daily to bring along to next visit. Instructed patient to decrease salt intake. Also told patient to call if any new signs or symptoms develop.    Osteoarthritis   -      Recommend tylenol PRN.     Status post parathyroidectomy   -     Stable. Continue current regimen.    SDH (subdural hematoma)   -    No current neurological deficits.    Pulmonary hypertension   -     Stable. Continue current regimen.      RTC PRN    Felipe Mustafa MD  02/07/2019 3:25 PM

## 2019-02-15 ENCOUNTER — TELEPHONE (OUTPATIENT)
Dept: FAMILY MEDICINE | Facility: CLINIC | Age: 84
End: 2019-02-15

## 2019-02-15 NOTE — TELEPHONE ENCOUNTER
Attempted to contact, no answer VM not set up.   Pt needs to contact billing for further assistance.

## 2019-02-15 NOTE — TELEPHONE ENCOUNTER
----- Message from Bing Mosqueda sent at 2/15/2019  3:16 PM CST -----  Contact: daughter Judie [phone]  Daughter is requesting to speak to you regarding a bill and code. Pls call Daughter Judie [phone]. Thanks..............Tiffany

## 2019-03-21 ENCOUNTER — TELEPHONE (OUTPATIENT)
Dept: FAMILY MEDICINE | Facility: CLINIC | Age: 84
End: 2019-03-21

## 2019-03-21 NOTE — TELEPHONE ENCOUNTER
----- Message from Jaye Malhotra sent at 3/20/2019  1:11 PM CDT -----  Contact: Judie Donald- Daughter   Patient's daughter says she need to speak with he nurse regarding the patient's insurance. Please call at [phone] work number.

## 2019-03-22 ENCOUNTER — TELEPHONE (OUTPATIENT)
Dept: FAMILY MEDICINE | Facility: CLINIC | Age: 84
End: 2019-03-22

## 2019-03-22 NOTE — TELEPHONE ENCOUNTER
----- Message from Jayy Hayes sent at 3/21/2019  3:42 PM CDT -----  Contact: Judie pierce/[phone]  The patient daughter returned the staff call.            Thank you

## 2019-03-22 NOTE — TELEPHONE ENCOUNTER
Daughter [facility] insurance company [facility] box checked off on form that patient was able to return to work but needs to states patient was not able to return to work and reason why to support. hospitals provider told her to call with information. Please advise.

## 2019-03-23 NOTE — TELEPHONE ENCOUNTER
Felipe Mustafa MD        3/22/19 10:05 AM   Please return forms or refax to us for correction.    Left message to voicemail  [phone] to return call to clinic re: above

## 2019-03-23 NOTE — TELEPHONE ENCOUNTER
----- Message from Jaye Malhotra sent at 3/22/2019  3:38 PM CDT -----  Contact: Daughter - Judie  Patient's daughter returned your call. Please call again at [phone]

## 2019-03-25 NOTE — TELEPHONE ENCOUNTER
----- Message from Priti Knapp sent at 3/25/2019  2:30 PM CDT -----  Contact: Daughter Judie [phone]  Patients daughter called returning Tena's phone call per voicemail.    Thank you

## 2019-03-25 NOTE — TELEPHONE ENCOUNTER
Spoke with daughter of patient and informed her that provider is requesting to bring form back are have it refax back for correction. She said she will have the insurance company fax form back.

## 2019-03-29 ENCOUNTER — TELEPHONE (OUTPATIENT)
Dept: FAMILY MEDICINE | Facility: CLINIC | Age: 84
End: 2019-03-29

## 2019-03-29 ENCOUNTER — TELEPHONE (OUTPATIENT)
Dept: SURGERY | Facility: CLINIC | Age: 84
End: 2019-03-29

## 2019-03-29 NOTE — TELEPHONE ENCOUNTER
Spoke with pt daughter , message forwarded to  with detailed explanation of pt long term disability request. Informed pt daughter once I get a response from  I will give her a call. Pt daughter verbalized understanding.

## 2019-03-29 NOTE — TELEPHONE ENCOUNTER
----- Message from Jayy Hayes sent at 3/29/2019  1:48 PM CDT -----  Contact: Judie daughter/[phone]  The patients daughter would like to speak to the staff regarding the patients insurance.            Thank you

## 2019-04-03 ENCOUNTER — TELEPHONE (OUTPATIENT)
Dept: FAMILY MEDICINE | Facility: CLINIC | Age: 84
End: 2019-04-03

## 2019-04-03 NOTE — TELEPHONE ENCOUNTER
----- Message from Mitra Scott MA sent at 3/29/2019  3:01 PM CDT -----  Contact: Judie daughter/[phone]  Spoke with pt daughter , pt states her mom is on long term disability and was out of work due to a brain hemorrhage and arthritis. Pt wants to return to work in 2020 but her insurance needs a reason to continue the long term disability until next year. Pt daughter requesting a letter on a letter head to submit to pt's insurance.     Thanks.     ----- Message -----  From: Jayy Hayes  Sent: 3/29/2019   1:48 PM  To: [facility] Staff    The patients daughter would like to speak to the staff regarding the patients insurance.            Thank you

## 2019-05-01 ENCOUNTER — TELEPHONE (OUTPATIENT)
Dept: FAMILY MEDICINE | Facility: CLINIC | Age: 84
End: 2019-05-01

## 2019-05-01 NOTE — TELEPHONE ENCOUNTER
----- Message from Marian Morel sent at 5/1/2019 12:25 PM CDT -----  Contact: Judie/ Daughter/  [phone]  Type: Patient Call Back    Who called:  Judie    What is the request in detail:  Patients daughter returned staffs call and would like a call back.  Thank you      Would the patient rather a call back or a response via [facility]?  Call    Best call back number:  [phone]

## 2019-05-01 NOTE — TELEPHONE ENCOUNTER
Spoke with daughter of pt says she brought (insurance paperwork) papers Monday 4/28/2019 for Dr Mustafa to fill out and needs them back by Thursday 5/2/2019.    Please advise    Thank you,  Jameel

## 2019-05-02 ENCOUNTER — TELEPHONE (OUTPATIENT)
Dept: FAMILY MEDICINE | Facility: CLINIC | Age: 84
End: 2019-05-02

## 2019-05-02 NOTE — TELEPHONE ENCOUNTER
Called pt regarding her paperwork being ready for  . If pt does show up to pick , information is missing on paper

## 2019-05-03 ENCOUNTER — TELEPHONE (OUTPATIENT)
Dept: FAMILY MEDICINE | Facility: CLINIC | Age: 84
End: 2019-05-03

## 2019-05-03 NOTE — TELEPHONE ENCOUNTER
Contacted pt daughter to inform her the pt paperwork is ready for . LVM for pt daughter to callback . Also tried and was able to contact pt to inform daughter paperwork is ready and will be left at .

## 2019-07-25 ENCOUNTER — TELEPHONE (OUTPATIENT)
Dept: FAMILY MEDICINE | Facility: CLINIC | Age: 84
End: 2019-07-25

## 2019-07-25 DIAGNOSIS — Z02.4 ENCOUNTER FOR EXAMINATION FOR DRIVING LICENSE: Primary | ICD-10-CM

## 2019-07-25 NOTE — TELEPHONE ENCOUNTER
Spoke with pts daughter requesting to have pt evaluated for driving. The order should state Occupational Therapy Driving Evaluation and faxed to [phone]. Ms. Dimas states this was previousy discussed with PCP.

## 2019-07-25 NOTE — TELEPHONE ENCOUNTER
----- Message from Corey Mercedes sent at 7/25/2019 12:33 PM CDT -----  Contact: Judie pt's daughter  Name of Who is Calling:Judie      What is the request in detail: Judie called regarding pt's evaluation for DMV paperwork. She states form should list occupational Therapy Driving Evaluation. Fax form to [phone].Contact to further discuss.    Can the clinic reply by [facility]: n    [facility]: [phone]

## 2019-07-26 DIAGNOSIS — Z02.4 ENCOUNTER FOR EXAMINATION FOR DRIVING LICENSE: Primary | ICD-10-CM

## 2019-08-13 ENCOUNTER — CLINICAL SUPPORT (OUTPATIENT)
Dept: REHABILITATION | Facility: HOSPITAL | Age: 84
End: 2019-08-13
Attending: FAMILY MEDICINE
Payer: MEDICARE

## 2019-08-13 DIAGNOSIS — Z91.89 DRIVING SAFETY ISSUE: ICD-10-CM

## 2019-08-13 DIAGNOSIS — Z86.79 HISTORY OF SUBDURAL HEMATOMA: Primary | ICD-10-CM

## 2019-08-13 NOTE — PROGRESS NOTES
Occupational Therapy   Driving Evaluation Clinic Only     Mikaela Ghosh   MRN: [id number]     Referring Physician: Felipe Mustafa MD  Diagnosis: Hx of SDH, driving safety issue   History: Pt is currently a licensed  but has been referred to Occupational Therapy by her MD for clinical and on-road testing to be used for driving evaluation. Ms Ghosh suffered a fall and SDH in April 2018. She has not returned to driving since and is here today with her daughter to see if she has the skills needed for driving.     LADL [id number]   Exp 5/4/2023   Restrictions 01 - corrective lens    Date last drove: April 2018.    SUBJECTIVE:    Pt states that her car is in good shape and she wants to be able to drive again.    OBJECTIVE:   Physical Function Testing:    ROM:  WFLs B UEs    Head / Neck ROM: WNL's   Pt is right hand dominant   Strength: WFL B UEs  Balance:    Static and Dynamic sitting- Normal   Static Standing - Normal     Dynamic standing - Normal   Transfers and Mobility:  Independent as Pt ambulates without a device   Rapid Pace Walk: 9.1 seconds which is slightly slower than the average standard of 8 seconds    Perceptual testing:   Letter cancellation:  30/34, a non- passing score where testing was completed in 2 minute 30 seconds, average time for completion is 1 minute 15 seconds   Line bisection:  WNLs  Maze Test - 88  seconds, no error(s)  (Cut point score is 60 seconds or less with 1 error or less)   Trail Making Test A - 136  seconds   (Average 29 seconds, Deficient > 78 seconds)    Trail Making Test B -  446  seconds  (Average 75 seconds, Deficient > 273 seconds)   Motor Free Visual Perception Test (MVPT),  which assesses figure ground, visual closure and visual memory, 18/36, a non- passing score, where testing was competed in 12 minutes 16 seconds, average time for completion is 7 minutes   Right/Left discrimination: 4/4 , but Pt needed increased time to respond  Auditory discrimination: WFL      Vision testing:     Glasses were worn during testing  Quick acuity and night vision: marginal  Color vision: 4/4,   Pt correctly identified only 4/12 road signs and was 4/4 for depth perception  Both eye acuity: 20/40  Right eye acuity: 20/40  Left eye acuity: 20/40  Phoria which assesses binocular vision was normal.  Horizontal field test and nasal vision: Normal     Visual acuity minimum standards for the [facility] is 20/40 in one eye.    Cognitive testing:   Short term memory:  3/4,    Immediate # recall (Digit span) : 4, a borderline score    Cognitive sequencing of months of year in reverse:  No error, but Pt needed increased time to complete   Long term memory:  WNL     [facility] Mental Status Exam (UMS) which is used to  detect mild neurocognitive disorder and dementia: Pt scored 13/30 which  scores in the range indicating dementia.    Judgment questions regarding rules of the road:  (non-passing)    Insight:  Pt seemed to understand recommendations to stop driving but was upset at end of session.    Communication:   Expressive aphasia:  Not found  Receptive aphasia:  Not found    Reaction time testin/100s or a second (avg of 3 trials) which is better than the average standard of  50/100s of a second, she needed additional instruction on the operation of the pedals. Normally only one simple instruction is needed.     In-car / on-road assessment:  On road testing not performed as Pt's has too many deficits in clinical testing to be able safely operate an automobile.     ASSESSMENT:     Ms Ghosh had poor scores in multiple areas of testing in the clinic as well as needing increased time in all areas of testing. Since driving is an activity that requires quick and accurate decision making, the increased time that she needed for testing is a great concern. The lower scores in perceptual testing, poor road sign recognition, poor memory and multiple other areas show that she  does not have the skills to safely operate a motor vehicle. It is recommended that Ms Ghosh retire from driving. Her daughter, Judie Donald was present for testing, as well as for a post testing conference and was in agreement with the findings. It was recommended that Ms Ghosh have her 's license converted to a State ID card as not to have the temptation of a 's license in her possession. Her daughter stated that Ms Ghosh will have assistance from her family and friends to meet her transportation needs.  Findings were notified to the office of Felipe Mustafa MD.     PLAN:   Discharge patient from outpatient Occupational Therapy services as Pt and MD needs being met with completion and reporting of this evaluation.      ALVA Timmons, S  Occupational Therapist, Certified  Rehabilitation Specialist  8/13/2019

## 2019-10-01 ENCOUNTER — PES CALL (OUTPATIENT)
Dept: ADMINISTRATIVE | Facility: CLINIC | Age: 84
End: 2019-10-01

## 2020-01-06 DIAGNOSIS — I10 ESSENTIAL HYPERTENSION, BENIGN: ICD-10-CM

## 2020-03-03 NOTE — ASSESSMENT & PLAN NOTE
-continue PT/OT to increase ambulation, ADL performance and endurance  Continue to monitor incision and continue bactroban BID to incision  -continue seizure prophylaxis with keppra to 4/29  -continue heparin for DVT prophylaxis  -continue fall precautions  -continue senokot-s to prevent constipation; hold for frequent or loose stooling    
Chronic with improved control  Continue therapy with amlodipine and low Na diet  -will continue to monitor and adjust regimen as necessary    
Evaluated on 4/30/18  · Chronic with improved control  · Continue therapy with amlodipine and low Na diet  · will continue to monitor and adjust regimen as necessary  
Evaluated on 4/30/18  · Mild and Elevated since 5/2017  · Renal function normal but phos is low  · Encourage hydration. If Ca increases may need ivfs, will continue to monitor  · PTH elevated at 197--follow-up with Endocrine  
Evaluated on 4/30/18  · Replete with phos packets  · Monitor with twice weekly labs  
Evaluated on 4/30/18  · Repletes with phos packets  · 2.6 on 5/7/18  · Monitor with twice weekly labs  
Evaluated on 4/30/18  · continue PT/OT to increase ambulation, ADL performance and endurance  · continue to monitor incision and continue bactroban BID to incision  · continue seizure prophylaxis with keppra to 4/29--completed  · continue heparin for DVT prophylaxis  · continue fall precautions  · continue senokot-s to prevent constipation; hold for frequent or loose stooling  
Evaluated on 4/30/18  · resume therapy with artificial tears as she takes at home  
Evaluated on 5/15/18  · Calcium Mild and Elevated since 5/2017  · Renal function normal but phos is low   · Encourage hydration.Patient did requiring IVFs that did not improve Ca++ levels, patient has been asymptomatic  · PTH elevated at 197--follow-up with Endocrine--f/u on 5/10/18  · Patient agreed to start senipar on 5/12, she was hesitant at first to start medication due to possible dizziness with medication. Patient has not become dizzy since starting medication   
Evaluated on 5/15/18  · Chronic with improved control  · Continue therapy with amlodipine and low Na diet  · will continue to monitor and adjust regimen as necessary  
Evaluated on 5/15/18  · continue PT/OT to increase ambulation, ADL performance and endurance  · continue to monitor incision and continue bactroban BID to incision--staples removed on 5/10  · continue seizure prophylaxis with keppra to 4/29--completed  · continue heparin for DVT prophylaxis  · continue fall precautions  · continue senokot-s to prevent constipation; hold for frequent or loose stooling  
Evaluated on 5/15/18  · resume therapy with artificial tears as she takes at home  
Evaluated on 5/8/18  · Chronic with improved control  · Continue therapy with amlodipine and low Na diet  · will continue to monitor and adjust regimen as necessary  
Evaluated on 5/8/18  · Mild and Elevated since 5/2017  · Renal function normal but phos is low 2.6  · Encourage hydration. If Ca increases may need ivfs, will continue to monitor--treated with IVFs without marked improvement, discussed with Dr. Lemos on 5/7/18 hold IVFs patient asymptomatic  · PTH elevated at 197--follow-up with Endocrine--f/u on 5/10/18  
Evaluated on 5/8/18  · Mild and Elevated since 5/2017  · Renal function normal but phos is low 2.6  · Encourage hydration. If Ca increases may need ivfs, will continue to monitor--treated with IVFs without marked improvement, discussed with Dr. Lemos on 5/7/18 hold IVFs patient asymptomatic  · PTH elevated at 197--follow-up with Endocrine--f/u on 5/10/18  
Evaluated on 5/8/18  · continue PT/OT to increase ambulation, ADL performance and endurance  · continue to monitor incision and continue bactroban BID to incision  · continue seizure prophylaxis with keppra to 4/29--completed  · continue heparin for DVT prophylaxis  · continue fall precautions  · continue senokot-s to prevent constipation; hold for frequent or loose stooling  
Evaluated on 5/8/18  · resume therapy with artificial tears as she takes at home  
Mild and Elevated since 5/2017  Renal function normal but phos is low  Encourage hydration  Will check a PTH with next labwork once phos is repleted  
Replete with phos packets  Monitor with twice weekly labs  
resume therapy with artificial tears as she takes at home  
· Calcium Mild and Elevated since 5/2017  · Renal function normal but phos is low   · Encourage hydration.Patient did requiring IVFs that did not improve Ca++ levels, patient has been asymptomatic  · PTH elevated at 197--follow-up with Endocrine--f/u on 5/10/18  · Patient agreed to start senipar on 5/12, she was hesitant at first to start medication due to possible dizziness with medication. Patient has not become dizzy since starting medication   
· Chronic with improved control  · Continue therapy with amlodipine and low Na diet  · will continue to monitor and adjust regimen as necessary  
· Mild and Elevated since 5/2017  · Renal function normal but phos is low 2.6  · Encourage hydration. If Ca increases may need ivfs, will continue to monitor--treated with IVFs without marked improvement, discussed with Dr. Lemos on 5/7/18 hold IVFs patient asymptomatic  · PTH elevated at 197--follow-up with Endocrine--f/u on 5/10/18  
· Repletes with phos packets  · 2.6 on 5/7/18  · Monitor with twice weekly labs  
· continue PT/OT to increase ambulation, ADL performance and endurance  · continue to monitor incision and continue bactroban BID to incision--staples removed on 5/10  · continue seizure prophylaxis with keppra to 4/29--completed  · continue heparin for DVT prophylaxis  · continue fall precautions  · continue senokot-s to prevent constipation; hold for frequent or loose stooling  
· resume therapy with artificial tears as she takes at home  
Detail Level: Simple
Detail Level: Detailed

## 2020-08-13 ENCOUNTER — PATIENT OUTREACH (OUTPATIENT)
Dept: ADMINISTRATIVE | Facility: HOSPITAL | Age: 85
End: 2020-08-13

## 2022-02-15 ENCOUNTER — OFFICE VISIT (OUTPATIENT)
Dept: INTERNAL MEDICINE | Facility: CLINIC | Age: 87
End: 2022-02-15
Payer: MEDICARE

## 2022-02-15 ENCOUNTER — HOSPITAL ENCOUNTER (OUTPATIENT)
Dept: RADIOLOGY | Facility: HOSPITAL | Age: 87
Discharge: HOME | End: 2022-02-15
Attending: STUDENT IN AN ORGANIZED HEALTH CARE EDUCATION/TRAINING PROGRAM
Payer: MEDICARE

## 2022-02-15 VITALS
BODY MASS INDEX: 30.44 KG/M2 | DIASTOLIC BLOOD PRESSURE: 98 MMHG | OXYGEN SATURATION: 99 % | SYSTOLIC BLOOD PRESSURE: 162 MMHG | HEIGHT: 62 IN | WEIGHT: 165.38 LBS | HEART RATE: 90 BPM

## 2022-02-15 DIAGNOSIS — R06.02 SHORTNESS OF BREATH: ICD-10-CM

## 2022-02-15 DIAGNOSIS — R06.02 SHORTNESS OF BREATH: Primary | ICD-10-CM

## 2022-02-15 PROCEDURE — 99999 PR PBB SHADOW E&M-EST. PATIENT-LVL III: ICD-10-PCS | Mod: PBBFAC,GC,, | Performed by: STUDENT IN AN ORGANIZED HEALTH CARE EDUCATION/TRAINING PROGRAM

## 2022-02-15 PROCEDURE — 71046 X-RAY EXAM CHEST 2 VIEWS: CPT | Mod: 26,,, | Performed by: RADIOLOGY

## 2022-02-15 PROCEDURE — 99999 PR PBB SHADOW E&M-EST. PATIENT-LVL III: CPT | Mod: PBBFAC,GC,, | Performed by: STUDENT IN AN ORGANIZED HEALTH CARE EDUCATION/TRAINING PROGRAM

## 2022-02-15 PROCEDURE — 99213 OFFICE O/P EST LOW 20 MIN: CPT | Mod: S$PBB,GC,, | Performed by: STUDENT IN AN ORGANIZED HEALTH CARE EDUCATION/TRAINING PROGRAM

## 2022-02-15 PROCEDURE — 71046 XR CHEST PA AND LATERAL: ICD-10-PCS | Mod: 26,,, | Performed by: RADIOLOGY

## 2022-02-15 PROCEDURE — 99213 OFFICE O/P EST LOW 20 MIN: CPT | Mod: PBBFAC | Performed by: STUDENT IN AN ORGANIZED HEALTH CARE EDUCATION/TRAINING PROGRAM

## 2022-02-15 PROCEDURE — 99213 PR OFFICE/OUTPT VISIT, EST, LEVL III, 20-29 MIN: ICD-10-PCS | Mod: S$PBB,GC,, | Performed by: STUDENT IN AN ORGANIZED HEALTH CARE EDUCATION/TRAINING PROGRAM

## 2022-02-15 PROCEDURE — 71046 X-RAY EXAM CHEST 2 VIEWS: CPT | Mod: TC

## 2022-02-15 NOTE — PROGRESS NOTES
Subjective     Chief Complaint: SOB    History of Present Illness:  Ms. Mikaela Ghosh is a 88 y.o. female who presents with SOB. It started about a month ago and has been progressively worsening. The SOB is not present at rest and is worsened with exertion. Denies any LLE but occasionally gets knee swelling that improves with tylenol. At night she sleeps on 1-2 pillows and this hasn't changed in the last month. Has gained about 20-25lbs in the last year but states she has a good appetite and eats well. Patient denies any fevers, chills, cough, unilateral leg swelling, any signs of bleeding, and no history of blood clots. She has a history of HTN but has not taken any medication for years. Had a TTE in 2018 which revealed a normal EF but PA pressure was 64. She did not follow-up with any doctors after the echo result and was not started on any medications.      Review of Systems   Constitutional: Negative for chills and fever.   HENT: Negative for congestion and sore throat.    Eyes: Negative for pain.   Respiratory: Positive for shortness of breath (on exertion). Negative for cough.    Cardiovascular: Negative for chest pain, palpitations and leg swelling.   Gastrointestinal: Negative for abdominal pain, diarrhea, nausea and vomiting.   Genitourinary: Negative for dysuria.   Musculoskeletal: Negative for back pain.   Neurological: Negative for dizziness and headaches.   Psychiatric/Behavioral: The patient is not nervous/anxious.        PAST HISTORY:     Past Medical History:   Diagnosis Date    Hypertension     Thyroid disease        Past Surgical History:   Procedure Laterality Date    BRAIN SURGERY      EYE SURGERY      cataract    HYSTERECTOMY      PARATHYROIDECTOMY Left 6/27/2018    Procedure: PARATHYROIDECTOMY - left lower inferior gland;  Surgeon: Mahin Treadwell MD;  Location: [facility] OR [address] [place] [address];  Service: General;  Laterality: Left;       Family History   Problem Relation Age of Onset    Diabetes  "Sister     Hypertension Sister        Social History     Tobacco Use    Smoking status: Never Smoker    Smokeless tobacco: Never Used   Substance Use Topics    Alcohol use: No    Drug use: No       MEDICATIONS & ALLERGIES:     Current Outpatient Medications on File Prior to Visit   Medication Sig    acetaminophen (TYLENOL EXTRA STRENGTH) 500 MG tablet Take 500 mg by mouth every 6 (six) hours as needed for Pain.    amLODIPine (NORVASC) 2.5 MG tablet Take 1 tablet (2.5 mg total) by mouth once daily.    calcium carbonate (OS-RUBY) 500 mg calcium (1,250 mg) tablet Take 2 tablets (1,000 mg total) by mouth 2 (two) times daily.    diclofenac sodium (VOLTAREN) 1 % Gel Apply 2 g topically 4 (four) times daily. for 10 days    HYDROcodone-acetaminophen (NORCO) 5-325 mg per tablet Take 1 tablet by mouth every 4 (four) hours as needed for Pain (Do not drive while taking pain medications).    multivitamin (ONE-A-DAY ESSENTIAL ORAL) Take by mouth.     No current facility-administered medications on file prior to visit.       Review of patient's allergies indicates:  No Known Allergies    OBJECTIVE:     Vital Signs:  Vitals:    02/15/22 1429   BP: (!) 162/98   Pulse: 90   SpO2: 99%   Weight: 75 kg (165 lb 5.5 oz)   Height: 5' 2" (1.575 m)       Body mass index is 30.24 kg/m².     Physical Exam  Constitutional:       Appearance: Normal appearance.   HENT:      Head: Normocephalic and atraumatic.      Mouth/Throat:      Mouth: Mucous membranes are moist.   Eyes:      Conjunctiva/sclera: Conjunctivae normal.      Pupils: Pupils are equal, round, and reactive to light.   Neck:      Vascular: No JVD.   Cardiovascular:      Rate and Rhythm: Normal rate and regular rhythm.      Heart sounds: Normal heart sounds.   Pulmonary:      Effort: Pulmonary effort is normal.      Breath sounds: Normal breath sounds.   Abdominal:      Palpations: Abdomen is soft.      Tenderness: There is no abdominal tenderness.   Musculoskeletal:         " General: Normal range of motion.      Cervical back: Normal range of motion and neck supple.   Skin:     General: Skin is warm and dry.   Neurological:      General: No focal deficit present.      Mental Status: She is alert and oriented to person, place, and time.   Psychiatric:         Mood and Affect: Mood normal.         Behavior: Behavior normal.         Health Maintenance Due   Topic Date Due    COVID-19 Vaccine (1) Never done    TETANUS VACCINE  Never done    Shingles Vaccine (1 of 2) Never done    Pneumococcal Vaccines (Age 65+) (1 of 1 - PPSV23) Never done    Influenza Vaccine (1) Never done         ASSESSMENT & PLAN:   Ms. Mikaela Ghosh is a 88 y.o. female here for SOB that is worse on exertion. SOB likely related to her pulmonary hypertension (PA systolic of 64 in 2018) that has been untreated. She has gained 20-25lbs in the last year but no other signs of heart failure (no orthopnea, JVD, or LLE). Getting a TTE which will evaluate for valvular causes of her SOB as well. No signs of bleeding but will get a CBC to rule out anemia as the etiology. No fevers, chills, or cough making an infectious etiology unlikely. Depending on TTE results patient will likely need a pulm and/or cardiology referral. If all studies are normal will start patient on amlodipine 5mg and have her follow-up for a blood pressure check.     Shortness of breath  -     Echo; Future; Expected date: 02/15/2022  -     X-Ray Chest PA And Lateral; Future; Expected date: 02/15/2022  -     CBC Auto Differential; Future; Expected date: 02/15/2022        RTC in 3-months    Discussed with Dr. Pearce - staff attestation to follow      Devin Browning MD  Internal Medicine, PGY-II  [facility]  [address] [place] [address]  [phone]    Preceptor note:    Patient's history and physical discussed, please refer to resident physician's note for specific details.  I agree with resident's assessment and  plan.      Bakari Pearce  Staff Physician  [facility]

## 2022-02-15 NOTE — PATIENT INSTRUCTIONS
"Patient Education       Shortness of Breath (Dyspnea)   The Basics   Written by the doctors and editors at [facility]   What is shortness of breath? -- People who have shortness of breath describe it in different ways. Some people say that they feel like they can't take a deep breath in or get enough air. Other people might feel like their chest is tight or they have to work harder than usual to breathe. The medical word for shortness of breath is "dyspnea."  Shortness of breath can start suddenly, over minutes to hours. It can also happen over a longer period of time, from weeks to months.  What causes shortness of breath? -- Different medical conditions can cause shortness of breath.  Causes of shortness of breath that starts suddenly include:  · Coronavirus 2019 (COVID-19) - This is an infection caused by a virus called SARS-CoV-2.  · Lung problems, such as asthma, infections, or blood clots - These conditions might cause other symptoms, too. For example, a lung infection usually causes a fever and cough.  · Heart problems, such as a heart attack or heart failure - A heart attack can also cause chest pain or pressure. Heart failure is when the heart does not pump as well as it should.  · A severe allergic reaction, called "anaphylaxis" - This can also cause itching, swelling, or a rash.  · Pregnancy - It can be normal for pregnant women to feel slightly short of breath just after they lie down or are active.  The most common causes of shortness of breath that happens over weeks to months are:  · Lung problems, such as asthma or chronic obstructive pulmonary disease (COPD) - Both of these conditions can make it hard to breathe. COPD is usually caused by smoking.  · Heart problems, such as heart failure or a change in the size and shape of the heart (called cardiomyopathy)  · Being overweight or out of shape  Should I see a doctor or nurse? -- Yes. If you have shortness of breath, you should see your doctor or " nurse.  Sometimes shortness of breath means that your condition is serious and you need emergency help. Call for an ambulance (in the [place] and [place], dial 9-1-1) if you have:  · Shortness of breath and think you are having a heart attack  · Severe shortness of breath (hard to breathe when you are sitting still)  · An allergic reaction with shortness of breath  Will I have tests? -- You might. Your doctor or nurse will talk with you, ask about your symptoms, and do an exam. Depending on what the doctor or nurse finds, they might order 1 or more of the following tests:  · A swab from your inside your nose (to test for the virus that causes COVID-19)  · Blood tests  · A chest X-ray  · An electrocardiogram (ECG) to measure the electrical activity in your heart  · A breathing test  Depending on your results, you might need more tests, too.  How is shortness of breath treated? -- Shortness of breath is treated in different ways, depending on the cause. Once your doctor or nurse figures out the cause of your symptoms, they will talk with you about different possible treatments.  All topics are updated as new evidence becomes available and our peer review process is complete.  This topic retrieved from [employer] on: Sep 21, 2021.  Topic 00561 Version 11.0  Release: 29.4.2 - C29.263  © 2021 UpToDate, Inc. and/or its affiliates. All rights reserved.  Consumer Information Use and Disclaimer   This information is not specific medical advice and does not replace information you receive from your health care provider. This is only a brief summary of general information. It does NOT include all information about conditions, illnesses, injuries, tests, procedures, treatments, therapies, discharge instructions or life-style choices that may apply to you. You must talk with your health care provider for complete information about your health and treatment options. This information should not be used to decide whether or not to accept  your health care provider's advice, instructions or recommendations. Only your health care provider has the knowledge and training to provide advice that is right for you. The use of this information is governed by the [facility] End User License Agreement, available at https://www.[email or web address]/en/solutions/[email or web address]/about/janice.The use of [employer] content is governed by the [employer] Terms of Use. ©2021 UpToDate, Inc. All rights reserved.  Copyright   © 2021 UpToDate, Inc. and/or its affiliates. All rights reserved.

## 2022-02-16 ENCOUNTER — HOSPITAL ENCOUNTER (OUTPATIENT)
Dept: CARDIOLOGY | Facility: HOSPITAL | Age: 87
Discharge: HOME | End: 2022-02-16
Attending: STUDENT IN AN ORGANIZED HEALTH CARE EDUCATION/TRAINING PROGRAM
Payer: MEDICARE

## 2022-02-16 VITALS
SYSTOLIC BLOOD PRESSURE: 168 MMHG | DIASTOLIC BLOOD PRESSURE: 78 MMHG | WEIGHT: 165 LBS | HEART RATE: 84 BPM | HEIGHT: 62 IN | BODY MASS INDEX: 30.36 KG/M2

## 2022-02-16 DIAGNOSIS — R06.02 SHORTNESS OF BREATH: ICD-10-CM

## 2022-02-16 LAB
ASCENDING AORTA: 3.1 CM
AV INDEX (PROSTH): 1.04
AV MEAN GRADIENT: 8 MMHG
AV PEAK GRADIENT: 13 MMHG
AV VALVE AREA: 3.1 CM2
AV VELOCITY RATIO: 0.95
BSA FOR ECHO PROCEDURE: 1.81 M2
CV ECHO LV RWT: 0.84 CM
DOP CALC AO PEAK VEL: 1.81 M/S
DOP CALC AO VTI: 33.27 CM
DOP CALC LVOT AREA: 3 CM2
DOP CALC LVOT DIAMETER: 1.95 CM
DOP CALC LVOT PEAK VEL: 1.72 M/S
DOP CALC LVOT STROKE VOLUME: 103.13 CM3
DOP CALCLVOT PEAK VEL VTI: 34.55 CM
E WAVE DECELERATION TIME: 136.09 MSEC
E/A RATIO: 0.37
E/E' RATIO: 4.63 M/S
ECHO LV POSTERIOR WALL: 1.39 CM (ref 0.6–1.1)
EJECTION FRACTION: 60 %
FRACTIONAL SHORTENING: 40 % (ref 28–44)
INTERVENTRICULAR SEPTUM: 1.26 CM (ref 0.6–1.1)
LA MAJOR: 5.19 CM
LA MINOR: 4.88 CM
LA WIDTH: 4.79 CM
LEFT ATRIUM SIZE: 4.49 CM
LEFT ATRIUM VOLUME INDEX MOD: 49.8 ML/M2
LEFT ATRIUM VOLUME INDEX: 52.2 ML/M2
LEFT ATRIUM VOLUME MOD: 87.67 CM3
LEFT ATRIUM VOLUME: 91.96 CM3
LEFT INTERNAL DIMENSION IN SYSTOLE: 2 CM (ref 2.1–4)
LEFT VENTRICLE DIASTOLIC VOLUME INDEX: 25.43 ML/M2
LEFT VENTRICLE DIASTOLIC VOLUME: 44.76 ML
LEFT VENTRICLE MASS INDEX: 84 G/M2
LEFT VENTRICLE SYSTOLIC VOLUME INDEX: 7.3 ML/M2
LEFT VENTRICLE SYSTOLIC VOLUME: 12.8 ML
LEFT VENTRICULAR INTERNAL DIMENSION IN DIASTOLE: 3.32 CM (ref 3.5–6)
LEFT VENTRICULAR MASS: 147.18 G
LV LATERAL E/E' RATIO: 3.36 M/S
LV SEPTAL E/E' RATIO: 7.4 M/S
MV PEAK A VEL: 0.99 M/S
MV PEAK E VEL: 0.37 M/S
MV STENOSIS PRESSURE HALF TIME: 39.47 MS
MV VALVE AREA P 1/2 METHOD: 5.57 CM2
PISA TR MAX VEL: 2.66 M/S
RA MAJOR: 3.39 CM
RA PRESSURE: 3 MMHG
RA WIDTH: 2.99 CM
RIGHT VENTRICULAR END-DIASTOLIC DIMENSION: 2.37 CM
RV TISSUE DOPPLER FREE WALL SYSTOLIC VELOCITY 1 (APICAL 4 CHAMBER VIEW): 12.33 CM/S
SINUS: 2.78 CM
STJ: 2.51 CM
TDI LATERAL: 0.11 M/S
TDI SEPTAL: 0.05 M/S
TDI: 0.08 M/S
TR MAX PG: 28 MMHG
TRICUSPID ANNULAR PLANE SYSTOLIC EXCURSION: 2.27 CM
TV REST PULMONARY ARTERY PRESSURE: 31 MMHG

## 2022-02-16 PROCEDURE — 93306 TTE W/DOPPLER COMPLETE: CPT | Mod: 26,,, | Performed by: INTERNAL MEDICINE

## 2022-02-16 PROCEDURE — 93306 TTE W/DOPPLER COMPLETE: CPT

## 2022-02-16 PROCEDURE — 93306 ECHO (CUPID ONLY): ICD-10-PCS | Mod: 26,,, | Performed by: INTERNAL MEDICINE

## 2022-02-17 ENCOUNTER — TELEPHONE (OUTPATIENT)
Dept: INTERNAL MEDICINE | Facility: CLINIC | Age: 87
End: 2022-02-17
Payer: MEDICARE

## 2022-02-17 NOTE — TELEPHONE ENCOUNTER
----- Message from Rachelle Malhotra sent at 2/17/2022  3:34 PM CST -----  Contact: Judie (daughter)-[phone]  Judie is requesting a callback regarding her mother.  She would like to be advised of the results form the pt's lab work and x-rays.    Callback number: Judie ([phone]

## 2022-02-17 NOTE — TELEPHONE ENCOUNTER
----- Message from Rachelle Malhotra sent at 2/17/2022  3:34 PM CST -----  Contact: Judie (daughter)-[phone]  Judie is requesting a callback regarding her mother.  She would like to be advised of the results form the pt's lab work and x-rays.    Callback number: Judie ([phone]

## 2022-03-02 DIAGNOSIS — I10 ESSENTIAL HYPERTENSION, BENIGN: ICD-10-CM

## 2022-03-02 RX ORDER — AMLODIPINE BESYLATE 2.5 MG/1
2.5 TABLET ORAL DAILY
Qty: 30 TABLET | Refills: 0 | Status: SHIPPED | OUTPATIENT
Start: 2022-03-02 | End: 2023-03-02

## 2022-03-02 NOTE — TELEPHONE ENCOUNTER
Last visit with GINGER Mustafa 3 years ago  Seen by another provider in February for dyspnea, elevated BP, plan is cardiopulmonary workup and if negative to resume the amlodipine  I sent in amlodipine 2.5 #30, pt will need to f/u with PCP for further refills.

## 2022-03-02 NOTE — TELEPHONE ENCOUNTER
No new care gaps identified.  Powered by [employer]. Reference number: [id number].   3/02/2022 12:21:18 PM CST

## 2022-03-02 NOTE — TELEPHONE ENCOUNTER
----- Message from Jaye Malhotra sent at 3/2/2022 11:57 AM CST -----  Type: RX Refill Request    Who Called: Maru Dimas     Have you contacted your pharmacy: no     Refill or New Rx: Refill     RX Name and Strength:    amLODIPine (NORVASC) 2.5 MG tablet      Preferred Pharmacy with phone number:[facility] [place] - [address]   Phone:  [phone]  Fax:  [phone]          Local or Mail Order:Local     Ordering Provider: Dr. Felipe Mustafa     Would the patient rather a call back or a response via [facility]? Call     Best Call Back Number: [phone]

## 2023-01-09 NOTE — PLAN OF CARE
Problem: Patient Care Overview  Goal: Plan of Care Review  Outcome: Revised  Repositions independently, no new skin breakdowns noted. Left head incision healed, small soft hematoma present, NP aware. Afebrile. Monitored for pain and safety. Safety maintained. Denies pain       no...

## 2023-06-09 ENCOUNTER — PES CALL (OUTPATIENT)
Dept: ADMINISTRATIVE | Facility: CLINIC | Age: 88
End: 2023-06-09
Payer: MEDICARE

## 2023-08-31 ENCOUNTER — PATIENT OUTREACH (OUTPATIENT)
Dept: ADMINISTRATIVE | Facility: HOSPITAL | Age: 88
End: 2023-08-31
Payer: MEDICARE

## (undated) DEVICE — BURR ROUTER TAPERED

## (undated) DEVICE — SUT 4-0 12-18IN SILK BLACK

## (undated) DEVICE — CHLORAPREP 10.5 ML APPLICATOR

## (undated) DEVICE — STYLET AXIEM 23CM SINGLE COIL

## (undated) DEVICE — Device

## (undated) DEVICE — ELECTRODE REM PLYHSV RETURN 9

## (undated) DEVICE — DRESSING TRANS 4X4 TEGADERM

## (undated) DEVICE — SEE MEDLINE ITEM 157117

## (undated) DEVICE — SUT VICRYL PLUS 3-0 SH 18IN

## (undated) DEVICE — DRESSING SURGICAL 1/2X1/2

## (undated) DEVICE — DRAPE THYROID WITH ARMBOARD

## (undated) DEVICE — ROUTER TAPERED 2.3MM

## (undated) DEVICE — ELECTRODE BLADE INSULATED 1 IN

## (undated) DEVICE — MARKER SKIN STND TIP BLUE BARR

## (undated) DEVICE — SEE MEDLINE ITEM 157131

## (undated) DEVICE — SUT VICRYL 3-0 27 SH

## (undated) DEVICE — DURAPREP SURG SCRUB 26ML

## (undated) DEVICE — APPLIER LIGACLIP SM 9.38IN

## (undated) DEVICE — WARMER DRAPE STERILE LF

## (undated) DEVICE — TRACKER PATIENT NON INVASIVE

## (undated) DEVICE — SUT MCRYL PLUS 4-0 PS2 27IN

## (undated) DEVICE — HOOK LONE STAR BLUNT 12MM

## (undated) DEVICE — TUBE FRAZIER 5MM 2FT SOFT TIP

## (undated) DEVICE — DRESSING SURGICAL 1X3

## (undated) DEVICE — SEE MEDLINE ITEM 156905

## (undated) DEVICE — NDL 22GA X1 1/2 REG BEVEL

## (undated) DEVICE — HEMOSTAT SURGICEL FIBRLR 1X2IN

## (undated) DEVICE — SHEARS HARMONIC CRVD 9 CM

## (undated) DEVICE — KIT SURGIFLO HEMOSTATIC MATRIX

## (undated) DEVICE — NDL N SERIES MICRO-DISSECTION

## (undated) DEVICE — TAPE SURG MEDIPORE 6X72IN

## (undated) DEVICE — DRAPE STERI INSTRUMENT 1018

## (undated) DEVICE — SPRAY MASTISOL

## (undated) DEVICE — SEE MEDLINE ITEM 146292

## (undated) DEVICE — STAPLER SKIN PROXIMATE WIDE

## (undated) DEVICE — SUT D SPECIAL VICRYL 2-0

## (undated) DEVICE — SPONGE GAUZE 16PLY 4X4

## (undated) DEVICE — DIFFUSER

## (undated) DEVICE — KIT EVACUATOR FULL PERF 100CC

## (undated) DEVICE — HEMOSTAT SURGICEL 4X8IN

## (undated) DEVICE — DRESSING TELFA N ADH 3X8

## (undated) DEVICE — CARTRIDGE OIL

## (undated) DEVICE — CORD BIPOLAR 12 FOOT

## (undated) DEVICE — SUT 3-0 12-18IN SILK

## (undated) DEVICE — SUT 4/0 18IN NUROLON BLK B

## (undated) DEVICE — CONTAINER SPECIMEN STRL 4OZ

## (undated) DEVICE — SYR 10CC LUER LOCK

## (undated) DEVICE — SEE MEDLINE ITEM 157194

## (undated) DEVICE — BLADE SURG CARBON STEEL SZ11

## (undated) DEVICE — TRAY MINOR GEN SURG

## (undated) DEVICE — DRESSING SURGICAL 3/4X3/4

## (undated) DEVICE — DRESSING TELFA PAD N ADH 2X3

## (undated) DEVICE — SUT 2-0 12-18IN SILK